# Patient Record
Sex: FEMALE | Race: BLACK OR AFRICAN AMERICAN | NOT HISPANIC OR LATINO | Employment: FULL TIME | URBAN - METROPOLITAN AREA
[De-identification: names, ages, dates, MRNs, and addresses within clinical notes are randomized per-mention and may not be internally consistent; named-entity substitution may affect disease eponyms.]

---

## 2017-09-17 PROBLEM — E55.9 UNSPECIFIED VITAMIN D DEFICIENCY: Status: ACTIVE | Noted: 2017-09-17

## 2017-09-17 PROBLEM — E88.810 DYSMETABOLIC SYNDROME X: Status: ACTIVE | Noted: 2017-09-17

## 2017-09-17 PROBLEM — E78.5 OTHER AND UNSPECIFIED HYPERLIPIDEMIA: Status: ACTIVE | Noted: 2017-09-17

## 2017-09-17 PROBLEM — E03.9 UNSPECIFIED HYPOTHYROIDISM: Status: ACTIVE | Noted: 2017-09-17

## 2017-10-10 PROBLEM — E78.49 OTHER HYPERLIPIDEMIA: Status: ACTIVE | Noted: 2017-09-17

## 2018-06-06 PROBLEM — Z00.00 ROUTINE GENERAL MEDICAL EXAMINATION AT A HEALTH CARE FACILITY: Status: ACTIVE | Noted: 2018-06-06

## 2018-06-06 PROBLEM — Z11.3 SCREENING EXAMINATION FOR VENEREAL DISEASE: Status: ACTIVE | Noted: 2018-06-06

## 2018-09-10 PROBLEM — Z00.00 ROUTINE GENERAL MEDICAL EXAMINATION AT A HEALTH CARE FACILITY: Status: RESOLVED | Noted: 2018-06-06 | Resolved: 2018-09-10

## 2018-09-29 PROBLEM — J32.9 SINUSITIS: Status: ACTIVE | Noted: 2018-09-29

## 2018-10-25 PROBLEM — J34.2 DEVIATED NASAL SEPTUM: Status: ACTIVE | Noted: 2018-10-25

## 2018-10-25 PROBLEM — J30.9 ALLERGIC RHINITIS: Status: ACTIVE | Noted: 2018-10-25

## 2019-07-09 PROBLEM — I10 ESSENTIAL HYPERTENSION, BENIGN: Status: ACTIVE | Noted: 2019-07-09

## 2019-07-15 PROBLEM — D72.810 LYMPHOCYTOPENIA: Status: ACTIVE | Noted: 2019-07-15

## 2019-07-31 PROBLEM — Z71.3 NUTRITIONAL COUNSELING: Status: ACTIVE | Noted: 2019-07-31

## 2019-10-14 PROBLEM — Z00.00 ROUTINE GENERAL MEDICAL EXAMINATION AT A HEALTH CARE FACILITY: Status: RESOLVED | Noted: 2018-06-06 | Resolved: 2019-10-14

## 2020-03-18 PROBLEM — F41.9 ANXIETY: Status: ACTIVE | Noted: 2020-03-18

## 2021-04-29 ENCOUNTER — PATIENT MESSAGE (OUTPATIENT)
Dept: RESEARCH | Facility: HOSPITAL | Age: 51
End: 2021-04-29

## 2021-09-02 PROBLEM — K21.9 GERD (GASTROESOPHAGEAL REFLUX DISEASE): Status: ACTIVE | Noted: 2020-09-22

## 2021-09-16 ENCOUNTER — OFFICE VISIT (OUTPATIENT)
Dept: GASTROENTEROLOGY | Facility: CLINIC | Age: 51
End: 2021-09-16
Payer: COMMERCIAL

## 2021-09-16 VITALS
HEART RATE: 68 BPM | HEIGHT: 62 IN | SYSTOLIC BLOOD PRESSURE: 110 MMHG | OXYGEN SATURATION: 98 % | WEIGHT: 199.06 LBS | DIASTOLIC BLOOD PRESSURE: 74 MMHG | BODY MASS INDEX: 36.63 KG/M2

## 2021-09-16 DIAGNOSIS — E88.810 DYSMETABOLIC SYNDROME X: ICD-10-CM

## 2021-09-16 DIAGNOSIS — Z12.11 COLON CANCER SCREENING: ICD-10-CM

## 2021-09-16 DIAGNOSIS — K59.00 CONSTIPATION, UNSPECIFIED CONSTIPATION TYPE: Primary | ICD-10-CM

## 2021-09-16 PROCEDURE — 3074F SYST BP LT 130 MM HG: CPT | Mod: CPTII,S$GLB,, | Performed by: PHYSICIAN ASSISTANT

## 2021-09-16 PROCEDURE — 1159F MED LIST DOCD IN RCRD: CPT | Mod: CPTII,S$GLB,, | Performed by: PHYSICIAN ASSISTANT

## 2021-09-16 PROCEDURE — 3078F DIAST BP <80 MM HG: CPT | Mod: CPTII,S$GLB,, | Performed by: PHYSICIAN ASSISTANT

## 2021-09-16 PROCEDURE — 99999 PR PBB SHADOW E&M-EST. PATIENT-LVL V: CPT | Mod: PBBFAC,,, | Performed by: PHYSICIAN ASSISTANT

## 2021-09-16 PROCEDURE — 99203 PR OFFICE/OUTPT VISIT, NEW, LEVL III, 30-44 MIN: ICD-10-PCS | Mod: S$GLB,,, | Performed by: PHYSICIAN ASSISTANT

## 2021-09-16 PROCEDURE — 3008F BODY MASS INDEX DOCD: CPT | Mod: CPTII,S$GLB,, | Performed by: PHYSICIAN ASSISTANT

## 2021-09-16 PROCEDURE — 3074F PR MOST RECENT SYSTOLIC BLOOD PRESSURE < 130 MM HG: ICD-10-PCS | Mod: CPTII,S$GLB,, | Performed by: PHYSICIAN ASSISTANT

## 2021-09-16 PROCEDURE — 1159F PR MEDICATION LIST DOCUMENTED IN MEDICAL RECORD: ICD-10-PCS | Mod: CPTII,S$GLB,, | Performed by: PHYSICIAN ASSISTANT

## 2021-09-16 PROCEDURE — 3008F PR BODY MASS INDEX (BMI) DOCUMENTED: ICD-10-PCS | Mod: CPTII,S$GLB,, | Performed by: PHYSICIAN ASSISTANT

## 2021-09-16 PROCEDURE — 99999 PR PBB SHADOW E&M-EST. PATIENT-LVL V: ICD-10-PCS | Mod: PBBFAC,,, | Performed by: PHYSICIAN ASSISTANT

## 2021-09-16 PROCEDURE — 3078F PR MOST RECENT DIASTOLIC BLOOD PRESSURE < 80 MM HG: ICD-10-PCS | Mod: CPTII,S$GLB,, | Performed by: PHYSICIAN ASSISTANT

## 2021-09-16 PROCEDURE — 99203 OFFICE O/P NEW LOW 30 MIN: CPT | Mod: S$GLB,,, | Performed by: PHYSICIAN ASSISTANT

## 2021-09-16 RX ORDER — SOD SULF/POT CHLORIDE/MAG SULF 1.479 G
12 TABLET ORAL DAILY
Qty: 24 TABLET | Refills: 0 | Status: ON HOLD | OUTPATIENT
Start: 2021-09-16 | End: 2021-10-15 | Stop reason: HOSPADM

## 2021-09-28 ENCOUNTER — TELEPHONE (OUTPATIENT)
Dept: ENDOSCOPY | Facility: HOSPITAL | Age: 51
End: 2021-09-28

## 2021-10-12 ENCOUNTER — LAB VISIT (OUTPATIENT)
Dept: PRIMARY CARE CLINIC | Facility: CLINIC | Age: 51
End: 2021-10-12
Payer: COMMERCIAL

## 2021-10-12 DIAGNOSIS — Z12.11 COLON CANCER SCREENING: ICD-10-CM

## 2021-10-12 PROCEDURE — U0005 INFEC AGEN DETEC AMPLI PROBE: HCPCS | Performed by: PHYSICIAN ASSISTANT

## 2021-10-12 PROCEDURE — U0003 INFECTIOUS AGENT DETECTION BY NUCLEIC ACID (DNA OR RNA); SEVERE ACUTE RESPIRATORY SYNDROME CORONAVIRUS 2 (SARS-COV-2) (CORONAVIRUS DISEASE [COVID-19]), AMPLIFIED PROBE TECHNIQUE, MAKING USE OF HIGH THROUGHPUT TECHNOLOGIES AS DESCRIBED BY CMS-2020-01-R: HCPCS | Performed by: PHYSICIAN ASSISTANT

## 2021-10-13 ENCOUNTER — ANESTHESIA EVENT (OUTPATIENT)
Dept: ENDOSCOPY | Facility: HOSPITAL | Age: 51
End: 2021-10-13
Payer: COMMERCIAL

## 2021-10-13 LAB
SARS-COV-2 RNA RESP QL NAA+PROBE: NOT DETECTED
SARS-COV-2- CYCLE NUMBER: NORMAL

## 2021-10-15 ENCOUNTER — HOSPITAL ENCOUNTER (OUTPATIENT)
Facility: HOSPITAL | Age: 51
Discharge: HOME | End: 2021-10-15
Attending: INTERNAL MEDICINE | Admitting: INTERNAL MEDICINE
Payer: COMMERCIAL

## 2021-10-15 ENCOUNTER — ANESTHESIA (OUTPATIENT)
Dept: ENDOSCOPY | Facility: HOSPITAL | Age: 51
End: 2021-10-15
Payer: COMMERCIAL

## 2021-10-15 VITALS
OXYGEN SATURATION: 100 % | HEIGHT: 62 IN | TEMPERATURE: 98 F | WEIGHT: 196.75 LBS | SYSTOLIC BLOOD PRESSURE: 108 MMHG | BODY MASS INDEX: 36.2 KG/M2 | RESPIRATION RATE: 19 BRPM | DIASTOLIC BLOOD PRESSURE: 58 MMHG | HEART RATE: 60 BPM

## 2021-10-15 DIAGNOSIS — Z12.11 ENCOUNTER FOR SCREENING COLONOSCOPY: Primary | ICD-10-CM

## 2021-10-15 LAB
B-HCG UR QL: NEGATIVE
B-HCG UR QL: NEGATIVE
CTP QC/QA: YES
CTP QC/QA: YES
POCT GLUCOSE: 103 MG/DL (ref 70–110)

## 2021-10-15 PROCEDURE — 45380 COLONOSCOPY AND BIOPSY: CPT | Performed by: INTERNAL MEDICINE

## 2021-10-15 PROCEDURE — 88305 TISSUE EXAM BY PATHOLOGIST: CPT | Performed by: PATHOLOGY

## 2021-10-15 PROCEDURE — 63600175 PHARM REV CODE 636 W HCPCS: Performed by: INTERNAL MEDICINE

## 2021-10-15 PROCEDURE — D9220A PRA ANESTHESIA: ICD-10-PCS | Mod: 33,,, | Performed by: NURSE ANESTHETIST, CERTIFIED REGISTERED

## 2021-10-15 PROCEDURE — 27201012 HC FORCEPS, HOT/COLD, DISP: Performed by: INTERNAL MEDICINE

## 2021-10-15 PROCEDURE — 81025 URINE PREGNANCY TEST: CPT | Performed by: INTERNAL MEDICINE

## 2021-10-15 PROCEDURE — 88305 TISSUE EXAM BY PATHOLOGIST: ICD-10-PCS | Mod: 26,,, | Performed by: PATHOLOGY

## 2021-10-15 PROCEDURE — D9220A PRA ANESTHESIA: ICD-10-PCS | Mod: 33,,, | Performed by: ANESTHESIOLOGY

## 2021-10-15 PROCEDURE — 25000003 PHARM REV CODE 250: Performed by: NURSE ANESTHETIST, CERTIFIED REGISTERED

## 2021-10-15 PROCEDURE — D9220A PRA ANESTHESIA: Mod: 33,,, | Performed by: NURSE ANESTHETIST, CERTIFIED REGISTERED

## 2021-10-15 PROCEDURE — 37000009 HC ANESTHESIA EA ADD 15 MINS: Performed by: INTERNAL MEDICINE

## 2021-10-15 PROCEDURE — 63600175 PHARM REV CODE 636 W HCPCS: Performed by: NURSE ANESTHETIST, CERTIFIED REGISTERED

## 2021-10-15 PROCEDURE — 45385 COLONOSCOPY W/LESION REMOVAL: CPT | Performed by: INTERNAL MEDICINE

## 2021-10-15 PROCEDURE — 37000008 HC ANESTHESIA 1ST 15 MINUTES: Performed by: INTERNAL MEDICINE

## 2021-10-15 PROCEDURE — 88305 TISSUE EXAM BY PATHOLOGIST: CPT | Mod: 26,,, | Performed by: PATHOLOGY

## 2021-10-15 PROCEDURE — 45380 COLONOSCOPY AND BIOPSY: CPT | Mod: 59,,, | Performed by: INTERNAL MEDICINE

## 2021-10-15 PROCEDURE — 27201089 HC SNARE, DISP (ANY): Performed by: INTERNAL MEDICINE

## 2021-10-15 PROCEDURE — 82962 GLUCOSE BLOOD TEST: CPT | Performed by: INTERNAL MEDICINE

## 2021-10-15 PROCEDURE — D9220A PRA ANESTHESIA: Mod: 33,,, | Performed by: ANESTHESIOLOGY

## 2021-10-15 PROCEDURE — 45385 COLONOSCOPY W/LESION REMOVAL: CPT | Mod: 33,,, | Performed by: INTERNAL MEDICINE

## 2021-10-15 PROCEDURE — 45380 PR COLONOSCOPY,BIOPSY: ICD-10-PCS | Mod: 59,,, | Performed by: INTERNAL MEDICINE

## 2021-10-15 PROCEDURE — 45385 PR COLONOSCOPY,REMV LESN,SNARE: ICD-10-PCS | Mod: 33,,, | Performed by: INTERNAL MEDICINE

## 2021-10-15 RX ORDER — PROPOFOL 10 MG/ML
VIAL (ML) INTRAVENOUS
Status: DISCONTINUED | OUTPATIENT
Start: 2021-10-15 | End: 2021-10-15

## 2021-10-15 RX ORDER — SODIUM CHLORIDE, SODIUM LACTATE, POTASSIUM CHLORIDE, CALCIUM CHLORIDE 600; 310; 30; 20 MG/100ML; MG/100ML; MG/100ML; MG/100ML
INJECTION, SOLUTION INTRAVENOUS CONTINUOUS
Status: DISCONTINUED | OUTPATIENT
Start: 2021-10-15 | End: 2021-10-15 | Stop reason: HOSPADM

## 2021-10-15 RX ORDER — LIDOCAINE HYDROCHLORIDE 20 MG/ML
INJECTION, SOLUTION EPIDURAL; INFILTRATION; INTRACAUDAL; PERINEURAL
Status: DISCONTINUED | OUTPATIENT
Start: 2021-10-15 | End: 2021-10-15

## 2021-10-15 RX ORDER — SODIUM CHLORIDE 0.9 % (FLUSH) 0.9 %
10 SYRINGE (ML) INJECTION
Status: DISCONTINUED | OUTPATIENT
Start: 2021-10-15 | End: 2021-10-15 | Stop reason: HOSPADM

## 2021-10-15 RX ADMIN — PROPOFOL 100 MG: 10 INJECTION, EMULSION INTRAVENOUS at 06:10

## 2021-10-15 RX ADMIN — PROPOFOL 50 MG: 10 INJECTION, EMULSION INTRAVENOUS at 07:10

## 2021-10-15 RX ADMIN — PROPOFOL 30 MG: 10 INJECTION, EMULSION INTRAVENOUS at 07:10

## 2021-10-15 RX ADMIN — LIDOCAINE HYDROCHLORIDE 100 MG: 20 INJECTION, SOLUTION EPIDURAL; INFILTRATION; INTRACAUDAL; PERINEURAL at 06:10

## 2021-10-15 RX ADMIN — PROPOFOL 20 MG: 10 INJECTION, EMULSION INTRAVENOUS at 07:10

## 2021-10-15 RX ADMIN — SODIUM CHLORIDE, SODIUM LACTATE, POTASSIUM CHLORIDE, AND CALCIUM CHLORIDE: .6; .31; .03; .02 INJECTION, SOLUTION INTRAVENOUS at 06:10

## 2021-10-22 LAB
FINAL PATHOLOGIC DIAGNOSIS: NORMAL
Lab: NORMAL

## 2022-01-18 ENCOUNTER — TELEPHONE (OUTPATIENT)
Dept: ORTHOPEDICS | Facility: CLINIC | Age: 52
End: 2022-01-18
Payer: COMMERCIAL

## 2022-01-28 DIAGNOSIS — M25.512 CHRONIC LEFT SHOULDER PAIN: Primary | ICD-10-CM

## 2022-01-28 DIAGNOSIS — G89.29 CHRONIC LEFT SHOULDER PAIN: Primary | ICD-10-CM

## 2022-01-31 ENCOUNTER — OFFICE VISIT (OUTPATIENT)
Dept: SPORTS MEDICINE | Facility: CLINIC | Age: 52
End: 2022-01-31
Payer: COMMERCIAL

## 2022-01-31 ENCOUNTER — HOSPITAL ENCOUNTER (OUTPATIENT)
Dept: RADIOLOGY | Facility: HOSPITAL | Age: 52
Discharge: HOME | End: 2022-01-31
Attending: PHYSICAL MEDICINE & REHABILITATION
Payer: COMMERCIAL

## 2022-01-31 VITALS — HEIGHT: 62 IN | WEIGHT: 197 LBS | BODY MASS INDEX: 36.25 KG/M2

## 2022-01-31 DIAGNOSIS — M25.512 CHRONIC LEFT SHOULDER PAIN: ICD-10-CM

## 2022-01-31 DIAGNOSIS — G89.29 CHRONIC LEFT SHOULDER PAIN: ICD-10-CM

## 2022-01-31 DIAGNOSIS — M75.112 NONTRAUMATIC INCOMPLETE TEAR OF LEFT ROTATOR CUFF: Primary | ICD-10-CM

## 2022-01-31 PROCEDURE — 3066F PR DOCUMENTATION OF TREATMENT FOR NEPHROPATHY: ICD-10-PCS | Mod: CPTII,S$GLB,, | Performed by: PHYSICAL MEDICINE & REHABILITATION

## 2022-01-31 PROCEDURE — 73030 XR SHOULDER COMPLETE 2 OR MORE VIEWS LEFT: ICD-10-PCS | Mod: 26,LT,, | Performed by: RADIOLOGY

## 2022-01-31 PROCEDURE — 99204 PR OFFICE/OUTPT VISIT, NEW, LEVL IV, 45-59 MIN: ICD-10-PCS | Mod: S$GLB,,, | Performed by: PHYSICAL MEDICINE & REHABILITATION

## 2022-01-31 PROCEDURE — 1159F PR MEDICATION LIST DOCUMENTED IN MEDICAL RECORD: ICD-10-PCS | Mod: CPTII,S$GLB,, | Performed by: PHYSICAL MEDICINE & REHABILITATION

## 2022-01-31 PROCEDURE — 73030 X-RAY EXAM OF SHOULDER: CPT | Mod: TC,LT

## 2022-01-31 PROCEDURE — 3066F NEPHROPATHY DOC TX: CPT | Mod: CPTII,S$GLB,, | Performed by: PHYSICAL MEDICINE & REHABILITATION

## 2022-01-31 PROCEDURE — 99999 PR PBB SHADOW E&M-EST. PATIENT-LVL V: ICD-10-PCS | Mod: PBBFAC,,, | Performed by: PHYSICAL MEDICINE & REHABILITATION

## 2022-01-31 PROCEDURE — 99999 PR PBB SHADOW E&M-EST. PATIENT-LVL V: CPT | Mod: PBBFAC,,, | Performed by: PHYSICAL MEDICINE & REHABILITATION

## 2022-01-31 PROCEDURE — 3061F NEG MICROALBUMINURIA REV: CPT | Mod: CPTII,S$GLB,, | Performed by: PHYSICAL MEDICINE & REHABILITATION

## 2022-01-31 PROCEDURE — 73030 X-RAY EXAM OF SHOULDER: CPT | Mod: 26,LT,, | Performed by: RADIOLOGY

## 2022-01-31 PROCEDURE — 1160F PR REVIEW ALL MEDS BY PRESCRIBER/CLIN PHARMACIST DOCUMENTED: ICD-10-PCS | Mod: CPTII,S$GLB,, | Performed by: PHYSICAL MEDICINE & REHABILITATION

## 2022-01-31 PROCEDURE — 99204 OFFICE O/P NEW MOD 45 MIN: CPT | Mod: S$GLB,,, | Performed by: PHYSICAL MEDICINE & REHABILITATION

## 2022-01-31 PROCEDURE — 3008F PR BODY MASS INDEX (BMI) DOCUMENTED: ICD-10-PCS | Mod: CPTII,S$GLB,, | Performed by: PHYSICAL MEDICINE & REHABILITATION

## 2022-01-31 PROCEDURE — 3061F PR NEG MICROALBUMINURIA RESULT DOCUMENTED/REVIEW: ICD-10-PCS | Mod: CPTII,S$GLB,, | Performed by: PHYSICAL MEDICINE & REHABILITATION

## 2022-01-31 PROCEDURE — 1160F RVW MEDS BY RX/DR IN RCRD: CPT | Mod: CPTII,S$GLB,, | Performed by: PHYSICAL MEDICINE & REHABILITATION

## 2022-01-31 PROCEDURE — 1159F MED LIST DOCD IN RCRD: CPT | Mod: CPTII,S$GLB,, | Performed by: PHYSICAL MEDICINE & REHABILITATION

## 2022-01-31 PROCEDURE — 3008F BODY MASS INDEX DOCD: CPT | Mod: CPTII,S$GLB,, | Performed by: PHYSICAL MEDICINE & REHABILITATION

## 2022-02-01 NOTE — PATIENT INSTRUCTIONS
Assessment:  Gerda Obrien is a 51 y.o. female   Chief Complaint   Patient presents with    Left Shoulder - Pain       Nontraumatic incomplete tear of left rotator cuff    Chronic left shoulder pain  -     Ambulatory referral/consult to Orthopedics  -     MRI Shoulder Without Contrast Left; Future; Expected date: 01/31/2022        Plan:   We personally reviewed her imaging today in clinic and agree with the radiologist's report.   At this point she has had pain for months, and has had no improvement after two months of continuous physical therapy as well as some chiropractic treatment.  She is most interested in pursuing advanced imaging to find the exact cause of this ongoing pain.   Given her inability to abduct greater than 90° I think she may have partial rotator cuff tendon tear.  MRI be the most appropriate next step to determine extent of injury and whether this is a surgical lesion or if it could be treated conservatively.   We will follow up after MRI and may send her to our shoulder surgeon versus trial of corticosteroid injection depending on results of the test.   She will take anti inflammatory in the meantime as needed and recommend she continue her home exercise program.      Follow-up:  After MRI or sooner if there are any problems between now and then.    Thank you for choosing [facility] and Dr. Abbie Smith for your orthopedic & sports medicine care. It is our goal to provide you with exceptional care that will help keep you healthy, active, and get you back in the game.    Please do not hesitate to reach out to us via email, phone, or [employer] with any questions, concerns, or feedback.    If you felt that you received exemplary care today, please consider leaving us feedback on [employer] at:  [email or web address]     If you are experiencing pain/discomfort ,or have questions after 5pm and would like to be connected to  the [facility] on-call team, please call this number and specify which Sports Medicine provider is treating you: [phone]

## 2022-02-02 ENCOUNTER — HOSPITAL ENCOUNTER (OUTPATIENT)
Dept: RADIOLOGY | Facility: HOSPITAL | Age: 52
Discharge: HOME | End: 2022-02-02
Attending: PHYSICAL MEDICINE & REHABILITATION
Payer: COMMERCIAL

## 2022-02-02 DIAGNOSIS — G89.29 CHRONIC LEFT SHOULDER PAIN: ICD-10-CM

## 2022-02-02 DIAGNOSIS — M25.512 CHRONIC LEFT SHOULDER PAIN: ICD-10-CM

## 2022-02-02 PROCEDURE — 73221 MRI JOINT UPR EXTREM W/O DYE: CPT | Mod: TC,PO,LT

## 2022-02-02 PROCEDURE — 73221 MRI JOINT UPR EXTREM W/O DYE: CPT | Mod: 26,LT,, | Performed by: RADIOLOGY

## 2022-02-02 PROCEDURE — 73221 MRI SHOULDER WITHOUT CONTRAST LEFT: ICD-10-PCS | Mod: 26,LT,, | Performed by: RADIOLOGY

## 2022-02-04 ENCOUNTER — TELEPHONE (OUTPATIENT)
Dept: SPORTS MEDICINE | Facility: CLINIC | Age: 52
End: 2022-02-04
Payer: COMMERCIAL

## 2022-02-04 DIAGNOSIS — M25.562 PAIN IN BOTH KNEES, UNSPECIFIED CHRONICITY: Primary | ICD-10-CM

## 2022-02-04 DIAGNOSIS — M25.512 CHRONIC LEFT SHOULDER PAIN: ICD-10-CM

## 2022-02-04 DIAGNOSIS — G89.29 CHRONIC LEFT SHOULDER PAIN: ICD-10-CM

## 2022-02-04 DIAGNOSIS — M25.561 PAIN IN BOTH KNEES, UNSPECIFIED CHRONICITY: Primary | ICD-10-CM

## 2022-02-04 DIAGNOSIS — M75.112 NONTRAUMATIC INCOMPLETE TEAR OF LEFT ROTATOR CUFF: ICD-10-CM

## 2022-02-04 PROBLEM — A60.00 GENITAL HERPES SIMPLEX TYPE 2: Status: ACTIVE | Noted: 2022-02-04

## 2022-02-07 ENCOUNTER — TELEPHONE (OUTPATIENT)
Dept: PHYSICAL MEDICINE AND REHAB | Facility: CLINIC | Age: 52
End: 2022-02-07
Payer: COMMERCIAL

## 2022-02-07 ENCOUNTER — OFFICE VISIT (OUTPATIENT)
Dept: SPORTS MEDICINE | Facility: CLINIC | Age: 52
End: 2022-02-07
Payer: COMMERCIAL

## 2022-02-07 ENCOUNTER — HOSPITAL ENCOUNTER (OUTPATIENT)
Dept: RADIOLOGY | Facility: HOSPITAL | Age: 52
Discharge: HOME | End: 2022-02-07
Attending: PHYSICAL MEDICINE & REHABILITATION
Payer: COMMERCIAL

## 2022-02-07 ENCOUNTER — TELEPHONE (OUTPATIENT)
Dept: SPORTS MEDICINE | Facility: CLINIC | Age: 52
End: 2022-02-07
Payer: COMMERCIAL

## 2022-02-07 VITALS — BODY MASS INDEX: 36.25 KG/M2 | WEIGHT: 197 LBS | HEIGHT: 62 IN

## 2022-02-07 DIAGNOSIS — M76.51 PATELLAR TENDINITIS OF BOTH KNEES: ICD-10-CM

## 2022-02-07 DIAGNOSIS — M54.50 ACUTE LOW BACK PAIN, UNSPECIFIED BACK PAIN LATERALITY, UNSPECIFIED WHETHER SCIATICA PRESENT: ICD-10-CM

## 2022-02-07 DIAGNOSIS — M25.562 PAIN IN BOTH KNEES, UNSPECIFIED CHRONICITY: ICD-10-CM

## 2022-02-07 DIAGNOSIS — M76.52 PATELLAR TENDINITIS OF BOTH KNEES: ICD-10-CM

## 2022-02-07 DIAGNOSIS — M22.2X2 PATELLOFEMORAL PAIN SYNDROME OF BOTH KNEES: Primary | ICD-10-CM

## 2022-02-07 DIAGNOSIS — M25.561 PAIN IN BOTH KNEES, UNSPECIFIED CHRONICITY: ICD-10-CM

## 2022-02-07 DIAGNOSIS — M22.2X1 PATELLOFEMORAL PAIN SYNDROME OF BOTH KNEES: Primary | ICD-10-CM

## 2022-02-07 PROCEDURE — 99999 PR PBB SHADOW E&M-EST. PATIENT-LVL IV: CPT | Mod: PBBFAC,,, | Performed by: PHYSICAL MEDICINE & REHABILITATION

## 2022-02-07 PROCEDURE — 99214 PR OFFICE/OUTPT VISIT, EST, LEVL IV, 30-39 MIN: ICD-10-PCS | Mod: S$GLB,,, | Performed by: PHYSICAL MEDICINE & REHABILITATION

## 2022-02-07 PROCEDURE — 3066F PR DOCUMENTATION OF TREATMENT FOR NEPHROPATHY: ICD-10-PCS | Mod: CPTII,S$GLB,, | Performed by: PHYSICAL MEDICINE & REHABILITATION

## 2022-02-07 PROCEDURE — 1160F PR REVIEW ALL MEDS BY PRESCRIBER/CLIN PHARMACIST DOCUMENTED: ICD-10-PCS | Mod: CPTII,S$GLB,, | Performed by: PHYSICAL MEDICINE & REHABILITATION

## 2022-02-07 PROCEDURE — 3061F NEG MICROALBUMINURIA REV: CPT | Mod: CPTII,S$GLB,, | Performed by: PHYSICAL MEDICINE & REHABILITATION

## 2022-02-07 PROCEDURE — 99214 OFFICE O/P EST MOD 30 MIN: CPT | Mod: S$GLB,,, | Performed by: PHYSICAL MEDICINE & REHABILITATION

## 2022-02-07 PROCEDURE — 3008F PR BODY MASS INDEX (BMI) DOCUMENTED: ICD-10-PCS | Mod: CPTII,S$GLB,, | Performed by: PHYSICAL MEDICINE & REHABILITATION

## 2022-02-07 PROCEDURE — 3066F NEPHROPATHY DOC TX: CPT | Mod: CPTII,S$GLB,, | Performed by: PHYSICAL MEDICINE & REHABILITATION

## 2022-02-07 PROCEDURE — 99999 PR PBB SHADOW E&M-EST. PATIENT-LVL IV: ICD-10-PCS | Mod: PBBFAC,,, | Performed by: PHYSICAL MEDICINE & REHABILITATION

## 2022-02-07 PROCEDURE — 1159F MED LIST DOCD IN RCRD: CPT | Mod: CPTII,S$GLB,, | Performed by: PHYSICAL MEDICINE & REHABILITATION

## 2022-02-07 PROCEDURE — 3061F PR NEG MICROALBUMINURIA RESULT DOCUMENTED/REVIEW: ICD-10-PCS | Mod: CPTII,S$GLB,, | Performed by: PHYSICAL MEDICINE & REHABILITATION

## 2022-02-07 PROCEDURE — 73564 X-RAY EXAM KNEE 4 OR MORE: CPT | Mod: 26,,, | Performed by: RADIOLOGY

## 2022-02-07 PROCEDURE — 73564 X-RAY EXAM KNEE 4 OR MORE: CPT | Mod: TC,50

## 2022-02-07 PROCEDURE — 3008F BODY MASS INDEX DOCD: CPT | Mod: CPTII,S$GLB,, | Performed by: PHYSICAL MEDICINE & REHABILITATION

## 2022-02-07 PROCEDURE — 1159F PR MEDICATION LIST DOCUMENTED IN MEDICAL RECORD: ICD-10-PCS | Mod: CPTII,S$GLB,, | Performed by: PHYSICAL MEDICINE & REHABILITATION

## 2022-02-07 PROCEDURE — 1160F RVW MEDS BY RX/DR IN RCRD: CPT | Mod: CPTII,S$GLB,, | Performed by: PHYSICAL MEDICINE & REHABILITATION

## 2022-02-07 PROCEDURE — 73564 XR KNEE ORTHO BILAT WITH FLEXION: ICD-10-PCS | Mod: 26,,, | Performed by: RADIOLOGY

## 2022-02-07 NOTE — PROGRESS NOTES
SPORTS MEDICINE / PM&R Follow Up Visit :    Referring Physician: No ref. provider found    Chief Complaint   Patient presents with    Left Knee - Pain    Right Knee - Pain       HPI: This is a 51 y.o.  female being seen in clinic today for evaluation of Pain of the Left Knee and Pain of the Right Knee    The problem began 2 years ago.  She feels sharp, pressure, constant and pain with movement pain around her bilateral knee . The symptoms are worsening. She has tried nothing without improvement. She has not tried therapy.      History obtained from patient.  Patient states that she has had bilateral knee pain for the last 2 years and denies any specific RODNEY.  She states that exercising, stair climbing, and pressure applied to knees will increase her pain as well as prolonged weight bearing.      Past family, medical, social, surgical history, and vital signs reviewed in chart.      General    Nursing note and vitals reviewed.  Constitutional: She is oriented to person, place, and time. She appears well-developed and well-nourished.   HENT:   Head: Normocephalic and atraumatic.   Eyes: Conjunctivae and EOM are normal. Pupils are equal, round, and reactive to light.   Neck: Neck supple.   Cardiovascular: Intact distal pulses.    Pulmonary/Chest: Effort normal. No respiratory distress.   Abdominal: She exhibits no distension.   Neurological: She is alert and oriented to person, place, and time. She has normal reflexes.   Psychiatric: She has a normal mood and affect.     General Musculoskeletal Exam   Gait: normal       Right Knee Exam   Right knee exam is normal.    Inspection   Erythema: absent  Swelling: absent  Effusion: absent    Tenderness   The patient is tender to palpation of the patella, patellar tendon, lateral retinaculum and medial retinaculum.    Range of Motion   The patient has normal right knee ROM.    Tests   Meniscus   Walter:  Medial - negative Lateral - negative  Ligament Examination Lachman:  normal (-1 to 2mm) PCL-Posterior Drawer: normal (0 to 2mm)     MCL - Valgus: normal (0 to 2mm)  LCL - Varus: normal  Patella   Patellar apprehension: negative  Patellar Tracking: normal    Other   Sensation: normal    Left Knee Exam   Left knee exam is normal.    Inspection   Erythema: absent  Swelling: absent  Effusion: absent    Tenderness   The patient tender to palpation of the patella, lateral retinaculum, patellar tendon and medial retinaculum.    Range of Motion   The patient has normal left knee ROM.    Tests   Meniscus   Walter:  Medial - negative Lateral - negative  Stability Lachman: normal (-1 to 2mm) PCL-Posterior Drawer: normal (0 to 2mm)  MCL - Valgus: normal (0 to 2mm)  LCL - Varus: normal (0 to 2mm)  Patella   Patellar apprehension: negative  Patellar Tracking: normal    Other   Sensation: normal    Right Hip Exam   Right hip exam is normal.     Tests   Pain w/ forced internal rotation (LISSA): absent  Left Hip Exam   Left hip exam is normal.    Tests   Pain w/ forced internal rotation (LISSA): absent          Muscle Strength   Right Lower Extremity   Hip Abduction: 5/5   Hip Adduction: 5/5   Hip Flexion: 5/5   Quadriceps:  5/5   Hamstrin/5   Left Lower Extremity   Hip Abduction: 5/5   Hip Adduction: 5/5   Hip Flexion: 5/5   Quadriceps:  5/5   Hamstrin/5     Reflexes     Left Side  Achilles:  2+  Quadriceps:  2+    Right Side   Achilles:  2+  Quadriceps:  2+    Vascular Exam     Right Pulses  Dorsalis Pedis:      2+          Left Pulses  Dorsalis Pedis:      2+              X-ray Knee Ortho Bilateral with Flexion  Narrative: EXAMINATION:  XR KNEE ORTHO BILAT WITH FLEXION    CLINICAL HISTORY:  Pain in right knee    TECHNIQUE:  AP standing of both knees, PA flexion standing views of both knees, and Merchant views of both knees were performed.  Lateral views of both knees were also performed.    COMPARISON:  None    FINDINGS:  No fracture or dislocation.  No effusion.  Mild medial  compartment narrowing with mild marginal spurring.  Soft tissues are normal  Impression: Please see above    Electronically signed by: Chavez Gerard MD  Date:    02/07/2022  Time:    10:42            Patient Instructions     Assessment:  Gerda Obrien is a 51 y.o. female   Chief Complaint   Patient presents with    Left Knee - Pain    Right Knee - Pain       Acute low back pain, unspecified back pain laterality, unspecified whether sciatica present  -     Ambulatory referral/consult to Back & Spine Clinic; Future; Expected date: 02/14/2022    Patellofemoral pain syndrome of both knees    Patellar tendinitis of both knees        Plan:   Physical therapy for bilateral knee pain   Referral to Back and Spine Clinic / Pain Management   Referral to Physical Therapy BRPT           Follow-up: 8 weeks or sooner if there are any problems between now and then.    Thank you for choosing [facility] and Dr. Abbie Smith for your orthopedic & sports medicine care. It is our goal to provide you with exceptional care that will help keep you healthy, active, and get you back in the game.    Please do not hesitate to reach out to us via email, phone, or [employer] with any questions, concerns, or feedback.    If you felt that you received exemplary care today, please consider leaving us feedback on [employer] at:  [email or web address]     If you are experiencing pain/discomfort ,or have questions after 5pm and would like to be connected to the [facility] on-call team, please call this number and specify which Sports Medicine provider is treating you: [phone]   Patient Education       Patellofemoral Pain   The Basics   Written by the doctors and editors at [facility]   What is patellofemoral pain? -- Patellofemoral pain is a condition that causes pain in the front of the knee. It involves the knee cap, which doctors call  "the "patella" (figure 1).  Many times, patellofemoral pain happens in runners or other people who put a lot of pressure on their knees. But it can also happen when a person's knee cap gets out of line with the knee joint.  What are the symptoms of patellofemoral pain? -- Patellofemoral pain causes pain in the front of the knee, or around or behind the knee cap. The pain can come on slowly or quickly. The pain is usually worse when you squat, run, or sit for a long time. You might also feel as if your knee is giving out.  Is there a test for patellofemoral pain? -- No test can tell for sure if you have patellofemoral pain. But your doctor or nurse should be able to tell if you have the condition by learning about your symptoms and doing an exam.  To make sure your symptoms aren't caused by another condition, your doctor might order an X-ray or imaging test of your knee. Imaging tests create pictures of the inside of the body.  How is patellofemoral pain treated? -- Long-term, the most important treatment is strengthening the muscles around your knees and hips. A physical therapist (exercise expert) can teach you exercises to do. This usually also involves strengthening the "core" muscles in your belly and lower back.  When you are having pain, there are some things that might help, such as:  · Resting your knee and avoiding activities or movements that make the pain worse  · Taking nonsteroidal antiinflammatory drugs, also called "NSAIDs" - NSAIDs are a large group of medicines that includes ibuprofen (sample brand names: Advil, Motrin) and naproxen (sample brand names: Aleve, Naprosyn). While they can help relieve short term pain, they should not be used to treat patellofemoral pain for longer than 2 or 3 weeks.  · Putting ice on your knee when it hurts or after activities that cause pain - You can put a cold gel pack, bag of ice, or bag of frozen vegetables on the painful area every 1 to 2 hours, for 15 minutes " each time. Put a thin towel between the ice (or other cold object) and your skin.  Your doctor might also recommend the following, along with physical therapy:  · Wear a knee brace to support your knee.  · Tape up your knee in a certain way to support your knee.  · Wear special shoe inserts made to fit your foot (to keep your foot from turning in or out too much).  Your symptoms will most likely improve with treatment, especially physical therapy. If they don't, your doctor might have you see a knee specialist to discuss treating your condition with surgery. But this is rare.  How can I prevent getting patellofemoral pain again? -- You can reduce your chances of getting this condition again by doing the exercises and stretches that your doctor or physical therapist shows you. Strengthening your muscles helps reduce the amount of stress on your knees.  All topics are updated as new evidence becomes available and our peer review process is complete.  This topic retrieved from [employer] on: Sep 21, 2021.  Topic 38631 Version 9.0  Release: 29.4.2 - C29.263  © 2021 UpToDate, Inc. and/or its affiliates. All rights reserved.  figure 1: Right knee     Graphic 54461 Version 2.0    Consumer Information Use and Disclaimer   This information is not specific medical advice and does not replace information you receive from your health care provider. This is only a brief summary of general information. It does NOT include all information about conditions, illnesses, injuries, tests, procedures, treatments, therapies, discharge instructions or life-style choices that may apply to you. You must talk with your health care provider for complete information about your health and treatment options. This information should not be used to decide whether or not to accept your health care provider's advice, instructions or recommendations. Only your health care provider has the knowledge and training to provide advice that is right for you.  The use of this information is governed by the [employer] End User License Agreement, available at https://www.[employer]/en/solutions/HapBoo/about/cee.The use of [employer] content is governed by the [employer] Terms of Use. ©2021 UpToDate, Inc. All rights reserved.  Copyright   © 2021 UpToDate, Inc. and/or its affiliates. All rights reserved.  Patient Education       Overuse Injuries   About this topic   Overuse injuries can happen to your bones, nerves, muscles, joints, tendons, or ligaments. If you overdo an exercise or activity you may notice swelling, stiffness, or soreness. These are the first signs of overuse.  What are the causes?   Overuse injuries may be caused by:  · Doing the same activity over and over  · Not giving your body a chance to rest or recover after an activity  · Doing too much exercise.  · Poor training  What can make this more likely to happen?   · Not being flexible around your joint  · Some kinds of body alignment, like flat feet or bowlegs  · Tight or weak muscles  · Old injuries  · Kind of equipment you use to train  What are the main signs?   The first sign of an overuse injury may be pain. This may go away when you warm up. If your problem continues, your pain may go away at first, but then come back after you are finished with your activity. Later, you may start to have more pain with your activity. Finally, you may have pain all the time or problems using your body part. Other signs might include swelling, redness, stiffness, muscle cramping, or spasm.  How does the doctor diagnose this health problem?   The doctor will ask you questions about your health history and do an exam. The doctor may have you move your sore body part. The doctor may order:  · X-rays  · MRI or CT scan  · Lab tests  How does the doctor treat this health problem?   Your doctor will first want you to stop doing what is causing the pain or stress to your body. You may not be able to stop it completely but  try to limit the amount of time you do it. You can also change how you do the activity that is bothering you. Take short breaks more often. Your doctor may suggest:  · Rest  · Ice  · Splints or elastic bandages for support  · Physical therapy  · Chiropractic care  · Massage  · Surgery  What drugs may be needed?   The doctor may order drugs to:  · Help with pain and swelling  · Help relax muscle spasm  The doctor may give you a shot of an anti-inflammatory drug called a corticosteroid. This will help with swelling.  Helpful tips   · Take frequent rest breaks when doing the same motion or activity. Vary tasks and exercises if possible.  · Stay active and work out to keep your muscles strong and flexible.  · Warm up slowly and stretch after you work out. Use good ways to train, such as slowly adding to how far you run. Do not work out if you are overly tired. Take extra care if working out in very cold or very hot weather.  · Vary the activities you do and how you train.  · Wear the right equipment when playing sports.  · Always maintain good posture.  · Keep a healthy weight. Being heavy puts more stress on your joints and then you are more likely to get hurt.  · Wear supportive shoes. If your feet are flat, talk with your doctor about getting inserts or orthotics for your shoes.  · Request a workplace assessment so that adjustments can be made to make your tasks more comfortable.  Where can I learn more?   American Academy of Family Physicians  https://familydoctor.org/common-sports-injuries/   NHS Choices  https://www.nhs.uk/conditions/repetitive-strain-injury-rsi/   Last Reviewed Date   2020-10-09  Consumer Information Use and Disclaimer   This information is not specific medical advice and does not replace information you receive from your health care provider. This is only a brief summary of general information. It does NOT include all information about conditions, illnesses, injuries, tests, procedures,  treatments, therapies, discharge instructions or life-style choices that may apply to you. You must talk with your health care provider for complete information about your health and treatment options. This information should not be used to decide whether or not to accept your health care providers advice, instructions or recommendations. Only your health care provider has the knowledge and training to provide advice that is right for you.  Copyright   Copyright © 2021 UpToDate, Inc. and its affiliates and/or licensors. All rights reserved.         Disclaimer: This note was prepared using a voice recognition system and is likely to have sound alike errors within the text.     Abbie Smith M.D.  Sports Medicine    I, Jeni Rodney, acted as a scribe for Abbie Smith MD for the duration of this office visit.

## 2022-02-07 NOTE — PROGRESS NOTES
SPORTS MEDICINE / PM&R New Patient Visit :    Referring Physician: No ref. provider found    Chief Complaint   Patient presents with    Left Knee - Pain    Right Knee - Pain       HPI: This is a 51 y.o.  female being seen in clinic today for evaluation of Pain of the Left Knee and Pain of the Right Knee      The problem began 2 years ago.  She feels sharp, pressure, constant and pain with movement pain around her bilateral knee . The symptoms are worsening. She has tried nothing without improvement. She has not tried therapy. ***    History obtained from patient.    Past family, medical, social, surgical history, and vital signs reviewed in chart.    Ortho/SPM Exam    X-ray Knee Ortho Bilateral with Flexion  Narrative: EXAMINATION:  XR KNEE ORTHO BILAT WITH FLEXION    CLINICAL HISTORY:  Pain in right knee    TECHNIQUE:  AP standing of both knees, PA flexion standing views of both knees, and Merchant views of both knees were performed.  Lateral views of both knees were also performed.    COMPARISON:  None    FINDINGS:  No fracture or dislocation.  No effusion.  Mild medial compartment narrowing with mild marginal spurring.  Soft tissues are normal  Impression: Please see above    Electronically signed by: Chavez Gerard MD  Date:    02/07/2022  Time:    10:42         There are no Patient Instructions on file for this visit.    Disclaimer: This note was prepared using a voice recognition system and is likely to have sound alike errors within the text.     Abbie Smith M.D.  Sports Medicine

## 2022-02-07 NOTE — TELEPHONE ENCOUNTER
Contacted patient regarding referral with Back & Spine. Scheduled appointment.  Ashley Shin RN   Protopic Counseling: Patient may experience a mild burning sensation during topical application. Protopic is not approved in children less than 2 years of age. There have been case reports of hematologic and skin malignancies in patients using topical calcineurin inhibitors although causality is questionable.

## 2022-02-07 NOTE — PATIENT INSTRUCTIONS
"Assessment:  Gerda Obrien is a 51 y.o. female   Chief Complaint   Patient presents with    Left Knee - Pain    Right Knee - Pain       Acute low back pain, unspecified back pain laterality, unspecified whether sciatica present  -     Ambulatory referral/consult to [facility]; Future; Expected date: 02/14/2022    Patellofemoral pain syndrome of both knees    Patellar tendinitis of both knees        Plan:   Physical therapy for bilateral knee pain   Referral to [facility] / Pain Management   Referral to Physical Therapy BRPT           Follow-up: 8 weeks or sooner if there are any problems between now and then.    Thank you for choosing [facility] and Dr. Abbie Smith for your orthopedic & sports medicine care. It is our goal to provide you with exceptional care that will help keep you healthy, active, and get you back in the game.    Please do not hesitate to reach out to us via email, phone, or MyChart with any questions, concerns, or feedback.    If you felt that you received exemplary care today, please consider leaving us feedback on [employer] at:  [email or web address]     If you are experiencing pain/discomfort ,or have questions after 5pm and would like to be connected to the [facility] on-call team, please call this number and specify which Sports Medicine provider is treating you: [phone]   Patient Education       Patellofemoral Pain   The Basics   Written by the doctors and editors at [facility]   What is patellofemoral pain? -- Patellofemoral pain is a condition that causes pain in the front of the knee. It involves the knee cap, which doctors call the "patella" (figure 1).  Many times, patellofemoral pain happens in runners or other people who put a lot of pressure on their knees. But it can also happen when a person's knee cap gets out of line with the knee " "joint.  What are the symptoms of patellofemoral pain? -- Patellofemoral pain causes pain in the front of the knee, or around or behind the knee cap. The pain can come on slowly or quickly. The pain is usually worse when you squat, run, or sit for a long time. You might also feel as if your knee is giving out.  Is there a test for patellofemoral pain? -- No test can tell for sure if you have patellofemoral pain. But your doctor or nurse should be able to tell if you have the condition by learning about your symptoms and doing an exam.  To make sure your symptoms aren't caused by another condition, your doctor might order an X-ray or imaging test of your knee. Imaging tests create pictures of the inside of the body.  How is patellofemoral pain treated? -- Long-term, the most important treatment is strengthening the muscles around your knees and hips. A physical therapist (exercise expert) can teach you exercises to do. This usually also involves strengthening the "core" muscles in your belly and lower back.  When you are having pain, there are some things that might help, such as:  · Resting your knee and avoiding activities or movements that make the pain worse  · Taking nonsteroidal antiinflammatory drugs, also called "NSAIDs" - NSAIDs are a large group of medicines that includes ibuprofen (sample brand names: Advil, Motrin) and naproxen (sample brand names: Aleve, Naprosyn). While they can help relieve short term pain, they should not be used to treat patellofemoral pain for longer than 2 or 3 weeks.  · Putting ice on your knee when it hurts or after activities that cause pain - You can put a cold gel pack, bag of ice, or bag of frozen vegetables on the painful area every 1 to 2 hours, for 15 minutes each time. Put a thin towel between the ice (or other cold object) and your skin.  Your doctor might also recommend the following, along with physical therapy:  · Wear a knee brace to support your knee.  · Tape up your " knee in a certain way to support your knee.  · Wear special shoe inserts made to fit your foot (to keep your foot from turning in or out too much).  Your symptoms will most likely improve with treatment, especially physical therapy. If they don't, your doctor might have you see a knee specialist to discuss treating your condition with surgery. But this is rare.  How can I prevent getting patellofemoral pain again? -- You can reduce your chances of getting this condition again by doing the exercises and stretches that your doctor or physical therapist shows you. Strengthening your muscles helps reduce the amount of stress on your knees.  All topics are updated as new evidence becomes available and our peer review process is complete.  This topic retrieved from [employer] on: Sep 21, 2021.  Topic 63101 Version 9.0  Release: 29.4.2 - C29.263  © 2021 UpToDate, Inc. and/or its affiliates. All rights reserved.  figure 1: Right knee     Graphic 20598 Version 2.0    Consumer Information Use and Disclaimer   This information is not specific medical advice and does not replace information you receive from your health care provider. This is only a brief summary of general information. It does NOT include all information about conditions, illnesses, injuries, tests, procedures, treatments, therapies, discharge instructions or life-style choices that may apply to you. You must talk with your health care provider for complete information about your health and treatment options. This information should not be used to decide whether or not to accept your health care provider's advice, instructions or recommendations. Only your health care provider has the knowledge and training to provide advice that is right for you. The use of this information is governed by the [employer] End User License Agreement, available at https://[email or web address]/en/[email or web address]/about/cee.The use of [employer] content is governed by the [employer]  Terms of Use. ©2021 UpToDate, Inc. All rights reserved.  Copyright   © 2021 Up[employer]Date, Inc. and/or its affiliates. All rights reserved.  Patient Education       Overuse Injuries   About this topic   Overuse injuries can happen to your bones, nerves, muscles, joints, tendons, or ligaments. If you overdo an exercise or activity you may notice swelling, stiffness, or soreness. These are the first signs of overuse.  What are the causes?   Overuse injuries may be caused by:  · Doing the same activity over and over  · Not giving your body a chance to rest or recover after an activity  · Doing too much exercise.  · Poor training  What can make this more likely to happen?   · Not being flexible around your joint  · Some kinds of body alignment, like flat feet or bowlegs  · Tight or weak muscles  · Old injuries  · Kind of equipment you use to train  What are the main signs?   The first sign of an overuse injury may be pain. This may go away when you warm up. If your problem continues, your pain may go away at first, but then come back after you are finished with your activity. Later, you may start to have more pain with your activity. Finally, you may have pain all the time or problems using your body part. Other signs might include swelling, redness, stiffness, muscle cramping, or spasm.  How does the doctor diagnose this health problem?   The doctor will ask you questions about your health history and do an exam. The doctor may have you move your sore body part. The doctor may order:  · X-rays  · MRI or CT scan  · Lab tests  How does the doctor treat this health problem?   Your doctor will first want you to stop doing what is causing the pain or stress to your body. You may not be able to stop it completely but try to limit the amount of time you do it. You can also change how you do the activity that is bothering you. Take short breaks more often. Your doctor may suggest:  · Rest  · Ice  · Splints or elastic bandages for  support  · Physical therapy  · Chiropractic care  · Massage  · Surgery  What drugs may be needed?   The doctor may order drugs to:  · Help with pain and swelling  · Help relax muscle spasm  The doctor may give you a shot of an anti-inflammatory drug called a corticosteroid. This will help with swelling.  Helpful tips   · Take frequent rest breaks when doing the same motion or activity. Vary tasks and exercises if possible.  · Stay active and work out to keep your muscles strong and flexible.  · Warm up slowly and stretch after you work out. Use good ways to train, such as slowly adding to how far you run. Do not work out if you are overly tired. Take extra care if working out in very cold or very hot weather.  · Vary the activities you do and how you train.  · Wear the right equipment when playing sports.  · Always maintain good posture.  · Keep a healthy weight. Being heavy puts more stress on your joints and then you are more likely to get hurt.  · Wear supportive shoes. If your feet are flat, talk with your doctor about getting inserts or orthotics for your shoes.  · Request a workplace assessment so that adjustments can be made to make your tasks more comfortable.  Where can I learn more?   [employer]  https://familydoctor.org/common-sports-injuries/   NHS Choices  https://www.nhs.uk/conditions/repetitive-strain-injury-rsi/   Last Reviewed Date   2020-10-09  Consumer Information Use and Disclaimer   This information is not specific medical advice and does not replace information you receive from your health care provider. This is only a brief summary of general information. It does NOT include all information about conditions, illnesses, injuries, tests, procedures, treatments, therapies, discharge instructions or life-style choices that may apply to you. You must talk with your health care provider for complete information about your health and treatment options. This information  should not be used to decide whether or not to accept your health care providers advice, instructions or recommendations. Only your health care provider has the knowledge and training to provide advice that is right for you.  Copyright   Copyright © 2021 UpToDate, Inc. and its affiliates and/or licensors. All rights reserved.

## 2022-02-15 ENCOUNTER — OFFICE VISIT (OUTPATIENT)
Dept: PHYSICAL MEDICINE AND REHAB | Facility: CLINIC | Age: 52
End: 2022-02-15
Payer: COMMERCIAL

## 2022-02-15 ENCOUNTER — HOSPITAL ENCOUNTER (OUTPATIENT)
Dept: RADIOLOGY | Facility: HOSPITAL | Age: 52
Discharge: HOME | End: 2022-02-15
Attending: PHYSICAL MEDICINE & REHABILITATION
Payer: COMMERCIAL

## 2022-02-15 ENCOUNTER — PATIENT MESSAGE (OUTPATIENT)
Dept: PHYSICAL MEDICINE AND REHAB | Facility: CLINIC | Age: 52
End: 2022-02-15

## 2022-02-15 VITALS
RESPIRATION RATE: 12 BRPM | SYSTOLIC BLOOD PRESSURE: 122 MMHG | DIASTOLIC BLOOD PRESSURE: 78 MMHG | WEIGHT: 197 LBS | BODY MASS INDEX: 36.25 KG/M2 | HEIGHT: 62 IN | HEART RATE: 69 BPM

## 2022-02-15 DIAGNOSIS — M47.816 LUMBAR FACET ARTHROPATHY: Primary | ICD-10-CM

## 2022-02-15 DIAGNOSIS — M46.1 BILATERAL SACROILIITIS: ICD-10-CM

## 2022-02-15 DIAGNOSIS — M47.816 LUMBAR FACET ARTHROPATHY: ICD-10-CM

## 2022-02-15 PROBLEM — J32.9 SINUSITIS: Status: RESOLVED | Noted: 2018-09-29 | Resolved: 2022-02-15

## 2022-02-15 PROCEDURE — 72114 XR LUMBAR SPINE 5 VIEW WITH FLEX AND EXT: ICD-10-PCS | Mod: 26,,, | Performed by: RADIOLOGY

## 2022-02-15 PROCEDURE — 99203 OFFICE O/P NEW LOW 30 MIN: CPT | Mod: S$GLB,,, | Performed by: PHYSICAL MEDICINE & REHABILITATION

## 2022-02-15 PROCEDURE — 72114 X-RAY EXAM L-S SPINE BENDING: CPT | Mod: TC

## 2022-02-15 PROCEDURE — 73521 X-RAY EXAM HIPS BI 2 VIEWS: CPT | Mod: 26,,, | Performed by: RADIOLOGY

## 2022-02-15 PROCEDURE — 72114 X-RAY EXAM L-S SPINE BENDING: CPT | Mod: 26,,, | Performed by: RADIOLOGY

## 2022-02-15 PROCEDURE — 1159F MED LIST DOCD IN RCRD: CPT | Mod: CPTII,S$GLB,, | Performed by: PHYSICAL MEDICINE & REHABILITATION

## 2022-02-15 PROCEDURE — 99999 PR PBB SHADOW E&M-EST. PATIENT-LVL V: CPT | Mod: PBBFAC,,, | Performed by: PHYSICAL MEDICINE & REHABILITATION

## 2022-02-15 PROCEDURE — 1160F RVW MEDS BY RX/DR IN RCRD: CPT | Mod: CPTII,S$GLB,, | Performed by: PHYSICAL MEDICINE & REHABILITATION

## 2022-02-15 PROCEDURE — 3061F PR NEG MICROALBUMINURIA RESULT DOCUMENTED/REVIEW: ICD-10-PCS | Mod: CPTII,S$GLB,, | Performed by: PHYSICAL MEDICINE & REHABILITATION

## 2022-02-15 PROCEDURE — 3008F BODY MASS INDEX DOCD: CPT | Mod: CPTII,S$GLB,, | Performed by: PHYSICAL MEDICINE & REHABILITATION

## 2022-02-15 PROCEDURE — 99999 PR PBB SHADOW E&M-EST. PATIENT-LVL V: ICD-10-PCS | Mod: PBBFAC,,, | Performed by: PHYSICAL MEDICINE & REHABILITATION

## 2022-02-15 PROCEDURE — 3008F PR BODY MASS INDEX (BMI) DOCUMENTED: ICD-10-PCS | Mod: CPTII,S$GLB,, | Performed by: PHYSICAL MEDICINE & REHABILITATION

## 2022-02-15 PROCEDURE — 99203 PR OFFICE/OUTPT VISIT, NEW, LEVL III, 30-44 MIN: ICD-10-PCS | Mod: S$GLB,,, | Performed by: PHYSICAL MEDICINE & REHABILITATION

## 2022-02-15 PROCEDURE — 1160F PR REVIEW ALL MEDS BY PRESCRIBER/CLIN PHARMACIST DOCUMENTED: ICD-10-PCS | Mod: CPTII,S$GLB,, | Performed by: PHYSICAL MEDICINE & REHABILITATION

## 2022-02-15 PROCEDURE — 3078F PR MOST RECENT DIASTOLIC BLOOD PRESSURE < 80 MM HG: ICD-10-PCS | Mod: CPTII,S$GLB,, | Performed by: PHYSICAL MEDICINE & REHABILITATION

## 2022-02-15 PROCEDURE — 73521 XR HIPS BILATERAL 2 VIEW INCL AP PELVIS: ICD-10-PCS | Mod: 26,,, | Performed by: RADIOLOGY

## 2022-02-15 PROCEDURE — 1159F PR MEDICATION LIST DOCUMENTED IN MEDICAL RECORD: ICD-10-PCS | Mod: CPTII,S$GLB,, | Performed by: PHYSICAL MEDICINE & REHABILITATION

## 2022-02-15 PROCEDURE — 3066F PR DOCUMENTATION OF TREATMENT FOR NEPHROPATHY: ICD-10-PCS | Mod: CPTII,S$GLB,, | Performed by: PHYSICAL MEDICINE & REHABILITATION

## 2022-02-15 PROCEDURE — 3074F SYST BP LT 130 MM HG: CPT | Mod: CPTII,S$GLB,, | Performed by: PHYSICAL MEDICINE & REHABILITATION

## 2022-02-15 PROCEDURE — 3078F DIAST BP <80 MM HG: CPT | Mod: CPTII,S$GLB,, | Performed by: PHYSICAL MEDICINE & REHABILITATION

## 2022-02-15 PROCEDURE — 3066F NEPHROPATHY DOC TX: CPT | Mod: CPTII,S$GLB,, | Performed by: PHYSICAL MEDICINE & REHABILITATION

## 2022-02-15 PROCEDURE — 3061F NEG MICROALBUMINURIA REV: CPT | Mod: CPTII,S$GLB,, | Performed by: PHYSICAL MEDICINE & REHABILITATION

## 2022-02-15 PROCEDURE — 73521 X-RAY EXAM HIPS BI 2 VIEWS: CPT | Mod: TC

## 2022-02-15 PROCEDURE — 3074F PR MOST RECENT SYSTOLIC BLOOD PRESSURE < 130 MM HG: ICD-10-PCS | Mod: CPTII,S$GLB,, | Performed by: PHYSICAL MEDICINE & REHABILITATION

## 2022-02-15 NOTE — PATIENT INSTRUCTIONS
Patient Education       Sacroiliac Joint Pain   About this topic   The spine ends in a set of 5 fused bones. They are called the sacrum. They join the pelvis at the sacroiliac joint. This is also called the SI joint. Strong bands of tissue called ligaments hold this joint together. Normally, there is very little movement at this joint. Its job is to absorb shock and take stress off of the spine. You can have SI joint pain if this joint is irritated.  What are the causes?   Sometimes, the exact cause of SI pain is unknown. It is not always known if the pain is in the joint or in the ligaments around the joint. The pain may be caused by wear and tear on the joint from arthritis. Pregnancy causes this joint to become looser. Sometimes, the pain may be caused by swelling from problems like gout or an injury. Infection or a fracture can also cause SI pain.   What can make this more likely to happen?   You are more likely to have this problem if you have had an injury to your spine, pelvis, or buttocks. Someone with a history of being pregnant a few times is more likely to have problems with her SI joint. Legs that are not the same length can cause pain as well. Some infections may cause problems with the SI joint.  What are the main signs?   · Pain in the lower back or buttocks. It may also be felt in the hips or pelvis. Some people feel the pain in the groin or back of the legs. This pain is often worse with activity like climbing stairs or standing for a long time.  · Numbness or tingling in the upper leg  · Feeling of weakness in the leg  · Stiffness  · Feeling unstable when moving  How does the doctor diagnose this health problem?   The doctor will do an exam and feel around your lower back. Your doctor will have you bend and twist at the waist to see what makes the pain worse. Your doctor may have you move and push and pull on your legs to test your motion and strength. Your doctor will check if the muscles in the  back or legs are tight. Your doctor may check the feeling and reflexes in your legs.  The doctor may order:  · X-ray  · CT or MRI scan  · Bone scan  · Lab tests  · Diagnostic injection ? injecting a drug into the joint to see if relief is given  How does the doctor treat this health problem?   · Rest from activities that make the problem worse  · Ice  · Heat may be used later but not right away. Heat can make swelling worse.  · Special belt worn low on the hips called an SI belt. It helps to hold the joint tightly together.  · Electrical stimulation  · Exercises  · Chiropractic manipulation  · Radiofrequency ablation ? A treatment where small nerves around the joint are burned so they are numb. This does not always work. It may only last for a few years.  · Surgery may be needed if other treatment plans do not work.  · Injections (cortisone)  Are there other health problems to treat?   If the problem is caused by an infection, inflammatory arthritis, or ankylosing spondylosis, these problems will need to be treated.  What drugs may be needed?   The doctor may order drugs to:  · Help with pain and swelling  · Fight an infection  The doctor may give you a shot to help with pain and swelling. Talk with your doctor about possible risks with this shot.   What problems could happen?   · Long-term back pain  · Weight gain, less muscle strength and flexibility, weaker bones  · Arthritis  · Need for surgery  · Infection  What can be done to prevent this health problem?   · Stay active and work out to keep your muscles strong and flexible. Warm up slowly and stretch before you exercise.  · Use good posture.  · Use proper ways to lift and bend:  ? Spread your feet apart so you have a good base of support. Then, bend with your knees when you  something from the ground.  ? When lifting and moving an object, keep your back straight. Keep the object as close to your body as possible. Do not twist. Instead, move your feet to  the direction you are going.  ? Follow your doctor's orders about weight lifting.  Last Reviewed Date   2020-10-12  Consumer Information Use and Disclaimer   This information is not specific medical advice and does not replace information you receive from your health care provider. This is only a brief summary of general information. It does NOT include all information about conditions, illnesses, injuries, tests, procedures, treatments, therapies, discharge instructions or life-style choices that may apply to you. You must talk with your health care provider for complete information about your health and treatment options. This information should not be used to decide whether or not to accept your health care providers advice, instructions or recommendations. Only your health care provider has the knowledge and training to provide advice that is right for you.  Copyright   Copyright © 2021 [employer] and its affiliates and/or licensors. All rights reserved.  Patient Education       Back Extension Exercises   About this topic   Back extension exercises focus on making your back muscles stronger and more flexible. Back extension exercises focus on the long muscles along your spine and other muscles in your back. Back extension exercises can help with problems such as some kinds of bulging discs and back pain. For some back problems, like spinal stenosis, these exercises may make some people worse.  General   Before starting with a program, ask your doctor if you are healthy enough to do these exercises. Your doctor may have you work with a , chiropractor or physical therapist to make a safe exercise program to meet your needs.  Stretching Exercises   Stretching exercises keep your muscles flexible. They also stop them from getting tight. Start by doing each of these stretches 2 to 3 times. In order for your body to make changes, you will need to hold these stretches for 20 to 30 seconds. Try to do the  stretches 2 to 3 times each day. Do all exercises slowly. Do not bounce when doing stretches.  · Back bends standing ? Stand with feet slightly apart. Put your hands on your hips. Lean back and look towards the ceiling until you feel a stretch. For a disc problem, you can do this exercise without holding it for 10 times in a row.  · Elbow props on stomach ? Lie on your stomach, resting on your lower arms. Rise up on your elbows as high as you are able. Keep your hips on the floor. Then, lower your back and shoulders down.  Strengthening Exercises   Strengthening exercises keep your muscles firm and strong. Start by repeating each exercise 2 to 3 times. Work up to doing each exercise 10 times. Try to do the exercises 2 to 3 times each day. Hold each exercise for 3 to 5 seconds. Do all exercises slowly.  · Prone press-ups ? Lie on your stomach with your arms bent and your hands near your shoulders. Raise your upper body by straightening your arms. Keep your hips on the floor. Then, lower back to the ground.  · Leg lifts on stomach ? Lie on your stomach. Keeping the knee straight, lift one leg towards the ceiling. Then, lower back to the ground. Repeat with the other leg.  · Alternate opposite arm and leg lifts on stomach ? Lie on your stomach. Extend your arms over your head so your elbows are by your ears. Keep your head aligned with your back and lift one arm and the opposite leg at the same time. Then, return to the start position. Repeat with the other arm and leg. You may also try this exercise starting on your hands and knees.  · Arm and leg lifts on stomach also called Superman exercise ? Lie on your stomach. Extend your arms over your head so your elbows are by your ears. Lift your upper body and legs up off the floor at the same time and hold 3 to 5 seconds. Lower to the ground. This is a very hard exercise. It may take some time doing the other exercises before you are strong enough to do this one.                What will the results be?   · Better strength and flexibility  · Less back pain  · Less back spasms  · Less leg pain, numbness, and tingling  · Better posture  · Easier to walk and do other activities  Helpful tips   · Stay active and work out to keep your muscles strong and flexible.  · Keep a healthy weight to avoid putting too much stress on your spine. Eat a healthy diet to keep your muscles healthy.  · Be sure you do not hold your breath when exercising. This can raise your blood pressure. If you tend to hold your breath, try counting out loud when exercising. If any exercise bothers you, stop right away.  · Try walking or cycling at an easy pace for a few minutes to warm up your muscles. Do this again after exercising.  · Exercise may be slightly uncomfortable, but you should not have sharp pains. If you do get sharp pains, stop what you are doing. If the sharp pains continue, call your doctor.  Where can I learn more?   [employer]  http://orthoinfo.aaos.org/topic.cfm?egtnt=Z72248   [facility]  https://www.nhs.uk/live-well/exercise/lower-back-pain-exercises/   Last Reviewed Date   2021-03-18  Consumer Information Use and Disclaimer   This information is not specific medical advice and does not replace information you receive from your health care provider. This is only a brief summary of general information. It does NOT include all information about conditions, illnesses, injuries, tests, procedures, treatments, therapies, discharge instructions or life-style choices that may apply to you. You must talk with your health care provider for complete information about your health and treatment options. This information should not be used to decide whether or not to accept your health care providers advice, instructions or recommendations. Only your health care provider has the knowledge and training to provide advice that is right for you.  Copyright   Copyright © 2021 [employer]  [employer] and its affiliates and/or licensors. All rights reserved.  Patient Education       Core Strengthening Exercises on Back or on Hands and Knees   About this topic   Your core muscles are in your chest, back, buttock, and stomach area. They are your abdominal, back, and pelvis muscles. These muscles help keep your body stable when using your arms or legs. They also help with balance and posture. There are many exercises you can do to keep these muscles strong.  If you have back problems like a compression fracture or a ruptured disc, doing some of these exercises could make your problem worse. Some of these exercises may cause lower back pain.  General   Before starting with a program, ask your doctor if you are healthy enough to do these exercises. Your doctor may have you work with a , chiropractor, or physical therapist to make a safe exercise program to meet your needs.  Strengthening Exercises   Strengthening exercises keep your muscles firm and strong. Start by repeating each exercise 2 to 3 times. Work up to doing each exercise 10 times. Try to do the exercises 2 to 3 times each day. Hold each exercise for 3 to 5 seconds. Do all exercises slowly.  · Hip lifts ? Lie on your back with your knees bent and feet flat on the floor. Tighten your stomach muscles and push your heels into the floor to lift your buttocks off the floor. Relax.  · Pelvic tilts ? Lie on your back with your knees bent and feet flat on the floor. Tighten your stomach muscles and press your lower back down to the floor. Relax.  · Straight leg raises lying down ? Lie on your back with one leg straight. Bend your other knee so the foot is flat on the bed. Keeping your leg straight, lift the leg up to the level of your other knee. Lower it back down. Repeat with the other leg.  · Knee flex lying down ? Lie on your back with both knees bent and your feet flat on the floor. Tighten your belly muscles. Raise one leg up and back down as if  you are marching in slow motion. Keep belly muscles tight while you move your leg. Switch legs. To make this exercise harder, raise both arms straight up in the air. Tighten your belly muscles. When you raise one leg up, reach the opposite arm over your head. Switch, moving the opposite arm and leg until you have done 10 repetitions on each side.  · Abdominal crunches ? Lie on your back with both knees bent. Keep your feet flat on the floor. Place your hands in one of these positions. Try starting with the first position since it is the easiest. As you get better, use the other positions to make it harder.  ? Crunches with arms at sides.  ? Crunches with arms across chest.  ? Crunches with arms behind head. Be careful not to interlock your fingers behind your neck or head while doing crunches. This may add tension to your neck and cause strain.  Look at the ceiling. Tighten your belly muscles and lift your shoulders and upper back off the floor. Breathe out while you are doing this. Lower your shoulders to the floor. Breathe in while you are doing this. Relax your belly muscles all the way before starting another crunch.  · Arm and leg lifts on hands and knees ? Start on your hands and knees. With all of these exercises, keep your back as level as possible. If you are having trouble with this, you may want to put a small object on your back such as a book. If it falls off, you are not keeping your back level enough during the exercise.  ? Lift one arm up to shoulder level and hold. Lower it back down. Now, lift up the other arm and hold.  ? Lift one leg up and kick it straight out until it is in line with your back and hold. Lower it back down. Now, lift up the other leg and hold.  ? Lift one arm and the OPPOSITE leg up at the same time and hold. Lower them down. Now, repeat using the other arm and leg. This is a very hard exercise. It may take time to be able to do this.               What will the results be?    · Stronger core  · Better balance  · More toned belly and back muscles  · Easier to do daily activities  · Better sports performance  · Less low back pain  Helpful tips   · Stay active and work out to keep your muscles strong and flexible.  · Keep a healthy weight to avoid putting too much stress on your spine. Eat a healthy diet to keep your muscles healthy.  · Be sure you do not hold your breath when exercising. This can raise your blood pressure. If you tend to hold your breath, try counting out loud when exercising. If any exercise bothers you, stop right away.  · Try walking or cycling at an easy pace for a few minutes to warm up your muscles. Do this again after exercising.  · Exercise may be slightly uncomfortable, but you should not have sharp pains. If you do get sharp pains, stop what you are doing. If the sharp pains continue, call your doctor.  Where can I learn more?   [employer]  http://www.orthoinfo.org/topic.cfm?ghevn=Y12487   [facility] [place]  http://www.nhs.uk/live-well/exercise/lower-back-pain-exercises/   Last Reviewed Date   2021-03-18  Consumer Information Use and Disclaimer   This information is not specific medical advice and does not replace information you receive from your health care provider. This is only a brief summary of general information. It does NOT include all information about conditions, illnesses, injuries, tests, procedures, treatments, therapies, discharge instructions or life-style choices that may apply to you. You must talk with your health care provider for complete information about your health and treatment options. This information should not be used to decide whether or not to accept your health care providers advice, instructions or recommendations. Only your health care provider has the knowledge and training to provide advice that is right for you.  Copyright   Copyright © 2021 [employer] and its affiliates and/or  licensors. All rights reserved.

## 2022-02-15 NOTE — PROGRESS NOTES
[facility] NEW PATIENT HISTORY & PHYSICAL :    Referring Physician: Dr Smith    Chief Complaint   Patient presents with    Back Pain       HPI: This is a 51 y.o.  female being seen in clinic today for evaluation of chronic low back achy pain that is worse on the left side with tightness that is worse in the morning.  With movement and change of position her symptoms somewhat improve.  She denies major weakness or numbness/tingling into her legs.    History obtained from patient    Functional History:  Walking: Not limited  Transfers: Independent  Assistive devices: No  Power mobility: No  Falls: None   Directional preference:sitting or position change      Needs help with:  Nothing - all ADLS normal    Cooking   Cleaning  Bathing   Dressing   Toileting     Past family, medical, social, and surgical history reviewed in chart    Review of Systems:     General- denies lethargy, weight change, fever, chills  Head/neck- denies swallowing difficulties  ENT- denies hearing changes  Cardiovascular-denies chest pain  Pulmonary- denies shortness of breath  GI- denies constipation or bowel incontinence  - denies bladder incontinence  Skin- denies wounds or rashes  Musculoskeletal- denies weakness, +pain  Neurologic- denies numbness and tingling  Psychiatric- denies depressive or psychotic features, +anxiety  Lymphatic-denies swelling  Endocrine- denies hypoglycemic symptoms/+DM history  All other pertinent systems negative     Physical Examination:  General: Well developed, well nourished female, NAD  HEENT:NCAT EOMI bilaterally   Pulmonary:Normal respirations    Spinal Examination: CERVICAL  Active ROM is within normal limits.  Inspection: No deformity of spinal alignment.      Spinal Examination: LUMBAR or THORACIC  Active ROM is within normal limits, some discomfort in flex  Inspection: No deformity of spinal alignment.  No palpable olisthesis.  Palpation: No vertebral tenderness to percussion.  Very ttp at bilateral si  joints-worse on left, tight and ttp at quad lumborum-worse on left, mild ttp at gluteus musculature   LISSA: positive bilaterally  Facet loading +bilaterally  SLR Test (seated and supine):negative to greater than 70 degrees bilaterally  Able to stand on heels and toes  Trendelenburg test: negative    Bilateral Upper and Lower Extremities:  Pulses are 2+ at radial,  bilaterally.  Shoulder/Elbow/Wrist/Hand ROM   Hip/Knee/Ankle ROM wnl. Some discomfort with left ER  Bilateral Extremities show normal capillary refill.  No signs of cyanosis, rubor, edema, skin changes, or dysvascular changes of appendages.  Nails appear intact.    Neurological Exam:  Cranial Nerves:  II-XII grossly intact    Manual Muscle Testing: (Motor 5=normal)  RIGHT Lower extremity: Hip flexion 5/5, Hip Abduction 4+/5, Hip Adduction 5/5, Knee extension 5/5, Knee flexion 5/5, Ankle dorsiflexion 5/5, Extensor hallucis longus 5/5, Ankle plantarflexion 5/5  LEFT Lower extremity:  Hip flexion 5/5, Hip Abduction 4+/5,Hip Adduction 5/5, Knee extension 5/5, Knee flexion 5/5, Ankle dorsiflexion 5/5, Extensor hallucis longus 5/5, Ankle plantarflexion 5/5    No focal atrophy is noted of either upper or lower extremity.    Bilateral Reflexes:1+patellar  No clonus at knee or ankle.    Sensation: tested to light touch  - intact in legs    Gait: Narrow base and good arm swing.      IMPRESSION/PLAN: This is a 51 y.o.  female with bilateral sacroiliitis-worse on left, probable lumbar facet arthropathy, overweight:Body mass index is 36.03 kg/m².    Discussed in detail for 30 minutes about diagnosis and treatment plan    1. Rx for PT-Core/hip strengthening, pelvic stabilization, myofascial release, stretch, dry needle, ROM, modalities, HEP  2. Cont aleve BID for at least 2 wks  3. Handouts on back care, exercise, stretches, etc provided  4. Xray Lumbar spine and hips today  5. If not improving, consider referral to IP[facility] for SI joint injections    Marie Zee  M.D.  Physical Medicine and Rehab

## 2022-02-18 ENCOUNTER — LAB VISIT (OUTPATIENT)
Dept: LAB | Facility: HOSPITAL | Age: 52
End: 2022-02-18
Attending: STUDENT IN AN ORGANIZED HEALTH CARE EDUCATION/TRAINING PROGRAM
Payer: COMMERCIAL

## 2022-02-18 ENCOUNTER — OFFICE VISIT (OUTPATIENT)
Dept: ORTHOPEDICS | Facility: CLINIC | Age: 52
End: 2022-02-18
Payer: COMMERCIAL

## 2022-02-18 VITALS — BODY MASS INDEX: 36.03 KG/M2 | WEIGHT: 197 LBS

## 2022-02-18 DIAGNOSIS — M75.112 NONTRAUMATIC INCOMPLETE TEAR OF LEFT ROTATOR CUFF: ICD-10-CM

## 2022-02-18 DIAGNOSIS — M25.512 CHRONIC LEFT SHOULDER PAIN: ICD-10-CM

## 2022-02-18 DIAGNOSIS — M75.122 NONTRAUMATIC COMPLETE TEAR OF LEFT ROTATOR CUFF: Primary | ICD-10-CM

## 2022-02-18 DIAGNOSIS — G89.29 CHRONIC LEFT SHOULDER PAIN: ICD-10-CM

## 2022-02-18 DIAGNOSIS — Z01.818 PREOP TESTING: ICD-10-CM

## 2022-02-18 DIAGNOSIS — Z01.818 PREOP TESTING: Primary | ICD-10-CM

## 2022-02-18 LAB
ALBUMIN SERPL BCP-MCNC: 3.9 G/DL (ref 3.5–5.2)
ALP SERPL-CCNC: 88 U/L (ref 55–135)
ALT SERPL W/O P-5'-P-CCNC: 15 U/L (ref 10–44)
ANION GAP SERPL CALC-SCNC: 12 MMOL/L (ref 8–16)
AST SERPL-CCNC: 21 U/L (ref 10–40)
BASOPHILS # BLD AUTO: 0.02 K/UL (ref 0–0.2)
BASOPHILS NFR BLD: 0.4 % (ref 0–1.9)
BILIRUB SERPL-MCNC: 0.3 MG/DL (ref 0.1–1)
BUN SERPL-MCNC: 15 MG/DL (ref 6–20)
CALCIUM SERPL-MCNC: 9.9 MG/DL (ref 8.7–10.5)
CHLORIDE SERPL-SCNC: 101 MMOL/L (ref 95–110)
CO2 SERPL-SCNC: 27 MMOL/L (ref 23–29)
CREAT SERPL-MCNC: 0.8 MG/DL (ref 0.5–1.4)
DIFFERENTIAL METHOD: ABNORMAL
EOSINOPHIL # BLD AUTO: 0.1 K/UL (ref 0–0.5)
EOSINOPHIL NFR BLD: 1.7 % (ref 0–8)
ERYTHROCYTE [DISTWIDTH] IN BLOOD BY AUTOMATED COUNT: 14.5 % (ref 11.5–14.5)
EST. GFR  (AFRICAN AMERICAN): >60 ML/MIN/1.73 M^2
EST. GFR  (NON AFRICAN AMERICAN): >60 ML/MIN/1.73 M^2
ESTIMATED AVG GLUCOSE: 108 MG/DL (ref 68–131)
GLUCOSE SERPL-MCNC: 96 MG/DL (ref 70–110)
HBA1C MFR BLD: 5.4 % (ref 4–5.6)
HCT VFR BLD AUTO: 42.8 % (ref 37–48.5)
HGB BLD-MCNC: 13.2 G/DL (ref 12–16)
IMM GRANULOCYTES # BLD AUTO: 0.02 K/UL (ref 0–0.04)
IMM GRANULOCYTES NFR BLD AUTO: 0.4 % (ref 0–0.5)
LYMPHOCYTES # BLD AUTO: 1.9 K/UL (ref 1–4.8)
LYMPHOCYTES NFR BLD: 39.7 % (ref 18–48)
MCH RBC QN AUTO: 29.1 PG (ref 27–31)
MCHC RBC AUTO-ENTMCNC: 30.8 G/DL (ref 32–36)
MCV RBC AUTO: 95 FL (ref 82–98)
MONOCYTES # BLD AUTO: 0.4 K/UL (ref 0.3–1)
MONOCYTES NFR BLD: 8.8 % (ref 4–15)
NEUTROPHILS # BLD AUTO: 2.4 K/UL (ref 1.8–7.7)
NEUTROPHILS NFR BLD: 49 % (ref 38–73)
NRBC BLD-RTO: 0 /100 WBC
PLATELET # BLD AUTO: 288 K/UL (ref 150–450)
PMV BLD AUTO: 11.4 FL (ref 9.2–12.9)
POTASSIUM SERPL-SCNC: 4.3 MMOL/L (ref 3.5–5.1)
PROT SERPL-MCNC: 7.3 G/DL (ref 6–8.4)
RBC # BLD AUTO: 4.53 M/UL (ref 4–5.4)
SODIUM SERPL-SCNC: 140 MMOL/L (ref 136–145)
WBC # BLD AUTO: 4.79 K/UL (ref 3.9–12.7)

## 2022-02-18 PROCEDURE — 3008F PR BODY MASS INDEX (BMI) DOCUMENTED: ICD-10-PCS | Mod: CPTII,S$GLB,, | Performed by: STUDENT IN AN ORGANIZED HEALTH CARE EDUCATION/TRAINING PROGRAM

## 2022-02-18 PROCEDURE — 1160F RVW MEDS BY RX/DR IN RCRD: CPT | Mod: CPTII,S$GLB,, | Performed by: STUDENT IN AN ORGANIZED HEALTH CARE EDUCATION/TRAINING PROGRAM

## 2022-02-18 PROCEDURE — 3044F HG A1C LEVEL LT 7.0%: CPT | Mod: CPTII,S$GLB,, | Performed by: STUDENT IN AN ORGANIZED HEALTH CARE EDUCATION/TRAINING PROGRAM

## 2022-02-18 PROCEDURE — 36415 COLL VENOUS BLD VENIPUNCTURE: CPT | Performed by: STUDENT IN AN ORGANIZED HEALTH CARE EDUCATION/TRAINING PROGRAM

## 2022-02-18 PROCEDURE — 99999 PR PBB SHADOW E&M-EST. PATIENT-LVL III: ICD-10-PCS | Mod: PBBFAC,,, | Performed by: STUDENT IN AN ORGANIZED HEALTH CARE EDUCATION/TRAINING PROGRAM

## 2022-02-18 PROCEDURE — 3066F PR DOCUMENTATION OF TREATMENT FOR NEPHROPATHY: ICD-10-PCS | Mod: CPTII,S$GLB,, | Performed by: STUDENT IN AN ORGANIZED HEALTH CARE EDUCATION/TRAINING PROGRAM

## 2022-02-18 PROCEDURE — 85025 COMPLETE CBC W/AUTO DIFF WBC: CPT | Performed by: STUDENT IN AN ORGANIZED HEALTH CARE EDUCATION/TRAINING PROGRAM

## 2022-02-18 PROCEDURE — 1159F PR MEDICATION LIST DOCUMENTED IN MEDICAL RECORD: ICD-10-PCS | Mod: CPTII,S$GLB,, | Performed by: STUDENT IN AN ORGANIZED HEALTH CARE EDUCATION/TRAINING PROGRAM

## 2022-02-18 PROCEDURE — 99204 PR OFFICE/OUTPT VISIT, NEW, LEVL IV, 45-59 MIN: ICD-10-PCS | Mod: S$GLB,,, | Performed by: STUDENT IN AN ORGANIZED HEALTH CARE EDUCATION/TRAINING PROGRAM

## 2022-02-18 PROCEDURE — 80053 COMPREHEN METABOLIC PANEL: CPT | Performed by: STUDENT IN AN ORGANIZED HEALTH CARE EDUCATION/TRAINING PROGRAM

## 2022-02-18 PROCEDURE — 83036 HEMOGLOBIN GLYCOSYLATED A1C: CPT | Performed by: STUDENT IN AN ORGANIZED HEALTH CARE EDUCATION/TRAINING PROGRAM

## 2022-02-18 PROCEDURE — 3008F BODY MASS INDEX DOCD: CPT | Mod: CPTII,S$GLB,, | Performed by: STUDENT IN AN ORGANIZED HEALTH CARE EDUCATION/TRAINING PROGRAM

## 2022-02-18 PROCEDURE — 1160F PR REVIEW ALL MEDS BY PRESCRIBER/CLIN PHARMACIST DOCUMENTED: ICD-10-PCS | Mod: CPTII,S$GLB,, | Performed by: STUDENT IN AN ORGANIZED HEALTH CARE EDUCATION/TRAINING PROGRAM

## 2022-02-18 PROCEDURE — 3061F PR NEG MICROALBUMINURIA RESULT DOCUMENTED/REVIEW: ICD-10-PCS | Mod: CPTII,S$GLB,, | Performed by: STUDENT IN AN ORGANIZED HEALTH CARE EDUCATION/TRAINING PROGRAM

## 2022-02-18 PROCEDURE — 3061F NEG MICROALBUMINURIA REV: CPT | Mod: CPTII,S$GLB,, | Performed by: STUDENT IN AN ORGANIZED HEALTH CARE EDUCATION/TRAINING PROGRAM

## 2022-02-18 PROCEDURE — 99999 PR PBB SHADOW E&M-EST. PATIENT-LVL III: CPT | Mod: PBBFAC,,, | Performed by: STUDENT IN AN ORGANIZED HEALTH CARE EDUCATION/TRAINING PROGRAM

## 2022-02-18 PROCEDURE — 3066F NEPHROPATHY DOC TX: CPT | Mod: CPTII,S$GLB,, | Performed by: STUDENT IN AN ORGANIZED HEALTH CARE EDUCATION/TRAINING PROGRAM

## 2022-02-18 PROCEDURE — 1159F MED LIST DOCD IN RCRD: CPT | Mod: CPTII,S$GLB,, | Performed by: STUDENT IN AN ORGANIZED HEALTH CARE EDUCATION/TRAINING PROGRAM

## 2022-02-18 PROCEDURE — 3044F PR MOST RECENT HEMOGLOBIN A1C LEVEL <7.0%: ICD-10-PCS | Mod: CPTII,S$GLB,, | Performed by: STUDENT IN AN ORGANIZED HEALTH CARE EDUCATION/TRAINING PROGRAM

## 2022-02-18 PROCEDURE — 99204 OFFICE O/P NEW MOD 45 MIN: CPT | Mod: S$GLB,,, | Performed by: STUDENT IN AN ORGANIZED HEALTH CARE EDUCATION/TRAINING PROGRAM

## 2022-02-18 NOTE — PROGRESS NOTES
Orthopaedics Sports Medicine     Shoulder Initial Visit         2/18/2022    Referring MD: Abbie Smith MD    Chief Complaint   Patient presents with    Left Shoulder - Pain         History of Present Illness:   Gerda Obrien is a 51 y.o. right-hand dominant female who presents with LEFT shoulder pain and dysfunction.    Onset of the symptoms was about 1 year ago     Inciting event: no specific injury, gradual onset of symptoms     Current symptoms include pain in the left shoulder, localized lateral and anterior     Pain is aggravated by activity, lifting, reaching, also endorses night pain      Evaluation to date: X-Ray     Treatment to date: Rest, activity modification, NSAIDs, formal PT at [facility]    Past Medical History:   Past Medical History:   Diagnosis Date    Allergy     Biotin deficiency disease     Chlamydia     Diabetes mellitus     Fibroids     Herpes simplex     Hypertension     Knee pain, bilateral     Metabolic syndrome     Sleep apnea     Thyroid disease     Vitamin D deficiency        Past Surgical History:   Past Surgical History:   Procedure Laterality Date    BREAST SURGERY      BUNIONECTOMY      CERVICAL BIOPSY  W/ LOOP ELECTRODE EXCISION      COLONOSCOPY N/A 10/15/2021    Procedure: COLONOSCOPY;  Surgeon: Alania Ceja MD;  [facility];  Service: Endoscopy;  Laterality: N/A;    FOOT SURGERY      hammer toe Bilateral     HYSTERECTOMY      Laparoscopic assisted vaginal hysterectomy with BSO  11/22/2021    Path--Leiomyomata/Adenomyosis    LASER ABLATION OF THE CERVIX      PLANTAR FASCIA SURGERY Left     TUBAL LIGATION         Medications:  Patient's Medications   New Prescriptions    No medications on file   Previous Medications    ALBUTEROL (PROVENTIL/VENTOLIN HFA) 90 MCG/ACTUATION INHALER    Inhale 2 puffs into the lungs every 6 (six) hours as needed for Wheezing.    AMITRIPTYLINE (ELAVIL) 25 MG TABLET    amitriptyline 25 mg tablet   TAKE  1/2 TABLET BY MOUTH AT BEDTIME X 5 DAYS, THEN 1 TAB AT BEDTIME FOR HEADACHE/NECK PAIN CONTROL    ASPIRIN (ECOTRIN) 81 MG EC TABLET    Take 81 mg by mouth once daily.    ATORVASTATIN (LIPITOR) 10 MG TABLET    Take 1 tablet (10 mg total) by mouth once daily.    BIOTIN 10,000 MCG CAP    Take 1 capsule by mouth once daily.    BLOOD SUGAR DIAGNOSTIC STRP    Precision Xtra Test strips    BUSPIRONE (BUSPAR) 7.5 MG TABLET    TAKE 1 TABLET BY MOUTH TWICE A DAY AS NEEDED    BUTALBITAL-ACETAMINOPHEN-CAFFEINE -40 MG (FIORICET, ESGIC) -40 MG PER TABLET    PLEASE SEE ATTACHED FOR DETAILED DIRECTIONS    CHOLECALCIFEROL, VITAMIN D3, (VITAMIN D3) 250 MCG (10,000 UNIT) CAP    Take 1 tablet by mouth. 1 tablet 3 times a week    CLONAZEPAM (KLONOPIN) 0.5 MG TABLET    TAKE 1 TO 2 TABLETS BY MOUTH ONCE DAILY AT BEDTIME.    CYCLOBENZAPRINE (FLEXERIL) 10 MG TABLET    One tab po qhs prn spasm    DAPAGLIFLOZIN (FARXIGA) 10 MG TABLET    Take 1 tablet (10 mg total) by mouth once daily.    DESLORATADINE (CLARINEX) 5 MG TABLET    desloratadine 5 mg tablet    DICLOFENAC SODIUM (VOLTAREN) 1 % GEL    Apply 2 g topically 4 (four) times daily.    DYMISTA 137-50 MCG/SPRAY SPRY NASSAL SPRAY    1 spray by Each Nare route 2 (two) times daily as needed.    ESTRADIOL (VIVELLE-DOT) 0.075 MG/24 HR    Place 1 patch onto the skin twice a week.    FERROUS SULFATE (FEOSOL) 325 MG (65 MG IRON) TAB TABLET    Take 1 tablet (325 mg total) by mouth daily with breakfast.    FREESTYLE LANCETS 28 GAUGE LANCETS    USE TWICE DAILY BEFORE MEALS.    HYDROCORTISONE 2.5 % CREAM    Apply topically 2 (two) times daily.    KETOCONAZOLE (NIZORAL) 2 % CREAM    APPLY TO FEET ONCE DAILY    MAGNESIUM CHLORIDE (SLOW-MAG) 71.5 MG TBEC    Take 1 tablet by mouth once. One tablet 3 times a week    MULTIVITAMIN CAPSULE    Take by mouth.    NAPROXEN (NAPROSYN) 500 MG TABLET    One tab po bid prn pain    OMEPRAZOLE (PRILOSEC) 40 MG CAPSULE    Take 1 capsule (40 mg total) by mouth  "every morning.    SITAGLIPTIN (JANUVIA) 100 MG TAB    Take 1 tablet (100 mg total) by mouth once daily.    SUMATRIPTAN (IMITREX) 100 MG TABLET    sumatriptan 100 mg tablet   PLEASE SEE ATTACHED FOR DETAILED DIRECTIONS    TIZANIDINE (ZANAFLEX) 4 MG TABLET    tizanidine 4 mg tablet   ONE TABLET BY MOUTH NIGHTLY AT BEDTIME.    VALACYCLOVIR (VALTREX) 500 MG TABLET    Take 500 mg by mouth 2 (two) times daily.   Modified Medications    No medications on file   Discontinued Medications    No medications on file       Allergies:   Review of patient's allergies indicates:   Allergen Reactions    Doxycycline     Exenatide     Lexapro [escitalopram oxalate]      spasms    Sulfa (sulfonamide antibiotics)     Trulicity [dulaglutide]     Bydureon [exenatide microspheres] Rash     Skin reaction at site of injection.       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  Tobacco use: She reports that she has never smoked. She has never used smokeless tobacco.    Review of systems:  History of recent illness, fevers, shakes, or chills: no  History of cardiac problems or chest pain: no  History of pulmonary problems or asthma: no  History of diabetes: no  History of prior dvt or clotting problems: no  History of sleep apnea: no      Physical Examination:  Estimated body mass index is 36.03 kg/m² as calculated from the following:    Height as of 2/15/22: 5' 2" (1.575 m).    Weight as of this encounter: 89.4 kg (197 lb).    General  Healthy appearing female in no acute distress  Alert and oriented, normal mood, appropriate affect    Shoulder Examination:  Patient is alert and oriented, no distress. Skin is intact. Neuro is normal with no focal motor or sensory findings.    Cervical exam is unremarkable. Intact cervical ROM. Negative Spurling's test    Physical Exam:  RIGHT    LEFT    Scap " Dyskinesis/Winging (-)    (-)    Tenderness:          Greater Tuberosity             (-)    +  Bicipital Groove  (-)    +  AC joint   (-)    (-)  Other:     ROM:  Forward Elevation 180    160  Abduction  120    100  ER (at side)  80    60  IR   T8    T12    Strength:   Supraspinatus  5/5    4/5  Infraspinatus  5/5    4/5  Subscap / IR  5/5    5/5     Special Tests:   Neer:    (-)    (-)   Greenberg:   (-)    +   SS Stress:   (-)    +   Bear Hug:   (-)    (-)   [place]'s:   (-)    +   Resisted Thrower's:   (-)    +   Cross Arm Abduction:  (-)    (-)    Neurovascular examination  - Motor grossly intact bilaterally to shoulder abduction, elbow flexion and extension, wrist flexion and extension, and intrinsic hand musculature  - Sensation intact to light touch bilaterally in axillary, median, radial, and ulnar distributions  - Symmetrical radial pulses      Imaging:  MRI SHOULDER WITHOUT CONTRAST LEFT     CLINICAL HISTORY:  Shoulder pain, rotator cuff disorder suspected, xray done;  Pain in left shoulder     TECHNIQUE:  Multisequence, multiplanar MR imaging of the shoulder performed without contrast.     COMPARISON:  Left shoulder radiographs from 01/31/2022     FINDINGS:  Rotator cuff:     Supraspinatus: There is a small full-thickness tear of the far anterior distal fibers of the supraspinatus tendon with fluid-filled gap near the insertion site.  Please see series 5 image 5 for example.  Or alternatively see image 13 of series 4.  Defect measures on the order of 7 mm.     Infraspinatus: Intact     Subscapularis: Intact     Teres minor: Intact     Muscle bulk is maintained.     Labrum: Limited evaluation on this non-arthrographic study.  Degenerative changes noted.  No gross labral deformity or perilabral cyst.     Biceps: Long head biceps tendon is intact.     Bone: No fracture present.  Bone marrow signal is unremarkable.     Acromioclavicular joint:Mild degenerative changes of the AC joint with small osteophytes  noted.     Cartilage: Articular cartilage of the glenohumeral joint is preserved     Miscellaneous: No significant joint effusion.  Small volume subacromial/subdeltoid bursal fluid present.     Impression:     Acute full-thickness supraspinatus tear, degenerative changes, mild subdeltoid bursitis, and other incidental findings as above.        Electronically signed by: Chavez Gerard MD  Date:                                            02/02/2022     Physician Read: I agree with the above impression.      Impression:  51 y.o. female with left shoulder rotator cuff tear, subacromial bursitis, biceps tendinosis      Plan:  1. Discussed diagnosis and treatment options with patient today.  She has a left shoulder rotator cuff tear along with associated subacromial bursitis and biceps tendinosis.  2. She has tried multiple nonoperative treatments including rest, activity modification, oral anti-inflammatories, and formal physical therapy.  She has had symptoms for 1 year now.  The symptoms are interfering with her activities of daily living and diminishing her quality of life.  3. I recommend left shoulder arthroscopy with rotator cuff repair, subacromial decompression, and possible biceps tenodesis.  4. We discussed all the risks, benefits, limitations, and alternatives of surgery today.  We discussed the postoperative recovery and rehab protocol in detail.  Despite the risks, she elected proceed forward with the surgery and the consent was freely signed.  5. Follow up with me 10-14 days after surgery           Denilson Jackson MD

## 2022-02-18 NOTE — H&P (VIEW-ONLY)
Orthopaedics Sports Medicine     Shoulder Initial Visit         2/18/2022    Referring MD: Abbie Smith MD    Chief Complaint   Patient presents with    Left Shoulder - Pain         History of Present Illness:   Gerda Obrien is a 51 y.o. right-hand dominant female who presents with LEFT shoulder pain and dysfunction.    Onset of the symptoms was about 1 year ago     Inciting event: no specific injury, gradual onset of symptoms     Current symptoms include pain in the left shoulder, localized lateral and anterior     Pain is aggravated by activity, lifting, reaching, also endorses night pain      Evaluation to date: X-Ray     Treatment to date: Rest, activity modification, NSAIDs, formal PT at [facility]    Past Medical History:   Past Medical History:   Diagnosis Date    Allergy     Biotin deficiency disease     Chlamydia     Diabetes mellitus     Fibroids     Herpes simplex     Hypertension     Knee pain, bilateral     Metabolic syndrome     Sleep apnea     Thyroid disease     Vitamin D deficiency        Past Surgical History:   Past Surgical History:   Procedure Laterality Date    BREAST SURGERY      BUNIONECTOMY      CERVICAL BIOPSY  W/ LOOP ELECTRODE EXCISION      COLONOSCOPY N/A 10/15/2021    Procedure: COLONOSCOPY;  Surgeon: Alaina Ceja MD;  Location: [facility];  Service: Endoscopy;  Laterality: N/A;    FOOT SURGERY      hammer toe Bilateral     HYSTERECTOMY      Laparoscopic assisted vaginal hysterectomy with BSO  11/22/2021    Path--Leiomyomata/Adenomyosis    LASER ABLATION OF THE CERVIX      PLANTAR FASCIA SURGERY Left     TUBAL LIGATION         Medications:  Patient's Medications   New Prescriptions    No medications on file   Previous Medications    ALBUTEROL (PROVENTIL/VENTOLIN HFA) 90 MCG/ACTUATION INHALER    Inhale 2 puffs into the lungs every 6 (six) hours as needed for Wheezing.    AMITRIPTYLINE (ELAVIL) 25 MG TABLET    amitriptyline 25 mg tablet   TAKE  1/2 TABLET BY MOUTH AT BEDTIME X 5 DAYS, THEN 1 TAB AT BEDTIME FOR HEADACHE/NECK PAIN CONTROL    ASPIRIN (ECOTRIN) 81 MG EC TABLET    Take 81 mg by mouth once daily.    ATORVASTATIN (LIPITOR) 10 MG TABLET    Take 1 tablet (10 mg total) by mouth once daily.    BIOTIN 10,000 MCG CAP    Take 1 capsule by mouth once daily.    BLOOD SUGAR DIAGNOSTIC STRP    Precision Xtra Test strips    BUSPIRONE (BUSPAR) 7.5 MG TABLET    TAKE 1 TABLET BY MOUTH TWICE A DAY AS NEEDED    BUTALBITAL-ACETAMINOPHEN-CAFFEINE -40 MG (FIORICET, ESGIC) -40 MG PER TABLET    PLEASE SEE ATTACHED FOR DETAILED DIRECTIONS    CHOLECALCIFEROL, VITAMIN D3, (VITAMIN D3) 250 MCG (10,000 UNIT) CAP    Take 1 tablet by mouth. 1 tablet 3 times a week    CLONAZEPAM (KLONOPIN) 0.5 MG TABLET    TAKE 1 TO 2 TABLETS BY MOUTH ONCE DAILY AT BEDTIME.    CYCLOBENZAPRINE (FLEXERIL) 10 MG TABLET    One tab po qhs prn spasm    DAPAGLIFLOZIN (FARXIGA) 10 MG TABLET    Take 1 tablet (10 mg total) by mouth once daily.    DESLORATADINE (CLARINEX) 5 MG TABLET    desloratadine 5 mg tablet    DICLOFENAC SODIUM (VOLTAREN) 1 % GEL    Apply 2 g topically 4 (four) times daily.    DYMISTA 137-50 MCG/SPRAY SPRY NASSAL SPRAY    1 spray by Each Nare route 2 (two) times daily as needed.    ESTRADIOL (VIVELLE-DOT) 0.075 MG/24 HR    Place 1 patch onto the skin twice a week.    FERROUS SULFATE (FEOSOL) 325 MG (65 MG IRON) TAB TABLET    Take 1 tablet (325 mg total) by mouth daily with breakfast.    FREESTYLE LANCETS 28 GAUGE LANCETS    USE TWICE DAILY BEFORE MEALS.    HYDROCORTISONE 2.5 % CREAM    Apply topically 2 (two) times daily.    KETOCONAZOLE (NIZORAL) 2 % CREAM    APPLY TO FEET ONCE DAILY    MAGNESIUM CHLORIDE (SLOW-MAG) 71.5 MG TBEC    Take 1 tablet by mouth once. One tablet 3 times a week    MULTIVITAMIN CAPSULE    Take by mouth.    NAPROXEN (NAPROSYN) 500 MG TABLET    One tab po bid prn pain    OMEPRAZOLE (PRILOSEC) 40 MG CAPSULE    Take 1 capsule (40 mg total) by mouth  "every morning.    SITAGLIPTIN (JANUVIA) 100 MG TAB    Take 1 tablet (100 mg total) by mouth once daily.    SUMATRIPTAN (IMITREX) 100 MG TABLET    sumatriptan 100 mg tablet   PLEASE SEE ATTACHED FOR DETAILED DIRECTIONS    TIZANIDINE (ZANAFLEX) 4 MG TABLET    tizanidine 4 mg tablet   ONE TABLET BY MOUTH NIGHTLY AT BEDTIME.    VALACYCLOVIR (VALTREX) 500 MG TABLET    Take 500 mg by mouth 2 (two) times daily.   Modified Medications    No medications on file   Discontinued Medications    No medications on file       Allergies:   Review of patient's allergies indicates:   Allergen Reactions    Doxycycline     Exenatide     Lexapro [escitalopram oxalate]      spasms    Sulfa (sulfonamide antibiotics)     Trulicity [dulaglutide]     Bydureon [exenatide microspheres] Rash     Skin reaction at site of injection.       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  Tobacco use: She reports that she has never smoked. She has never used smokeless tobacco.    Review of systems:  History of recent illness, fevers, shakes, or chills: no  History of cardiac problems or chest pain: no  History of pulmonary problems or asthma: no  History of diabetes: no  History of prior dvt or clotting problems: no  History of sleep apnea: no      Physical Examination:  Estimated body mass index is 36.03 kg/m² as calculated from the following:    Height as of 2/15/22: 5' 2" (1.575 m).    Weight as of this encounter: 89.4 kg (197 lb).    General  Healthy appearing female in no acute distress  Alert and oriented, normal mood, appropriate affect    Shoulder Examination:  Patient is alert and oriented, no distress. Skin is intact. Neuro is normal with no focal motor or sensory findings.    Cervical exam is unremarkable. Intact cervical ROM. Negative Spurling's test    Physical Exam:  RIGHT    LEFT    Scap " Dyskinesis/Winging (-)    (-)    Tenderness:          Greater Tuberosity             (-)    +  Bicipital Groove  (-)    +  AC joint   (-)    (-)  Other:     ROM:  Forward Elevation 180    160  Abduction  120    100  ER (at side)  80    60  IR   T8    T12    Strength:   Supraspinatus  5/5    4/5  Infraspinatus  5/5    4/5  Subscap / IR  5/5    5/5     Special Tests:   Neer:    (-)    (-)   Greenberg:   (-)    +   SS Stress:   (-)    +   Bear Hug:   (-)    (-)   [place]'s:   (-)    +   Resisted Thrower's:   (-)    +   Cross Arm Abduction:  (-)    (-)    Neurovascular examination  - Motor grossly intact bilaterally to shoulder abduction, elbow flexion and extension, wrist flexion and extension, and intrinsic hand musculature  - Sensation intact to light touch bilaterally in axillary, median, radial, and ulnar distributions  - Symmetrical radial pulses      Imaging:  MRI SHOULDER WITHOUT CONTRAST LEFT     CLINICAL HISTORY:  Shoulder pain, rotator cuff disorder suspected, xray done;  Pain in left shoulder     TECHNIQUE:  Multisequence, multiplanar MR imaging of the shoulder performed without contrast.     COMPARISON:  Left shoulder radiographs from 01/31/2022     FINDINGS:  Rotator cuff:     Supraspinatus: There is a small full-thickness tear of the far anterior distal fibers of the supraspinatus tendon with fluid-filled gap near the insertion site.  Please see series 5 image 5 for example.  Or alternatively see image 13 of series 4.  Defect measures on the order of 7 mm.     Infraspinatus: Intact     Subscapularis: Intact     Teres minor: Intact     Muscle bulk is maintained.     Labrum: Limited evaluation on this non-arthrographic study.  Degenerative changes noted.  No gross labral deformity or perilabral cyst.     Biceps: Long head biceps tendon is intact.     Bone: No fracture present.  Bone marrow signal is unremarkable.     Acromioclavicular joint:Mild degenerative changes of the AC joint with small osteophytes  noted.     Cartilage: Articular cartilage of the glenohumeral joint is preserved     Miscellaneous: No significant joint effusion.  Small volume subacromial/subdeltoid bursal fluid present.     Impression:     Acute full-thickness supraspinatus tear, degenerative changes, mild subdeltoid bursitis, and other incidental findings as above.        Electronically signed by: Chavez Gerard MD  Date:                                            02/02/2022     Physician Read: I agree with the above impression.      Impression:  51 y.o. female with left shoulder rotator cuff tear, subacromial bursitis, biceps tendinosis      Plan:  1. Discussed diagnosis and treatment options with patient today.  She has a left shoulder rotator cuff tear along with associated subacromial bursitis and biceps tendinosis.  2. She has tried multiple nonoperative treatments including rest, activity modification, oral anti-inflammatories, and formal physical therapy.  She has had symptoms for 1 year now.  The symptoms are interfering with her activities of daily living and diminishing her quality of life.  3. I recommend left shoulder arthroscopy with rotator cuff repair, subacromial decompression, and possible biceps tenodesis.  4. We discussed all the risks, benefits, limitations, and alternatives of surgery today.  We discussed the postoperative recovery and rehab protocol in detail.  Despite the risks, she elected proceed forward with the surgery and the consent was freely signed.  5. Follow up with me 10-14 days after surgery           Denilson Jackson MD

## 2022-02-21 ENCOUNTER — TELEPHONE (OUTPATIENT)
Dept: SPORTS MEDICINE | Facility: CLINIC | Age: 52
End: 2022-02-21
Payer: COMMERCIAL

## 2022-02-21 NOTE — TELEPHONE ENCOUNTER
Confirmed patient sx date and time. Patient verbalized understanding and was grateful for the call_DD      ----- Message from Sonia Chinchilla sent at 2/21/2022 10:24 AM CST -----  Pt is would like to know when her surgery date is. Call back number is [phone]. Thanks, El

## 2022-02-23 ENCOUNTER — OFFICE VISIT (OUTPATIENT)
Dept: DERMATOLOGY | Facility: CLINIC | Age: 52
End: 2022-02-23
Payer: COMMERCIAL

## 2022-02-23 DIAGNOSIS — L81.8 PERIORBITAL HYPERPIGMENTATION: Primary | ICD-10-CM

## 2022-02-23 DIAGNOSIS — L70.0 ACNE VULGARIS: ICD-10-CM

## 2022-02-23 PROCEDURE — 3044F HG A1C LEVEL LT 7.0%: CPT | Mod: CPTII,S$GLB,, | Performed by: DERMATOLOGY

## 2022-02-23 PROCEDURE — 3066F NEPHROPATHY DOC TX: CPT | Mod: CPTII,S$GLB,, | Performed by: DERMATOLOGY

## 2022-02-23 PROCEDURE — 1160F PR REVIEW ALL MEDS BY PRESCRIBER/CLIN PHARMACIST DOCUMENTED: ICD-10-PCS | Mod: CPTII,S$GLB,, | Performed by: DERMATOLOGY

## 2022-02-23 PROCEDURE — 3061F PR NEG MICROALBUMINURIA RESULT DOCUMENTED/REVIEW: ICD-10-PCS | Mod: CPTII,S$GLB,, | Performed by: DERMATOLOGY

## 2022-02-23 PROCEDURE — 1159F MED LIST DOCD IN RCRD: CPT | Mod: CPTII,S$GLB,, | Performed by: DERMATOLOGY

## 2022-02-23 PROCEDURE — 99203 OFFICE O/P NEW LOW 30 MIN: CPT | Mod: S$GLB,,, | Performed by: DERMATOLOGY

## 2022-02-23 PROCEDURE — 3061F NEG MICROALBUMINURIA REV: CPT | Mod: CPTII,S$GLB,, | Performed by: DERMATOLOGY

## 2022-02-23 PROCEDURE — 3066F PR DOCUMENTATION OF TREATMENT FOR NEPHROPATHY: ICD-10-PCS | Mod: CPTII,S$GLB,, | Performed by: DERMATOLOGY

## 2022-02-23 PROCEDURE — 99203 PR OFFICE/OUTPT VISIT, NEW, LEVL III, 30-44 MIN: ICD-10-PCS | Mod: S$GLB,,, | Performed by: DERMATOLOGY

## 2022-02-23 PROCEDURE — 3044F PR MOST RECENT HEMOGLOBIN A1C LEVEL <7.0%: ICD-10-PCS | Mod: CPTII,S$GLB,, | Performed by: DERMATOLOGY

## 2022-02-23 PROCEDURE — 99999 PR PBB SHADOW E&M-EST. PATIENT-LVL III: ICD-10-PCS | Mod: PBBFAC,,, | Performed by: DERMATOLOGY

## 2022-02-23 PROCEDURE — 1160F RVW MEDS BY RX/DR IN RCRD: CPT | Mod: CPTII,S$GLB,, | Performed by: DERMATOLOGY

## 2022-02-23 PROCEDURE — 1159F PR MEDICATION LIST DOCUMENTED IN MEDICAL RECORD: ICD-10-PCS | Mod: CPTII,S$GLB,, | Performed by: DERMATOLOGY

## 2022-02-23 PROCEDURE — 99999 PR PBB SHADOW E&M-EST. PATIENT-LVL III: CPT | Mod: PBBFAC,,, | Performed by: DERMATOLOGY

## 2022-02-23 RX ORDER — TRETINOIN 0.25 MG/G
CREAM TOPICAL
Qty: 20 G | Refills: 6 | Status: SHIPPED | OUTPATIENT
Start: 2022-02-23 | End: 2023-03-23

## 2022-02-23 RX ORDER — HYDROQUINONE 40 MG/G
CREAM TOPICAL
Qty: 28 G | Refills: 1 | Status: SHIPPED | OUTPATIENT
Start: 2022-02-23 | End: 2023-09-18

## 2022-02-23 NOTE — PROGRESS NOTES
Subjective:       Patient ID:  Gerda Obrien is a 51 y.o. female who presents for   Chief Complaint   Patient presents with    Skin Discoloration     Eyes and nose      History of Present Illness: The patient presents with chief complaint of discoloration. Irritated from eyelgasses.   Location: periocular areas  Duration: several months  Signs/Symptoms: discoloration    Prior treatments: vitamin C, vitamin E        Review of Systems   Constitutional: Negative for fever and chills.   Gastrointestinal: Negative for nausea and vomiting.   Skin: Positive for activity-related sunscreen use. Negative for daily sunscreen use and recent sunburn.   Hematologic/Lymphatic: Does not bruise/bleed easily.        Objective:    Physical Exam   Constitutional: She appears well-developed and well-nourished. No distress.   Neurological: She is alert and oriented to person, place, and time. She is not disoriented.   Psychiatric: She has a normal mood and affect.   Skin:   Areas Examined (abnormalities noted in diagram):   Head / Face Inspection Performed  Neck Inspection Performed  RUE Inspected  LUE Inspection Performed  Nails and Digits Inspection Performed                Assessment / Plan:        Periorbital hyperpigmentation  -     hydroquinone 4 % Crea; Apply to dark spots once daily. Use with sunscreen if outdoors  Dispense: 28 g; Refill: 1  Acne vulgaris  -     tretinoin (RETIN-A) 0.025 % cream; Apply to areas 3 times/week  Dispense: 20 g; Refill: 6  -     Discussed dx, AVS given. Will start above med with sunscreen. Recommend OTC caffeine products.            Follow up in about 3 months (around 5/23/2022).

## 2022-02-23 NOTE — PATIENT INSTRUCTIONS
Ordinary Caffeine Solution  Teamine Undereye cream    Start tretinoin 0.025% cream 3 nights/week. Apply pea-sized amount to affected areas at bedtime.      Start hydroquinone bleaching cream daily to dark spots. Use sunscreen each morning.       [facility] [phone]      SHEER SUNSCREENS    Black Girl Sunscreen SPF 30  Cherie Brightening Moisturizer SPF 30  Umbra Sheer Physical Daily Defense SPF 30  Shiseido Ultimate Sun Protection Lotion WetForce SPF 50+  Supergoop! Unseen Sunscreen Broad Spectrum SPF 40  Neutrogena HydroBoost Water Gel Lotion SPF 50  Neutrogena Ultrasheer Face & Body Stick SPF 70

## 2022-02-25 ENCOUNTER — PATIENT MESSAGE (OUTPATIENT)
Dept: SURGERY | Facility: HOSPITAL | Age: 52
End: 2022-02-25
Payer: COMMERCIAL

## 2022-03-02 ENCOUNTER — TELEPHONE (OUTPATIENT)
Dept: PREADMISSION TESTING | Facility: HOSPITAL | Age: 52
End: 2022-03-02
Payer: COMMERCIAL

## 2022-03-02 ENCOUNTER — PATIENT MESSAGE (OUTPATIENT)
Dept: ORTHOPEDICS | Facility: CLINIC | Age: 52
End: 2022-03-02
Payer: COMMERCIAL

## 2022-03-02 NOTE — TELEPHONE ENCOUNTER
Pre op instructions reviewed with patient per phone.    Spoke about pre op process and surgery instructions, verbalized understanding.    To confirm, Your surgeon has in structed you:  Surgery is scheduled on 3/7/22.      Please report to [facility], 1st floor.       The Pre Admissions will call you the day prior to surgery with your arrival time.        INSTRUCTIONS IMPORTANT!!!  Do Not Eat, Drink, or Smoke after 12 midnight! NO WATER after midnight! OK to brush teeth, no gum, candy or mints!        *Take only these medicines with a small swallow of water-morning of surgery.    Prilosec        ____  Do Not wear makeup, mascara nail polish or artificial nails  ____  NO powder, lotions, deodorants or creams to surgical area.  ____  Please remove all jewelry, including piercings and leave at home.  SURGERY WILL BE CANCELLED IF PIERCINGS ARE PRESENT!!!  ____  Dentures, Hearing Aids and Contact Lens will need to be removed prior to the start of surgery.  ____  Please bring photo ID and insurance information to hospital (Leave Valuables at Home)  ____  If going home the same day, arrange for a ride home. You will not be able to            drive if Anesthesia was used.    ____  Wear clean, loose fitting clothing. Allow for dressings, bandages.  ____  Stop Aspirin, Ibuprofen, Motrin and Aleve at least 5-7 days before surgery, unless otherwise instructed by your doctor, or the nurse. You MAY use Tylenol/acetaminophen until day of               surgery.  ____  If you take diabetic medication, do NOT take morning of surgery unless instructed by            Doctor. Metformin to be stopped 24 hrs prior to surgery time.   ____ Stop taking any Fish Oil supplements or Vitamins at least 5 days prior to surgery, unless instructed otherwise by your Doctor.         Bathing Instructions: The night before surgery and the morning prior to coming to the hospital:              -Do not shave your  face.  -Do not shave pubic hair 7 days prior to surgery (gyn pt's).  -Do not shave legs/underarms 3 days prior to surgery.              -Shower & Rinse your body as usual with anti-bacterial Soap (Dial, Lever 2000, or Hibiclens)              -Do not use hibiclens on your head, face, or genitals.              -Do not wash with anti-bacterial soap after you use the hibiclens.              -Rinse your body thoroughly.       Pediatric patients do not need to use anti-bacterial soap or Hibiclens.            [facility] Visitor/Ride Policy:  Only 1 adult allowed (over the age of 18) to accompany you into Pre-op/Recovery Surgery Dept and must stay through the entire length of admission.    Must have a ride home from a responsible adult that you know and trust.   Pediatric Patients are allowed 2 adult visitors.    Medical Transport, Uber or Lyft can only be used if patient has a responsible adult to accompany them during ride home.     Post-Op Instructions: You will receive surgery post-op instructions by your Discharge Nurse prior to going home.     Surgical Site Infection:     Prevention of surgical site infections:                 -Keep incisions clean and dry.              -Do not soak/submerge incisions in water until completely healed.              -Do not apply lotions, powders, creams, or deodorants to site.              -Always make sure hands are cleaned with antibacterial soap/ alcohol-based   prior to touching the surgical site.       Signs and symptoms:              -Redness and pain around the area where you had surgery              -Drainage of cloudy fluid from your surgical wound              -Fever over 100.4 or chills  >>>Call Surgeon office/on-call Surgeon if you experience any of these signs & symptoms post-surgery.        *Please Call [facility] Department with surgery instruction questions at [phone].     *Insurance Questions, please call [phone].    Pre admit office to call  afternoon prior to surgery with final arrival time.

## 2022-03-03 ENCOUNTER — ANESTHESIA EVENT (OUTPATIENT)
Dept: SURGERY | Facility: HOSPITAL | Age: 52
End: 2022-03-03
Payer: COMMERCIAL

## 2022-03-03 NOTE — ANESTHESIA PREPROCEDURE EVALUATION
03/03/2022  Gerda Obrien is a 51 y.o., female.  Past Surgical History:   Procedure Laterality Date    BREAST SURGERY      BUNIONECTOMY      CERVICAL BIOPSY  W/ LOOP ELECTRODE EXCISION      COLONOSCOPY N/A 10/15/2021    Procedure: COLONOSCOPY;  Surgeon: Alaina Ceja MD;  Location: [facility];  Service: Endoscopy;  Laterality: N/A;    FOOT SURGERY      hammer toe Bilateral     HYSTERECTOMY      Laparoscopic assisted vaginal hysterectomy with BSO  11/22/2021    Path--Leiomyomata/Adenomyosis    LASER ABLATION OF THE CERVIX      PLANTAR FASCIA SURGERY Left     TUBAL LIGATION       Past Medical History:   Diagnosis Date    Allergy     Biotin deficiency disease     Chlamydia     Diabetes mellitus     Fibroids     Herpes simplex     Knee pain, bilateral     Metabolic syndrome     Sleep apnea     Vitamin D deficiency      08/06/2020 EKG   NSR,normal ekg    Pre-op Assessment    I have reviewed the Patient Summary Reports.    I have reviewed the Nursing Notes. I have reviewed the NPO Status.   I have reviewed the Medications.     Review of Systems  Anesthesia Hx:  No problems with previous Anesthesia  Denies Family Hx of Anesthesia complications.   Denies Personal Hx of Anesthesia complications.   Social:  Non-Smoker, Social Alcohol Use    Hematology/Oncology:  Hematology Normal   Oncology Normal     EENT/Dental:EENT/Dental Normal   Cardiovascular:   Exercise tolerance: good Hypertension hyperlipidemia    Pulmonary:   Sleep Apnea, CPAP    Education provided regarding risk of obstructive sleep apnea     Renal/:  Renal/ Normal     Hepatic/GI:   Bowel Prep. GERD    Musculoskeletal:  Musculoskeletal Normal    Neurological:  Neurology Normal    Endocrine:   Diabetes, well controlled, type 2 Hypothyroidism  Metabolic Disorders, Obesity / BMI > 30  Dermatological:  Skin Normal     Psych:   anxiety          Physical Exam  General:  Well nourished and Obesity      Airway/Jaw/Neck:  Airway Findings: Mouth Opening: Normal   Tongue: Normal   General Airway Assessment: Adult Mallampati: II  TM Distance: Normal, at least 6 cm   Jaw/Neck Findings:     Neck ROM: Normal ROM      Eyes/Ears/Nose:  EYES/EARS/NOSE FINDINGS: Normal   Dental:  Dental Findings: In tact     Chest/Lungs:  Chest/Lungs Findings: Clear to auscultation, Normal Respiratory Rate      Heart/Vascular:  Heart Findings: Rate: Normal  Rhythm: Regular Rhythm  Heart murmur: negative       Mental Status:  Mental Status Findings:  Cooperative, Alert and Oriented         Anesthesia Plan  Type of Anesthesia, risks & benefits discussed:  Anesthesia Type:  general, Gen ETT    Patient's Preference:   Plan Factors:          Intra-op Monitoring Plan: standard ASA monitors  Intra-op Monitoring Plan Comments:   Post Op Pain Control Plan: per primary service following discharge from PACU, multimodal analgesia and IV/PO Opioids PRN  Post Op Pain Control Plan Comments:     Induction:   IV  Beta Blocker:  Patient is not currently on a Beta-Blocker (No further documentation required).       Informed Consent: Informed consent signed with the Patient and all parties understand the risks and agree with anesthesia plan.  All questions answered.  Anesthesia consent signed with patient.  ASA Score: 2     Day of Surgery Review of History & Physical:    H&P Update referred to the surgeon/provider.          Ready For Surgery From Anesthesia Perspective.           Physical Exam  General: Well nourished and Obesity    Airway:  Mallampati: II   Mouth Opening: Normal  TM Distance: Normal, at least 6 cm  Tongue: Normal  Neck ROM: Normal ROM    Dental:  In tact    Chest/Lungs:  Clear to auscultation, Normal Respiratory Rate    Heart:  Rate: Normal  Rhythm: Regular Rhythm          Anesthesia Plan  Type of Anesthesia, risks & benefits discussed:    Anesthesia Type:  general, Gen ETT  Intra-op Monitoring Plan: standard ASA monitors  Post Op Pain Control Plan: per primary service following discharge from PACU, multimodal analgesia and IV/PO Opioids PRN  Induction:  IV  Informed Consent: Informed consent signed with the Patient and all parties understand the risks and agree with anesthesia plan.  All questions answered.   ASA Score: 2  Day of Surgery Review of History & Physical: H&P Update referred to the surgeon/provider.    Ready For Surgery From Anesthesia Perspective.       .

## 2022-03-04 ENCOUNTER — TELEPHONE (OUTPATIENT)
Dept: PREADMISSION TESTING | Facility: HOSPITAL | Age: 52
End: 2022-03-04
Payer: COMMERCIAL

## 2022-03-04 NOTE — TELEPHONE ENCOUNTER
Called and spoke with the patient about the following:    Your Surgery arrival time is at 5:30 AM on 3/7/2022 at [facility] location.    The address is [address] [place]  [address].     Only one adult (over 18) is to accompany you to surgery, unless it is a Pediatric patient, then 2 adults are encouraged to accompany them to the surgery center.    Your ride MUST STAY the entire time until you are discharged.      Please come in the main lobby (located on the 1st floor).     Be prepared to show your photo ID and insurance card.     Masks should be worn by ALL persons entering the building.     Nothing to eat or drink after after midnight, unless you were instructed to take specific medications discussed with the Pre-admit Nurse.     Please call [phone] with any questions or concerns.     Thanks.

## 2022-03-07 ENCOUNTER — HOSPITAL ENCOUNTER (OUTPATIENT)
Facility: HOSPITAL | Age: 52
Discharge: HOME | End: 2022-03-07
Attending: STUDENT IN AN ORGANIZED HEALTH CARE EDUCATION/TRAINING PROGRAM | Admitting: STUDENT IN AN ORGANIZED HEALTH CARE EDUCATION/TRAINING PROGRAM
Payer: COMMERCIAL

## 2022-03-07 ENCOUNTER — ANESTHESIA (OUTPATIENT)
Dept: SURGERY | Facility: HOSPITAL | Age: 52
End: 2022-03-07
Payer: COMMERCIAL

## 2022-03-07 VITALS
SYSTOLIC BLOOD PRESSURE: 114 MMHG | DIASTOLIC BLOOD PRESSURE: 60 MMHG | TEMPERATURE: 98 F | HEIGHT: 62 IN | RESPIRATION RATE: 16 BRPM | HEART RATE: 55 BPM | WEIGHT: 203.06 LBS | BODY MASS INDEX: 37.37 KG/M2 | OXYGEN SATURATION: 100 %

## 2022-03-07 DIAGNOSIS — M75.102 ROTATOR CUFF TEAR, LEFT: ICD-10-CM

## 2022-03-07 DIAGNOSIS — M75.122 NONTRAUMATIC COMPLETE TEAR OF LEFT ROTATOR CUFF: Primary | ICD-10-CM

## 2022-03-07 LAB
POCT GLUCOSE: 100 MG/DL (ref 70–110)
SARS-COV-2 RNA NPH QL NAA+NON-PROBE: NOT DETECTED

## 2022-03-07 PROCEDURE — D9220A PRA ANESTHESIA: ICD-10-PCS | Mod: ,,, | Performed by: NURSE ANESTHETIST, CERTIFIED REGISTERED

## 2022-03-07 PROCEDURE — 76942 PERIPHERAL BLOCK: ICD-10-PCS | Mod: 26,,, | Performed by: ANESTHESIOLOGY

## 2022-03-07 PROCEDURE — 76942 ECHO GUIDE FOR BIOPSY: CPT | Mod: 26,,, | Performed by: ANESTHESIOLOGY

## 2022-03-07 PROCEDURE — 63600175 PHARM REV CODE 636 W HCPCS: Performed by: NURSE ANESTHETIST, CERTIFIED REGISTERED

## 2022-03-07 PROCEDURE — 71000015 HC POSTOP RECOV 1ST HR: Performed by: STUDENT IN AN ORGANIZED HEALTH CARE EDUCATION/TRAINING PROGRAM

## 2022-03-07 PROCEDURE — 36000711: Performed by: STUDENT IN AN ORGANIZED HEALTH CARE EDUCATION/TRAINING PROGRAM

## 2022-03-07 PROCEDURE — 71000033 HC RECOVERY, INTIAL HOUR: Performed by: STUDENT IN AN ORGANIZED HEALTH CARE EDUCATION/TRAINING PROGRAM

## 2022-03-07 PROCEDURE — 63600175 PHARM REV CODE 636 W HCPCS: Performed by: STUDENT IN AN ORGANIZED HEALTH CARE EDUCATION/TRAINING PROGRAM

## 2022-03-07 PROCEDURE — D9220A PRA ANESTHESIA: ICD-10-PCS | Mod: ,,, | Performed by: ANESTHESIOLOGY

## 2022-03-07 PROCEDURE — D9220A PRA ANESTHESIA: Mod: ,,, | Performed by: NURSE ANESTHETIST, CERTIFIED REGISTERED

## 2022-03-07 PROCEDURE — C1713 ANCHOR/SCREW BN/BN,TIS/BN: HCPCS | Performed by: STUDENT IN AN ORGANIZED HEALTH CARE EDUCATION/TRAINING PROGRAM

## 2022-03-07 PROCEDURE — 36000710: Performed by: STUDENT IN AN ORGANIZED HEALTH CARE EDUCATION/TRAINING PROGRAM

## 2022-03-07 PROCEDURE — 29827 SHO ARTHRS SRG RT8TR CUF RPR: CPT | Mod: LT,,, | Performed by: STUDENT IN AN ORGANIZED HEALTH CARE EDUCATION/TRAINING PROGRAM

## 2022-03-07 PROCEDURE — 82962 GLUCOSE BLOOD TEST: CPT | Performed by: STUDENT IN AN ORGANIZED HEALTH CARE EDUCATION/TRAINING PROGRAM

## 2022-03-07 PROCEDURE — 64450 NJX AA&/STRD OTHER PN/BRANCH: CPT | Performed by: STUDENT IN AN ORGANIZED HEALTH CARE EDUCATION/TRAINING PROGRAM

## 2022-03-07 PROCEDURE — 64415 NJX AA&/STRD BRCH PLXS IMG: CPT | Performed by: ANESTHESIOLOGY

## 2022-03-07 PROCEDURE — C9290 INJ, BUPIVACAINE LIPOSOME: HCPCS | Performed by: ANESTHESIOLOGY

## 2022-03-07 PROCEDURE — 37000009 HC ANESTHESIA EA ADD 15 MINS: Performed by: STUDENT IN AN ORGANIZED HEALTH CARE EDUCATION/TRAINING PROGRAM

## 2022-03-07 PROCEDURE — 29823 PR SHLDR ARTHROSCOP,EXTEN DEBRIDE: ICD-10-PCS | Mod: 51,LT,, | Performed by: STUDENT IN AN ORGANIZED HEALTH CARE EDUCATION/TRAINING PROGRAM

## 2022-03-07 PROCEDURE — D9220A PRA ANESTHESIA: Mod: ,,, | Performed by: ANESTHESIOLOGY

## 2022-03-07 PROCEDURE — 64415 PERIPHERAL BLOCK: ICD-10-PCS | Mod: 59,LT,, | Performed by: ANESTHESIOLOGY

## 2022-03-07 PROCEDURE — 29828 SHO ARTHRS SRG BICP TENODSIS: CPT | Mod: 51,LT,, | Performed by: STUDENT IN AN ORGANIZED HEALTH CARE EDUCATION/TRAINING PROGRAM

## 2022-03-07 PROCEDURE — 29827 PR SHLDR ARTHROSCOP,SURG,W/ROTAT CUFF REPR: ICD-10-PCS | Mod: LT,,, | Performed by: STUDENT IN AN ORGANIZED HEALTH CARE EDUCATION/TRAINING PROGRAM

## 2022-03-07 PROCEDURE — 27201423 OPTIME MED/SURG SUP & DEVICES STERILE SUPPLY: Performed by: STUDENT IN AN ORGANIZED HEALTH CARE EDUCATION/TRAINING PROGRAM

## 2022-03-07 PROCEDURE — 63600175 PHARM REV CODE 636 W HCPCS: Performed by: ANESTHESIOLOGY

## 2022-03-07 PROCEDURE — 25000003 PHARM REV CODE 250: Performed by: NURSE ANESTHETIST, CERTIFIED REGISTERED

## 2022-03-07 PROCEDURE — 25000003 PHARM REV CODE 250: Performed by: ANESTHESIOLOGY

## 2022-03-07 PROCEDURE — 37000008 HC ANESTHESIA 1ST 15 MINUTES: Performed by: STUDENT IN AN ORGANIZED HEALTH CARE EDUCATION/TRAINING PROGRAM

## 2022-03-07 PROCEDURE — 29823 SHO ARTHRS SRG XTNSV DBRDMT: CPT | Mod: 51,LT,, | Performed by: STUDENT IN AN ORGANIZED HEALTH CARE EDUCATION/TRAINING PROGRAM

## 2022-03-07 PROCEDURE — 29828 PR ARTHROSCOPY SHOULDER SURGICAL BICEPS TENODESIS: ICD-10-PCS | Mod: 51,LT,, | Performed by: STUDENT IN AN ORGANIZED HEALTH CARE EDUCATION/TRAINING PROGRAM

## 2022-03-07 DEVICE — ANCHOR SWIVELOCK KNTLS BLUE #2: Type: IMPLANTABLE DEVICE | Site: SHOULDER | Status: FUNCTIONAL

## 2022-03-07 DEVICE — ANCHOR FIBERTAK 2.6 SOFT DL: Type: IMPLANTABLE DEVICE | Site: SHOULDER | Status: FUNCTIONAL

## 2022-03-07 RX ORDER — ONDANSETRON 2 MG/ML
INJECTION INTRAMUSCULAR; INTRAVENOUS
Status: DISCONTINUED | OUTPATIENT
Start: 2022-03-07 | End: 2022-03-07

## 2022-03-07 RX ORDER — SODIUM CHLORIDE, SODIUM LACTATE, POTASSIUM CHLORIDE, CALCIUM CHLORIDE 600; 310; 30; 20 MG/100ML; MG/100ML; MG/100ML; MG/100ML
INJECTION, SOLUTION INTRAVENOUS CONTINUOUS
Status: DISCONTINUED | OUTPATIENT
Start: 2022-03-07 | End: 2022-03-07 | Stop reason: HOSPADM

## 2022-03-07 RX ORDER — EPINEPHRINE 1 MG/ML
INJECTION, SOLUTION INTRACARDIAC; INTRAMUSCULAR; INTRAVENOUS; SUBCUTANEOUS
Status: DISCONTINUED | OUTPATIENT
Start: 2022-03-07 | End: 2022-03-07 | Stop reason: HOSPADM

## 2022-03-07 RX ORDER — ROCURONIUM BROMIDE 10 MG/ML
INJECTION, SOLUTION INTRAVENOUS
Status: DISCONTINUED | OUTPATIENT
Start: 2022-03-07 | End: 2022-03-07

## 2022-03-07 RX ORDER — CHLORHEXIDINE GLUCONATE ORAL RINSE 1.2 MG/ML
10 SOLUTION DENTAL
Status: DISCONTINUED | OUTPATIENT
Start: 2022-03-07 | End: 2022-03-07 | Stop reason: HOSPADM

## 2022-03-07 RX ORDER — SODIUM CHLORIDE 9 MG/ML
INJECTION, SOLUTION INTRAVENOUS CONTINUOUS
Status: DISCONTINUED | OUTPATIENT
Start: 2022-03-07 | End: 2022-03-07 | Stop reason: HOSPADM

## 2022-03-07 RX ORDER — DEXAMETHASONE SODIUM PHOSPHATE 4 MG/ML
INJECTION, SOLUTION INTRA-ARTICULAR; INTRALESIONAL; INTRAMUSCULAR; INTRAVENOUS; SOFT TISSUE
Status: DISCONTINUED | OUTPATIENT
Start: 2022-03-07 | End: 2022-03-07

## 2022-03-07 RX ORDER — SUCCINYLCHOLINE CHLORIDE 20 MG/ML
INJECTION INTRAMUSCULAR; INTRAVENOUS
Status: DISCONTINUED | OUTPATIENT
Start: 2022-03-07 | End: 2022-03-07

## 2022-03-07 RX ORDER — EPINEPHRINE 1 MG/ML
INJECTION, SOLUTION INTRACARDIAC; INTRAMUSCULAR; INTRAVENOUS; SUBCUTANEOUS
Status: DISCONTINUED
Start: 2022-03-07 | End: 2022-03-07 | Stop reason: HOSPADM

## 2022-03-07 RX ORDER — ASPIRIN 81 MG/1
81 TABLET ORAL 2 TIMES DAILY
Qty: 28 TABLET | Refills: 0 | Status: SHIPPED | OUTPATIENT
Start: 2022-03-07 | End: 2022-03-21

## 2022-03-07 RX ORDER — BUPIVACAINE HYDROCHLORIDE 5 MG/ML
INJECTION, SOLUTION EPIDURAL; INTRACAUDAL
Status: COMPLETED | OUTPATIENT
Start: 2022-03-07 | End: 2022-03-07

## 2022-03-07 RX ORDER — ONDANSETRON 2 MG/ML
4 INJECTION INTRAMUSCULAR; INTRAVENOUS ONCE AS NEEDED
Status: DISCONTINUED | OUTPATIENT
Start: 2022-03-07 | End: 2022-03-07 | Stop reason: HOSPADM

## 2022-03-07 RX ORDER — FENTANYL CITRATE 50 UG/ML
25 INJECTION, SOLUTION INTRAMUSCULAR; INTRAVENOUS EVERY 5 MIN PRN
Status: DISCONTINUED | OUTPATIENT
Start: 2022-03-07 | End: 2022-03-07 | Stop reason: HOSPADM

## 2022-03-07 RX ORDER — CEFAZOLIN SODIUM 2 G/50ML
2 SOLUTION INTRAVENOUS
Status: COMPLETED | OUTPATIENT
Start: 2022-03-07 | End: 2022-03-07

## 2022-03-07 RX ORDER — LIDOCAINE HYDROCHLORIDE 20 MG/ML
INJECTION, SOLUTION EPIDURAL; INFILTRATION; INTRACAUDAL; PERINEURAL
Status: DISCONTINUED | OUTPATIENT
Start: 2022-03-07 | End: 2022-03-07

## 2022-03-07 RX ORDER — ACETAMINOPHEN 500 MG
1000 TABLET ORAL EVERY 8 HOURS PRN
Qty: 60 TABLET | Refills: 0 | Status: SHIPPED | OUTPATIENT
Start: 2022-03-07 | End: 2022-09-12

## 2022-03-07 RX ORDER — HYDROCODONE BITARTRATE AND ACETAMINOPHEN 5; 325 MG/1; MG/1
1 TABLET ORAL
Status: DISCONTINUED | OUTPATIENT
Start: 2022-03-07 | End: 2022-03-07 | Stop reason: HOSPADM

## 2022-03-07 RX ORDER — ACETAMINOPHEN 10 MG/ML
INJECTION, SOLUTION INTRAVENOUS
Status: DISCONTINUED | OUTPATIENT
Start: 2022-03-07 | End: 2022-03-07

## 2022-03-07 RX ORDER — FENTANYL CITRATE 50 UG/ML
INJECTION, SOLUTION INTRAMUSCULAR; INTRAVENOUS
Status: DISCONTINUED | OUTPATIENT
Start: 2022-03-07 | End: 2022-03-07

## 2022-03-07 RX ORDER — KETOROLAC TROMETHAMINE 30 MG/ML
INJECTION, SOLUTION INTRAMUSCULAR; INTRAVENOUS
Status: DISCONTINUED | OUTPATIENT
Start: 2022-03-07 | End: 2022-03-07

## 2022-03-07 RX ORDER — OXYCODONE HYDROCHLORIDE 5 MG/1
5 TABLET ORAL EVERY 4 HOURS PRN
Qty: 24 TABLET | Refills: 0 | Status: SHIPPED | OUTPATIENT
Start: 2022-03-07 | End: 2022-03-18

## 2022-03-07 RX ORDER — PROPOFOL 10 MG/ML
VIAL (ML) INTRAVENOUS
Status: DISCONTINUED | OUTPATIENT
Start: 2022-03-07 | End: 2022-03-07

## 2022-03-07 RX ORDER — TRAMADOL HYDROCHLORIDE 50 MG/1
50 TABLET ORAL
Qty: 36 TABLET | Refills: 0 | Status: SHIPPED | OUTPATIENT
Start: 2022-03-07 | End: 2022-03-18

## 2022-03-07 RX ORDER — ALBUTEROL SULFATE 0.83 MG/ML
2.5 SOLUTION RESPIRATORY (INHALATION) EVERY 4 HOURS PRN
Status: DISCONTINUED | OUTPATIENT
Start: 2022-03-07 | End: 2022-03-07 | Stop reason: HOSPADM

## 2022-03-07 RX ORDER — PHENYLEPHRINE HYDROCHLORIDE 10 MG/ML
INJECTION INTRAVENOUS
Status: DISCONTINUED | OUTPATIENT
Start: 2022-03-07 | End: 2022-03-07

## 2022-03-07 RX ORDER — MEPERIDINE HYDROCHLORIDE 25 MG/ML
12.5 INJECTION INTRAMUSCULAR; INTRAVENOUS; SUBCUTANEOUS ONCE
Status: DISCONTINUED | OUTPATIENT
Start: 2022-03-07 | End: 2022-03-07 | Stop reason: HOSPADM

## 2022-03-07 RX ORDER — MIDAZOLAM HYDROCHLORIDE 1 MG/ML
INJECTION INTRAMUSCULAR; INTRAVENOUS
Status: DISCONTINUED | OUTPATIENT
Start: 2022-03-07 | End: 2022-03-07

## 2022-03-07 RX ORDER — DIPHENHYDRAMINE HYDROCHLORIDE 50 MG/ML
25 INJECTION INTRAMUSCULAR; INTRAVENOUS EVERY 6 HOURS PRN
Status: DISCONTINUED | OUTPATIENT
Start: 2022-03-07 | End: 2022-03-07 | Stop reason: HOSPADM

## 2022-03-07 RX ORDER — CELECOXIB 200 MG/1
200 CAPSULE ORAL 2 TIMES DAILY
Qty: 28 CAPSULE | Refills: 0 | Status: SHIPPED | OUTPATIENT
Start: 2022-03-07 | End: 2022-10-04

## 2022-03-07 RX ADMIN — BUPIVACAINE 133 MG: 13.3 INJECTION, SUSPENSION, LIPOSOMAL INFILTRATION at 06:03

## 2022-03-07 RX ADMIN — ACETAMINOPHEN 1000 MG: 10 INJECTION, SOLUTION INTRAVENOUS at 08:03

## 2022-03-07 RX ADMIN — DEXAMETHASONE SODIUM PHOSPHATE 4 MG: 4 INJECTION, SOLUTION INTRA-ARTICULAR; INTRALESIONAL; INTRAMUSCULAR; INTRAVENOUS; SOFT TISSUE at 07:03

## 2022-03-07 RX ADMIN — SODIUM CHLORIDE, SODIUM LACTATE, POTASSIUM CHLORIDE, AND CALCIUM CHLORIDE: 600; 310; 30; 20 INJECTION, SOLUTION INTRAVENOUS at 06:03

## 2022-03-07 RX ADMIN — FENTANYL CITRATE 100 MCG: 50 INJECTION, SOLUTION INTRAMUSCULAR; INTRAVENOUS at 06:03

## 2022-03-07 RX ADMIN — BUPIVACAINE HYDROCHLORIDE 10 ML: 5 INJECTION, SOLUTION EPIDURAL; INTRACAUDAL; PERINEURAL at 06:03

## 2022-03-07 RX ADMIN — ROCURONIUM BROMIDE 10 MG: 10 INJECTION, SOLUTION INTRAVENOUS at 07:03

## 2022-03-07 RX ADMIN — CEFAZOLIN SODIUM 2 G: 2 SOLUTION INTRAVENOUS at 07:03

## 2022-03-07 RX ADMIN — PHENYLEPHRINE HYDROCHLORIDE 50 MCG: 10 INJECTION INTRAVENOUS at 08:03

## 2022-03-07 RX ADMIN — SUGAMMADEX 200 MG: 100 INJECTION, SOLUTION INTRAVENOUS at 08:03

## 2022-03-07 RX ADMIN — ONDANSETRON 4 MG: 2 INJECTION, SOLUTION INTRAMUSCULAR; INTRAVENOUS at 08:03

## 2022-03-07 RX ADMIN — FENTANYL CITRATE 50 MCG: 50 INJECTION, SOLUTION INTRAMUSCULAR; INTRAVENOUS at 07:03

## 2022-03-07 RX ADMIN — MIDAZOLAM HYDROCHLORIDE 2 MG: 1 INJECTION INTRAMUSCULAR; INTRAVENOUS at 06:03

## 2022-03-07 RX ADMIN — ROCURONIUM BROMIDE 20 MG: 10 INJECTION, SOLUTION INTRAVENOUS at 07:03

## 2022-03-07 RX ADMIN — PROPOFOL 200 MG: 10 INJECTION, EMULSION INTRAVENOUS at 07:03

## 2022-03-07 RX ADMIN — PHENYLEPHRINE HYDROCHLORIDE 100 MCG: 10 INJECTION INTRAVENOUS at 07:03

## 2022-03-07 RX ADMIN — LIDOCAINE HYDROCHLORIDE 100 MG: 20 INJECTION, SOLUTION EPIDURAL; INFILTRATION; INTRACAUDAL; PERINEURAL at 07:03

## 2022-03-07 RX ADMIN — SODIUM CHLORIDE, SODIUM LACTATE, POTASSIUM CHLORIDE, AND CALCIUM CHLORIDE: 600; 310; 30; 20 INJECTION, SOLUTION INTRAVENOUS at 08:03

## 2022-03-07 RX ADMIN — SUCCINYLCHOLINE CHLORIDE 100 MG: 20 INJECTION, SOLUTION INTRAMUSCULAR; INTRAVENOUS at 07:03

## 2022-03-07 RX ADMIN — KETOROLAC TROMETHAMINE 30 MG: 30 INJECTION, SOLUTION INTRAMUSCULAR at 08:03

## 2022-03-07 NOTE — PLAN OF CARE
Peripheral nerve block performed to left shoulder per Dr. Brown. Patient tolerated well. VSS. Cardiac monitor in place.Pt. To OR.

## 2022-03-07 NOTE — TRANSFER OF CARE
"Anesthesia Transfer of Care Note    Patient: Gerda Obrien    Procedure(s) Performed: Procedure(s) (LRB):  REPAIR, ROTATOR CUFF, ARTHROSCOPIC (Left)  ARTHROSCOPY, SHOULDER, WITH SUBACROMIAL SPACE DECOMPRESSION (Left)  FIXATION, TENDON (Left)    Patient location: PACU    Anesthesia Type: general and regional    Transport from OR: Transported from OR on room air with adequate spontaneous ventilation    Post pain: adequate analgesia    Post assessment: no apparent anesthetic complications and tolerated procedure well    Post vital signs: stable    Level of consciousness: awake and alert    Nausea/Vomiting: no nausea/vomiting    Complications: none    Transfer of care protocol was followed      Last vitals:   Visit Vitals  /71 (BP Location: Right arm, Patient Position: Lying)   Pulse (!) 55   Temp 36.8 °C (98.3 °F) (Temporal)   Resp 15   Ht 5' 2" (1.575 m)   Wt 92.1 kg (203 lb 0.7 oz)   LMP 06/01/2018 (Exact Date)   SpO2 97%   Breastfeeding No   BMI 37.14 kg/m²     "

## 2022-03-07 NOTE — ANESTHESIA POSTPROCEDURE EVALUATION
Anesthesia Post Evaluation    Patient: Gerda Obrien    Procedure(s) Performed: Procedure(s) (LRB):  REPAIR, ROTATOR CUFF, ARTHROSCOPIC (Left)  ARTHROSCOPY, SHOULDER, WITH SUBACROMIAL SPACE DECOMPRESSION (Left)  FIXATION, TENDON (Left)    Final Anesthesia Type: general      Patient location during evaluation: PACU  Patient participation: Yes- Able to Participate  Level of consciousness: awake and alert and oriented  Post-procedure vital signs: reviewed and stable  Pain management: adequate  Airway patency: patent    PONV status at discharge: No PONV  Anesthetic complications: no      Cardiovascular status: blood pressure returned to baseline, stable and hemodynamically stable  Respiratory status: unassisted  Hydration status: euvolemic  Follow-up not needed.          Vitals Value Taken Time   /60 03/07/22 0934   Temp 36.8 °C (98.3 °F) 03/07/22 0844   Pulse 57 03/07/22 0936   Resp 20 03/07/22 0936   SpO2 100 % 03/07/22 0936   Vitals shown include unvalidated device data.      Event Time   Out of Recovery 09:30:00         Pain/Ramandeep Score: Ramandeep Score: 9 (3/7/2022  9:30 AM)

## 2022-03-07 NOTE — PATIENT INSTRUCTIONS
DISCHARGE INSTRUCTIONS FOR ROTATOR CUFF REPAIR     Contact the Sports Medicine Clinic at [phone] if you have questions about your instructions or follow-up appointment.     DIET:   Start with clear liquids and light foods to minimize nausea. Once these are tolerated, advance to a regular diet.     DRESSING AND WOUND CARE:   Keep the dressing clean and dry. It is normal for there to be some drainage after surgery since the shoulder was irrigated with large amounts of fluid. Reinforce with additional gauze as necessary.   Remove the dressing the 2nd day after surgery and begin changing daily with clean gauze or Band-Aids®. Keep your incisions covered until you follow up in clinic.   If you have Steri-Strips in place of stitches, allow them to stay in place as long as possible. Steri-Strips are made of a fabric material that can get wet in the shower and pat dry with a towel. They usually fall off on their own within 7 to 10 days. You may trim the edges as they begin to curl.     BATHING:   You may bathe or shower on the 2nd day after surgery, but do not scrub or soak the incisions. Dry the area by gently blotting it with a gauze or towel. After it is completely dry, cover the wound with clean gauze or Band-Aids®. Do NOT submerge the incisions (bath/swim) until after the sutures are removed and the wound has completely healed.     ACTIVITY:    Ice should be applied to the shoulder for 20-30 minutes, 5-6 times a day, to help control pain and swelling. Apply additional times as needed, especially after exercise, for the first 3-4 weeks. Do not apply ice directly to the skin; use a thin barrier in between. Also, do not use heat.    Elevate the shoulder by sleeping as upright as possible using extra pillows or a recliner. Do this for the first few days to help decrease pain and swelling.    Wear the sling at all times for 6 weeks including while sleeping. The only time you may remove the sling is for bathing and  exercises. Do not lean or put your body weight on your arm.    When your block wears off, start the following exercises:  Remove the sling for 5-10 minutes, 3 times a day, to do the following exercises:   Fully bend & straighten your fingers, your wrist, and your elbow several times.   Lean forward, bracing yourself on a table/counter with your normal arm. Let your surgical arm relax and hang straight down. Shift your weight so that your arm moves side to side, front to back, and in gentle circles like a pendulum or elephant's trunk. Use your body to generate the movement for this, NOT your surgical shoulder's muscles. (see drawing below)      Physical therapy will be started after your 1st follow-up visit. At that time, you will be given a prescription & rehabilitation protocol to take to the PT clinic of your choice. Plan to visit with a therapist within 3 days after your follow-up visit.     PAIN CONTROL:   It is important to stay ahead of pain as it becomes challenging to get under control if you fall behind. Ice and elevation can help and should be used as much as possible in the first few days.    Narcotic pain medications, such as tramadol and oxycodone, should be taken as prescribed. The Tramadol is intended to be taken first as the primary medicine and then oxycodone taken for breakthrough pain. Wean off as soon as possible. Take these with food to decrease the chances of nausea and vomiting. Do not drink alcohol, drive a vehicle, or use heavy machinery while taking narcotic pain medications.    NSAID medications are used for pain control and to decrease inflammation. You may be prescribed an NSAID such as celebrex. Take as instructed. Other NSAID medications such as ibuprofen, Motrin, Advil, naproxen, or Aleve can be used once you have finished the celebrex, or if a prescription for celebrex was not provided.    Acetaminophen (Tylenol) is an effective over-the-counter pain medication that can be used with  NSAID medications and non-acetaminophen containing narcotics such as plain oxycodone.     ASPIRIN FOR PREVENTION OF BLOOD CLOTS:   You should take one 81 mg baby aspirin twice daily for two weeks starting the evening of the day you have surgery unless instructed otherwise or taking a different blood thinner such as enoxaparin or warfarin. If you are aware that you are at high risk for a blood clot, notify your physician as soon as possible.   Take aspirin at least 30 minutes before taking ibuprofen or Toradol.    CONSTIPATION PREVENTION:   Anesthesia and pain medications, changes in eating and drinking, and less activity can all lead to constipation after surgery. To prevent or reduce constipation, take an over-the-counter stool softener (brands include Colace and Miralax). Follow the directions on the bottle. Drink plenty of water and eat high fiber foods including whole grains, fresh fruits, vegetables, beans, prunes or prune juice.     PROBLEMS TO REPORT:   Persistent bloody drainage that soaks through reinforced dressings.   Fever greater than 101F or 38C.   Incision that is very red, swollen, draining pus, shows red streaks, or feels hot.   Inability to urinate within 8 hours of surgery (a rare effect of the anesthesia).   If you develop a rash, generalized itching or swelling from the medications, STOP the medication and call the clinic or the orthopedic surgery resident on call.   Daytime calls should be directed to the Sports Medicine Clinic at [phone].   Night-time and weekend calls should be directed to the after hours nurse line at [phone]    FREQUENTLY ASKED QUESTIONS     WHAT DAILY ACTIVITIES CAN I DO?   After shoulder surgery, you may do what you feel comfortable doing in the sling. Do not lift anything with your operative arm or put yourself at risk of falling.     CAN I DRIVE OR RIDE BY CAR/ TRAIN/ PLANE?   You should not drive while using a sling. There are no forced restrictions  regarding operating a motor vehicle, however you must always be the  of whether you are able to operate it safely. You should not drive while taking narcotic pain medications. You may ride in a car after surgery as needed. You may take a train or even fly the day after your surgery as long as you feel secure and comfortable.     WHAT ABOUT WORK?   You may return to an office-type job or to school whenever comfortable. For most patients this occurs 1-2 weeks after surgery. For more active jobs that require some lifting, you can wait until after your follow-up appointment. Any other unusual types of jobs should be discussed to determine a date for return to work.     WHAT ABOUT SWELLING?   Expect swelling as a normal process after surgery. Ice, elevation, and other treatments provided at physical therapy will allow this to improve in time. Some swelling may remain for up to 8 weeks, and this is normal.     WHAT IF IT REALLY HURTS TOO MUCH?   Surgery hurts and you cannot expect to be pain free, but our goal is for it to be tolerable. Try to use all available pain therapies such as narcotics, NSAIDS, and acetaminophen. Always try more ice and elevation. If the pain is not tolerable, call the clinic or the after hours nurse line.

## 2022-03-07 NOTE — ANESTHESIA PROCEDURE NOTES
Peripheral Block    Patient location during procedure: pre-op   Block not for primary anesthetic.  Reason for block: at surgeon's request and post-op pain management   Post-op Pain Location: Left shoulder   Start time: 3/7/2022 6:51 AM  Timeout: 3/7/2022 6:50 AM   End time: 3/7/2022 6:55 AM    Staffing  Authorizing Provider: Jasmin Brown MD  Performing Provider: Jasmin Brown MD    Staffing  Other anesthesia staff: Telma Blank CRNA  Preanesthetic Checklist  Completed: patient identified, IV checked, site marked, risks and benefits discussed, surgical consent, monitors and equipment checked, pre-op evaluation and timeout performed  Peripheral Block  Patient position: supine  Prep: ChloraPrep  Patient monitoring: heart rate, cardiac monitor, continuous pulse ox, continuous capnometry and frequent blood pressure checks  Block type: supraclavicular  Laterality: left  Injection technique: single shot  Needle  Needle type: Stimuplex   Needle gauge: 22 G  Needle length: 4 in  Needle localization: anatomical landmarks, ultrasound guidance and nerve stimulator   -ultrasound image captured on disc.  Assessment  Injection assessment: negative aspiration, negative parasthesia and local visualized surrounding nerve  Paresthesia pain: none  Heart rate change: no  Slow fractionated injection: yes  Pain Tolerance: comfortable throughout block and no complaints  Medications:    Medications: bupivacaine (pf) (MARCAINE) injection 0.5% - Perineural   10 mL - 3/7/2022 6:55:00 AM    Additional Notes  VSS.  [facility] RN monitoring vitals throughout procedure.  Patient tolerated procedure well.

## 2022-03-07 NOTE — DISCHARGE SUMMARY
[facility]  Discharge Note  Short Stay    Procedure(s) (LRB):  REPAIR, ROTATOR CUFF, ARTHROSCOPIC (Left)  ARTHROSCOPY, SHOULDER, WITH SUBACROMIAL SPACE DECOMPRESSION (Left)  FIXATION, TENDON (Left)    OUTCOME: Patient tolerated treatment/procedure well without complication and is now ready for discharge.    DISPOSITION: Home or Self Care    FINAL DIAGNOSIS:  Left rotator cuff tear    FOLLOWUP: In clinic    DISCHARGE INSTRUCTIONS:  No discharge procedures on file.     TIME SPENT ON DISCHARGE: 10 minutes

## 2022-03-08 ENCOUNTER — PATIENT MESSAGE (OUTPATIENT)
Dept: ORTHOPEDICS | Facility: CLINIC | Age: 52
End: 2022-03-08
Payer: COMMERCIAL

## 2022-03-08 DIAGNOSIS — M75.112 NONTRAUMATIC INCOMPLETE TEAR OF LEFT ROTATOR CUFF: Primary | ICD-10-CM

## 2022-03-09 ENCOUNTER — CLINICAL SUPPORT (OUTPATIENT)
Dept: REHABILITATION | Facility: HOSPITAL | Age: 52
End: 2022-03-09
Attending: STUDENT IN AN ORGANIZED HEALTH CARE EDUCATION/TRAINING PROGRAM
Payer: COMMERCIAL

## 2022-03-09 DIAGNOSIS — Z74.09 DECREASED FUNCTIONAL MOBILITY AND ENDURANCE: ICD-10-CM

## 2022-03-09 DIAGNOSIS — M62.81 PROXIMAL MUSCLE WEAKNESS: ICD-10-CM

## 2022-03-09 DIAGNOSIS — M25.612 DECREASED RANGE OF MOTION OF LEFT SHOULDER: ICD-10-CM

## 2022-03-09 DIAGNOSIS — M75.112 NONTRAUMATIC INCOMPLETE TEAR OF LEFT ROTATOR CUFF: ICD-10-CM

## 2022-03-09 PROCEDURE — 97110 THERAPEUTIC EXERCISES: CPT

## 2022-03-09 PROCEDURE — 97162 PT EVAL MOD COMPLEX 30 MIN: CPT

## 2022-03-09 NOTE — PLAN OF CARE
[facility]   Physical Therapy Initial Evaluation   Date: 3/9/2022   Name: Gerda Obrien  Clinic Number: [id number]    Therapy Diagnosis:    Encounter Diagnoses   Name Primary?    Nontraumatic incomplete tear of left rotator cuff     Decreased functional mobility and endurance     Decreased range of motion of left shoulder     Proximal muscle weakness       Physician: Denilson Jackson     Physician Orders: PT Eval and Treat  Medical Diagnosis from Referral: Nontraumatic incomplete tear of left rotator cuff  Evaluation Date: 3/9/2022  Authorization Period Expiration: 12/1/2022  Plan of Care Expiration: 6/9/2022  Progress Note Due: 4/9/2022  Visit # / Visits authorized: 1/ 1   FOTO: 1/ 3     Precautions: Standard      Surgical Procedure: L shoulder rotator cuff repair, biceps tenodesis, subacromial decompression Date of Surgery: 3/7/2022         Time In: 1015  Time Out: 1100  Total Billable Time (timed & untimed codes): 40 minutes    SUBJECTIVE   Date of onset: 3/7/2022    History of current condition - Gerda reports L shoulder pain for ~1 year prior to surgery. States she has been feeling good since surgery and is still taking pain medication. Continues to have numbness and weakness in the hand as a result of her nerve block but states that this is improving slowly. States she is sleeping propped up in bed with one pillow behind her back and that this has been making her back and hip a little stiff.     Imaging: x-ray shoulder performed on 1/28/2022    Pain:  Current 0/10, worst: NA due to pain medication, best 0/10    Location: L shoulder   Description: sore  Aggravating Factors: sleeping position   Easing Factors: pain medicine,     Prior Therapy: Yes   Social History: Pt lives with their family  Occupation: Pt works for the [employer] (currently off of work for 6+ weeks)   Prior Level of Function: Independent with all ADL, IADL, community mobility and functional  activities with reports of increased pain and need for increased time and frequent breaks. Pain at night time. Unable to raise into abduction.   Current Level of Function: wearing a sling with abduction pillow at all times.     Dominant Extremity: right    Pts goals: Pt reported goals are to decrease overall pain levels in order to return to prior functional level. She would like to get back to activities such as being a  and crafting (makes tumblers).       Medical History:   Past Medical History:   Diagnosis Date    Allergy     Biotin deficiency disease     Chlamydia     Diabetes mellitus     Fibroids     Herpes simplex     Knee pain, bilateral     Metabolic syndrome     Sleep apnea     Vitamin D deficiency        Surgical History:   Gerda Obrien  has a past surgical history that includes Tubal ligation; Bunionectomy; Breast surgery; Cervical biopsy w/ loop electrode excision; Plantar fascia surgery (Left); Foot surgery; hammer toe (Bilateral); Laser ablation of the cervix; Colonoscopy (N/A, 10/15/2021); Laparoscopic assisted vaginal hysterectomy with BSO (11/22/2021); Hysterectomy; Arthroscopic repair of rotator cuff of shoulder (Left, 3/7/2022); Arthroscopy of shoulder with decompression of subacromial space (Left, 3/7/2022); and Fixation of tendon (Left, 3/7/2022).    Medications:   Gerda has a current medication list which includes the following prescription(s): acetaminophen, albuterol, amitriptyline, aspirin, aspirin, atorvastatin, biotin, blood sugar diagnostic, buspirone, butalbital-acetaminophen-caffeine -40 mg, celecoxib, cholecalciferol (vitamin d3), clonazepam, cyclobenzaprine, farxiga, desloratadine, diclofenac sodium, dymista, estradiol, ferrous sulfate, freestyle lancets, hydrocortisone, hydroquinone, ketoconazole, magnesium chloride, multivitamin, naproxen, omeprazole, oxycodone, sitagliptin, sumatriptan, tizanidine, tramadol, tretinoin, and  valacyclovir.    Allergies:   Review of patient's allergies indicates:   Allergen Reactions    Doxycycline     Exenatide     Lexapro [escitalopram oxalate]      spasms    Sulfa (sulfonamide antibiotics)     Trulicity [dulaglutide]     Bydureon [exenatide microspheres] Rash     Skin reaction at site of injection.        OBJECTIVE     RANGE OF MOTION:    Cervical Right   (spine) Left    Pain/Dysfunction with Movement Goal   Cervical Flexion  100% ---     Cervical Extension  100% ---     Cervical Side Bending  75% 100%  100%   Cervical Rotation  100% 100%       Shoulder PROM Right Left Pain/Dysfunction with Movement Goal   Shoulder Flexion (180) 175 NT  170   Shoulder Extension (60) 60 NT  60   Shoulder Abduction (180) 175 NT  170   Shoulder ER (90) 90 NT  80   Shoulder IR (70) 70 NT  60     Elbow AROM/PROM Right Left Pain/Dysfunction with Movement Goal   Elbow Flexion (150) 150 130  150   Elbow Extension (0) 0 10  0       STRENGTH:    U/E MMT Right Left Pain/Dysfunction with Movement Goal   Shoulder Flexion 4/5 NT  4+/5 B   Shoulder Extension 4/5 NT  4+/5 B   Shoulder Abduction 4/5 NT  4+/5 B   Shoulder IR 4+/5 NT  4+/5 B   Shoulder ER   4/5 NT  4+/5 B   Elbow Flexion  5/5 NT  5/5 B   Elbow Extension 5/5 NT  5/5 B   Wrist Flexion 5/5 NT  5/5 B   Wrist Extension 5/5 NT  5/5 B       Initial Observation: pt wearing a sling with abduction pillow on L.     Sensation:  Sensation is intact to light touch in B upper extremities    Palpation: Increased tone and tenderness noted with palpation of L shoulder and periscapular region grossly.     Posture:  Pt presents with postural abnormalities which include: forward head and rounded shoulders     FUNCTION:     CMS Impairment/Limitation/Restriction for FOTO Shoulder Survey    Therapist reviewed FOTO scores for Gerda on 3/9/2022.   FOTO documents entered into EPIC - see Media section.    Limitation Score: 96%         TREATMENT   Gerda received the treatments listed  below:        THERAPEUTIC EXERCISES to develop strength, endurance, ROM, flexibility, posture and core stabilization for (15) minutes including:    Intervention Performed Today    Ball squeezes  x 1 minute    Wrist circles  x 1 minute    Scapular retractions  x 20x    Elbow PROM  x 10x by PT and 10x by family member for HEP education    Shoulder internal rotation and external rotation PROM  x 10x by PT and 10x by family member for HEP education    Pendulums  x 15x forward, 15x lateral                Plan for Next Visit: shoulder PROM, scapular isometrics        PATIENT EDUCATION AND HOME EXERCISES     Education provided: (time included with therex)    Patient educated on the impairments noted above and the effects of physical therapy intervention to improve overall condition and QOL.    Patient was educated on all the above exercise prior/during/after for proper posture, positioning, and execution for safe performance with home exercise program.     Written Home Exercises Provided: {Exercises were reviewed and Gerda was able to demonstrate them prior to the end of the session.  Gerda demonstrated good  understanding of the education provided. See EMR under Patient Instructions for exercises provided during therapy sessions.    ASSESSMENT   Gerda is a 51 y.o. female referred to outpatient Physical Therapy with a medical diagnosis of Nontraumatic incomplete tear of left rotator cuff. Pt presents with impairments including: decreased ROM, decreased strength, postural abnormalities, decreased overall function and scar tissue.    Pt prognosis is Excellent.   Pt will benefit from skilled outpatient Physical Therapy to address the deficits stated above and in the chart below, provide pt/family education, and to maximize pt's level of independence.     Plan of care discussed with patient: Yes  Pt's spiritual, cultural and educational needs considered and patient is agreeable to the plan of care and goals as  "stated below:     Anticipated Barriers for therapy: co-morbidities and adherence to treatment plan    Medical Necessity is demonstrated by the following  History  Co-morbidities and personal factors that may impact the plan of care Co-morbidities:   high BMI and HTN    Personal Factors:   lifestyle     moderate   Examination  Body Structures and Functions, activity limitations and participation restrictions that may impact the plan of care Body Regions:   upper extremities    Body Systems:    ROM  strength  motor control    Participation Restrictions:   See above in "Current Level of Function"     Activity limitations:   Learning and applying knowledge  no deficits    General Tasks and Commands  no deficits    Communication  no deficits    Mobility  lifting and carrying objects  reaching overhead, forward and behind the back    Self care  washing oneself (bathing, drying, washing hands)  caring for body parts (brushing teeth, shaving, grooming)  toileting  dressing  eating  drinking  looking after one's health    Domestic Life  shopping  cooking  doing house work (cleaning house, washing dishes, laundry)  assisting others    Interactions/Relationships  no deficits    Life Areas  no deficits    Community and Social Life  community life  recreation and leisure         moderate   Clinical Presentation evolving clinical presentation with changing clinical characteristics moderate   Decision Making/ Complexity Score: moderate       GOALS:    Short Term Goals:  6 weeks Progress   3/9/2022   1. Pain: Pt will demonstrate improved pain by reports of less than or equal to 6/10 worst pain on the verbal rating scale in order to progress toward maximal functional ability and improve QOL. PC   2. Function: Patient will demonstrate improved function as indicated by a functional limitation score of less than or equal to 80 out of 100 on FOTO. PC   3. Mobility: Patient will improve AROM to 50% of stated goals, listed in objective " measures above, in order to progress towards independence with functional activities.  PC   4. Strength: Patient will improve strength to 50% of stated goals, listed in objective measures above, in order to progress towards independence with functional activities.  PC   5. HEP: Patient will demonstrate independence with HEP in order to progress toward functional independence. PC     Long Term Goals:  12 weeks Progress  3/9/2022   1. Pain: Pt will demonstrate improved pain by reports of less than or equal to 3/10 worst pain on the verbal rating scale in order to progress toward maximal functional ability and improve QOL.   PC   2. Function: Patient will demonstrate improved function as indicated by a functional limitation score of less than or equal to 66 out of 100 on FOTO. PC   3. Mobility: Patient will improve AROM to stated goals, listed in objective measures above, in order to return to maximal functional potential and improve quality of life. PC   4. Strength: Patient will improve strength to stated goals, listed in objective measures above, in order to improve functional independence and quality of life. PC   5. Patient will return to normal ADL's, IADL's, community involvement, recreational activities, and work-related activities with less than or equal to 3/10 pain and maximal function.  PC     Goals Key:  PC= progressing/continue; PM= partially met;        DC= discontinue    PLAN   Plan of care Certification: 3/9/2022 to 6/9/2022.    Outpatient Physical Therapy 2 times weekly for 12 weeks to include any combination of the following interventions: virtual visits, dry needling, modalities, electrical stimulation (IFC, Pre-Mod, Attended with Functional Dry Needling), Cervical/Lumbar Traction, Manual Therapy, Neuromuscular Re-ed, Patient Education, Self Care, Therapeutic Exercise and Therapeutic Activites     Analilia Son, PT, DPT      I CERTIFY THE NEED FOR THESE SERVICES FURNISHED UNDER THIS PLAN OF  TREATMENT AND WHILE UNDER MY CARE   Physician's comments:     Physician's Signature: ___________________________________________________

## 2022-03-10 ENCOUNTER — PATIENT MESSAGE (OUTPATIENT)
Dept: ORTHOPEDICS | Facility: CLINIC | Age: 52
End: 2022-03-10
Payer: COMMERCIAL

## 2022-03-10 PROBLEM — M62.81 PROXIMAL MUSCLE WEAKNESS: Status: ACTIVE | Noted: 2022-03-10

## 2022-03-10 PROBLEM — Z74.09 DECREASED FUNCTIONAL MOBILITY AND ENDURANCE: Status: ACTIVE | Noted: 2022-03-10

## 2022-03-10 PROBLEM — M25.612 DECREASED RANGE OF MOTION OF LEFT SHOULDER: Status: ACTIVE | Noted: 2022-03-10

## 2022-03-18 ENCOUNTER — OFFICE VISIT (OUTPATIENT)
Dept: ORTHOPEDICS | Facility: CLINIC | Age: 52
End: 2022-03-18
Payer: COMMERCIAL

## 2022-03-18 VITALS — WEIGHT: 203 LBS | HEIGHT: 62 IN | BODY MASS INDEX: 37.36 KG/M2

## 2022-03-18 DIAGNOSIS — M25.512 CHRONIC LEFT SHOULDER PAIN: ICD-10-CM

## 2022-03-18 DIAGNOSIS — G89.29 CHRONIC LEFT SHOULDER PAIN: ICD-10-CM

## 2022-03-18 DIAGNOSIS — Z98.890 POST-OPERATIVE STATE: Primary | ICD-10-CM

## 2022-03-18 DIAGNOSIS — M75.112 NONTRAUMATIC INCOMPLETE TEAR OF LEFT ROTATOR CUFF: ICD-10-CM

## 2022-03-18 DIAGNOSIS — M75.122 NONTRAUMATIC COMPLETE TEAR OF LEFT ROTATOR CUFF: ICD-10-CM

## 2022-03-18 PROCEDURE — 3044F PR MOST RECENT HEMOGLOBIN A1C LEVEL <7.0%: ICD-10-PCS | Mod: CPTII,S$GLB,, | Performed by: PHYSICIAN ASSISTANT

## 2022-03-18 PROCEDURE — 99024 PR POST-OP FOLLOW-UP VISIT: ICD-10-PCS | Mod: S$GLB,,, | Performed by: PHYSICIAN ASSISTANT

## 2022-03-18 PROCEDURE — 99999 PR PBB SHADOW E&M-EST. PATIENT-LVL V: ICD-10-PCS | Mod: PBBFAC,,, | Performed by: PHYSICIAN ASSISTANT

## 2022-03-18 PROCEDURE — 3008F BODY MASS INDEX DOCD: CPT | Mod: CPTII,S$GLB,, | Performed by: PHYSICIAN ASSISTANT

## 2022-03-18 PROCEDURE — 3044F HG A1C LEVEL LT 7.0%: CPT | Mod: CPTII,S$GLB,, | Performed by: PHYSICIAN ASSISTANT

## 2022-03-18 PROCEDURE — 1160F PR REVIEW ALL MEDS BY PRESCRIBER/CLIN PHARMACIST DOCUMENTED: ICD-10-PCS | Mod: CPTII,S$GLB,, | Performed by: PHYSICIAN ASSISTANT

## 2022-03-18 PROCEDURE — 3066F PR DOCUMENTATION OF TREATMENT FOR NEPHROPATHY: ICD-10-PCS | Mod: CPTII,S$GLB,, | Performed by: PHYSICIAN ASSISTANT

## 2022-03-18 PROCEDURE — 3061F NEG MICROALBUMINURIA REV: CPT | Mod: CPTII,S$GLB,, | Performed by: PHYSICIAN ASSISTANT

## 2022-03-18 PROCEDURE — 99999 PR PBB SHADOW E&M-EST. PATIENT-LVL V: CPT | Mod: PBBFAC,,, | Performed by: PHYSICIAN ASSISTANT

## 2022-03-18 PROCEDURE — 3061F PR NEG MICROALBUMINURIA RESULT DOCUMENTED/REVIEW: ICD-10-PCS | Mod: CPTII,S$GLB,, | Performed by: PHYSICIAN ASSISTANT

## 2022-03-18 PROCEDURE — 3008F PR BODY MASS INDEX (BMI) DOCUMENTED: ICD-10-PCS | Mod: CPTII,S$GLB,, | Performed by: PHYSICIAN ASSISTANT

## 2022-03-18 PROCEDURE — 99024 POSTOP FOLLOW-UP VISIT: CPT | Mod: S$GLB,,, | Performed by: PHYSICIAN ASSISTANT

## 2022-03-18 PROCEDURE — 1159F MED LIST DOCD IN RCRD: CPT | Mod: CPTII,S$GLB,, | Performed by: PHYSICIAN ASSISTANT

## 2022-03-18 PROCEDURE — 3066F NEPHROPATHY DOC TX: CPT | Mod: CPTII,S$GLB,, | Performed by: PHYSICIAN ASSISTANT

## 2022-03-18 PROCEDURE — 1160F RVW MEDS BY RX/DR IN RCRD: CPT | Mod: CPTII,S$GLB,, | Performed by: PHYSICIAN ASSISTANT

## 2022-03-18 PROCEDURE — 1159F PR MEDICATION LIST DOCUMENTED IN MEDICAL RECORD: ICD-10-PCS | Mod: CPTII,S$GLB,, | Performed by: PHYSICIAN ASSISTANT

## 2022-03-18 RX ORDER — TRAMADOL HYDROCHLORIDE 50 MG/1
50 TABLET ORAL EVERY 8 HOURS PRN
Qty: 30 TABLET | Refills: 0 | Status: SHIPPED | OUTPATIENT
Start: 2022-03-18 | End: 2022-04-27

## 2022-03-18 NOTE — PROGRESS NOTES
Patient ID: Gerda Obrien  YOB: 1970  MRN: [id number]    Chief Complaint: Post-op Evaluation of the Left Shoulder    Referred By: Dr. Denilson Jackson    History of Present Illness: Gerda Obrein is a RHD 51 y.o. female     with a chief complaint of Post-op Evaluation of the Left Shoulder    Patient presents to the clinic today c/o 7/10 Left Shoulder Pain 11 days s/p Left Shoulder Surgery with Dr. Denilson Jackson on 3/7/2022. Procedures performed:  1. Left shoulder arthroscopic rotator cuff repair  2. Left shoulder arthroscopic biceps tenodesis  3. Left shoulder subacromial decompression  She states that her Pain is worse at night. She discontinued taking Tramadol 3 days ago and started taking Oxycodone. She has not monitored dosage timing at night, and feels that she may be taking too much, but she needs something for pain relief. She has been to 1 Physical Therapy visit since her surgery at [facility] with Sunshine SANCHEZ    Past Medical History:   Past Medical History:   Diagnosis Date    Allergy     Biotin deficiency disease     Chlamydia     Diabetes mellitus     Fibroids     Herpes simplex     Knee pain, bilateral     Metabolic syndrome     Sleep apnea     Vitamin D deficiency      Past Surgical History:   Procedure Laterality Date    ARTHROSCOPIC REPAIR OF ROTATOR CUFF OF SHOULDER Left 3/7/2022    Procedure: REPAIR, ROTATOR CUFF, ARTHROSCOPIC;  Surgeon: Denilson Jackson MD;  Location: [facility] OR;  Service: Orthopedics;  Laterality: Left;    ARTHROSCOPY OF SHOULDER WITH DECOMPRESSION OF SUBACROMIAL SPACE Left 3/7/2022    Procedure: ARTHROSCOPY, SHOULDER, WITH SUBACROMIAL SPACE DECOMPRESSION;  Surgeon: Denilson Jackson MD;  Location: [facility] OR;  Service: Orthopedics;  Laterality: Left;    BREAST SURGERY      BUNIONECTOMY      CERVICAL BIOPSY  W/ LOOP ELECTRODE EXCISION      COLONOSCOPY N/A  10/15/2021    Procedure: COLONOSCOPY;  Surgeon: Alaina Ceja MD;  Location: [facility] ENDO;  Service: Endoscopy;  Laterality: N/A;    FIXATION OF TENDON Left 3/7/2022    Procedure: FIXATION, TENDON;  Surgeon: Denilson Jackson MD;  Location: [facility] OR;  Service: Orthopedics;  Laterality: Left;    FOOT SURGERY      hammer toe Bilateral     HYSTERECTOMY      Laparoscopic assisted vaginal hysterectomy with BSO  11/22/2021    Path--Leiomyomata/Adenomyosis    LASER ABLATION OF THE CERVIX      PLANTAR FASCIA SURGERY Left     TUBAL LIGATION       Family History   Problem Relation Age of Onset    Diabetes Paternal Grandfather     Epilepsy Maternal Grandmother     Cancer Neg Hx     Heart disease Neg Hx     Hypertension Neg Hx      Social History     Socioeconomic History    Marital status:    Tobacco Use    Smoking status: Never Smoker    Smokeless tobacco: Never Used   Substance and Sexual Activity    Alcohol use: Yes     Alcohol/week: 1.0 standard drink     Types: 1 Glasses of wine per week     Comment: ocassional     Drug use: No    Sexual activity: Yes     Birth control/protection: Surgical     Medication List with Changes/Refills   Current Medications    ACETAMINOPHEN (TYLENOL) 500 MG TABLET    Take 2 tablets (1,000 mg total) by mouth every 8 (eight) hours as needed for Pain.    ALBUTEROL (PROVENTIL/VENTOLIN HFA) 90 MCG/ACTUATION INHALER    Inhale 2 puffs into the lungs every 6 (six) hours as needed for Wheezing.    AMITRIPTYLINE (ELAVIL) 25 MG TABLET    amitriptyline 25 mg tablet   TAKE 1/2 TABLET BY MOUTH AT BEDTIME X 5 DAYS, THEN 1 TAB AT BEDTIME FOR HEADACHE/NECK PAIN CONTROL    ASPIRIN (ECOTRIN) 81 MG EC TABLET    Take 81 mg by mouth once daily.    ASPIRIN (ECOTRIN) 81 MG EC TABLET    Take 1 tablet (81 mg total) by mouth 2 (two) times a day. for 14 days    ATORVASTATIN (LIPITOR) 10 MG TABLET    Take 1 tablet (10 mg total) by mouth once daily.    BIOTIN 10,000 MCG CAP    Take 1  capsule by mouth once daily.    BLOOD SUGAR DIAGNOSTIC STRP    Precision Xtra Test strips    BUSPIRONE (BUSPAR) 7.5 MG TABLET    TAKE 1 TABLET BY MOUTH TWICE A DAY AS NEEDED    BUTALBITAL-ACETAMINOPHEN-CAFFEINE -40 MG (FIORICET, ESGIC) -40 MG PER TABLET    PLEASE SEE ATTACHED FOR DETAILED DIRECTIONS    CELECOXIB (CELEBREX) 200 MG CAPSULE    Take 1 capsule (200 mg total) by mouth 2 (two) times daily.    CHOLECALCIFEROL, VITAMIN D3, (VITAMIN D3) 250 MCG (10,000 UNIT) CAP    Take 1 tablet by mouth. 1 tablet 3 times a week    CLONAZEPAM (KLONOPIN) 0.5 MG TABLET    TAKE 1 TO 2 TABLETS BY MOUTH ONCE DAILY AT BEDTIME.    CYCLOBENZAPRINE (FLEXERIL) 10 MG TABLET    One tab po qhs prn spasm    DAPAGLIFLOZIN (FARXIGA) 10 MG TABLET    Take 1 tablet (10 mg total) by mouth once daily.    DESLORATADINE (CLARINEX) 5 MG TABLET    desloratadine 5 mg tablet    DICLOFENAC SODIUM (VOLTAREN) 1 % GEL    Apply 2 g topically 4 (four) times daily.    DYMISTA 137-50 MCG/SPRAY SPRY NASSAL SPRAY    1 spray by Each Nare route 2 (two) times daily as needed.    ESTRADIOL (VIVELLE-DOT) 0.075 MG/24 HR    Place 1 patch onto the skin twice a week.    FERROUS SULFATE (FEOSOL) 325 MG (65 MG IRON) TAB TABLET    Take 1 tablet (325 mg total) by mouth daily with breakfast.    FREESTYLE LANCETS 28 GAUGE LANCETS    USE TWICE DAILY BEFORE MEALS.    HYDROCORTISONE 2.5 % CREAM    Apply topically 2 (two) times daily.    HYDROQUINONE 4 % CREA    Apply to dark spots once daily. Use with sunscreen if outdoors    KETOCONAZOLE (NIZORAL) 2 % CREAM    APPLY TO FEET ONCE DAILY    MAGNESIUM CHLORIDE (SLOW-MAG) 71.5 MG TBEC    Take 1 tablet by mouth once. One tablet 3 times a week    MULTIVITAMIN CAPSULE    Take by mouth.    NAPROXEN (NAPROSYN) 500 MG TABLET    One po bid prn pain    OMEPRAZOLE (PRILOSEC) 40 MG CAPSULE    Take 1 capsule (40 mg total) by mouth every morning.    SITAGLIPTIN (JANUVIA) 100 MG TAB    Take 1 tablet (100 mg total) by mouth once daily.     SUMATRIPTAN (IMITREX) 100 MG TABLET    sumatriptan 100 mg tablet   PLEASE SEE ATTACHED FOR DETAILED DIRECTIONS    TIZANIDINE (ZANAFLEX) 4 MG TABLET    tizanidine 4 mg tablet   ONE TABLET BY MOUTH NIGHTLY AT BEDTIME.    TRETINOIN (RETIN-A) 0.025 % CREAM    Apply to areas 3 times/week    VALACYCLOVIR (VALTREX) 500 MG TABLET    Take 500 mg by mouth 2 (two) times daily.   Changed and/or Refilled Medications    Modified Medication Previous Medication    TRAMADOL (ULTRAM) 50 MG TABLET traMADoL (ULTRAM) 50 mg tablet       Take 1 tablet (50 mg total) by mouth every 8 (eight) hours as needed for Pain.    Take 1 tablet (50 mg total) by mouth every 4 to 6 hours as needed for Pain.   Discontinued Medications    OXYCODONE (ROXICODONE) 5 MG IMMEDIATE RELEASE TABLET    Take 1 tablet (5 mg total) by mouth every 4 (four) hours as needed for Pain (post-op pain).     Review of patient's allergies indicates:   Allergen Reactions    Doxycycline     Exenatide     Lexapro [escitalopram oxalate]      spasms    Sulfa (sulfonamide antibiotics)     Trulicity [dulaglutide]     Bydureon [exenatide microspheres] Rash     Skin reaction at site of injection.     Review of Systems   Constitutional: Negative for chills and fever.   HENT: Negative for sore throat.    Eyes: Negative for pain.   Cardiovascular: Negative for chest pain and leg swelling.   Respiratory: Negative for cough and shortness of breath.    Skin: Negative for itching and rash.   Musculoskeletal: Positive for joint swelling and muscle weakness.   Gastrointestinal: Negative for abdominal pain, nausea and vomiting.   Genitourinary: Negative for dysuria.   Neurological: Negative for dizziness, numbness and paresthesias.       Physical Exam:   Body mass index is 37.13 kg/m².  There were no vitals filed for this visit.   GENERAL: Well appearing, appropriate for stated age, no acute distress.  CARDIOVASCULAR: Pulses regular by peripheral palpation.  PULMONARY: Respirations are  even and non-labored.  NEURO: Awake, alert, and oriented x 3.  PSYCH: Mood & affect are appropriate.  HEENT: Head is normocephalic and atraumatic.  General    Nursing note and vitals reviewed.          Left shoulder  Sutures removed, replaced with steri-strips  No s/s of infection  No active drainage   No active bleeding  Deferred on ROM  All compartments soft and compressible. Intact extensor pollicis longus, flexor pollicis longus, finger flexion, finger extension, finger abduction and adduction. Sensation intact to radial, median, ulnar, and axillary nerve distributions. Hand warm and well perfused with capillary refill of less than 2 seconds, and palpable distal radial pulses.       Imaging:    X-Ray Hips Bilateral 2 View Incl AP Pelvis  Narrative: EXAMINATION:  XR HIPS BILATERAL 2 VIEW INCL AP PELVIS    CLINICAL HISTORY:  Sacroiliitis, not elsewhere classified    TECHNIQUE:  AP view of the pelvis and frogleg lateral views of both hips were performed.    COMPARISON:  None.    FINDINGS:  No acute fracture.  Hip joint spaces maintained with minimal acetabular degenerative ridging.  These a fight changes noted at the left greater trochanter and left greater than right iliac spine/crest.  Soft tissues unremarkable..  Impression: As above    Electronically signed by: Kian Romero MD  Date:    02/15/2022  Time:    09:20  X-Ray Lumbar Complete Including Flex And Ext  Narrative: EXAMINATION:  XR LUMBAR SPINE 5 VIEW WITH FLEX AND EXT    CLINICAL HISTORY:  Spondylosis without myelopathy or radiculopathy, lumbar region    TECHNIQUE:  AP, lateral and spot images were performed of the lumbar spine.    COMPARISON:  None    FINDINGS:  AP, lateral (flexion and extension), bilateral oblique and coned down radiographs of the lumbar spine demonstrate no evidence for acute fracture or dislocation.  Posterior facet hypertrophic changes are identified at L5/S1, to a lesser extent at L4/5.  Anterior osteophytes are identified at  several lumbar levels.  Mild intervertebral disc space narrowing is also identified at L4/5 and L5/S1.  Remaining intervertebral disk spaces and vertebral body heights are well-preserved.  Visualized SI joints are intact.  Impression: Mild degenerative disease as described above    Electronically signed by: Jan Rowell MD  Date:    02/15/2022  Time:    09:17    Relevant imaging results reviewed and interpreted by me, discussed with the patient and / or family today.     Other Tests:     Patient Instructions     Assessment:  Gerda Obrien is a RHD 51 y.o. female     with a chief complaint of Post-op Evaluation of the Left Shoulder  11 days s/p Left Shoulder Surgery with Dr. Denilson Jackson on 3/7/2022. Procedures performed:  1. Left shoulder arthroscopic rotator cuff repair  2. Left shoulder arthroscopic biceps tenodesis  3. Left shoulder subacromial decompression   Post-op with Dr. Jackson on 3/7/22    Encounter Diagnoses   Name Primary?    Post-operative state Yes    Nontraumatic incomplete tear of left rotator cuff     Chronic left shoulder pain     Nontraumatic complete tear of left rotator cuff       Plan:   Continue physical therapy at [facility]   Tramadol for pain at not   Complete aspirin 2 weeks for DVT ppx   Discussed at home exercises   Sling x 6 weeks     Follow up with Dr. Jackson 4 weeks     Follow-up: 4 weeks or sooner if there are any problems between now and then.    Thank you for choosing [facility] and Dr. Fredy Mei for your orthopedic & sports medicine care. It is our goal to provide you with exceptional care that will help keep you healthy, active, and get you back in the game.    If you felt that you received exemplary care today, please consider leaving us feedback on [employer] at [email or web address].     Please do not hesitate to reach out to us via email,  phone, or MyChart with any questions, concerns, or feedback.    If you are experiencing pain/discomfort ,or have questions after 5pm and would like to be connected to the [facility] on-call team, please call this number and specify which Sports Medicine provider is treating you: [phone]        Provider Note/Medical Decision Making:        I discussed worrisome and red flag signs and symptoms with the patient. The patient expressed understanding and agreed to alert me immediately or to go to the emergency room if they experience any of these.    Treatment plan was developed with input from the patient/family, and they expressed understanding and agreement with the plan. All questions were answered today.    Disclaimer: This note was prepared using a voice recognition system and is likely to have sound alike errors within the text.

## 2022-03-18 NOTE — PATIENT INSTRUCTIONS
Assessment:  Gerda Obrien is a RHD 51 y.o. female     with a chief complaint of Post-op Evaluation of the Left Shoulder  11 days s/p Left Shoulder Surgery with Dr. Denilson Jackson on 3/7/2022. Procedures performed:  1. Left shoulder arthroscopic rotator cuff repair  2. Left shoulder arthroscopic biceps tenodesis  3. Left shoulder subacromial decompression  Post-op with Dr. Jackson on 3/7/22    Encounter Diagnoses   Name Primary?    Post-operative state Yes    Nontraumatic incomplete tear of left rotator cuff     Chronic left shoulder pain     Nontraumatic complete tear of left rotator cuff       Plan:  Continue physical therapy at [facility]  Tramadol for pain at not  Complete aspirin 2 weeks for DVT ppx  Discussed at home exercises  Sling x 6 weeks    Follow up with Dr. Jackson 4 weeks     Follow-up: 4 weeks or sooner if there are any problems between now and then.    Thank you for choosing [facility] and Dr. Fredy Mei for your orthopedic & sports medicine care. It is our goal to provide you with exceptional care that will help keep you healthy, active, and get you back in the game.    If you felt that you received exemplary care today, please consider leaving us feedback on [employer] at [email or web address].     Please do not hesitate to reach out to us via email, phone, or MyChart with any questions, concerns, or feedback.    If you are experiencing pain/discomfort ,or have questions after 5pm and would like to be connected to the [facility] on-call team, please call this number and specify which Sports Medicine provider is treating you: [phone]

## 2022-03-23 ENCOUNTER — CLINICAL SUPPORT (OUTPATIENT)
Dept: REHABILITATION | Facility: HOSPITAL | Age: 52
End: 2022-03-23
Attending: STUDENT IN AN ORGANIZED HEALTH CARE EDUCATION/TRAINING PROGRAM
Payer: COMMERCIAL

## 2022-03-23 DIAGNOSIS — M62.81 PROXIMAL MUSCLE WEAKNESS: ICD-10-CM

## 2022-03-23 DIAGNOSIS — M25.612 DECREASED RANGE OF MOTION OF LEFT SHOULDER: ICD-10-CM

## 2022-03-23 DIAGNOSIS — Z74.09 DECREASED FUNCTIONAL MOBILITY AND ENDURANCE: Primary | ICD-10-CM

## 2022-03-23 PROCEDURE — 97110 THERAPEUTIC EXERCISES: CPT

## 2022-03-23 PROCEDURE — 97140 MANUAL THERAPY 1/> REGIONS: CPT

## 2022-03-24 ENCOUNTER — CLINICAL SUPPORT (OUTPATIENT)
Dept: REHABILITATION | Facility: HOSPITAL | Age: 52
End: 2022-03-24
Payer: COMMERCIAL

## 2022-03-24 DIAGNOSIS — M25.612 DECREASED RANGE OF MOTION OF LEFT SHOULDER: ICD-10-CM

## 2022-03-24 DIAGNOSIS — Z74.09 DECREASED FUNCTIONAL MOBILITY AND ENDURANCE: Primary | ICD-10-CM

## 2022-03-24 DIAGNOSIS — M62.81 PROXIMAL MUSCLE WEAKNESS: ICD-10-CM

## 2022-03-24 PROCEDURE — 97110 THERAPEUTIC EXERCISES: CPT

## 2022-03-24 PROCEDURE — 97140 MANUAL THERAPY 1/> REGIONS: CPT

## 2022-03-24 NOTE — PROGRESS NOTES
[facility]   Physical Therapy Treatment Note     [facility]  [facility] Number: [id number]    Therapy Diagnosis:   Encounter Diagnoses   Name Primary?    Decreased functional mobility and endurance Yes    Decreased range of motion of left shoulder     Proximal muscle weakness      Physician: Denilson Jackson    Visit Date: 3/24/2022    Physician Orders: PT Eval and Treat  Medical Diagnosis from Referral: Nontraumatic incomplete tear of left rotator cuff  Evaluation Date: 3/9/2022  Authorization Period Expiration: 12/1/2022  Plan of Care Expiration: 6/9/2022  Progress Note Due: 4/9/2022  Visit # / Visits authorized: 2/20 (+1 for evaluation)    FOTO: 1/ 3      Precautions: Standard        Surgical Procedure: L shoulder rotator cuff repair, biceps tenodesis, subacromial decompression Date of Surgery: 3/7/2022     Time In: 1300  Time Out: 1345  Total Billable Time: 43 minutes     SUBJECTIVE     Patient reports: she has been having more shoulder pain now that she is no longer taking her pain medication but states that pain is tolerable. She has been sleeping in her recliner.    He/She was compliant with home exercise program.  Response to previous treatment: felt fine with no pain   Functional change: PROM goals accomplished     Pain: 4/10     Location: L shoulder     OBJECTIVE     Objective Measures updated at progress report unless specified.       TREATMENT     Gerda received the treatments listed below:       MANUAL THERAPY TECHNIQUES were applied for (10) minutes, including:    Manual Intervention Performed Today    Soft Tissue Mobilization x Superficial soft tissue mobilization to left upper trapezius, levator scapulae  and subscapularis and scapular tilting, latissimus dorsi muscle.   Joint Mobilizations     Mobilization with movement          Functional Dry Needling        Plan for Next Visit: PRN         THERAPEUTIC EXERCISES to develop strength,  endurance, ROM, flexibility, posture and core stabilization for (30) minutes including:    Intervention Performed Today    Shoulder and elbow PROM  x 15 minutes   Shoulder internal rotation and external rotation to 30*  Shoulder flexion to 90*  Shoulder abduction to 90*  Full range elbow flexion and extension    submax shoulder isometrics  x 30x each by PT (No abduction) adduction and extension    Shoulder protraction and retraction  x 20x    Shrugs and depressions  x 20x    pendulums  1 minute forward, 1 minute lateral    Wrist AROM flexion/extension;  supination/pronation x  x 20x/ each   gripper x Red 2 min          Plan for Next Visit: wrist AROM            PATIENT EDUCATION AND HOME EXERCISES     Home Exercises Provided and Patient Education Provided     Education provided: (time included with therex) minutes   Patient educated on biomechanical justification for therapeutic exercise and importance of compliance with HEP in order to improve overall impairments and QOL    Patient was educated on all the above exercise prior/during/after for proper posture, positioning, and execution for safe performance with home exercise program.    Patient educated on the importance of improved core and upper extremity strength in order to improve alignment of the spine and upper extremities with static positions and dynamic movement.    Patient educated on the importance of strong core and lower extremity musculature in order to improve both static and dynamic balance, improve gait mechanics, reduce fall risk and improve household and community mobility.       Written Home Exercises Provided: yes.  Exercises were reviewed and Gerda was able to demonstrate them prior to the end of the session.  Gerda demonstrated good  understanding of the education provided. See EMR under Patient Instructions for exercises provided during therapy sessions.      ASSESSMENT     Patient tolerated all treatment well today and was able to  recline more on mat for manual and PROM, patient was educated on support of posterior arm on towel roll or pillow to avoid arm slipping back while laying more reclined. Patient was encouraged to participate with home exercise program and continue with sling use.    Gerda is progressing well towards her goals.   Pt prognosis is Excellent.     Pt will continue to benefit from skilled outpatient physical therapy to address the deficits listed in the problem list box on initial evaluation, provide pt/family education and to maximize pt's level of independence in the home and community environment.     Pt's spiritual, cultural and educational needs considered and pt agreeable to plan of care and goals.     Anticipated Barriers for therapy: co-morbidities and adherence to treatment plan    GOALS:     Short Term Goals:  6 weeks Progress   3/9/2022   1. Pain: Pt will demonstrate improved pain by reports of less than or equal to 6/10 worst pain on the verbal rating scale in order to progress toward maximal functional ability and improve QOL. PC   2. Function: Patient will demonstrate improved function as indicated by a functional limitation score of less than or equal to 80 out of 100 on FOTO. PC   3. Mobility: Patient will improve AROM to 50% of stated goals, listed in objective measures above, in order to progress towards independence with functional activities.  PC   4. Strength: Patient will improve strength to 50% of stated goals, listed in objective measures above, in order to progress towards independence with functional activities.  PC   5. HEP: Patient will demonstrate independence with HEP in order to progress toward functional independence. PC      Long Term Goals:  12 weeks Progress  3/9/2022   1. Pain: Pt will demonstrate improved pain by reports of less than or equal to 3/10 worst pain on the verbal rating scale in order to progress toward maximal functional ability and improve QOL.   PC   2. Function:  Patient will demonstrate improved function as indicated by a functional limitation score of less than or equal to 66 out of 100 on FOTO. PC   3. Mobility: Patient will improve AROM to stated goals, listed in objective measures above, in order to return to maximal functional potential and improve quality of life. PC   4. Strength: Patient will improve strength to stated goals, listed in objective measures above, in order to improve functional independence and quality of life. PC   5. Patient will return to normal ADL's, IADL's, community involvement, recreational activities, and work-related activities with less than or equal to 3/10 pain and maximal function.         Goals Key:  PC= progressing/continue; PM= partially met;        DC= discontinue    PLAN     Continue Plan of Care (POC) and progress per patient tolerance. See treatment section for details on planned progressions next session.      Guera Gaspar, PT

## 2022-03-29 ENCOUNTER — PATIENT MESSAGE (OUTPATIENT)
Dept: ORTHOPEDICS | Facility: CLINIC | Age: 52
End: 2022-03-29
Payer: COMMERCIAL

## 2022-03-29 ENCOUNTER — CLINICAL SUPPORT (OUTPATIENT)
Dept: REHABILITATION | Facility: HOSPITAL | Age: 52
End: 2022-03-29
Attending: STUDENT IN AN ORGANIZED HEALTH CARE EDUCATION/TRAINING PROGRAM
Payer: COMMERCIAL

## 2022-03-29 DIAGNOSIS — M25.612 DECREASED RANGE OF MOTION OF LEFT SHOULDER: ICD-10-CM

## 2022-03-29 DIAGNOSIS — Z74.09 DECREASED FUNCTIONAL MOBILITY AND ENDURANCE: Primary | ICD-10-CM

## 2022-03-29 DIAGNOSIS — M62.81 PROXIMAL MUSCLE WEAKNESS: ICD-10-CM

## 2022-03-29 PROCEDURE — 97110 THERAPEUTIC EXERCISES: CPT

## 2022-03-29 PROCEDURE — 97140 MANUAL THERAPY 1/> REGIONS: CPT

## 2022-03-29 NOTE — PROGRESS NOTES
[facility]   Physical Therapy Treatment Note     Name: Gerda [facility]  Clinic Number: [id number]    Therapy Diagnosis:   Encounter Diagnoses   Name Primary?    Decreased functional mobility and endurance Yes    Decreased range of motion of left shoulder     Proximal muscle weakness      Physician: Denilson Jackson    Visit Date: 3/29/2022    Physician Orders: PT Eval and Treat  Medical Diagnosis from Referral: Nontraumatic incomplete tear of left rotator cuff  Evaluation Date: 3/9/2022  Authorization Period Expiration: 12/1/2022  Plan of Care Expiration: 6/9/2022  Progress Note Due: 4/9/2022  Visit # / Visits authorized: 3/20 (+1 for evaluation)    FOTO: 1/ 3      Precautions: Standard        Surgical Procedure: L shoulder rotator cuff repair, biceps tenodesis, subacromial decompression Date of Surgery: 3/7/2022     Time In: 1000  Time Out: 1048  Total Billable Time: 45 minutes     SUBJECTIVE     Patient reports: she has been having more shoulder pain now that she is no longer taking her pain medication but states that pain is tolerable. She has been sleeping in her recliner.    He/She was compliant with home exercise program.  Response to previous treatment: felt fine with no pain   Functional change: PROM goals accomplished     Pain: 4/10     Location: L shoulder     OBJECTIVE     Objective Measures updated at progress report unless specified.       TREATMENT     Gerda received the treatments listed below:       MANUAL THERAPY TECHNIQUES were applied for (15) minutes, including:    Manual Intervention Performed Today    Soft Tissue Mobilization x Superficial soft tissue mobilization to left upper trapezius, levator scapulae , periscapular musculature and deltoid and mid-scapular release   Joint Mobilizations x Grade II L glenohumeral distraction, inferior, anterior and posterior glide    Mobilization with movement          Functional Dry Needling        Plan for  Next Visit: PRN         THERAPEUTIC EXERCISES to develop strength, endurance, ROM, flexibility, posture and core stabilization for (30) minutes including:    Intervention Performed Today    Shoulder and elbow PROM  x 15 minutes   Shoulder internal rotation and external rotation to 30*  Shoulder flexion to 90*  Shoulder abduction to 90*  Full range elbow flexion and extension    submax shoulder isometrics  x 30x each by PT (No abduction) adduction and extension    Shoulder protraction and retraction  x 20x    Shrugs and depressions  x 20x    pendulums  1 minute forward, 1 minute lateral    Wrist AROM flexion/extension;  supination/pronation  20x/ each   gripper  Red 2 min          Plan for Next Visit: wrist AROM            PATIENT EDUCATION AND HOME EXERCISES     Home Exercises Provided and Patient Education Provided     Education provided: (time included with therex) minutes   Patient educated on biomechanical justification for therapeutic exercise and importance of compliance with HEP in order to improve overall impairments and QOL    Patient was educated on all the above exercise prior/during/after for proper posture, positioning, and execution for safe performance with home exercise program.    Patient educated on the importance of improved core and upper extremity strength in order to improve alignment of the spine and upper extremities with static positions and dynamic movement.    Patient educated on the importance of strong core and lower extremity musculature in order to improve both static and dynamic balance, improve gait mechanics, reduce fall risk and improve household and community mobility.       Written Home Exercises Provided: yes.  Exercises were reviewed and Gerda was able to demonstrate them prior to the end of the session.  Gerda demonstrated good  understanding of the education provided. See EMR under Patient Instructions for exercises provided during therapy sessions.      ASSESSMENT      Patient tolerated session well today. Incorporated joint mobilizations with good tolerance reported by pt. Pt continues to maintain ROM goals expected for this stage of rehab    Gerda is progressing well towards her goals.   Pt prognosis is Excellent.     Pt will continue to benefit from skilled outpatient physical therapy to address the deficits listed in the problem list box on initial evaluation, provide pt/family education and to maximize pt's level of independence in the home and community environment.     Pt's spiritual, cultural and educational needs considered and pt agreeable to plan of care and goals.     Anticipated Barriers for therapy: co-morbidities and adherence to treatment plan    GOALS:     Short Term Goals:  6 weeks Progress   3/9/2022   1. Pain: Pt will demonstrate improved pain by reports of less than or equal to 6/10 worst pain on the verbal rating scale in order to progress toward maximal functional ability and improve QOL. PC   2. Function: Patient will demonstrate improved function as indicated by a functional limitation score of less than or equal to 80 out of 100 on FOTO. PC   3. Mobility: Patient will improve AROM to 50% of stated goals, listed in objective measures above, in order to progress towards independence with functional activities.  PC   4. Strength: Patient will improve strength to 50% of stated goals, listed in objective measures above, in order to progress towards independence with functional activities.  PC   5. HEP: Patient will demonstrate independence with HEP in order to progress toward functional independence. PC      Long Term Goals:  12 weeks Progress  3/9/2022   1. Pain: Pt will demonstrate improved pain by reports of less than or equal to 3/10 worst pain on the verbal rating scale in order to progress toward maximal functional ability and improve QOL.   PC   2. Function: Patient will demonstrate improved function as indicated by a functional limitation score  of less than or equal to 66 out of 100 on FOTO. PC   3. Mobility: Patient will improve AROM to stated goals, listed in objective measures above, in order to return to maximal functional potential and improve quality of life. PC   4. Strength: Patient will improve strength to stated goals, listed in objective measures above, in order to improve functional independence and quality of life. PC   5. Patient will return to normal ADL's, IADL's, community involvement, recreational activities, and work-related activities with less than or equal to 3/10 pain and maximal function.         Goals Key:  PC= progressing/continue; PM= partially met;        DC= discontinue    PLAN     Continue Plan of Care (POC) and progress per patient tolerance. See treatment section for details on planned progressions next session.      Analilia Son, PT

## 2022-04-01 ENCOUNTER — CLINICAL SUPPORT (OUTPATIENT)
Dept: REHABILITATION | Facility: HOSPITAL | Age: 52
End: 2022-04-01
Attending: STUDENT IN AN ORGANIZED HEALTH CARE EDUCATION/TRAINING PROGRAM
Payer: COMMERCIAL

## 2022-04-01 DIAGNOSIS — Z74.09 DECREASED FUNCTIONAL MOBILITY AND ENDURANCE: Primary | ICD-10-CM

## 2022-04-01 DIAGNOSIS — M62.81 PROXIMAL MUSCLE WEAKNESS: ICD-10-CM

## 2022-04-01 DIAGNOSIS — M25.612 DECREASED RANGE OF MOTION OF LEFT SHOULDER: ICD-10-CM

## 2022-04-01 PROCEDURE — 97110 THERAPEUTIC EXERCISES: CPT

## 2022-04-01 PROCEDURE — 97140 MANUAL THERAPY 1/> REGIONS: CPT

## 2022-04-01 NOTE — PROGRESS NOTES
[facility]   Physical Therapy Treatment Note     [facility]  [facility] Number: [id number]    Therapy Diagnosis:   Encounter Diagnoses   Name Primary?    Decreased functional mobility and endurance Yes    Decreased range of motion of left shoulder     Proximal muscle weakness      Physician: Denilson Jackson    Visit Date: 4/1/2022    Physician Orders: PT Eval and Treat  Medical Diagnosis from Referral: Nontraumatic incomplete tear of left rotator cuff  Evaluation Date: 3/9/2022  Authorization Period Expiration: 12/1/2022  Plan of Care Expiration: 6/9/2022  Progress Note Due: 4/9/2022  Visit # / Visits authorized: 3/20 (+1 for evaluation)    FOTO: 1/ 3      Precautions: Standard        Surgical Procedure: L shoulder rotator cuff repair, biceps tenodesis, subacromial decompression Date of Surgery: 3/7/2022     Time In: 0920  Time Out: 1005  Total Billable Time: 43 minutes     SUBJECTIVE     Patient reports: she is feeling pretty good today with decreased pain and tension compared to previous session. Continues to report night time pain     He/She was compliant with home exercise program.  Response to previous treatment: felt fine with no pain   Functional change: PROM goals accomplished     Pain: 2/10     Location: L shoulder     OBJECTIVE     Objective Measures updated at progress report unless specified.       TREATMENT     Gerda received the treatments listed below:       MANUAL THERAPY TECHNIQUES were applied for (13) minutes, including:    Manual Intervention Performed Today    Soft Tissue Mobilization x Superficial soft tissue mobilization to left upper trapezius, levator scapulae , periscapular musculature and deltoid and mid-scapular release. Scar mobilizations    Joint Mobilizations x Grade II L glenohumeral distraction, inferior, anterior and posterior glide    Mobilization with movement          Functional Dry Needling        Plan for Next Visit:  PRN         THERAPEUTIC EXERCISES to develop strength, endurance, ROM, flexibility, posture and core stabilization for (30) minutes including:    Intervention Performed Today    Shoulder PROM  x 15 minutes   Shoulder internal rotation and external rotation to 30*  Shoulder flexion to 90*  Shoulder abduction to 90*   Elbow AROM flexion and extension  x 30x    submax shoulder isometrics  x 30x each by PT in all directions    Shoulder protraction and retraction   20x    Shrugs and depressions   20x    pendulums  1 minute forward, 1 minute lateral    Wrist AROM flexion/extension;  supination/pronation  20x/ each   gripper  Red 2 min   Shoulder rolls  x 30x each, posterior and anterior      Plan for Next Visit: advance shoulder PROM per protocol            PATIENT EDUCATION AND HOME EXERCISES     Home Exercises Provided and Patient Education Provided     Education provided: (time included with therex) minutes   Patient educated on biomechanical justification for therapeutic exercise and importance of compliance with HEP in order to improve overall impairments and QOL    Patient was educated on all the above exercise prior/during/after for proper posture, positioning, and execution for safe performance with home exercise program.    Patient educated on the importance of improved core and upper extremity strength in order to improve alignment of the spine and upper extremities with static positions and dynamic movement.    Patient educated on the importance of strong core and lower extremity musculature in order to improve both static and dynamic balance, improve gait mechanics, reduce fall risk and improve household and community mobility.       Written Home Exercises Provided: yes.  Exercises were reviewed and Gerda was able to demonstrate them prior to the end of the session.  Gerda demonstrated good  understanding of the education provided. See EMR under Patient Instructions for exercises provided during  therapy sessions.      ASSESSMENT     Patient tolerated session well today. incorporated elbow AROM and shoulder isometrics in all directions with no adverse effects     Gerda is progressing well towards her goals.   Pt prognosis is Excellent.     Pt will continue to benefit from skilled outpatient physical therapy to address the deficits listed in the problem list box on initial evaluation, provide pt/family education and to maximize pt's level of independence in the home and community environment.     Pt's spiritual, cultural and educational needs considered and pt agreeable to plan of care and goals.     Anticipated Barriers for therapy: co-morbidities and adherence to treatment plan    GOALS:     Short Term Goals:  6 weeks Progress   3/9/2022   1. Pain: Pt will demonstrate improved pain by reports of less than or equal to 6/10 worst pain on the verbal rating scale in order to progress toward maximal functional ability and improve QOL. PC   2. Function: Patient will demonstrate improved function as indicated by a functional limitation score of less than or equal to 80 out of 100 on FOTO. PC   3. Mobility: Patient will improve AROM to 50% of stated goals, listed in objective measures above, in order to progress towards independence with functional activities.  PC   4. Strength: Patient will improve strength to 50% of stated goals, listed in objective measures above, in order to progress towards independence with functional activities.  PC   5. HEP: Patient will demonstrate independence with HEP in order to progress toward functional independence. PC      Long Term Goals:  12 weeks Progress  3/9/2022   1. Pain: Pt will demonstrate improved pain by reports of less than or equal to 3/10 worst pain on the verbal rating scale in order to progress toward maximal functional ability and improve QOL.   PC   2. Function: Patient will demonstrate improved function as indicated by a functional limitation score of less than  or equal to 66 out of 100 on FOTO. PC   3. Mobility: Patient will improve AROM to stated goals, listed in objective measures above, in order to return to maximal functional potential and improve quality of life. PC   4. Strength: Patient will improve strength to stated goals, listed in objective measures above, in order to improve functional independence and quality of life. PC   5. Patient will return to normal ADL's, IADL's, community involvement, recreational activities, and work-related activities with less than or equal to 3/10 pain and maximal function.         Goals Key:  PC= progressing/continue; PM= partially met;        DC= discontinue    PLAN     Continue Plan of Care (POC) and progress per patient tolerance. See treatment section for details on planned progressions next session.      Analilia Son, PT

## 2022-04-04 ENCOUNTER — CLINICAL SUPPORT (OUTPATIENT)
Dept: REHABILITATION | Facility: HOSPITAL | Age: 52
End: 2022-04-04
Payer: COMMERCIAL

## 2022-04-04 ENCOUNTER — PATIENT MESSAGE (OUTPATIENT)
Dept: ORTHOPEDICS | Facility: CLINIC | Age: 52
End: 2022-04-04
Payer: COMMERCIAL

## 2022-04-04 DIAGNOSIS — M25.612 DECREASED RANGE OF MOTION OF LEFT SHOULDER: ICD-10-CM

## 2022-04-04 DIAGNOSIS — Z74.09 DECREASED FUNCTIONAL MOBILITY AND ENDURANCE: Primary | ICD-10-CM

## 2022-04-04 DIAGNOSIS — M62.81 PROXIMAL MUSCLE WEAKNESS: ICD-10-CM

## 2022-04-04 PROCEDURE — 97140 MANUAL THERAPY 1/> REGIONS: CPT | Performed by: PHYSICAL THERAPIST

## 2022-04-04 PROCEDURE — 97110 THERAPEUTIC EXERCISES: CPT | Performed by: PHYSICAL THERAPIST

## 2022-04-04 NOTE — PROGRESS NOTES
[facility]   Physical Therapy Treatment Note     [facility]  [facility] [id number]    Therapy Diagnosis:   Encounter Diagnoses   Name Primary?    Decreased functional mobility and endurance Yes    Decreased range of motion of left shoulder     Proximal muscle weakness      Physician: Denilson Jackson    Visit Date: 4/4/2022    Physician Orders: PT Eval and Treat  Medical Diagnosis from Referral: Nontraumatic incomplete tear of left rotator cuff  Evaluation Date: 3/9/2022  Authorization Period Expiration: 12/1/2022  Plan of Care Expiration: 6/9/2022  Progress Note Due: 4/9/2022  Visit # / Visits authorized: 5/20 (+1 for evaluation)    FOTO: 1/ 3      Precautions: Standard        Surgical Procedure: L shoulder rotator cuff repair, biceps tenodesis, subacromial decompression Date of Surgery: 3/7/2022     Time In: 1350  Time Out: 1435  Total Billable Time: 42 minutes     SUBJECTIVE     Patient reports: she is hurting a little more today. She states that she is hurting throughout her arm and into her fingers. Patient reports that she did go to [place] and sat in stands for a while.     He/She was compliant with home exercise program.  Response to previous treatment: felt fine with no pain   Functional change: PROM goals accomplished     Pain: 4/10     Location: L shoulder     OBJECTIVE     Objective Measures updated at progress report unless specified.       TREATMENT     Gerda received the treatments listed below:       MANUAL THERAPY TECHNIQUES were applied for (12) minutes, including:    Manual Intervention Performed Today    Soft Tissue Mobilization x Superficial soft tissue mobilization to left upper trapezius, levator scapulae , periscapular musculature and deltoid and mid-scapular release. Scar mobilizations    Joint Mobilizations x Grade II L glenohumeral distraction, inferior, anterior and posterior glide    Mobilization with movement         "  Functional Dry Needling        Plan for Next Visit: PRN         THERAPEUTIC EXERCISES to develop strength, endurance, ROM, flexibility, posture and core stabilization for (30) minutes including:    Intervention Performed Today    Shoulder PROM  x 15 minutes   Shoulder internal rotation to 45* and external rotation to 30*  Shoulder flexion to 120*  Shoulder abduction to 120*   Elbow AROM flexion and extension  x 30x    shoulder isometrics at wall x 10x, 5" holds each direction   Shoulder protraction and retraction   20x    Shrugs and depressions   20x    pendulums  1 minute forward, 1 minute lateral    Wrist AROM flexion/extension;  supination/pronation  20x/ each   gripper  Red 2 min   Shoulder rolls   30x each, posterior and anterior    AAROM IR/ER within protocol (45/30) x 2 x 10 with wand     Plan for Next Visit: advance shoulder PROM per protocol            PATIENT EDUCATION AND HOME EXERCISES     Home Exercises Provided and Patient Education Provided     Education provided: (time included with therex) minutes   Patient educated on biomechanical justification for therapeutic exercise and importance of compliance with HEP in order to improve overall impairments and QOL    Patient was educated on all the above exercise prior/during/after for proper posture, positioning, and execution for safe performance with home exercise program.    Patient educated on the importance of improved core and upper extremity strength in order to improve alignment of the spine and upper extremities with static positions and dynamic movement.    Patient educated on the importance of strong core and lower extremity musculature in order to improve both static and dynamic balance, improve gait mechanics, reduce fall risk and improve household and community mobility.       Written Home Exercises Provided: yes.  Exercises were reviewed and Gerda was able to demonstrate them prior to the end of the session.  Gerda demonstrated " good  understanding of the education provided. See EMR under Patient Instructions for exercises provided during therapy sessions.      ASSESSMENT     Patient did well with treatment today, despite initial complaints of pain. Able to increase PROM per protocol this visit, and patient tolerated PROM very well. No increase in muscle guarding noted, and patient reports only minimal discomfort with flexion out of all motions performed within protocol today. She did well with progression to isometrics standing at wall.      Gerda is progressing well towards her goals.   Pt prognosis is Excellent.     Pt will continue to benefit from skilled outpatient physical therapy to address the deficits listed in the problem list box on initial evaluation, provide pt/family education and to maximize pt's level of independence in the home and community environment.     Pt's spiritual, cultural and educational needs considered and pt agreeable to plan of care and goals.     Anticipated Barriers for therapy: co-morbidities and adherence to treatment plan    GOALS:     Short Term Goals:  6 weeks Progress   3/9/2022   1. Pain: Pt will demonstrate improved pain by reports of less than or equal to 6/10 worst pain on the verbal rating scale in order to progress toward maximal functional ability and improve QOL. PC   2. Function: Patient will demonstrate improved function as indicated by a functional limitation score of less than or equal to 80 out of 100 on FOTO. PC   3. Mobility: Patient will improve AROM to 50% of stated goals, listed in objective measures above, in order to progress towards independence with functional activities.  PC   4. Strength: Patient will improve strength to 50% of stated goals, listed in objective measures above, in order to progress towards independence with functional activities.  PC   5. HEP: Patient will demonstrate independence with HEP in order to progress toward functional independence. PC      Long Term  Goals:  12 weeks Progress  3/9/2022   1. Pain: Pt will demonstrate improved pain by reports of less than or equal to 3/10 worst pain on the verbal rating scale in order to progress toward maximal functional ability and improve QOL.   PC   2. Function: Patient will demonstrate improved function as indicated by a functional limitation score of less than or equal to 66 out of 100 on FOTO. PC   3. Mobility: Patient will improve AROM to stated goals, listed in objective measures above, in order to return to maximal functional potential and improve quality of life. PC   4. Strength: Patient will improve strength to stated goals, listed in objective measures above, in order to improve functional independence and quality of life. PC   5. Patient will return to normal ADL's, IADL's, community involvement, recreational activities, and work-related activities with less than or equal to 3/10 pain and maximal function.         Goals Key:  PC= progressing/continue; PM= partially met;        DC= discontinue    PLAN     Continue Plan of Care (POC) and progress per patient tolerance. See treatment section for details on planned progressions next session.      Melani Allen, PT

## 2022-04-06 ENCOUNTER — CLINICAL SUPPORT (OUTPATIENT)
Dept: REHABILITATION | Facility: HOSPITAL | Age: 52
End: 2022-04-06
Attending: STUDENT IN AN ORGANIZED HEALTH CARE EDUCATION/TRAINING PROGRAM
Payer: COMMERCIAL

## 2022-04-06 DIAGNOSIS — M25.612 DECREASED RANGE OF MOTION OF LEFT SHOULDER: ICD-10-CM

## 2022-04-06 DIAGNOSIS — Z74.09 DECREASED FUNCTIONAL MOBILITY AND ENDURANCE: Primary | ICD-10-CM

## 2022-04-06 DIAGNOSIS — M62.81 PROXIMAL MUSCLE WEAKNESS: ICD-10-CM

## 2022-04-06 PROCEDURE — 97140 MANUAL THERAPY 1/> REGIONS: CPT | Performed by: PHYSICAL THERAPIST

## 2022-04-06 PROCEDURE — 97110 THERAPEUTIC EXERCISES: CPT | Performed by: PHYSICAL THERAPIST

## 2022-04-06 NOTE — PROGRESS NOTES
[facility]   Physical Therapy Treatment Note     Name: Gerda Wilson [facility]  Clinic Number: [id number]    Therapy Diagnosis:   Encounter Diagnoses   Name Primary?    Decreased functional mobility and endurance Yes    Decreased range of motion of left shoulder     Proximal muscle weakness      Physician: Denilson Jackson    Visit Date: 4/6/2022    Physician Orders: PT Eval and Treat  Medical Diagnosis from Referral: Nontraumatic incomplete tear of left rotator cuff  Evaluation Date: 3/9/2022  Authorization Period Expiration: 12/1/2022  Plan of Care Expiration: 6/9/2022  Progress Note Due: 4/9/2022  Visit # / Visits authorized: 6/20 (+1 for evaluation)    FOTO: 1/ 3      Precautions: Standard        Surgical Procedure: L shoulder rotator cuff repair, biceps tenodesis, subacromial decompression Date of Surgery: 3/7/2022     Time In: 1144  Time Out: 1220  Total Billable Time: 35 minutes     SUBJECTIVE     Patient reports: she did not really experience any increase in pain or soreness following previous visit. Her pain levels remain about the same as last visit, maybe just a little less.     He/She was compliant with home exercise program.  Response to previous treatment: felt fine with no pain   Functional change: PROM goals accomplished     Pain: 3/10     Location: L shoulder     OBJECTIVE     Objective Measures updated at progress report unless specified.       TREATMENT     Gerda received the treatments listed below:       MANUAL THERAPY TECHNIQUES were applied for (10) minutes, including:    Manual Intervention Performed Today    Soft Tissue Mobilization x Superficial soft tissue mobilization to left upper trapezius, levator scapulae , periscapular musculature and deltoid and mid-scapular release. Scar mobilizations    Joint Mobilizations x Grade II L glenohumeral distraction, inferior, anterior and posterior glide    Mobilization with movement          Functional Dry  "Needling        Plan for Next Visit: PRN         THERAPEUTIC EXERCISES to develop strength, endurance, ROM, flexibility, posture and core stabilization for (25) minutes including:    Intervention Performed Today    Shoulder PROM  x 10 minutes   Shoulder internal rotation to 45* and external rotation to 30*  Shoulder flexion to 120*  Shoulder abduction to 120*   Elbow AROM flexion and extension  x 30x    shoulder isometrics at wall x 10x, 5" holds each direction   Shoulder protraction and retraction   20x    Shrugs and depressions   20x    pendulums  1 minute forward, 1 minute lateral    Wrist AROM flexion/extension;  supination/pronation  20x/ each   gripper  Red 2 min   Shoulder rolls   30x each, posterior and anterior    AAROM IR/ER within protocol (45/30) x 2 x 10 with wand     Plan for Next Visit: advance shoulder PROM per protocol            PATIENT EDUCATION AND HOME EXERCISES     Home Exercises Provided and Patient Education Provided     Education provided: (time included with therex) minutes   Patient educated on biomechanical justification for therapeutic exercise and importance of compliance with HEP in order to improve overall impairments and QOL    Patient was educated on all the above exercise prior/during/after for proper posture, positioning, and execution for safe performance with home exercise program.    Patient educated on the importance of improved core and upper extremity strength in order to improve alignment of the spine and upper extremities with static positions and dynamic movement.    Patient educated on the importance of strong core and lower extremity musculature in order to improve both static and dynamic balance, improve gait mechanics, reduce fall risk and improve household and community mobility.       Written Home Exercises Provided: yes.  Exercises were reviewed and Gerda was able to demonstrate them prior to the end of the session.  Gerda demonstrated good  understanding " of the education provided. See EMR under Patient Instructions for exercises provided during therapy sessions.      ASSESSMENT     Patient tolerated treatment well again this visit. Improved PROM noted today. Patient reports minimal discomfort with PROM flexion only during PROM today. When she was able to relax and decrease guarding, the discomfort decreased. Patient progressing well overall within protocol.     Gerda is progressing well towards her goals.   Pt prognosis is Excellent.     Pt will continue to benefit from skilled outpatient physical therapy to address the deficits listed in the problem list box on initial evaluation, provide pt/family education and to maximize pt's level of independence in the home and community environment.     Pt's spiritual, cultural and educational needs considered and pt agreeable to plan of care and goals.     Anticipated Barriers for therapy: co-morbidities and adherence to treatment plan    GOALS:     Short Term Goals:  6 weeks Progress   3/9/2022   1. Pain: Pt will demonstrate improved pain by reports of less than or equal to 6/10 worst pain on the verbal rating scale in order to progress toward maximal functional ability and improve QOL. PC   2. Function: Patient will demonstrate improved function as indicated by a functional limitation score of less than or equal to 80 out of 100 on FOTO. PC   3. Mobility: Patient will improve AROM to 50% of stated goals, listed in objective measures above, in order to progress towards independence with functional activities.  PC   4. Strength: Patient will improve strength to 50% of stated goals, listed in objective measures above, in order to progress towards independence with functional activities.  PC   5. HEP: Patient will demonstrate independence with HEP in order to progress toward functional independence. PC      Long Term Goals:  12 weeks Progress  3/9/2022   1. Pain: Pt will demonstrate improved pain by reports of less than or  equal to 3/10 worst pain on the verbal rating scale in order to progress toward maximal functional ability and improve QOL.   PC   2. Function: Patient will demonstrate improved function as indicated by a functional limitation score of less than or equal to 66 out of 100 on FOTO. PC   3. Mobility: Patient will improve AROM to stated goals, listed in objective measures above, in order to return to maximal functional potential and improve quality of life. PC   4. Strength: Patient will improve strength to stated goals, listed in objective measures above, in order to improve functional independence and quality of life. PC   5. Patient will return to normal ADL's, IADL's, community involvement, recreational activities, and work-related activities with less than or equal to 3/10 pain and maximal function.         Goals Key:  PC= progressing/continue; PM= partially met;        DC= discontinue    PLAN     Continue Plan of Care (POC) and progress per patient tolerance. See treatment section for details on planned progressions next session.      Melani Allen, PT

## 2022-04-09 ENCOUNTER — PATIENT MESSAGE (OUTPATIENT)
Dept: DERMATOLOGY | Facility: CLINIC | Age: 52
End: 2022-04-09
Payer: COMMERCIAL

## 2022-04-11 ENCOUNTER — CLINICAL SUPPORT (OUTPATIENT)
Dept: REHABILITATION | Facility: HOSPITAL | Age: 52
End: 2022-04-11
Payer: COMMERCIAL

## 2022-04-11 DIAGNOSIS — Z74.09 DECREASED FUNCTIONAL MOBILITY AND ENDURANCE: Primary | ICD-10-CM

## 2022-04-11 DIAGNOSIS — M62.81 PROXIMAL MUSCLE WEAKNESS: ICD-10-CM

## 2022-04-11 DIAGNOSIS — M25.612 DECREASED RANGE OF MOTION OF LEFT SHOULDER: ICD-10-CM

## 2022-04-11 PROCEDURE — 97140 MANUAL THERAPY 1/> REGIONS: CPT | Performed by: PHYSICAL THERAPIST

## 2022-04-11 PROCEDURE — 97110 THERAPEUTIC EXERCISES: CPT | Performed by: PHYSICAL THERAPIST

## 2022-04-11 NOTE — PROGRESS NOTES
[facility]   Physical Therapy Treatment Note     Name: Gerda Wilson [facility] [place]  Clinic Number: [id number]    Therapy Diagnosis:   Encounter Diagnoses   Name Primary?    Decreased functional mobility and endurance Yes    Decreased range of motion of left shoulder     Proximal muscle weakness      Physician: Denilson Jackson    Visit Date: 4/11/2022    Physician Orders: PT Eval and Treat  Medical Diagnosis from Referral: Nontraumatic incomplete tear of left rotator cuff  Evaluation Date: 3/9/2022  Authorization Period Expiration: 12/1/2022  Plan of Care Expiration: 6/9/2022  Progress Note Due: 4/9/2022  Visit # / Visits authorized: 7/20 (+1 for evaluation)    FOTO: 1/ 3      Precautions: Standard        Surgical Procedure: L shoulder rotator cuff repair, biceps tenodesis, subacromial decompression Date of Surgery: 3/7/2022     Time In: 1350  Time Out: 1435  Total Billable Time: 40 minutes     SUBJECTIVE     Patient reports: she has kind of been hurting a little the past couple of days at about a 3-4/10, mostly in the very early morning. It wakes her up in her sleep.     He/She was compliant with home exercise program.  Response to previous treatment: felt fine with no pain   Functional change: PROM goals accomplished     Pain: 3/10     Location: L shoulder     OBJECTIVE     Objective Measures updated at progress report unless specified.       TREATMENT     Gerda received the treatments listed below:       MANUAL THERAPY TECHNIQUES were applied for (17) minutes, including:    Manual Intervention Performed Today    Soft Tissue Mobilization x Superficial soft tissue mobilization to left upper trapezius, levator scapulae , periscapular musculature and deltoid and mid-scapular release. Scar mobilizations    Joint Mobilizations x Grade II L glenohumeral distraction, inferior, anterior and posterior glide    Mobilization with movement          Functional Dry Needling        Plan for  "Next Visit: PRN         THERAPEUTIC EXERCISES to develop strength, endurance, ROM, flexibility, posture and core stabilization for (23) minutes including:    Intervention Performed Today    Shoulder PROM  x 10 minutes   Shoulder internal rotation to 45* and external rotation to 30*  Shoulder flexion to 120*  Shoulder abduction to 120*   Elbow AROM flexion and extension  x 30x    shoulder isometrics at wall x 10x, 5" holds each direction   Shoulder protraction and retraction   20x    Shrugs and depressions   20x    pendulums  1 minute forward, 1 minute lateral    Wrist AROM flexion/extension;  supination/pronation  20x/ each   gripper  Red 2 min   Shoulder rolls   30x each, posterior and anterior    AAROM IR/ER within protocol (45/30) x 2 x 10 with wand   Sidelying ER, gentle assist from PT x 1 x 10   Pulley's - flexion/scap  (add next visit)     Plan for Next Visit: advance shoulder PROM per protocol            PATIENT EDUCATION AND HOME EXERCISES     Home Exercises Provided and Patient Education Provided     Education provided: (time included with therex) minutes   Patient educated on biomechanical justification for therapeutic exercise and importance of compliance with HEP in order to improve overall impairments and QOL    Patient was educated on all the above exercise prior/during/after for proper posture, positioning, and execution for safe performance with home exercise program.    Patient educated on the importance of improved core and upper extremity strength in order to improve alignment of the spine and upper extremities with static positions and dynamic movement.    Patient educated on the importance of strong core and lower extremity musculature in order to improve both static and dynamic balance, improve gait mechanics, reduce fall risk and improve household and community mobility.       Written Home Exercises Provided: yes.  Exercises were reviewed and Gerda was able to demonstrate them prior to " the end of the session.  Gerda demonstrated good  understanding of the education provided. See EMR under Patient Instructions for exercises provided during therapy sessions.      ASSESSMENT     Patient did very well with treatment today. She was able to tolerate PROM in supine position this visit and less guarding was noted with PROM shoulder flexion. Improved PROM noted in all directions. Patient was able to be progressed within protocol again this visit, and she is appropriate to progress per tolerance again next visit as well. Patient progressing well towards goals.     Gerda is progressing well towards her goals.   Pt prognosis is Excellent.     Pt will continue to benefit from skilled outpatient physical therapy to address the deficits listed in the problem list box on initial evaluation, provide pt/family education and to maximize pt's level of independence in the home and community environment.     Pt's spiritual, cultural and educational needs considered and pt agreeable to plan of care and goals.     Anticipated Barriers for therapy: co-morbidities and adherence to treatment plan    GOALS:     Short Term Goals:  6 weeks Progress   3/9/2022   1. Pain: Pt will demonstrate improved pain by reports of less than or equal to 6/10 worst pain on the verbal rating scale in order to progress toward maximal functional ability and improve QOL. PC   2. Function: Patient will demonstrate improved function as indicated by a functional limitation score of less than or equal to 80 out of 100 on FOTO. PC   3. Mobility: Patient will improve AROM to 50% of stated goals, listed in objective measures above, in order to progress towards independence with functional activities.  PC   4. Strength: Patient will improve strength to 50% of stated goals, listed in objective measures above, in order to progress towards independence with functional activities.  PC   5. HEP: Patient will demonstrate independence with HEP in order to  progress toward functional independence. PC      Long Term Goals:  12 weeks Progress  3/9/2022   1. Pain: Pt will demonstrate improved pain by reports of less than or equal to 3/10 worst pain on the verbal rating scale in order to progress toward maximal functional ability and improve QOL.   PC   2. Function: Patient will demonstrate improved function as indicated by a functional limitation score of less than or equal to 66 out of 100 on FOTO. PC   3. Mobility: Patient will improve AROM to stated goals, listed in objective measures above, in order to return to maximal functional potential and improve quality of life. PC   4. Strength: Patient will improve strength to stated goals, listed in objective measures above, in order to improve functional independence and quality of life. PC   5. Patient will return to normal ADL's, IADL's, community involvement, recreational activities, and work-related activities with less than or equal to 3/10 pain and maximal function.         Goals Key:  PC= progressing/continue; PM= partially met;        DC= discontinue    PLAN     Continue Plan of Care (POC) and progress per patient tolerance. See treatment section for details on planned progressions next session.      Melani Allen, PT

## 2022-04-13 ENCOUNTER — CLINICAL SUPPORT (OUTPATIENT)
Dept: REHABILITATION | Facility: HOSPITAL | Age: 52
End: 2022-04-13
Attending: STUDENT IN AN ORGANIZED HEALTH CARE EDUCATION/TRAINING PROGRAM
Payer: COMMERCIAL

## 2022-04-13 DIAGNOSIS — Z74.09 DECREASED FUNCTIONAL MOBILITY AND ENDURANCE: Primary | ICD-10-CM

## 2022-04-13 DIAGNOSIS — M25.612 DECREASED RANGE OF MOTION OF LEFT SHOULDER: ICD-10-CM

## 2022-04-13 DIAGNOSIS — M62.81 PROXIMAL MUSCLE WEAKNESS: ICD-10-CM

## 2022-04-13 PROCEDURE — 97110 THERAPEUTIC EXERCISES: CPT

## 2022-04-13 PROCEDURE — 97140 MANUAL THERAPY 1/> REGIONS: CPT

## 2022-04-13 NOTE — PROGRESS NOTES
[facility]   Physical Therapy Treatment Note     [facility]  [id number]    Therapy Diagnosis:   Encounter Diagnoses   Name Primary?    Decreased functional mobility and endurance Yes    Decreased range of motion of left shoulder     Proximal muscle weakness      Physician: Denilson Jackson    Visit Date: 4/13/2022    Physician Orders: PT Eval and Treat  Medical Diagnosis from Referral: Nontraumatic incomplete tear of left rotator cuff  Evaluation Date: 3/9/2022  Authorization Period Expiration: 12/1/2022  Plan of Care Expiration: 6/9/2022  Progress Note Due: 4/9/2022  Visit # / Visits authorized: 8/20 (+1 for evaluation)    FOTO: 1/ 3      Precautions: Standard        Surgical Procedure: L shoulder rotator cuff repair, biceps tenodesis, subacromial decompression Date of Surgery: 3/7/2022     Time In: 1150 (PN + FOTO Next Session)   Time Out: 1230  Total Billable Time: 38 minutes     SUBJECTIVE     Patient reports: her shoulder is a little sore today but otherwise she is feeling fine     He/She was compliant with home exercise program.  Response to previous treatment: felt fine with no pain   Functional change: PROM goals accomplished     Pain: 3/10     Location: L shoulder     OBJECTIVE     Objective Measures updated at progress report unless specified.       TREATMENT     Gerda received the treatments listed below:       MANUAL THERAPY TECHNIQUES were applied for (8) minutes, including:    Manual Intervention Performed Today    Soft Tissue Mobilization x Superficial soft tissue mobilization to left upper trapezius, levator scapulae , periscapular musculature and deltoid and mid-scapular release. Scar mobilizations    Joint Mobilizations x Grade II L glenohumeral distraction, inferior, anterior and posterior glide    Mobilization with movement          Functional Dry Needling        Plan for Next Visit: PRN         THERAPEUTIC EXERCISES to  "develop strength, endurance, ROM, flexibility, posture and core stabilization for (30) minutes including:    Intervention Performed Today    Shoulder PROM  x 8 minutes   Shoulder internal rotation to 60* and external rotation to ~50*  Shoulder flexion to ~145*  Shoulder abduction to 120*   Elbow AROM flexion and extension   30x    shoulder isometrics at wall x 1 minute with 5" holds each direction   Shoulder protraction and retraction   20x    Shrugs and depressions   20x    pendulums  1 minute forward, 1 minute lateral    Wrist AROM flexion/extension;  supination/pronation  20x/ each   Shoulder rolls   30x each, posterior and anterior    Manual resisted IR/ER- supine  x 20x   Sidelying ER, gentle assist from PT x 1 x 10   Pulley's - flexion/scap x 3 minutes each on L    Table slides flexion and scaption  x 15x each on L                Plan for Next Visit: advance shoulder PROM per protocol            PATIENT EDUCATION AND HOME EXERCISES     Home Exercises Provided and Patient Education Provided     Education provided: (time included with therex) minutes   Patient educated on biomechanical justification for therapeutic exercise and importance of compliance with HEP in order to improve overall impairments and QOL    Patient was educated on all the above exercise prior/during/after for proper posture, positioning, and execution for safe performance with home exercise program.    Patient educated on the importance of improved core and upper extremity strength in order to improve alignment of the spine and upper extremities with static positions and dynamic movement.    Patient educated on the importance of strong core and lower extremity musculature in order to improve both static and dynamic balance, improve gait mechanics, reduce fall risk and improve household and community mobility.       Written Home Exercises Provided: yes.  Exercises were reviewed and Gerda was able to demonstrate them prior to the end of " the session.  Gerda demonstrated good  understanding of the education provided. See EMR under Patient Instructions for exercises provided during therapy sessions.      ASSESSMENT     Patient tolerated session well today. Incorporated rope and pulley as well as table slides for AAROM. Significant improvement in shoulder PROM accomplished today.     Gerda is progressing well towards her goals.   Pt prognosis is Excellent.     Pt will continue to benefit from skilled outpatient physical therapy to address the deficits listed in the problem list box on initial evaluation, provide pt/family education and to maximize pt's level of independence in the home and community environment.     Pt's spiritual, cultural and educational needs considered and pt agreeable to plan of care and goals.     Anticipated Barriers for therapy: co-morbidities and adherence to treatment plan    GOALS:     Short Term Goals:  6 weeks Progress   3/9/2022   1. Pain: Pt will demonstrate improved pain by reports of less than or equal to 6/10 worst pain on the verbal rating scale in order to progress toward maximal functional ability and improve QOL. PC   2. Function: Patient will demonstrate improved function as indicated by a functional limitation score of less than or equal to 80 out of 100 on FOTO. PC   3. Mobility: Patient will improve AROM to 50% of stated goals, listed in objective measures above, in order to progress towards independence with functional activities.  PC   4. Strength: Patient will improve strength to 50% of stated goals, listed in objective measures above, in order to progress towards independence with functional activities.  PC   5. HEP: Patient will demonstrate independence with HEP in order to progress toward functional independence. PC      Long Term Goals:  12 weeks Progress  3/9/2022   1. Pain: Pt will demonstrate improved pain by reports of less than or equal to 3/10 worst pain on the verbal rating scale in order  to progress toward maximal functional ability and improve QOL.   PC   2. Function: Patient will demonstrate improved function as indicated by a functional limitation score of less than or equal to 66 out of 100 on FOTO. PC   3. Mobility: Patient will improve AROM to stated goals, listed in objective measures above, in order to return to maximal functional potential and improve quality of life. PC   4. Strength: Patient will improve strength to stated goals, listed in objective measures above, in order to improve functional independence and quality of life. PC   5. Patient will return to normal ADL's, IADL's, community involvement, recreational activities, and work-related activities with less than or equal to 3/10 pain and maximal function.         Goals Key:  PC= progressing/continue; PM= partially met;        DC= discontinue    PLAN     Continue Plan of Care (POC) and progress per patient tolerance. See treatment section for details on planned progressions next session.      Analilia Son, PT

## 2022-04-19 ENCOUNTER — OFFICE VISIT (OUTPATIENT)
Dept: ORTHOPEDICS | Facility: CLINIC | Age: 52
End: 2022-04-19
Payer: COMMERCIAL

## 2022-04-19 ENCOUNTER — PATIENT MESSAGE (OUTPATIENT)
Dept: ORTHOPEDICS | Facility: CLINIC | Age: 52
End: 2022-04-19

## 2022-04-19 ENCOUNTER — CLINICAL SUPPORT (OUTPATIENT)
Dept: REHABILITATION | Facility: HOSPITAL | Age: 52
End: 2022-04-19
Attending: STUDENT IN AN ORGANIZED HEALTH CARE EDUCATION/TRAINING PROGRAM
Payer: COMMERCIAL

## 2022-04-19 VITALS — WEIGHT: 203 LBS | BODY MASS INDEX: 37.36 KG/M2 | HEIGHT: 62 IN

## 2022-04-19 DIAGNOSIS — M62.81 PROXIMAL MUSCLE WEAKNESS: ICD-10-CM

## 2022-04-19 DIAGNOSIS — M25.612 DECREASED RANGE OF MOTION OF LEFT SHOULDER: ICD-10-CM

## 2022-04-19 DIAGNOSIS — M75.112 NONTRAUMATIC INCOMPLETE TEAR OF LEFT ROTATOR CUFF: Primary | ICD-10-CM

## 2022-04-19 DIAGNOSIS — Z74.09 DECREASED FUNCTIONAL MOBILITY AND ENDURANCE: Primary | ICD-10-CM

## 2022-04-19 PROCEDURE — 97110 THERAPEUTIC EXERCISES: CPT

## 2022-04-19 PROCEDURE — 3066F PR DOCUMENTATION OF TREATMENT FOR NEPHROPATHY: ICD-10-PCS | Mod: CPTII,S$GLB,, | Performed by: STUDENT IN AN ORGANIZED HEALTH CARE EDUCATION/TRAINING PROGRAM

## 2022-04-19 PROCEDURE — 3061F PR NEG MICROALBUMINURIA RESULT DOCUMENTED/REVIEW: ICD-10-PCS | Mod: CPTII,S$GLB,, | Performed by: STUDENT IN AN ORGANIZED HEALTH CARE EDUCATION/TRAINING PROGRAM

## 2022-04-19 PROCEDURE — 3008F PR BODY MASS INDEX (BMI) DOCUMENTED: ICD-10-PCS | Mod: CPTII,S$GLB,, | Performed by: STUDENT IN AN ORGANIZED HEALTH CARE EDUCATION/TRAINING PROGRAM

## 2022-04-19 PROCEDURE — 3066F NEPHROPATHY DOC TX: CPT | Mod: CPTII,S$GLB,, | Performed by: STUDENT IN AN ORGANIZED HEALTH CARE EDUCATION/TRAINING PROGRAM

## 2022-04-19 PROCEDURE — 99024 POSTOP FOLLOW-UP VISIT: CPT | Mod: S$GLB,,, | Performed by: STUDENT IN AN ORGANIZED HEALTH CARE EDUCATION/TRAINING PROGRAM

## 2022-04-19 PROCEDURE — 97140 MANUAL THERAPY 1/> REGIONS: CPT

## 2022-04-19 PROCEDURE — 3044F PR MOST RECENT HEMOGLOBIN A1C LEVEL <7.0%: ICD-10-PCS | Mod: CPTII,S$GLB,, | Performed by: STUDENT IN AN ORGANIZED HEALTH CARE EDUCATION/TRAINING PROGRAM

## 2022-04-19 PROCEDURE — 1160F RVW MEDS BY RX/DR IN RCRD: CPT | Mod: CPTII,S$GLB,, | Performed by: STUDENT IN AN ORGANIZED HEALTH CARE EDUCATION/TRAINING PROGRAM

## 2022-04-19 PROCEDURE — 1159F MED LIST DOCD IN RCRD: CPT | Mod: CPTII,S$GLB,, | Performed by: STUDENT IN AN ORGANIZED HEALTH CARE EDUCATION/TRAINING PROGRAM

## 2022-04-19 PROCEDURE — 1160F PR REVIEW ALL MEDS BY PRESCRIBER/CLIN PHARMACIST DOCUMENTED: ICD-10-PCS | Mod: CPTII,S$GLB,, | Performed by: STUDENT IN AN ORGANIZED HEALTH CARE EDUCATION/TRAINING PROGRAM

## 2022-04-19 PROCEDURE — 3008F BODY MASS INDEX DOCD: CPT | Mod: CPTII,S$GLB,, | Performed by: STUDENT IN AN ORGANIZED HEALTH CARE EDUCATION/TRAINING PROGRAM

## 2022-04-19 PROCEDURE — 3044F HG A1C LEVEL LT 7.0%: CPT | Mod: CPTII,S$GLB,, | Performed by: STUDENT IN AN ORGANIZED HEALTH CARE EDUCATION/TRAINING PROGRAM

## 2022-04-19 PROCEDURE — 99999 PR PBB SHADOW E&M-EST. PATIENT-LVL V: ICD-10-PCS | Mod: PBBFAC,,, | Performed by: STUDENT IN AN ORGANIZED HEALTH CARE EDUCATION/TRAINING PROGRAM

## 2022-04-19 PROCEDURE — 3061F NEG MICROALBUMINURIA REV: CPT | Mod: CPTII,S$GLB,, | Performed by: STUDENT IN AN ORGANIZED HEALTH CARE EDUCATION/TRAINING PROGRAM

## 2022-04-19 PROCEDURE — 99024 PR POST-OP FOLLOW-UP VISIT: ICD-10-PCS | Mod: S$GLB,,, | Performed by: STUDENT IN AN ORGANIZED HEALTH CARE EDUCATION/TRAINING PROGRAM

## 2022-04-19 PROCEDURE — 1159F PR MEDICATION LIST DOCUMENTED IN MEDICAL RECORD: ICD-10-PCS | Mod: CPTII,S$GLB,, | Performed by: STUDENT IN AN ORGANIZED HEALTH CARE EDUCATION/TRAINING PROGRAM

## 2022-04-19 PROCEDURE — 99999 PR PBB SHADOW E&M-EST. PATIENT-LVL V: CPT | Mod: PBBFAC,,, | Performed by: STUDENT IN AN ORGANIZED HEALTH CARE EDUCATION/TRAINING PROGRAM

## 2022-04-19 NOTE — PROGRESS NOTES
Orthopaedics  Post-operative follow-up    Procedure Performed:  1. Left shoulder arthroscopic rotator cuff repair  2. Left shoulder arthroscopic biceps tenodesis  3. Left shoulder subacromial space debridement    Date of Surgery: 03/07/2022    Subjective: Gerda Obrien is now approximately 6 weeks out from her shoulder surgery.  She has been compliant with post-operative restrictions and has been progressing with PT.  Her pain and function continue to improve.    Exam:  Incision sites healed, C/D/I  No swelling or bruising noted  Fluid ROM with no crepitus  Passive ROM: , , ER 60  Axillary nerve sensation and motor intact  Motor and sensory intact distally  Strong radial pulse, fingers warm and well perfused    Imaging:  No new imaging studies    Impression:  Left shoulder arthroscopic rotator cuff repair, biceps tenodesis, subacromial space debridement, second post-operative visit - doing well    Plan:  Overall she is doing very well at this point in her recovery.  She is making excellent progress in terms of passive range of motion.  Advance PT per protocol - I would like her now to start working on active assisted and active range of motion  Symptomatic treatment for pain / swelling  May advance activities as reviewed today:  Discontinue sling, focus on active range of motion  Instructed patient to call clinic if questions or concerns    Follow-up in 6 weeks at 3 months post-op    Work status:  For weeks 0-6 from now:  1) Discontinue sling  2) Continue physical therapy  3) No lifting/pushing/pulling greater than 2 pounds  4) No overhead work  5) No repetitive motions        Denilson Jackson MD

## 2022-04-19 NOTE — PROGRESS NOTES
[facility]   Physical Therapy Treatment Note / Progress Note    Name: Gerda Obrien  Clinic Number: [id number]    Therapy Diagnosis:   Encounter Diagnoses   Name Primary?    Decreased functional mobility and endurance Yes    Decreased range of motion of left shoulder     Proximal muscle weakness      Physician: Denilson Jackson    Visit Date: 4/19/2022    Physician Orders: PT Eval and Treat  Medical Diagnosis from Referral: Nontraumatic incomplete tear of left rotator cuff  Evaluation Date: 3/9/2022  Authorization Period Expiration: 12/1/2022  Plan of Care Expiration: 6/9/2022  Visit # / Visits authorized: 8/20 (+1 for evaluation)    FOTO: 1/ 3      Precautions: Standard        Surgical Procedure: L shoulder rotator cuff repair, biceps tenodesis, subacromial decompression Date of Surgery: 3/7/2022       Progress Note Due: 4/9/2022    Time In: 0755 (FOTO Next Session)   Time Out: 0833  Total Billable Time: 35 minutes     SUBJECTIVE     Patient reports: mild soreness in the shoulder. States she has been getting some muscle spasms throughout the hamstring region     He/She was compliant with home exercise program.  Response to previous treatment: felt fine with no pain   Functional change: PROM goals accomplished     Pain: 3/10     Location: L shoulder     OBJECTIVE     Objective Measures updated at progress report unless specified.     RANGE OF MOTION:     Cervical Right   (spine) Left     Pain/Dysfunction with Movement Goal   Cervical Flexion  100% ---       Cervical Extension  100% ---       Cervical Side Bending  100% 100%   100%   Cervical Rotation  100% 100%          Shoulder PROM Right Left Pain/Dysfunction with Movement Goal   Shoulder Flexion (180) 175 148   170   Shoulder Extension (60) 60 NT   60   Shoulder Abduction (180) 175 105   170   Shoulder ER (90) 90 50 at 45* abduction    80   Shoulder IR (70) 70 60 at 45* abduction   60      Elbow AROM/PROM Right  "Left Pain/Dysfunction with Movement Goal   Elbow Flexion (150) 150 150   150   Elbow Extension (0) 0 0   0         STRENGTH:     U/E MMT Right Left Pain/Dysfunction with Movement Goal   Shoulder Flexion 4/5 2+/5   4+/5 B   Shoulder Extension 4/5 2+/5   4+/5 B   Shoulder Abduction 4/5 2+/5   4+/5 B   Shoulder IR 4+/5 3-/5   4+/5 B   Shoulder ER    4/5 3-/5   4+/5 B   Elbow Flexion  5/5 3/5   5/5 B   Elbow Extension 5/5 3/5   5/5 B   Wrist Flexion 5/5 3+/5   5/5 B   Wrist Extension 5/5 3+/5   5/5 B           TREATMENT     Gerda received the treatments listed below:       MANUAL THERAPY TECHNIQUES were applied for (10) minutes, including:    Manual Intervention Performed Today    Soft Tissue Mobilization x Superficial soft tissue mobilization to left upper trapezius, levator scapulae , periscapular musculature and deltoid and mid-scapular release. Scar mobilizations    Joint Mobilizations x Grade II L glenohumeral distraction, inferior, anterior and posterior glide    Mobilization with movement          Functional Dry Needling        Plan for Next Visit: PRN         THERAPEUTIC EXERCISES to develop strength, endurance, ROM, flexibility, posture and core stabilization for (25) minutes including:    Intervention Performed Today    Shoulder PROM  x 8 minutes   Shoulder internal rotation to 60* and external rotation to ~50*  Shoulder flexion to ~145*  Shoulder abduction to 120*   AAROM shoulder flexion and abduction to 90* x 20x each with PT assist, *progress to [facility] next session     Elbow AROM flexion and extension  x 30x    6 way shoulder isometrics at wall x 2 minutes with 5" holds each direction   Shoulder protraction and retraction   20x    Shrugs and depressions   20x    pendulums  1 minute forward, 1 minute lateral    Wrist AROM flexion/extension;  supination/pronation  20x/ each   Shoulder rolls   30x each, posterior and anterior    Manual resisted IR/ER- supine   20x   Sidelying ER, gentle assist from PT  1 x " 10   Pulley's - flexion/scap x 3 minutes each on L to 90*   Table slides flexion and scaption  x 20x each on L                Plan for Next Visit: progress to AAROM flexion and abduction with dowel if tolerated            PATIENT EDUCATION AND HOME EXERCISES     Home Exercises Provided and Patient Education Provided     Education provided: (time included with therex) minutes   Patient educated on biomechanical justification for therapeutic exercise and importance of compliance with HEP in order to improve overall impairments and QOL    Patient was educated on all the above exercise prior/during/after for proper posture, positioning, and execution for safe performance with home exercise program.    Patient educated on the importance of improved core and upper extremity strength in order to improve alignment of the spine and upper extremities with static positions and dynamic movement.    Patient educated on the importance of strong core and lower extremity musculature in order to improve both static and dynamic balance, improve gait mechanics, reduce fall risk and improve household and community mobility.       Written Home Exercises Provided: yes.  Exercises were reviewed and Gerda was able to demonstrate them prior to the end of the session.  Gerda demonstrated good  understanding of the education provided. See EMR under Patient Instructions for exercises provided during therapy sessions.      ASSESSMENT     Patient tolerated session well today. Progressed time with shoulder isometrics for increased muscle activation and strength. A progress note was completed today with significant improvements noted in shoulder ROM and gross us strength compared to prior assessment; please see exam for details. Gerda continues to have difficulty with shoulder ROM and strength due to post-operative restrictions. The above impairments and functional deficits will continue to be addressed over the course of this plan of  care. Gerda was educated on continued progression of PT, expectations for the duration of care, updates on goals set at initial evaluation, and home exercise program.  Gerda will continue to benefit from physical therapy in order to further address goals, maximize function and quality of life.     Gerda is progressing well towards her goals.   Pt prognosis is Excellent.     Pt will continue to benefit from skilled outpatient physical therapy to address the deficits listed in the problem list box on initial evaluation, provide pt/family education and to maximize pt's level of independence in the home and community environment.     Pt's spiritual, cultural and educational needs considered and pt agreeable to plan of care and goals.     Anticipated Barriers for therapy: co-morbidities and adherence to treatment plan    GOALS:     Short Term Goals:  6 weeks Progress   3/9/2022   1. Pain: Pt will demonstrate improved pain by reports of less than or equal to 6/10 worst pain on the verbal rating scale in order to progress toward maximal functional ability and improve QOL. Met   4/19/2022   2. Function: Patient will demonstrate improved function as indicated by a functional limitation score of less than or equal to 80 out of 100 on FOTO. PC   3. Mobility: Patient will improve AROM to 50% of stated goals, listed in objective measures above, in order to progress towards independence with functional activities.  Met  4/19/2022   4. Strength: Patient will improve strength to 50% of stated goals, listed in objective measures above, in order to progress towards independence with functional activities.  PM  4/19/2022   5. HEP: Patient will demonstrate independence with HEP in order to progress toward functional independence. Met  4/19/2022      Long Term Goals:  12 weeks Progress  3/9/2022   1. Pain: Pt will demonstrate improved pain by reports of less than or equal to 3/10 worst pain on the verbal rating scale in order  to progress toward maximal functional ability and improve QOL.   PC   2. Function: Patient will demonstrate improved function as indicated by a functional limitation score of less than or equal to 66 out of 100 on FOTO. PC   3. Mobility: Patient will improve AROM to stated goals, listed in objective measures above, in order to return to maximal functional potential and improve quality of life. PC   4. Strength: Patient will improve strength to stated goals, listed in objective measures above, in order to improve functional independence and quality of life. PC   5. Patient will return to normal ADL's, IADL's, community involvement, recreational activities, and work-related activities with less than or equal to 3/10 pain and maximal function.  PC       Goals Key:  PC= progressing/continue; PM= partially met;        DC= discontinue    PLAN     Continue Plan of Care (POC) and progress per patient tolerance. See treatment section for details on planned progressions next session.      Analilia Son, PT

## 2022-04-19 NOTE — Clinical Note
Hey, she has been doing really well in therapy and has been meeting all of her goals. I know you follow up with her today so let me know if you have any concerns.   Thanks,  Analilia

## 2022-04-22 ENCOUNTER — CLINICAL SUPPORT (OUTPATIENT)
Dept: REHABILITATION | Facility: HOSPITAL | Age: 52
End: 2022-04-22
Attending: STUDENT IN AN ORGANIZED HEALTH CARE EDUCATION/TRAINING PROGRAM
Payer: COMMERCIAL

## 2022-04-22 ENCOUNTER — PATIENT MESSAGE (OUTPATIENT)
Dept: ORTHOPEDICS | Facility: CLINIC | Age: 52
End: 2022-04-22
Payer: COMMERCIAL

## 2022-04-22 DIAGNOSIS — M62.81 PROXIMAL MUSCLE WEAKNESS: ICD-10-CM

## 2022-04-22 DIAGNOSIS — Z74.09 DECREASED FUNCTIONAL MOBILITY AND ENDURANCE: Primary | ICD-10-CM

## 2022-04-22 DIAGNOSIS — M25.612 DECREASED RANGE OF MOTION OF LEFT SHOULDER: ICD-10-CM

## 2022-04-22 PROCEDURE — 97110 THERAPEUTIC EXERCISES: CPT

## 2022-04-22 PROCEDURE — 97140 MANUAL THERAPY 1/> REGIONS: CPT

## 2022-04-22 NOTE — PROGRESS NOTES
[facility]   Physical Therapy Treatment Note / Progress Note    Name: Gerda Wilson [facility]  Clinic Number: [id number]    Therapy Diagnosis:   Encounter Diagnoses   Name Primary?    Decreased functional mobility and endurance Yes    Decreased range of motion of left shoulder     Proximal muscle weakness      Physician: Denilson Jackson    Visit Date: 4/22/2022    Physician Orders: PT Eval and Treat  Medical Diagnosis from Referral: Nontraumatic incomplete tear of left rotator cuff  Evaluation Date: 3/9/2022  Authorization Period Expiration: 12/1/2022  Plan of Care Expiration: 6/9/2022  Visit # / Visits authorized: 10/20 (+1 for evaluation)    FOTO: 1/ 3      Precautions: Standard        Surgical Procedure: L shoulder rotator cuff repair, biceps tenodesis, subacromial decompression Date of Surgery: 3/7/2022       Progress Note Due: 5/19/2022    Time In: 0836 (FOTO Next Session)   Time Out: 0908  Total Billable Time: 45 minutes     SUBJECTIVE     Patient reports: increased soreness in the L anterior shoulder in the last few days. Has been walking more without the sling; 5 mile walk this week.     He/She was compliant with home exercise program.  Response to previous treatment: felt fine with no pain   Functional change: PROM goals accomplished     Pain: 3/10     Location: L shoulder     OBJECTIVE     Objective Measures updated at progress report unless specified.       TREATMENT     Gerda received the treatments listed below:       MANUAL THERAPY TECHNIQUES were applied for (10) minutes, including:    Manual Intervention Performed Today    Soft Tissue Mobilization x Superficial soft tissue mobilization to left periscapular musculature, pectorals, supraspinatus , infraspinatus , subscapularis  and deltoid. Scar mobilizations    Joint Mobilizations  Grade II L glenohumeral distraction, inferior, anterior and posterior glide    Mobilization with movement          Functional  "Dry Needling        Plan for Next Visit: PRN         THERAPEUTIC EXERCISES to develop strength, endurance, ROM, flexibility, posture and core stabilization for (35) minutes including:    Intervention Performed Today    Shoulder PROM  x 6 minutes in all directions    Shoulder AAROM- standing with dowel  x 20x each flexion, abduction and extension    Elbow AROM flexion and extension   30x    6 way shoulder isometrics at wall  2 minutes with 5" holds each direction   Shoulder protraction and retraction   20x    Shrugs and depressions   20x    pendulums  1 minute forward, 1 minute lateral    Wrist AROM flexion/extension;  supination/pronation  20x/ each   Shoulder rolls   30x each, posterior and anterior    Manual resisted IR/ER- supine   20x   Sidelying ER, gentle assist from PT  1 x 10   Pulley's - flexion/scap x 3 minutes with 3s holds    Table slides flexion and scaption   20x each on L    Side lying shoulder external rotation  x 2 x 10    Wall slides flexion (assisted)  x 10x      Plan for Next Visit: progress to AAROM flexion and abduction with dowel if tolerated            PATIENT EDUCATION AND HOME EXERCISES     Home Exercises Provided and Patient Education Provided     Education provided: (time included with therex) minutes   Patient educated on biomechanical justification for therapeutic exercise and importance of compliance with HEP in order to improve overall impairments and QOL    Patient was educated on all the above exercise prior/during/after for proper posture, positioning, and execution for safe performance with home exercise program.    Patient educated on the importance of improved core and upper extremity strength in order to improve alignment of the spine and upper extremities with static positions and dynamic movement.    Patient educated on the importance of strong core and lower extremity musculature in order to improve both static and dynamic balance, improve gait mechanics, reduce fall " risk and improve household and community mobility.       Written Home Exercises Provided: yes.  Exercises were reviewed and Gerda was able to demonstrate them prior to the end of the session.  Gerda demonstrated good  understanding of the education provided. See EMR under Patient Instructions for exercises provided during therapy sessions.      ASSESSMENT     Patient tolerated session well today. Incorporated standing AAROM shoulder exercises for increased strength. Significant improvement noted in ROM with PROM and AAROM today.     Gerda is progressing well towards her goals.   Pt prognosis is Excellent.     Pt will continue to benefit from skilled outpatient physical therapy to address the deficits listed in the problem list box on initial evaluation, provide pt/family education and to maximize pt's level of independence in the home and community environment.     Pt's spiritual, cultural and educational needs considered and pt agreeable to plan of care and goals.     Anticipated Barriers for therapy: co-morbidities and adherence to treatment plan    GOALS:     Short Term Goals:  6 weeks Progress   3/9/2022   1. Pain: Pt will demonstrate improved pain by reports of less than or equal to 6/10 worst pain on the verbal rating scale in order to progress toward maximal functional ability and improve QOL. Met   4/19/2022   2. Function: Patient will demonstrate improved function as indicated by a functional limitation score of less than or equal to 80 out of 100 on FOTO. PC   3. Mobility: Patient will improve AROM to 50% of stated goals, listed in objective measures above, in order to progress towards independence with functional activities.  Met  4/19/2022   4. Strength: Patient will improve strength to 50% of stated goals, listed in objective measures above, in order to progress towards independence with functional activities.  PM  4/19/2022   5. HEP: Patient will demonstrate independence with HEP in order to  progress toward functional independence. Met  4/19/2022      Long Term Goals:  12 weeks Progress  3/9/2022   1. Pain: Pt will demonstrate improved pain by reports of less than or equal to 3/10 worst pain on the verbal rating scale in order to progress toward maximal functional ability and improve QOL.   PC   2. Function: Patient will demonstrate improved function as indicated by a functional limitation score of less than or equal to 66 out of 100 on FOTO. PC   3. Mobility: Patient will improve AROM to stated goals, listed in objective measures above, in order to return to maximal functional potential and improve quality of life. PC   4. Strength: Patient will improve strength to stated goals, listed in objective measures above, in order to improve functional independence and quality of life. PC   5. Patient will return to normal ADL's, IADL's, community involvement, recreational activities, and work-related activities with less than or equal to 3/10 pain and maximal function.  PC       Goals Key:  PC= progressing/continue; PM= partially met;        DC= discontinue    PLAN     Continue Plan of Care (POC) and progress per patient tolerance. See treatment section for details on planned progressions next session.      Analilia Son, PT

## 2022-04-29 ENCOUNTER — CLINICAL SUPPORT (OUTPATIENT)
Dept: REHABILITATION | Facility: HOSPITAL | Age: 52
End: 2022-04-29
Attending: STUDENT IN AN ORGANIZED HEALTH CARE EDUCATION/TRAINING PROGRAM
Payer: COMMERCIAL

## 2022-04-29 DIAGNOSIS — M25.612 DECREASED RANGE OF MOTION OF LEFT SHOULDER: ICD-10-CM

## 2022-04-29 DIAGNOSIS — M62.81 PROXIMAL MUSCLE WEAKNESS: ICD-10-CM

## 2022-04-29 DIAGNOSIS — Z74.09 DECREASED FUNCTIONAL MOBILITY AND ENDURANCE: Primary | ICD-10-CM

## 2022-04-29 PROCEDURE — 97140 MANUAL THERAPY 1/> REGIONS: CPT | Performed by: PHYSICAL THERAPIST

## 2022-04-29 PROCEDURE — 97110 THERAPEUTIC EXERCISES: CPT | Performed by: PHYSICAL THERAPIST

## 2022-04-29 NOTE — PROGRESS NOTES
[facility]   Physical Therapy Treatment Note     Name: Gerda Wilson [facility]  Clinic Number: [id number]    Therapy Diagnosis:   Encounter Diagnoses   Name Primary?    Decreased functional mobility and endurance Yes    Decreased range of motion of left shoulder     Proximal muscle weakness      Physician: Denilson Jackson    Visit Date: 4/29/2022    Physician Orders: PT Eval and Treat  Medical Diagnosis from Referral: Nontraumatic incomplete tear of left rotator cuff  Evaluation Date: 3/9/2022  Authorization Period Expiration: 12/1/2022  Plan of Care Expiration: 6/9/2022  Visit # / Visits authorized: 11/20 (+1 for evaluation)    FOTO: 1/ 3      Precautions: Standard        Surgical Procedure: L shoulder rotator cuff repair, biceps tenodesis, subacromial decompression Date of Surgery: 3/7/2022       Progress Note Due: 5/19/2022    Time In: 0751   Time Out: 0838  Total Billable Time: 44 minutes     SUBJECTIVE     Patient reports: feeling okay overall. She has her good days and her bad days.     He/She was compliant with home exercise program.  Response to previous treatment: felt fine with no pain   Functional change: PROM goals accomplished     Pain: 2/10     Location: L shoulder     OBJECTIVE     Objective Measures updated at progress report unless specified.         TREATMENT     Gerda received the treatments listed below:       MANUAL THERAPY TECHNIQUES were applied for (9) minutes, including:    Manual Intervention Performed Today    Soft Tissue Mobilization x Superficial soft tissue mobilization to left periscapular musculature, pectorals, supraspinatus , infraspinatus , subscapularis  and deltoid. Scar mobilizations    Joint Mobilizations  Grade II L glenohumeral distraction, inferior, anterior and posterior glide    Mobilization with movement          Functional Dry Needling        Plan for Next Visit: PRN         THERAPEUTIC EXERCISES to develop strength, endurance,  "ROM, flexibility, posture and core stabilization for (35) minutes including:    Intervention Performed Today    Shoulder PROM  x 6 minutes in all directions    Shoulder AAROM- standing with dowel  x 20x each flexion, abduction and extension    Elbow AROM flexion and extension   30x    6 way shoulder isometrics at wall x 2 minutes with 5" holds each direction   Shoulder protraction and retraction   20x    Shrugs and depressions   20x    pendulums  1 minute forward, 1 minute lateral    Wrist AROM flexion/extension;  supination/pronation  20x/ each   Shoulder rolls   30x each, posterior and anterior    Manual resisted IR/ER- supine   20x   Sidelying ER, gentle assist from PT  1 x 10   Pulley's - flexion/scap x 3 minutes with 3s holds    Table slides flexion and scaption   20x each on L    Side lying shoulder external rotation  x 2 x 10    Wall slides flexion & scaption (assisted)  x 10x each     Plan for Next Visit: progress to AAROM flexion and abduction with dowel if tolerated            PATIENT EDUCATION AND HOME EXERCISES     Home Exercises Provided and Patient Education Provided     Education provided: (time included with therex) minutes   Patient educated on biomechanical justification for therapeutic exercise and importance of compliance with HEP in order to improve overall impairments and QOL    Patient was educated on all the above exercise prior/during/after for proper posture, positioning, and execution for safe performance with home exercise program.    Patient educated on the importance of improved core and upper extremity strength in order to improve alignment of the spine and upper extremities with static positions and dynamic movement.    Patient educated on the importance of strong core and lower extremity musculature in order to improve both static and dynamic balance, improve gait mechanics, reduce fall risk and improve household and community mobility.       Written Home Exercises Provided: " yes.  Exercises were reviewed and Gerda was able to demonstrate them prior to the end of the session.  Gerda demonstrated good  understanding of the education provided. See EMR under Patient Instructions for exercises provided during therapy sessions.      ASSESSMENT     Patient did well with treatment today. She demonstrated good understanding and form with exercises and again demonstrated improvements in PROM and AAROM. Patient is able to self correct with any shoulder shrug compensation during standing AAROM. She is progressing very well towards goals. Patient is appropriate to progress again next visit per tolerance.     Gerda is progressing well towards her goals.   Pt prognosis is Excellent.     Pt will continue to benefit from skilled outpatient physical therapy to address the deficits listed in the problem list box on initial evaluation, provide pt/family education and to maximize pt's level of independence in the home and community environment.     Pt's spiritual, cultural and educational needs considered and pt agreeable to plan of care and goals.     Anticipated Barriers for therapy: co-morbidities and adherence to treatment plan    GOALS:     Short Term Goals:  6 weeks Progress   3/9/2022   1. Pain: Pt will demonstrate improved pain by reports of less than or equal to 6/10 worst pain on the verbal rating scale in order to progress toward maximal functional ability and improve QOL. Met   4/19/2022   2. Function: Patient will demonstrate improved function as indicated by a functional limitation score of less than or equal to 80 out of 100 on FOTO. Met  4/29/2022   3. Mobility: Patient will improve AROM to 50% of stated goals, listed in objective measures above, in order to progress towards independence with functional activities.  Met  4/19/2022   4. Strength: Patient will improve strength to 50% of stated goals, listed in objective measures above, in order to progress towards independence with  functional activities.  PM  4/19/2022   5. HEP: Patient will demonstrate independence with HEP in order to progress toward functional independence. Met  4/19/2022      Long Term Goals:  12 weeks Progress  3/9/2022   1. Pain: Pt will demonstrate improved pain by reports of less than or equal to 3/10 worst pain on the verbal rating scale in order to progress toward maximal functional ability and improve QOL.   PC   2. Function: Patient will demonstrate improved function as indicated by a functional limitation score of less than or equal to 66 out of 100 on FOTO. Met  4/29/2022   3. Mobility: Patient will improve AROM to stated goals, listed in objective measures above, in order to return to maximal functional potential and improve quality of life. PC   4. Strength: Patient will improve strength to stated goals, listed in objective measures above, in order to improve functional independence and quality of life. PC   5. Patient will return to normal ADL's, IADL's, community involvement, recreational activities, and work-related activities with less than or equal to 3/10 pain and maximal function.  PC       Goals Key:  PC= progressing/continue; PM= partially met;        DC= discontinue    PLAN     Continue Plan of Care (POC) and progress per patient tolerance. See treatment section for details on planned progressions next session.      Melani Allen, PT

## 2022-05-02 ENCOUNTER — CLINICAL SUPPORT (OUTPATIENT)
Dept: REHABILITATION | Facility: HOSPITAL | Age: 52
End: 2022-05-02
Payer: COMMERCIAL

## 2022-05-02 DIAGNOSIS — Z74.09 DECREASED FUNCTIONAL MOBILITY AND ENDURANCE: Primary | ICD-10-CM

## 2022-05-02 DIAGNOSIS — M25.612 DECREASED RANGE OF MOTION OF LEFT SHOULDER: ICD-10-CM

## 2022-05-02 DIAGNOSIS — M62.81 PROXIMAL MUSCLE WEAKNESS: ICD-10-CM

## 2022-05-02 PROCEDURE — 97110 THERAPEUTIC EXERCISES: CPT

## 2022-05-02 PROCEDURE — 97140 MANUAL THERAPY 1/> REGIONS: CPT

## 2022-05-02 NOTE — PROGRESS NOTES
[facility]   Physical Therapy Treatment Note     Name: Gerda Wilson [facility]  Clinic Number: [id number]    Therapy Diagnosis:   Encounter Diagnoses   Name Primary?    Decreased functional mobility and endurance Yes    Decreased range of motion of left shoulder     Proximal muscle weakness      Physician: Denilson Jackson    Visit Date: 5/2/2022    Physician Orders: PT Eval and Treat  Medical Diagnosis from Referral: Nontraumatic incomplete tear of left rotator cuff  Evaluation Date: 3/9/2022  Authorization Period Expiration: 12/1/2022  Plan of Care Expiration: 6/9/2022  Visit # / Visits authorized: 12/20 (+1 for evaluation)    FOTO: 1/ 3      Precautions: Standard        Surgical Procedure: L shoulder rotator cuff repair, biceps tenodesis, subacromial decompression Date of Surgery: 3/7/2022       Progress Note Due: 5/19/2022    Time In: 1145  Time Out: 1235  Total Billable Time: 48 minutes     SUBJECTIVE     Patient reports: she is feeling good. States she returned to work last week and that the arm gets tired throughout the day but that overall she has been feeling good. Continues to wake up in the middle of the night with aching pain     He/She was compliant with home exercise program.  Response to previous treatment: felt fine with no pain   Functional change: PROM goals accomplished     Pain: 1/10     Location: L shoulder     OBJECTIVE     Objective Measures updated at progress report unless specified.         TREATMENT     Gerda received the treatments listed below:       MANUAL THERAPY TECHNIQUES were applied for (8) minutes, including:    Manual Intervention Performed Today    Soft Tissue Mobilization x Superficial soft tissue mobilization to left periscapular musculature, pectorals, supraspinatus , infraspinatus , subscapularis  and deltoid   Joint Mobilizations  Grade II L glenohumeral distraction, inferior, anterior and posterior glide    Mobilization with  movement          Functional Dry Needling        Plan for Next Visit: PRN         THERAPEUTIC EXERCISES to develop strength, endurance, ROM, flexibility, posture and core stabilization for (40) minutes including:    Intervention Performed Today    Shoulder PROM  x 6 minutes in all directions    Shoulder AAROM- standing with dowel  x 20x extension, abduction    Elbow AROM flexion and extension   30x    Shoulder protraction and retraction   20x    Shrugs and depressions   20x    Wrist AROM flexion/extension;  supination/pronation  20x/ each   Manual resisted IR/ER- supine   20x   Sidelying ER, gentle assist from PT  1 x 10   Table slides flexion and scaption   20x each on L    Side lying shoulder external rotation  x 2 x 10 on L    Wall slides flexion & scaption  x 2 x 10 each   Side lying shoulder abduction to 90* x 10x with minimal assist from PT    Chest press with wand  x 10x    Supine AAROM shoulder flexion  x 10x    Manual resisted shoulder internal rotation and external rotation  x 20x    Rhythmic stabilization  x 3 minutes      Plan for Next Visit: progress to AAROM flexion and abduction with dowel if tolerated            PATIENT EDUCATION AND HOME EXERCISES     Home Exercises Provided and Patient Education Provided     Education provided: (time included with therex) minutes   Patient educated on biomechanical justification for therapeutic exercise and importance of compliance with HEP in order to improve overall impairments and QOL    Patient was educated on all the above exercise prior/during/after for proper posture, positioning, and execution for safe performance with home exercise program.    Patient educated on the importance of improved core and upper extremity strength in order to improve alignment of the spine and upper extremities with static positions and dynamic movement.    Patient educated on the importance of strong core and lower extremity musculature in order to improve both static and  dynamic balance, improve gait mechanics, reduce fall risk and improve household and community mobility.       Written Home Exercises Provided: yes.  Exercises were reviewed and Gerda was able to demonstrate them prior to the end of the session.  Gerda demonstrated good  understanding of the education provided. See EMR under Patient Instructions for exercises provided during therapy sessions.      ASSESSMENT     Patient tolerated session well today. Continue to progress AROM and AAROM for shoulder strength and mobility. Pt reports significant fatigue following session but states that she feels good and notes that she is getting stronger     Gerda is progressing well towards her goals.   Pt prognosis is Excellent.     Pt will continue to benefit from skilled outpatient physical therapy to address the deficits listed in the problem list box on initial evaluation, provide pt/family education and to maximize pt's level of independence in the home and community environment.     Pt's spiritual, cultural and educational needs considered and pt agreeable to plan of care and goals.     Anticipated Barriers for therapy: co-morbidities and adherence to treatment plan    GOALS:     Short Term Goals:  6 weeks Progress   3/9/2022   1. Pain: Pt will demonstrate improved pain by reports of less than or equal to 6/10 worst pain on the verbal rating scale in order to progress toward maximal functional ability and improve QOL. Met   4/19/2022   2. Function: Patient will demonstrate improved function as indicated by a functional limitation score of less than or equal to 80 out of 100 on FOTO. Met  4/29/2022   3. Mobility: Patient will improve AROM to 50% of stated goals, listed in objective measures above, in order to progress towards independence with functional activities.  Met  4/19/2022   4. Strength: Patient will improve strength to 50% of stated goals, listed in objective measures above, in order to progress towards  independence with functional activities.  PM  4/19/2022   5. HEP: Patient will demonstrate independence with HEP in order to progress toward functional independence. Met  4/19/2022      Long Term Goals:  12 weeks Progress  3/9/2022   1. Pain: Pt will demonstrate improved pain by reports of less than or equal to 3/10 worst pain on the verbal rating scale in order to progress toward maximal functional ability and improve QOL.   PC   2. Function: Patient will demonstrate improved function as indicated by a functional limitation score of less than or equal to 66 out of 100 on FOTO. Met  4/29/2022   3. Mobility: Patient will improve AROM to stated goals, listed in objective measures above, in order to return to maximal functional potential and improve quality of life. PC   4. Strength: Patient will improve strength to stated goals, listed in objective measures above, in order to improve functional independence and quality of life. PC   5. Patient will return to normal ADL's, IADL's, community involvement, recreational activities, and work-related activities with less than or equal to 3/10 pain and maximal function.  PC       Goals Key:  PC= progressing/continue; PM= partially met;        DC= discontinue    PLAN     Continue Plan of Care (POC) and progress per patient tolerance. See treatment section for details on planned progressions next session.      Analilia Son, PT

## 2022-05-04 ENCOUNTER — CLINICAL SUPPORT (OUTPATIENT)
Dept: REHABILITATION | Facility: HOSPITAL | Age: 52
End: 2022-05-04
Payer: COMMERCIAL

## 2022-05-04 DIAGNOSIS — M25.612 DECREASED RANGE OF MOTION OF LEFT SHOULDER: ICD-10-CM

## 2022-05-04 DIAGNOSIS — Z74.09 DECREASED FUNCTIONAL MOBILITY AND ENDURANCE: Primary | ICD-10-CM

## 2022-05-04 DIAGNOSIS — M62.81 PROXIMAL MUSCLE WEAKNESS: ICD-10-CM

## 2022-05-04 PROCEDURE — 97110 THERAPEUTIC EXERCISES: CPT

## 2022-05-04 PROCEDURE — 97140 MANUAL THERAPY 1/> REGIONS: CPT

## 2022-05-04 NOTE — PROGRESS NOTES
[facility]   Physical Therapy Treatment Note     [facility]  [facility] Number: [id number]    Therapy Diagnosis:   Encounter Diagnoses   Name Primary?    Decreased functional mobility and endurance Yes    Decreased range of motion of left shoulder     Proximal muscle weakness      Physician: Denilson Jackson    Visit Date: 5/4/2022    Physician Orders: PT Eval and Treat  Medical Diagnosis from Referral: Nontraumatic incomplete tear of left rotator cuff  Evaluation Date: 3/9/2022  Authorization Period Expiration: 12/1/2022  Plan of Care Expiration: 6/9/2022  Visit # / Visits authorized: 13/20 (+1 for evaluation)    FOTO: 1/ 3      Precautions: Standard        Surgical Procedure: L shoulder rotator cuff repair, biceps tenodesis, subacromial decompression Date of Surgery: 3/7/2022       Progress Note Due: 5/19/2022  MD Follow-up: 5/24/2022     Time In: 1110 (late arrival)   Time Out: 1154  Total Billable Time: 40 minutes     SUBJECTIVE     Patient reports: increased pain today. States she transitioned from sleeping in her recliner to sleeping in her bed and is therefore having increased pain at night     He/She was compliant with home exercise program.  Response to previous treatment: felt fine with no pain   Functional change: PROM goals accomplished     Pain: 4/10     Location: L shoulder     OBJECTIVE     Objective Measures updated at progress report unless specified.       TREATMENT     Gerda received the treatments listed below:       MANUAL THERAPY TECHNIQUES were applied for (12) minutes, including:    Manual Intervention Performed Today    Soft Tissue Mobilization x Superficial soft tissue mobilization to left periscapular musculature, pectorals, supraspinatus , infraspinatus , subscapularis  and deltoid   Joint Mobilizations x Grade III L glenohumeral distraction, inferior, anterior and posterior glide    Mobilization with movement x Subscapularis  release with shoulder flexion         Functional Dry Needling        Plan for Next Visit: PRN         THERAPEUTIC EXERCISES to develop strength, endurance, ROM, flexibility, posture and core stabilization for (28) minutes including:    Intervention Performed Today    Rope and pulley  x 3 minutes flexion, 3 minutes scaption    Shoulder PROM  x 10 minutes in all directions    Side lying shoulder external rotation  x 3 x 10 on L    Wall slides flexion & scaption   2 x 10 each   Side lying shoulder abduction to 90* x 10x then transitioned to standing with wand    Standing AAROM shoulder abduction  x 15x   Chest press with wand  x 2#, 2 x 10     Supine AAROM shoulder flexion  x 2 x 10                        Rhythmic stabilization   3 minutes      Plan for Next Visit: progress to AAROM flexion and abduction with dowel if tolerated            PATIENT EDUCATION AND HOME EXERCISES     Home Exercises Provided and Patient Education Provided     Education provided: (time included with therex) minutes   Patient educated on biomechanical justification for therapeutic exercise and importance of compliance with HEP in order to improve overall impairments and QOL    Patient was educated on all the above exercise prior/during/after for proper posture, positioning, and execution for safe performance with home exercise program.    Patient educated on the importance of improved core and upper extremity strength in order to improve alignment of the spine and upper extremities with static positions and dynamic movement.    Patient educated on the importance of strong core and lower extremity musculature in order to improve both static and dynamic balance, improve gait mechanics, reduce fall risk and improve household and community mobility.       Written Home Exercises Provided: yes.  Exercises were reviewed and Gerda was able to demonstrate them prior to the end of the session.  Gerda demonstrated good  understanding of the  education provided. See EMR under Patient Instructions for exercises provided during therapy sessions.      ASSESSMENT     Patient tolerated session well today. We continue to progress AROM and AAROM for shoulder strength and mobility with reports of significant soreness following intervention    Gerda is progressing well towards her goals.   Pt prognosis is Excellent.     Pt will continue to benefit from skilled outpatient physical therapy to address the deficits listed in the problem list box on initial evaluation, provide pt/family education and to maximize pt's level of independence in the home and community environment.     Pt's spiritual, cultural and educational needs considered and pt agreeable to plan of care and goals.     Anticipated Barriers for therapy: co-morbidities and adherence to treatment plan    GOALS:     Short Term Goals:  6 weeks Progress   3/9/2022   1. Pain: Pt will demonstrate improved pain by reports of less than or equal to 6/10 worst pain on the verbal rating scale in order to progress toward maximal functional ability and improve QOL. Met   4/19/2022   2. Function: Patient will demonstrate improved function as indicated by a functional limitation score of less than or equal to 80 out of 100 on FOTO. Met  4/29/2022   3. Mobility: Patient will improve AROM to 50% of stated goals, listed in objective measures above, in order to progress towards independence with functional activities.  Met  4/19/2022   4. Strength: Patient will improve strength to 50% of stated goals, listed in objective measures above, in order to progress towards independence with functional activities.  PM  4/19/2022   5. HEP: Patient will demonstrate independence with HEP in order to progress toward functional independence. Met  4/19/2022      Long Term Goals:  12 weeks Progress  3/9/2022   1. Pain: Pt will demonstrate improved pain by reports of less than or equal to 3/10 worst pain on the verbal rating scale in  order to progress toward maximal functional ability and improve QOL.   PC   2. Function: Patient will demonstrate improved function as indicated by a functional limitation score of less than or equal to 66 out of 100 on FOTO. Met  4/29/2022   3. Mobility: Patient will improve AROM to stated goals, listed in objective measures above, in order to return to maximal functional potential and improve quality of life. PC   4. Strength: Patient will improve strength to stated goals, listed in objective measures above, in order to improve functional independence and quality of life. PC   5. Patient will return to normal ADL's, IADL's, community involvement, recreational activities, and work-related activities with less than or equal to 3/10 pain and maximal function.  PC       Goals Key:  PC= progressing/continue; PM= partially met;        DC= discontinue    PLAN     Continue Plan of Care (POC) and progress per patient tolerance. See treatment section for details on planned progressions next session.      Analilia Son, PT

## 2022-05-09 ENCOUNTER — CLINICAL SUPPORT (OUTPATIENT)
Dept: REHABILITATION | Facility: HOSPITAL | Age: 52
End: 2022-05-09
Payer: COMMERCIAL

## 2022-05-09 DIAGNOSIS — Z74.09 DECREASED FUNCTIONAL MOBILITY AND ENDURANCE: Primary | ICD-10-CM

## 2022-05-09 DIAGNOSIS — M25.612 DECREASED RANGE OF MOTION OF LEFT SHOULDER: ICD-10-CM

## 2022-05-09 DIAGNOSIS — M62.81 PROXIMAL MUSCLE WEAKNESS: ICD-10-CM

## 2022-05-09 PROCEDURE — 97112 NEUROMUSCULAR REEDUCATION: CPT

## 2022-05-09 PROCEDURE — 97110 THERAPEUTIC EXERCISES: CPT

## 2022-05-09 PROCEDURE — 97140 MANUAL THERAPY 1/> REGIONS: CPT

## 2022-05-09 NOTE — PROGRESS NOTES
[facility]   Physical Therapy Treatment Note     [facility]  [id number]    Therapy Diagnosis:   Encounter Diagnoses   Name Primary?    Decreased functional mobility and endurance Yes    Decreased range of motion of left shoulder     Proximal muscle weakness      Physician: Denilson Jackson    Visit Date: 5/9/2022    Physician Orders: PT Eval and Treat  Medical Diagnosis from Referral: Nontraumatic incomplete tear of left rotator cuff  Evaluation Date: 3/9/2022  Authorization Period Expiration: 12/1/2022  Plan of Care Expiration: 6/9/2022  Visit # / Visits authorized: 14/20 (+1 for evaluation)    FOTO: 1/ 3      Precautions: Standard        Surgical Procedure: L shoulder rotator cuff repair, biceps tenodesis, subacromial decompression Date of Surgery: 3/7/2022       Progress Note Due: 5/19/2022  MD Follow-up: 5/24/2022     Time In: 1100   Time Out: 1152  Total Billable Time: 50 minutes     SUBJECTIVE     Patient reports: continued soreness in the shoulder     He/She was compliant with home exercise program.  Response to previous treatment: felt fine with no pain   Functional change: PROM goals accomplished     Pain: 4/10     Location: L shoulder     OBJECTIVE     Objective Measures updated at progress report unless specified.       TREATMENT     Gerda received the treatments listed below:       MANUAL THERAPY TECHNIQUES were applied for (10) minutes, including:    Manual Intervention Performed Today    Soft Tissue Mobilization x Superficial soft tissue mobilization to left periscapular musculature, pectorals, supraspinatus , infraspinatus , subscapularis  and deltoid   Joint Mobilizations x Grade III L glenohumeral distraction, inferior, anterior and posterior glide    Mobilization with movement x Subscapularis release with shoulder flexion         Functional Dry Needling        Plan for Next Visit: PRN         THERAPEUTIC EXERCISES to  develop strength, endurance, ROM, flexibility, posture and core stabilization for (22) minutes including:    Intervention Performed Today    Rope and pulley  x 3 minutes flexion, 3 minutes scaption    Shoulder PROM  x 10 minutes in all directions    Side lying shoulder external rotation  x 3 x 10 on L    Wall slides flexion & scaption   2 x 10 each   Chest press with wand   2#, 2 x 10     AAROM shoulder flexion  x  x Supine with hands clasped: 10x   Standing with wand: 2 x 10    AAROM shoulder abduction  X  x Standing with wand: 2x10  Side lying to 90*: 15x with PT assist                   Rhythmic stabilization   3 minutes      Plan for Next Visit: progress to AAROM flexion and abduction with dowel if tolerated        NEUROMUSCULAR RE-EDUCATION ACTIVITIES to improve Balance, Coordination, Kinesthetic, Sense, Proprioception and Posture for (18) minutes.  The following were included:    Intervention Performed Today    Shoulder internal rotation- standing  x Yellow band, 3 x 10; *progress to red next session    Shoulder external rotation  X  x Standing: Yellow band, 3 x 10   Side lyin# 10x, 1# 10x    Shoulder extensions  x Yellow band, 3 x 10    Supine shoulder circles at 90* flexion  x 3x each, 10 clockwise and 10 counterclockwise                          Plan for Next Visit:          PATIENT EDUCATION AND HOME EXERCISES     Home Exercises Provided and Patient Education Provided     Education provided: (time included with therex) minutes   Patient educated on biomechanical justification for therapeutic exercise and importance of compliance with HEP in order to improve overall impairments and QOL    Patient was educated on all the above exercise prior/during/after for proper posture, positioning, and execution for safe performance with home exercise program.    Patient educated on the importance of improved core and upper extremity strength in order to improve alignment of the spine and upper extremities with  static positions and dynamic movement.    Patient educated on the importance of strong core and lower extremity musculature in order to improve both static and dynamic balance, improve gait mechanics, reduce fall risk and improve household and community mobility.       Written Home Exercises Provided: yes.  Exercises were reviewed and Gerda was able to demonstrate them prior to the end of the session.  Gerda demonstrated good  understanding of the education provided. See EMR under Patient Instructions for exercises provided during therapy sessions.      ASSESSMENT     Patient tolerated session well today. Increased repetitions of all overhead AAROM exercises today. Added theraband exercises for increased shoulder strength.     Gerda is progressing well towards her goals.   Pt prognosis is Excellent.     Pt will continue to benefit from skilled outpatient physical therapy to address the deficits listed in the problem list box on initial evaluation, provide pt/family education and to maximize pt's level of independence in the home and community environment.     Pt's spiritual, cultural and educational needs considered and pt agreeable to plan of care and goals.     Anticipated Barriers for therapy: co-morbidities and adherence to treatment plan    GOALS:     Short Term Goals:  6 weeks Progress   3/9/2022   1. Pain: Pt will demonstrate improved pain by reports of less than or equal to 6/10 worst pain on the verbal rating scale in order to progress toward maximal functional ability and improve QOL. Met   4/19/2022   2. Function: Patient will demonstrate improved function as indicated by a functional limitation score of less than or equal to 80 out of 100 on FOTO. Met  4/29/2022   3. Mobility: Patient will improve AROM to 50% of stated goals, listed in objective measures above, in order to progress towards independence with functional activities.  Met  4/19/2022   4. Strength: Patient will improve strength to  50% of stated goals, listed in objective measures above, in order to progress towards independence with functional activities.  PM  4/19/2022   5. HEP: Patient will demonstrate independence with HEP in order to progress toward functional independence. Met  4/19/2022      Long Term Goals:  12 weeks Progress  3/9/2022   1. Pain: Pt will demonstrate improved pain by reports of less than or equal to 3/10 worst pain on the verbal rating scale in order to progress toward maximal functional ability and improve QOL.   PC   2. Function: Patient will demonstrate improved function as indicated by a functional limitation score of less than or equal to 66 out of 100 on FOTO. Met  4/29/2022   3. Mobility: Patient will improve AROM to stated goals, listed in objective measures above, in order to return to maximal functional potential and improve quality of life. PC   4. Strength: Patient will improve strength to stated goals, listed in objective measures above, in order to improve functional independence and quality of life. PC   5. Patient will return to normal ADL's, IADL's, community involvement, recreational activities, and work-related activities with less than or equal to 3/10 pain and maximal function.  PC       Goals Key:  PC= progressing/continue; PM= partially met;        DC= discontinue    PLAN     Continue Plan of Care (POC) and progress per patient tolerance. See treatment section for details on planned progressions next session.      Analilia Son, PT

## 2022-05-11 ENCOUNTER — CLINICAL SUPPORT (OUTPATIENT)
Dept: REHABILITATION | Facility: HOSPITAL | Age: 52
End: 2022-05-11
Payer: COMMERCIAL

## 2022-05-11 DIAGNOSIS — M25.612 DECREASED RANGE OF MOTION OF LEFT SHOULDER: ICD-10-CM

## 2022-05-11 DIAGNOSIS — M62.81 PROXIMAL MUSCLE WEAKNESS: ICD-10-CM

## 2022-05-11 DIAGNOSIS — Z74.09 DECREASED FUNCTIONAL MOBILITY AND ENDURANCE: Primary | ICD-10-CM

## 2022-05-11 PROCEDURE — 97110 THERAPEUTIC EXERCISES: CPT

## 2022-05-11 PROCEDURE — 97112 NEUROMUSCULAR REEDUCATION: CPT

## 2022-05-11 PROCEDURE — 97140 MANUAL THERAPY 1/> REGIONS: CPT

## 2022-05-11 NOTE — PROGRESS NOTES
[facility]   Physical Therapy Treatment Note     [facility]  [facility] Number: [id number]    Therapy Diagnosis:   Encounter Diagnoses   Name Primary?    Decreased functional mobility and endurance Yes    Decreased range of motion of left shoulder     Proximal muscle weakness      Physician: Denilson Jackson    Visit Date: 5/11/2022    Physician Orders: PT Eval and Treat  Medical Diagnosis from Referral: Nontraumatic incomplete tear of left rotator cuff  Evaluation Date: 3/9/2022  Authorization Period Expiration: 12/1/2022  Plan of Care Expiration: 6/9/2022  Visit # / Visits authorized: 15/20 (+1 for evaluation)    FOTO: 1/ 3      Precautions: Standard        Surgical Procedure: L shoulder rotator cuff repair, biceps tenodesis, subacromial decompression Date of Surgery: 3/7/2022       Progress Note Due: 5/19/2022  MD Follow-up: 5/24/2022     Time In: 1100   Time Out: 1150  Total Billable Time: 48 minutes     SUBJECTIVE     Patient reports: she is feeling okay today     He/She was compliant with home exercise program.  Response to previous treatment: felt fine with no pain   Functional change: PROM goals accomplished     Pain: 4/10     Location: L shoulder     OBJECTIVE     Objective Measures updated at progress report unless specified.       TREATMENT     Gerda received the treatments listed below:       MANUAL THERAPY TECHNIQUES were applied for (10) minutes, including:    Manual Intervention Performed Today    Soft Tissue Mobilization x Superficial soft tissue mobilization to left periscapular musculature, pectorals, supraspinatus , infraspinatus , subscapularis  and deltoid   Joint Mobilizations x Grade III L glenohumeral distraction, inferior, anterior and posterior glide    Mobilization with movement x Subscapularis release with shoulder flexion         Functional Dry Needling        Plan for Next Visit: PRN         THERAPEUTIC EXERCISES to develop  strength, endurance, ROM, flexibility, posture and core stabilization for (28) minutes including:    Intervention Performed Today    Rope and pulley  x 3 minutes flexion, 3 minutes scaption    Shoulder PROM  x 10 minutes in all directions    Stretches  x Prayer: 2 minutes with 5s holds    Side lying shoulder external rotation  x 3 x 10 on L    Wall slides flexion & scaption   2 x 10 each   Chest press with wand   2#, 2 x 10     AAROM shoulder flexion    x Supine with hands clasped: 10x   Standing with wand: 2 x 10, ROM limited to prevent shoulder hike   AROM shoulder flexion  x Supine: 2x10    AAROM shoulder abduction  X  x Standing with wand: 2x10, ROM limited to prevent shoulder hike  Side lying to 90*: 15x with PT assist    AAROM shoulder extensions x 10 x 5s holds    AAROM shoulder functional internal rotation  x 2x10         Rhythmic stabilization   3 minutes      Plan for Next Visit: progress to AAROM flexion and abduction with dowel if tolerated        NEUROMUSCULAR RE-EDUCATION ACTIVITIES to improve Balance, Coordination, Kinesthetic, Sense, Proprioception and Posture for (12) minutes.  The following were included:    Intervention Performed Today    Shoulder internal rotation- standing  x Yellow band, 3 x 10; *progress to red next session    Shoulder external rotation  X  x Standing: Yellow band, 3 x 10   Side lyin# 10x, 1# 10x    Shoulder extensions  x Yellow band, 3 x 10    Ball circles on wall  x 3x each, 10 clockwise and 10 counterclockwise                          Plan for Next Visit:          PATIENT EDUCATION AND HOME EXERCISES     Home Exercises Provided and Patient Education Provided     Education provided: (time included with therex) minutes   Patient educated on biomechanical justification for therapeutic exercise and importance of compliance with HEP in order to improve overall impairments and QOL    Patient was educated on all the above exercise prior/during/after for proper posture,  positioning, and execution for safe performance with home exercise program.    Patient educated on the importance of improved core and upper extremity strength in order to improve alignment of the spine and upper extremities with static positions and dynamic movement.    Patient educated on the importance of strong core and lower extremity musculature in order to improve both static and dynamic balance, improve gait mechanics, reduce fall risk and improve household and community mobility.       Written Home Exercises Provided: yes.  Exercises were reviewed and Gerda was able to demonstrate them prior to the end of the session.  Gerda demonstrated good  understanding of the education provided. See EMR under Patient Instructions for exercises provided during therapy sessions.      ASSESSMENT     Pt tolerated session well. Continued improvement noted in shoulder AROM with decreased assistance required for movement. Ball circles on wall incorporated for shoulder flexion strength and endurance. Pt states she feels good following session.     Gerda is progressing well towards her goals.   Pt prognosis is Excellent.     Pt will continue to benefit from skilled outpatient physical therapy to address the deficits listed in the problem list box on initial evaluation, provide pt/family education and to maximize pt's level of independence in the home and community environment.     Pt's spiritual, cultural and educational needs considered and pt agreeable to plan of care and goals.     Anticipated Barriers for therapy: co-morbidities and adherence to treatment plan    GOALS:     Short Term Goals:  6 weeks Progress   3/9/2022   1. Pain: Pt will demonstrate improved pain by reports of less than or equal to 6/10 worst pain on the verbal rating scale in order to progress toward maximal functional ability and improve QOL. Met   4/19/2022   2. Function: Patient will demonstrate improved function as indicated by a functional  limitation score of less than or equal to 80 out of 100 on FOTO. Met  4/29/2022   3. Mobility: Patient will improve AROM to 50% of stated goals, listed in objective measures above, in order to progress towards independence with functional activities.  Met  4/19/2022   4. Strength: Patient will improve strength to 50% of stated goals, listed in objective measures above, in order to progress towards independence with functional activities.  PM  4/19/2022   5. HEP: Patient will demonstrate independence with HEP in order to progress toward functional independence. Met  4/19/2022      Long Term Goals:  12 weeks Progress  3/9/2022   1. Pain: Pt will demonstrate improved pain by reports of less than or equal to 3/10 worst pain on the verbal rating scale in order to progress toward maximal functional ability and improve QOL.   PC   2. Function: Patient will demonstrate improved function as indicated by a functional limitation score of less than or equal to 66 out of 100 on FOTO. Met  4/29/2022   3. Mobility: Patient will improve AROM to stated goals, listed in objective measures above, in order to return to maximal functional potential and improve quality of life. PC   4. Strength: Patient will improve strength to stated goals, listed in objective measures above, in order to improve functional independence and quality of life. PC   5. Patient will return to normal ADL's, IADL's, community involvement, recreational activities, and work-related activities with less than or equal to 3/10 pain and maximal function.  PC       Goals Key:  PC= progressing/continue; PM= partially met;        DC= discontinue    PLAN     Continue Plan of Care (POC) and progress per patient tolerance. See treatment section for details on planned progressions next session.      Analilia Son, PT

## 2022-05-16 ENCOUNTER — CLINICAL SUPPORT (OUTPATIENT)
Dept: REHABILITATION | Facility: HOSPITAL | Age: 52
End: 2022-05-16
Payer: COMMERCIAL

## 2022-05-16 DIAGNOSIS — M62.81 PROXIMAL MUSCLE WEAKNESS: ICD-10-CM

## 2022-05-16 DIAGNOSIS — M25.612 DECREASED RANGE OF MOTION OF LEFT SHOULDER: ICD-10-CM

## 2022-05-16 DIAGNOSIS — Z74.09 DECREASED FUNCTIONAL MOBILITY AND ENDURANCE: Primary | ICD-10-CM

## 2022-05-16 PROCEDURE — 97110 THERAPEUTIC EXERCISES: CPT

## 2022-05-16 PROCEDURE — 97112 NEUROMUSCULAR REEDUCATION: CPT

## 2022-05-16 PROCEDURE — 97140 MANUAL THERAPY 1/> REGIONS: CPT

## 2022-05-16 NOTE — PROGRESS NOTES
[facility]   Physical Therapy Treatment Note     [facility]  [facility] Number: [id number]    Therapy Diagnosis:   Encounter Diagnoses   Name Primary?    Decreased functional mobility and endurance Yes    Decreased range of motion of left shoulder     Proximal muscle weakness      Physician: Denilson Jackson    Visit Date: 5/16/2022    Physician Orders: PT Eval and Treat  Medical Diagnosis from Referral: Nontraumatic incomplete tear of left rotator cuff  Evaluation Date: 3/9/2022  Authorization Period Expiration: 12/1/2022  Plan of Care Expiration: 6/9/2022  Visit # / Visits authorized: 16/20 (+1 for evaluation)    FOTO: 1/ 3      Precautions: Standard        Surgical Procedure: L shoulder rotator cuff repair, biceps tenodesis, subacromial decompression Date of Surgery: 3/7/2022       Progress Note Due: 5/19/2022  MD Follow-up: 5/24/2022     Time In: 1104  Time Out: 1200  Total Billable Time: 53 minutes     SUBJECTIVE     Patient reports: she is feeling okay today     He/She was compliant with home exercise program.  Response to previous treatment: felt fine with no pain   Functional change: PROM goals accomplished     Pain: 4/10     Location: L shoulder     OBJECTIVE     Objective Measures updated at progress report unless specified.       TREATMENT     Gedra received the treatments listed below:       MANUAL THERAPY TECHNIQUES were applied for (10) minutes, including:    Manual Intervention Performed Today    Soft Tissue Mobilization x Superficial soft tissue mobilization to left periscapular musculature, pectorals, supraspinatus , infraspinatus , subscapularis  and deltoid   Joint Mobilizations x Grade III L glenohumeral distraction, inferior, anterior and posterior glide    Mobilization with movement x Subscapularis release with shoulder flexion         Functional Dry Needling        Plan for Next Visit: PRN         THERAPEUTIC EXERCISES to develop  strength, endurance, ROM, flexibility, posture and core stabilization for (31) minutes including:    Intervention Performed Today    Rope and pulley   3 minutes flexion, 3 minutes scaption    Shoulder PROM  x 8 minutes in all directions    Stretches  X  x Prayer: 2 minutes with 5s holds   Table external rotation: 2 minutes with 5s holds    Side lying shoulder external rotation   3 x 10 on L    Wall slides flexion & scaption  x 15x each on L    Chest press with wand   2#, 2 x 10     AAROM shoulder flexion    x Supine with hands clasped: 10x   Standing with wand: 3 x 10, ROM limited to prevent shoulder hike   AROM shoulder flexion  x Supine: 2x10    AAROM shoulder abduction  X   Standing with wand: 2x10, ROM limited to prevent shoulder hike  Side lying to 90*: 15x with PT assist    AAROM shoulder extensions  10 x 5s holds    AAROM shoulder functional internal rotation   2x10    Bicep curls  x 2# 3x10, *progress next session   Rhythmic stabilization   3 minutes      Plan for Next Visit: progress to AAROM flexion and abduction with dowel if tolerated        NEUROMUSCULAR RE-EDUCATION ACTIVITIES to improve Balance, Coordination, Kinesthetic, Sense, Proprioception and Posture for (12) minutes.  The following were included:    Intervention Performed Today    Shoulder internal rotation- standing  x Green band, 3 x 10   Shoulder external rotation  X   Standing: red band, 3 x 10   Side lyin# 10x, 1# 10x    Shoulder extensions  x Yellow band, 3 x 10    Scapular retractions  x 20#, 3x10   Ball circles on wall   3x each, 10 clockwise and 10 counterclockwise                          Plan for Next Visit:          PATIENT EDUCATION AND HOME EXERCISES     Home Exercises Provided and Patient Education Provided     Education provided: (time included with therex) minutes   Patient educated on biomechanical justification for therapeutic exercise and importance of compliance with HEP in order to improve overall impairments and QOL     Patient was educated on all the above exercise prior/during/after for proper posture, positioning, and execution for safe performance with home exercise program.    Patient educated on the importance of improved core and upper extremity strength in order to improve alignment of the spine and upper extremities with static positions and dynamic movement.    Patient educated on the importance of strong core and lower extremity musculature in order to improve both static and dynamic balance, improve gait mechanics, reduce fall risk and improve household and community mobility.       Written Home Exercises Provided: yes.  Exercises were reviewed and Gerda was able to demonstrate them prior to the end of the session.  Gerda demonstrated good  understanding of the education provided. See EMR under Patient Instructions for exercises provided during therapy sessions.      ASSESSMENT     Pt tolerated session well. We are now at week 10 post-op and were able to incorporate bicep curls and rows with good tolerance by pt. Continued limitation in standing AROM flexion and abduction due to significant shoulder hiking     Gerda is progressing well towards her goals.   Pt prognosis is Excellent.     Pt will continue to benefit from skilled outpatient physical therapy to address the deficits listed in the problem list box on initial evaluation, provide pt/family education and to maximize pt's level of independence in the home and community environment.     Pt's spiritual, cultural and educational needs considered and pt agreeable to plan of care and goals.     Anticipated Barriers for therapy: co-morbidities and adherence to treatment plan    GOALS:     Short Term Goals:  6 weeks Progress   3/9/2022   1. Pain: Pt will demonstrate improved pain by reports of less than or equal to 6/10 worst pain on the verbal rating scale in order to progress toward maximal functional ability and improve QOL. Met   4/19/2022    2. Function: Patient will demonstrate improved function as indicated by a functional limitation score of less than or equal to 80 out of 100 on FOTO. Met  4/29/2022   3. Mobility: Patient will improve AROM to 50% of stated goals, listed in objective measures above, in order to progress towards independence with functional activities.  Met  4/19/2022   4. Strength: Patient will improve strength to 50% of stated goals, listed in objective measures above, in order to progress towards independence with functional activities.  PM  4/19/2022   5. HEP: Patient will demonstrate independence with HEP in order to progress toward functional independence. Met  4/19/2022      Long Term Goals:  12 weeks Progress  3/9/2022   1. Pain: Pt will demonstrate improved pain by reports of less than or equal to 3/10 worst pain on the verbal rating scale in order to progress toward maximal functional ability and improve QOL.   PC   2. Function: Patient will demonstrate improved function as indicated by a functional limitation score of less than or equal to 66 out of 100 on FOTO. Met  4/29/2022   3. Mobility: Patient will improve AROM to stated goals, listed in objective measures above, in order to return to maximal functional potential and improve quality of life. PC   4. Strength: Patient will improve strength to stated goals, listed in objective measures above, in order to improve functional independence and quality of life. PC   5. Patient will return to normal ADL's, IADL's, community involvement, recreational activities, and work-related activities with less than or equal to 3/10 pain and maximal function.  PC       Goals Key:  PC= progressing/continue; PM= partially met;        DC= discontinue    PLAN     Continue Plan of Care (POC) and progress per patient tolerance. See treatment section for details on planned progressions next session.      Analilia oSn, PT

## 2022-05-19 ENCOUNTER — CLINICAL SUPPORT (OUTPATIENT)
Dept: REHABILITATION | Facility: HOSPITAL | Age: 52
End: 2022-05-19
Payer: COMMERCIAL

## 2022-05-19 DIAGNOSIS — M25.612 DECREASED RANGE OF MOTION OF LEFT SHOULDER: ICD-10-CM

## 2022-05-19 DIAGNOSIS — M62.81 PROXIMAL MUSCLE WEAKNESS: ICD-10-CM

## 2022-05-19 DIAGNOSIS — Z74.09 DECREASED FUNCTIONAL MOBILITY AND ENDURANCE: Primary | ICD-10-CM

## 2022-05-19 PROCEDURE — 97110 THERAPEUTIC EXERCISES: CPT

## 2022-05-19 PROCEDURE — 97112 NEUROMUSCULAR REEDUCATION: CPT

## 2022-05-19 PROCEDURE — 97140 MANUAL THERAPY 1/> REGIONS: CPT

## 2022-05-19 NOTE — PROGRESS NOTES
[facility]   Physical Therapy Treatment Note     [facility]  [facility] [id number]    Therapy Diagnosis:   Encounter Diagnoses   Name Primary?    Decreased functional mobility and endurance Yes    Decreased range of motion of left shoulder     Proximal muscle weakness      Physician: Denilson Jackson    Visit Date: 5/19/2022    Physician Orders: PT Eval and Treat  Medical Diagnosis from Referral: Nontraumatic incomplete tear of left rotator cuff  Evaluation Date: 3/9/2022  Authorization Period Expiration: 12/1/2022  Plan of Care Expiration: 6/9/2022  Visit # / Visits authorized: 17/20 (+1 for evaluation)    FOTO: 1/ 3      Precautions: Standard        Surgical Procedure: L shoulder rotator cuff repair, biceps tenodesis, subacromial decompression Date of Surgery: 3/7/2022       Progress Note Due: 5/19/2022  MD Follow-up: 5/24/2022     Time In: 1104 (PN + FOTO Next Session)   Time Out: 1200  Total Billable Time: 53 minutes     SUBJECTIVE     Patient reports: mild soreness this morning    He/She was compliant with home exercise program.  Response to previous treatment: felt fine with no pain   Functional change: PROM goals accomplished     Pain: 4/10     Location: L shoulder     OBJECTIVE     Objective Measures updated at progress report unless specified.       TREATMENT     Gerda received the treatments listed below:       MANUAL THERAPY TECHNIQUES were applied for (10) minutes, including:    Manual Intervention Performed Today    Soft Tissue Mobilization x Superficial soft tissue mobilization to left periscapular musculature, pectorals, supraspinatus , infraspinatus , subscapularis  and deltoid   Joint Mobilizations x Grade III L glenohumeral distraction, inferior, anterior and posterior glide    Mobilization with movement x Subscapularis release with shoulder flexion         Functional Dry Needling        Plan for Next Visit: PRN         THERAPEUTIC  EXERCISES to develop strength, endurance, ROM, flexibility, posture and core stabilization for (20) minutes including:    Intervention Performed Today    Rope and pulley  x 3 minutes flexion, 3 minutes scaption    Shoulder PROM  x 8 minutes in all directions    Stretches   Prayer: 2 minutes with 5s holds   Table external rotation: 2 minutes with 5s holds    Side lying shoulder external rotation   3 x 10 on L    Wall slides flexion & scaption   15x each on L    Chest press with wand   2#, 2 x 10     AAROM shoulder flexion    x Supine with hands clasped: 10x   Standing with wand: 3 x 10, ROM limited to prevent shoulder hike   AROM shoulder flexion  x standing: 2x10    AROM shoulder abduction  X Standing: 2x10, ROM limited to prevent shoulder hike   AAROM shoulder extensions  10 x 5s holds    AAROM shoulder functional internal rotation   2x10    Bicep curls  x 4# 3x10   Shoulder flexion punch  x Red band, 3x10   Assisted overhead press  x 2# ball, 2x10 flexion and abduction with wall for assist      Plan for Next Visit: progress to AAROM flexion and abduction with dowel if tolerated        NEUROMUSCULAR RE-EDUCATION ACTIVITIES to improve Balance, Coordination, Kinesthetic, Sense, Proprioception and Posture for (18) minutes.  The following were included:    Intervention Performed Today    Shoulder internal rotation- standing  x Green band, 3 x 10   Shoulder external rotation  X   Standing: red band, 3 x 10   Side lyin# 10x, 1# 10x    Shoulder extensions   Yellow band, 3 x 10    Scapular retractions   20#, 3x10   Ball circles on wall   3x each, 10 clockwise and 10 counterclockwise    Prone row  x 4#, 2x10   Prone T and I  X  x I: 2#, 2x10   T: 2x10               Plan for Next Visit:          PATIENT EDUCATION AND HOME EXERCISES     Home Exercises Provided and Patient Education Provided     Education provided: (time included with therex) minutes   Patient educated on biomechanical justification for therapeutic  exercise and importance of compliance with HEP in order to improve overall impairments and QOL    Patient was educated on all the above exercise prior/during/after for proper posture, positioning, and execution for safe performance with home exercise program.    Patient educated on the importance of improved core and upper extremity strength in order to improve alignment of the spine and upper extremities with static positions and dynamic movement.    Patient educated on the importance of strong core and lower extremity musculature in order to improve both static and dynamic balance, improve gait mechanics, reduce fall risk and improve household and community mobility.       Written Home Exercises Provided: yes.  Exercises were reviewed and Gerda was able to demonstrate them prior to the end of the session.  Gerda demonstrated good  understanding of the education provided. See EMR under Patient Instructions for exercises provided during therapy sessions.      ASSESSMENT     Pt tolerated session well today. Added assisted overhead press to increase strength with end range elevation. Added prone T and I for scapular strength and stability     Gerda is progressing well towards her goals.   Pt prognosis is Excellent.     Pt will continue to benefit from skilled outpatient physical therapy to address the deficits listed in the problem list box on initial evaluation, provide pt/family education and to maximize pt's level of independence in the home and community environment.     Pt's spiritual, cultural and educational needs considered and pt agreeable to plan of care and goals.     Anticipated Barriers for therapy: co-morbidities and adherence to treatment plan    GOALS:     Short Term Goals:  6 weeks Progress   3/9/2022   1. Pain: Pt will demonstrate improved pain by reports of less than or equal to 6/10 worst pain on the verbal rating scale in order to progress toward maximal functional ability and improve  QOL. Met   4/19/2022   2. Function: Patient will demonstrate improved function as indicated by a functional limitation score of less than or equal to 80 out of 100 on FOTO. Met  4/29/2022   3. Mobility: Patient will improve AROM to 50% of stated goals, listed in objective measures above, in order to progress towards independence with functional activities.  Met  4/19/2022   4. Strength: Patient will improve strength to 50% of stated goals, listed in objective measures above, in order to progress towards independence with functional activities.  PM  4/19/2022   5. HEP: Patient will demonstrate independence with HEP in order to progress toward functional independence. Met  4/19/2022      Long Term Goals:  12 weeks Progress  3/9/2022   1. Pain: Pt will demonstrate improved pain by reports of less than or equal to 3/10 worst pain on the verbal rating scale in order to progress toward maximal functional ability and improve QOL.   PC   2. Function: Patient will demonstrate improved function as indicated by a functional limitation score of less than or equal to 66 out of 100 on FOTO. Met  4/29/2022   3. Mobility: Patient will improve AROM to stated goals, listed in objective measures above, in order to return to maximal functional potential and improve quality of life. PC   4. Strength: Patient will improve strength to stated goals, listed in objective measures above, in order to improve functional independence and quality of life. PC   5. Patient will return to normal ADL's, IADL's, community involvement, recreational activities, and work-related activities with less than or equal to 3/10 pain and maximal function.  PC       Goals Key:  PC= progressing/continue; PM= partially met;        DC= discontinue    PLAN     Continue Plan of Care (POC) and progress per patient tolerance. See treatment section for details on planned progressions next session.      Analilia Son, PT

## 2022-05-23 ENCOUNTER — CLINICAL SUPPORT (OUTPATIENT)
Dept: REHABILITATION | Facility: HOSPITAL | Age: 52
End: 2022-05-23
Payer: COMMERCIAL

## 2022-05-23 DIAGNOSIS — Z74.09 DECREASED FUNCTIONAL MOBILITY AND ENDURANCE: Primary | ICD-10-CM

## 2022-05-23 DIAGNOSIS — M25.612 DECREASED RANGE OF MOTION OF LEFT SHOULDER: ICD-10-CM

## 2022-05-23 DIAGNOSIS — M62.81 PROXIMAL MUSCLE WEAKNESS: ICD-10-CM

## 2022-05-23 PROCEDURE — 97112 NEUROMUSCULAR REEDUCATION: CPT

## 2022-05-23 PROCEDURE — 97140 MANUAL THERAPY 1/> REGIONS: CPT

## 2022-05-23 PROCEDURE — 97110 THERAPEUTIC EXERCISES: CPT

## 2022-05-23 NOTE — Clinical Note
Hey! You follow up with her tomorrow. She has been doing pretty good and pain has decreased significantly. She is still stiff with end range flexion and abduction but we usually get pretty close after PROM and stretching. She still has a good bit of popping and discomfort with elevation but it's becoming less frequent with time. Strength is progressing but shoulder still has the most difficulty with overhead movements and tends to shrug a good bit. Let me know if you have any concerns after you follow up with her!   Analilia

## 2022-05-23 NOTE — PROGRESS NOTES
[facility]   Physical Therapy Treatment Note / Progress Note     Name: Gerda Obrien  Clinic Number: [id number]    Therapy Diagnosis:   Encounter Diagnoses   Name Primary?    Decreased functional mobility and endurance Yes    Decreased range of motion of left shoulder     Proximal muscle weakness      Physician: Denilson Jackson    Visit Date: 5/23/2022    Physician Orders: PT Eval and Treat  Medical Diagnosis from Referral: Nontraumatic incomplete tear of left rotator cuff  Evaluation Date: 3/9/2022  Authorization Period Expiration: 12/1/2022  Plan of Care Expiration: 6/9/2022  Visit # / Visits authorized: 18/20 (+1 for evaluation)    FOTO: 1/ 3      Precautions: Standard        Surgical Procedure: L shoulder rotator cuff repair, biceps tenodesis, subacromial decompression Date of Surgery: 3/7/2022       Progress Note Due: 5/19/2022  MD Follow-up: 5/24/2022     Time In: 1100  Time Out: 1147  Total Billable Time: 46 minutes     SUBJECTIVE     Patient reports: she is feeling pretty good today. Continues to get occasional popping and clicking in the shoulder with elevation     He/She was compliant with home exercise program.  Response to previous treatment: felt fine with no pain   Functional change: PROM goals accomplished     Pain: 2/10     Location: L shoulder     OBJECTIVE     Objective Measures updated at progress report unless specified.     RANGE OF MOTION:     Cervical Right   (spine) Left     Pain/Dysfunction with Movement Goal   Cervical Flexion  100% ---       Cervical Extension  100% ---       Cervical Side Bending  100% 100%   100%   Cervical Rotation  100% 100%          Shoulder PROM Right Left Pain/Dysfunction with Movement Goal   Shoulder Flexion (180) 175 168   170   Shoulder Extension (60) 60 NT   60   Shoulder Abduction (180) 175 160   170   Shoulder ER (90) 90 90 at 90* abduction    80   Shoulder IR (70) 70 70 at 90* abduction   60         STRENGTH:     U/E MMT Right Left Pain/Dysfunction with Movement Goal   Shoulder Flexion 4/5 4-/5   4+/5 B   Shoulder Extension 4/5 4/5   4+/5 B   Shoulder Abduction 4/5 4-/5   4+/5 B   Shoulder IR 4+/5 4/5   4+/5 B   Shoulder ER    4/5 4-/5   4+/5 B   Elbow Flexion  5/5 4+/5   5/5 B   Elbow Extension 5/5 4+/5   5/5 B   Wrist Flexion 5/5 5/5   5/5 B   Wrist Extension 5/5 5/5   5/5 B       TREATMENT     Post-Op Rehab Timeline     11 weeks: 5/23/2022  15 weeks: 6/20/2022  20 weeks: 7/25/2022  24 weeks: 8/22/2022    Gerda received the treatments listed below:       MANUAL THERAPY TECHNIQUES were applied for (8) minutes, including:    Manual Intervention Performed Today    Soft Tissue Mobilization x Superficial soft tissue mobilization to left periscapular musculature, pectorals, supraspinatus , infraspinatus , subscapularis  and deltoid   Joint Mobilizations x Grade III L glenohumeral distraction, inferior, anterior and posterior glide    Mobilization with movement x Subscapularis release with shoulder flexion         Functional Dry Needling        Plan for Next Visit: PRN         THERAPEUTIC EXERCISES to develop strength, endurance, ROM, flexibility, posture and core stabilization for (20) minutes including:    Intervention Performed Today    Rope and pulley  x 3 minutes flexion, 3 minutes scaption    UBE NEXT    Shoulder PROM  x 8 minutes in all directions    Stretches  X  X  x Prayer: 90s each with shoulders in ER and IR  Pectoralis + ER stretch: 5x10s holds   Shoulder internal rotation (strap): 5x10s holds    Side lying shoulder external rotation   3 x 10 on L    Wall slides flexion & scaption   15x each on L    Chest press with wand   2#, 2 x 10     AAROM shoulder flexion   Supine with hands clasped: 10x   Standing with wand: 3 x 10, ROM limited to prevent shoulder hike   AROM shoulder flexion   standing: 2x10    AROM shoulder abduction   Standing: 2x10, ROM limited to prevent shoulder hike   AAROM  shoulder extensions  10 x 5s holds    AAROM shoulder functional internal rotation   2x10    Bicep curls  x 4# 3x10   Shoulder flexion punch   Red band, 3x10   Assisted overhead press  x 2# ball, 2x10 flexion and abduction with wall for assist    Wall push up  NEXT      Plan for Next Visit: progress to AAROM flexion and abduction with dowel if tolerated        NEUROMUSCULAR RE-EDUCATION ACTIVITIES to improve Balance, Coordination, Kinesthetic, Sense, Proprioception and Posture for (18) minutes.  The following were included:    Intervention Performed Today    Shoulder internal rotation- standing  x Blue band, 3 x 12   Shoulder external rotation  X Standing: red band, 3 x 12   Shoulder extensions  x 30# 3x10    Scapular retractions  x 30# 3x10   Ball circles on wall   3x each, 10 clockwise and 10 counterclockwise    Prone row   4#, 2x10   Prone T and I   I: 2#, 2x10   T: 2x10   Wall wash  x 2x30s each clockwise and counterclockwise           Plan for Next Visit:          PATIENT EDUCATION AND HOME EXERCISES     Home Exercises Provided and Patient Education Provided     Education provided: (time included with therex) minutes   Patient educated on biomechanical justification for therapeutic exercise and importance of compliance with HEP in order to improve overall impairments and QOL    Patient was educated on all the above exercise prior/during/after for proper posture, positioning, and execution for safe performance with home exercise program.    Patient educated on the importance of improved core and upper extremity strength in order to improve alignment of the spine and upper extremities with static positions and dynamic movement.    Patient educated on the importance of strong core and lower extremity musculature in order to improve both static and dynamic balance, improve gait mechanics, reduce fall risk and improve household and community mobility.       Written Home Exercises Provided: yes.  Exercises were  reviewed and Gerda was able to demonstrate them prior to the end of the session.  Gerda demonstrated good  understanding of the education provided. See EMR under Patient Instructions for exercises provided during therapy sessions.      ASSESSMENT     Pt tolerated session well today. Increased repetitions for shoulder external rotation with reports of increased fatigue. Added wall washes for endurance in shoulder flexion position. A progress note was completed today with significant improvements noted in shoulder ROM and gross upper extremity strength compared to prior assessment; please see exam for details. Gerda continues to have difficulty with end range shoulder flexion and abduction ROM and overhead strength due to continues limitations with overhead strength. The above impairments and functional deficits will continue to be addressed over the course of this plan of care. Gerda was educated on continued progression of PT, expectations for the duration of care, updates on goals set at initial evaluation, and home exercise program.  eGrda will continue to benefit from physical therapy in order to further address goals, maximize function and quality of life.     Gerda is progressing well towards her goals.   Pt prognosis is Excellent.     Pt will continue to benefit from skilled outpatient physical therapy to address the deficits listed in the problem list box on initial evaluation, provide pt/family education and to maximize pt's level of independence in the home and community environment.     Pt's spiritual, cultural and educational needs considered and pt agreeable to plan of care and goals.     Anticipated Barriers for therapy: co-morbidities and adherence to treatment plan    GOALS:     Short Term Goals:  6 weeks Progress   3/9/2022   1. Pain: Pt will demonstrate improved pain by reports of less than or equal to 6/10 worst pain on the verbal rating scale in order to progress toward maximal  functional ability and improve QOL. Met   4/19/2022   2. Function: Patient will demonstrate improved function as indicated by a functional limitation score of less than or equal to 80 out of 100 on FOTO. Met  4/29/2022   3. Mobility: Patient will improve AROM to 50% of stated goals, listed in objective measures above, in order to progress towards independence with functional activities.  Met  4/19/2022   4. Strength: Patient will improve strength to 50% of stated goals, listed in objective measures above, in order to progress towards independence with functional activities.  Met  5/23/2022   5. HEP: Patient will demonstrate independence with HEP in order to progress toward functional independence. Met  4/19/2022      Long Term Goals:  12 weeks Progress  3/9/2022   1. Pain: Pt will demonstrate improved pain by reports of less than or equal to 3/10 worst pain on the verbal rating scale in order to progress toward maximal functional ability and improve QOL.   PM  5/23/2022   2. Function: Patient will demonstrate improved function as indicated by a functional limitation score of less than or equal to 66 out of 100 on FOTO. Met  4/29/2022   3. Mobility: Patient will improve AROM to stated goals, listed in objective measures above, in order to return to maximal functional potential and improve quality of life. PM   5/23/2022   4. Strength: Patient will improve strength to stated goals, listed in objective measures above, in order to improve functional independence and quality of life. PM  5/23/2022   5. Patient will return to normal ADL's, IADL's, community involvement, recreational activities, and work-related activities with less than or equal to 3/10 pain and maximal function.  PM  5/23/2022       Goals Key:  PC= progressing/continue; PM= partially met;        DC= discontinue    PLAN     Continue Plan of Care (POC) and progress per patient tolerance. See treatment section for details on planned progressions next  session.      Analilia Son, PT

## 2022-05-24 ENCOUNTER — OFFICE VISIT (OUTPATIENT)
Dept: ORTHOPEDICS | Facility: CLINIC | Age: 52
End: 2022-05-24
Payer: COMMERCIAL

## 2022-05-24 ENCOUNTER — TELEPHONE (OUTPATIENT)
Dept: ORTHOPEDICS | Facility: CLINIC | Age: 52
End: 2022-05-24
Payer: COMMERCIAL

## 2022-05-24 VITALS — WEIGHT: 206 LBS | BODY MASS INDEX: 37.91 KG/M2 | HEIGHT: 62 IN

## 2022-05-24 DIAGNOSIS — M75.112 NONTRAUMATIC INCOMPLETE TEAR OF LEFT ROTATOR CUFF: Primary | ICD-10-CM

## 2022-05-24 PROCEDURE — 1159F MED LIST DOCD IN RCRD: CPT | Mod: CPTII,S$GLB,, | Performed by: STUDENT IN AN ORGANIZED HEALTH CARE EDUCATION/TRAINING PROGRAM

## 2022-05-24 PROCEDURE — 1159F PR MEDICATION LIST DOCUMENTED IN MEDICAL RECORD: ICD-10-PCS | Mod: CPTII,S$GLB,, | Performed by: STUDENT IN AN ORGANIZED HEALTH CARE EDUCATION/TRAINING PROGRAM

## 2022-05-24 PROCEDURE — 99999 PR PBB SHADOW E&M-EST. PATIENT-LVL IV: CPT | Mod: PBBFAC,,, | Performed by: STUDENT IN AN ORGANIZED HEALTH CARE EDUCATION/TRAINING PROGRAM

## 2022-05-24 PROCEDURE — 99024 PR POST-OP FOLLOW-UP VISIT: ICD-10-PCS | Mod: S$GLB,,, | Performed by: STUDENT IN AN ORGANIZED HEALTH CARE EDUCATION/TRAINING PROGRAM

## 2022-05-24 PROCEDURE — 99024 POSTOP FOLLOW-UP VISIT: CPT | Mod: S$GLB,,, | Performed by: STUDENT IN AN ORGANIZED HEALTH CARE EDUCATION/TRAINING PROGRAM

## 2022-05-24 PROCEDURE — 1160F RVW MEDS BY RX/DR IN RCRD: CPT | Mod: CPTII,S$GLB,, | Performed by: STUDENT IN AN ORGANIZED HEALTH CARE EDUCATION/TRAINING PROGRAM

## 2022-05-24 PROCEDURE — 3008F PR BODY MASS INDEX (BMI) DOCUMENTED: ICD-10-PCS | Mod: CPTII,S$GLB,, | Performed by: STUDENT IN AN ORGANIZED HEALTH CARE EDUCATION/TRAINING PROGRAM

## 2022-05-24 PROCEDURE — 99999 PR PBB SHADOW E&M-EST. PATIENT-LVL IV: ICD-10-PCS | Mod: PBBFAC,,, | Performed by: STUDENT IN AN ORGANIZED HEALTH CARE EDUCATION/TRAINING PROGRAM

## 2022-05-24 PROCEDURE — 3066F PR DOCUMENTATION OF TREATMENT FOR NEPHROPATHY: ICD-10-PCS | Mod: CPTII,S$GLB,, | Performed by: STUDENT IN AN ORGANIZED HEALTH CARE EDUCATION/TRAINING PROGRAM

## 2022-05-24 PROCEDURE — 1160F PR REVIEW ALL MEDS BY PRESCRIBER/CLIN PHARMACIST DOCUMENTED: ICD-10-PCS | Mod: CPTII,S$GLB,, | Performed by: STUDENT IN AN ORGANIZED HEALTH CARE EDUCATION/TRAINING PROGRAM

## 2022-05-24 PROCEDURE — 3044F PR MOST RECENT HEMOGLOBIN A1C LEVEL <7.0%: ICD-10-PCS | Mod: CPTII,S$GLB,, | Performed by: STUDENT IN AN ORGANIZED HEALTH CARE EDUCATION/TRAINING PROGRAM

## 2022-05-24 PROCEDURE — 3066F NEPHROPATHY DOC TX: CPT | Mod: CPTII,S$GLB,, | Performed by: STUDENT IN AN ORGANIZED HEALTH CARE EDUCATION/TRAINING PROGRAM

## 2022-05-24 PROCEDURE — 3044F HG A1C LEVEL LT 7.0%: CPT | Mod: CPTII,S$GLB,, | Performed by: STUDENT IN AN ORGANIZED HEALTH CARE EDUCATION/TRAINING PROGRAM

## 2022-05-24 PROCEDURE — 3061F NEG MICROALBUMINURIA REV: CPT | Mod: CPTII,S$GLB,, | Performed by: STUDENT IN AN ORGANIZED HEALTH CARE EDUCATION/TRAINING PROGRAM

## 2022-05-24 PROCEDURE — 3008F BODY MASS INDEX DOCD: CPT | Mod: CPTII,S$GLB,, | Performed by: STUDENT IN AN ORGANIZED HEALTH CARE EDUCATION/TRAINING PROGRAM

## 2022-05-24 PROCEDURE — 3061F PR NEG MICROALBUMINURIA RESULT DOCUMENTED/REVIEW: ICD-10-PCS | Mod: CPTII,S$GLB,, | Performed by: STUDENT IN AN ORGANIZED HEALTH CARE EDUCATION/TRAINING PROGRAM

## 2022-05-24 NOTE — PROGRESS NOTES
Orthopaedics  Post-operative follow-up    Procedure Performed:  1. Left shoulder arthroscopic rotator cuff repair  2. Left shoulder arthroscopic biceps tenodesis  3. Left shoulder subacromial space debridement     Date of Surgery: 03/07/2022    Subjective: Gerda Obrien is now almost 12 weeks out from her shoulder surgery.  She has been compliant with post-operative restrictions and has been progressing with PT.  Her pain and function continue to improve. She does note some clicking in the shoulder and still has a little difficulty sleeping.    Exam:  incision sites healed, C/D/I  No swelling or bruising noted  Fluid ROM with no crepitus  Active upright ROM: , , ER 50  Cuff strength intact on limited testing  Axillary nerve sensation and motor intact  Motor and sensory intact distally  Strong radial pulse, fingers warm and well perfused    Imaging:  No new imaging.    Impression:  2.5 months s/p left shoulder arthroscopic rotator cuff repair, biceps tenodesis, subacromial space debridement, third post-operative visit - doing well    Plan:  Overall she is doing well and making steady progress.  Attribute the clicking in her shoulder to scar formation in her subacromial space.  This should improve over time.  She may now begin working on strengthening of the shoulder.  As her strength and endurance improves, I anticipate her symptoms will improve as well.  Advance PT per protocol  Symptomatic treatment for pain / swelling  Instructed patient to call clinic if questions or concerns    Follow-up with me in 2 months.  If she is still symptomatic, then we will consider MRI versus injection.          Denilson Jackson MD    I, Sherwin Reddy, acted as a scribe for Denilson Jackson MD for the duration of this office visit.

## 2022-05-26 ENCOUNTER — CLINICAL SUPPORT (OUTPATIENT)
Dept: REHABILITATION | Facility: HOSPITAL | Age: 52
End: 2022-05-26
Payer: COMMERCIAL

## 2022-05-26 DIAGNOSIS — M25.612 DECREASED RANGE OF MOTION OF LEFT SHOULDER: ICD-10-CM

## 2022-05-26 DIAGNOSIS — M62.81 PROXIMAL MUSCLE WEAKNESS: ICD-10-CM

## 2022-05-26 DIAGNOSIS — Z74.09 DECREASED FUNCTIONAL MOBILITY AND ENDURANCE: Primary | ICD-10-CM

## 2022-05-26 PROCEDURE — 97140 MANUAL THERAPY 1/> REGIONS: CPT

## 2022-05-26 PROCEDURE — 97112 NEUROMUSCULAR REEDUCATION: CPT

## 2022-05-26 PROCEDURE — 97110 THERAPEUTIC EXERCISES: CPT

## 2022-05-26 NOTE — PROGRESS NOTES
[facility]   Physical Therapy Treatment Note     Name: Gerda Obrien  Clinic Number: [id number]    Therapy Diagnosis:   Encounter Diagnoses   Name Primary?    Decreased functional mobility and endurance Yes    Decreased range of motion of left shoulder     Proximal muscle weakness      Physician: Denilson Jackson    Visit Date: 5/26/2022    Physician Orders: PT Eval and Treat  Medical Diagnosis from Referral: Nontraumatic incomplete tear of left rotator cuff  Evaluation Date: 3/9/2022  Authorization Period Expiration: 12/1/2022  Plan of Care Expiration: 6/9/2022  Visit # / Visits authorized: 19/20 (+1 for evaluation)    FOTO: 1/ 3      Precautions: Standard        Surgical Procedure: L shoulder rotator cuff repair, biceps tenodesis, subacromial decompression Date of Surgery: 3/7/2022       Progress Note Due: 5/19/2022  MD Follow-up: 5/24/2022     Time In: 1430  Time Out: 1520  Total Billable Time: 48 minutes     SUBJECTIVE     Patient reports: she is feeling pretty good today with no significant complaints     He/She was compliant with home exercise program.  Response to previous treatment: felt fine with no pain   Functional change: PROM goals accomplished     Pain: 1/10     Location: L shoulder     OBJECTIVE     Objective Measures updated at progress report unless specified.     TREATMENT     Post-Op Rehab Timeline     11 weeks: 5/23/2022  15 weeks: 6/20/2022  20 weeks: 7/25/2022  24 weeks: 8/22/2022    Gerda received the treatments listed below:       MANUAL THERAPY TECHNIQUES were applied for (8) minutes, including:    Manual Intervention Performed Today    Soft Tissue Mobilization x Superficial soft tissue mobilization to left periscapular musculature, pectorals, supraspinatus , infraspinatus , subscapularis  and deltoid   Joint Mobilizations x Grade IV L glenohumeral distraction and inferior glides   Mobilization with movement x Subscapularis release with  shoulder flexion         Functional Dry Needling        Plan for Next Visit: PRN         THERAPEUTIC EXERCISES to develop strength, endurance, ROM, flexibility, posture and core stabilization for (25) minutes including:    Intervention Performed Today    Rope and pulley  x 3 minutes flexion, 3 minutes scaption    UBE x Level 1, 3' forward, 3' backward    Shoulder PROM  x 5 minutes in all directions    Stretches  X  X  x Prayer: 90s each with shoulders in ER and IR  Pectoralis + ER stretch: 5x10s holds   Shoulder internal rotation (strap): 5x10s holds    Side lying shoulder external rotation   3 x 10 on L    Wall slides flexion & scaption   15x each on L    Chest press with wand   2#, 2 x 10     AAROM shoulder flexion   Supine with hands clasped: 10x   Standing with wand: 3 x 10, ROM limited to prevent shoulder hike   AROM shoulder flexion   standing: 2x10    AROM shoulder abduction   Standing: 2x10, ROM limited to prevent shoulder hike   AAROM shoulder extensions  10 x 5s holds    AAROM shoulder functional internal rotation   2x10    Bicep curls  x 5# 3x10   Shoulder flexion punch   Red band, 3x10   Assisted overhead press  x 3# ball, 2x10 flexion and abduction with wall for assist    Wall push up  x 3x10     Plan for Next Visit: progress to AAROM flexion and abduction with dowel if tolerated        NEUROMUSCULAR RE-EDUCATION ACTIVITIES to improve Balance, Coordination, Kinesthetic, Sense, Proprioception and Posture for (15) minutes.  The following were included:    Intervention Performed Today    Shoulder internal rotation- standing   Blue band, 3 x 12   B Shoulder external rotation  X Standing: red band, 3 x 8   Shoulder extensions   30# 3x10    Scapular retractions  x 30# 3x10   Ball circles on wall   3x each, 10 clockwise and 10 counterclockwise    Prone row   4#, 2x10   Prone TYI  X  x I: 2#, 2x10   T and Y: 2x10   Wall wash  x 2x30s each clockwise and counterclockwise           Plan for Next Visit:           PATIENT EDUCATION AND HOME EXERCISES     Home Exercises Provided and Patient Education Provided     Education provided: (time included with therex) minutes   Patient educated on biomechanical justification for therapeutic exercise and importance of compliance with HEP in order to improve overall impairments and QOL    Patient was educated on all the above exercise prior/during/after for proper posture, positioning, and execution for safe performance with home exercise program.    Patient educated on the importance of improved core and upper extremity strength in order to improve alignment of the spine and upper extremities with static positions and dynamic movement.    Patient educated on the importance of strong core and lower extremity musculature in order to improve both static and dynamic balance, improve gait mechanics, reduce fall risk and improve household and community mobility.       Written Home Exercises Provided: yes.  Exercises were reviewed and Gerda was able to demonstrate them prior to the end of the session.  Gerda demonstrated good  understanding of the education provided. See EMR under Patient Instructions for exercises provided during therapy sessions.      ASSESSMENT     Pt tolerated session well today. Increased weight with overhead press. Added wall push ups for anterior shoulder strength.     Gerda is progressing well towards her goals.   Pt prognosis is Excellent.     Pt will continue to benefit from skilled outpatient physical therapy to address the deficits listed in the problem list box on initial evaluation, provide pt/family education and to maximize pt's level of independence in the home and community environment.     Pt's spiritual, cultural and educational needs considered and pt agreeable to plan of care and goals.     Anticipated Barriers for therapy: co-morbidities and adherence to treatment plan    GOALS:     Short Term Goals:  6 weeks Progress   3/9/2022    1. Pain: Pt will demonstrate improved pain by reports of less than or equal to 6/10 worst pain on the verbal rating scale in order to progress toward maximal functional ability and improve QOL. Met   4/19/2022   2. Function: Patient will demonstrate improved function as indicated by a functional limitation score of less than or equal to 80 out of 100 on FOTO. Met  4/29/2022   3. Mobility: Patient will improve AROM to 50% of stated goals, listed in objective measures above, in order to progress towards independence with functional activities.  Met  4/19/2022   4. Strength: Patient will improve strength to 50% of stated goals, listed in objective measures above, in order to progress towards independence with functional activities.  Met  5/23/2022   5. HEP: Patient will demonstrate independence with HEP in order to progress toward functional independence. Met  4/19/2022      Long Term Goals:  12 weeks Progress  3/9/2022   1. Pain: Pt will demonstrate improved pain by reports of less than or equal to 3/10 worst pain on the verbal rating scale in order to progress toward maximal functional ability and improve QOL.   PM  5/23/2022   2. Function: Patient will demonstrate improved function as indicated by a functional limitation score of less than or equal to 66 out of 100 on FOTO. Met  4/29/2022   3. Mobility: Patient will improve AROM to stated goals, listed in objective measures above, in order to return to maximal functional potential and improve quality of life. PM   5/23/2022   4. Strength: Patient will improve strength to stated goals, listed in objective measures above, in order to improve functional independence and quality of life. PM  5/23/2022   5. Patient will return to normal ADL's, IADL's, community involvement, recreational activities, and work-related activities with less than or equal to 3/10 pain and maximal function.  PM  5/23/2022       Goals Key:  PC= progressing/continue; PM= partially met;         DC= discontinue    PLAN     Continue Plan of Care (POC) and progress per patient tolerance. See treatment section for details on planned progressions next session.      Analilia Son PT

## 2022-05-27 ENCOUNTER — PATIENT MESSAGE (OUTPATIENT)
Dept: ORTHOPEDICS | Facility: CLINIC | Age: 52
End: 2022-05-27
Payer: COMMERCIAL

## 2022-05-30 ENCOUNTER — CLINICAL SUPPORT (OUTPATIENT)
Dept: REHABILITATION | Facility: HOSPITAL | Age: 52
End: 2022-05-30
Payer: COMMERCIAL

## 2022-05-30 DIAGNOSIS — Z74.09 DECREASED FUNCTIONAL MOBILITY AND ENDURANCE: Primary | ICD-10-CM

## 2022-05-30 DIAGNOSIS — M62.81 PROXIMAL MUSCLE WEAKNESS: ICD-10-CM

## 2022-05-30 DIAGNOSIS — M25.612 DECREASED RANGE OF MOTION OF LEFT SHOULDER: ICD-10-CM

## 2022-05-30 PROCEDURE — 97110 THERAPEUTIC EXERCISES: CPT

## 2022-05-30 PROCEDURE — 97112 NEUROMUSCULAR REEDUCATION: CPT

## 2022-05-30 PROCEDURE — 97140 MANUAL THERAPY 1/> REGIONS: CPT

## 2022-05-30 NOTE — PROGRESS NOTES
[facility]   Physical Therapy Treatment Note     Name: Gerda Obrien  Clinic Number: [id number]    Therapy Diagnosis:   Encounter Diagnoses   Name Primary?    Decreased functional mobility and endurance Yes    Decreased range of motion of left shoulder     Proximal muscle weakness      Physician: Denilson Jackson    Visit Date: 5/30/2022    Physician Orders: PT Eval and Treat  Medical Diagnosis from Referral: Nontraumatic incomplete tear of left rotator cuff  Evaluation Date: 3/9/2022  Authorization Period Expiration: 12/1/2022  Plan of Care Expiration: 6/9/2022  Visit # / Visits authorized: 19/20 (+1 for evaluation)    FOTO: 1/ 3      Precautions: Standard        Surgical Procedure: L shoulder rotator cuff repair, biceps tenodesis, subacromial decompression Date of Surgery: 3/7/2022       Progress Note Due: 5/19/2022  MD Follow-up: 5/24/2022     Time In: 1330  Time Out: 1432  Total Billable Time: 60 minutes     SUBJECTIVE     Patient reports: she is feeling pretty good today with no significant complaints currently. Continues to have some night time pain.    He/She was compliant with home exercise program.  Response to previous treatment: felt fine with no pain   Functional change: PROM goals accomplished     Pain: 0/10     Location: L shoulder     OBJECTIVE     Objective Measures updated at progress report unless specified.     TREATMENT     Post-Op Rehab Timeline     11 weeks: 5/23/2022  15 weeks: 6/20/2022  20 weeks: 7/25/2022  24 weeks: 8/22/2022    Gerda received the treatments listed below:       MANUAL THERAPY TECHNIQUES were applied for (12) minutes, including:    Manual Intervention Performed Today    Soft Tissue Mobilization x Superficial soft tissue mobilization to left periscapular musculature, pectorals, supraspinatus , infraspinatus , subscapularis  and deltoid   Joint Mobilizations x Grade IV L glenohumeral distraction and inferior glides    Mobilization with movement x Subscapularis release with shoulder flexion         Functional Dry Needling        Plan for Next Visit: PRN         THERAPEUTIC EXERCISES to develop strength, endurance, ROM, flexibility, posture and core stabilization for (23) minutes including:    Intervention Performed Today    Rope and pulley  x 3 minutes flexion, 3 minutes scaption    UBE x Level 1, 3' forward, 3' backward    Shoulder PROM  x 5 minutes in all directions    Stretches  X  X  x Prayer: 90s each with shoulders in ER and IR  Pectoralis + ER stretch: 5x10s holds   Shoulder internal rotation (strap): 5x10s holds    Side lying shoulder external rotation   3 x 10 on L    Wall slides flexion & scaption   15x each on L    Chest press with wand   2#, 2 x 10     AAROM shoulder flexion   Supine with hands clasped: 10x   Standing with wand: 3 x 10, ROM limited to prevent shoulder hike   AROM shoulder flexion   standing: 2x10    AROM shoulder abduction   Standing: 2x10, ROM limited to prevent shoulder hike   AAROM shoulder extensions  10 x 5s holds    AAROM shoulder functional internal rotation   2x10    Bicep curls   5# 3x10   Shoulder flexion punch   Red band, 3x10   Assisted overhead press  x 3# ball, 2x10 flexion and abduction with wall for assist    Wall push up  x 3x10     Plan for Next Visit: progress to AAROM flexion and abduction with dowel if tolerated          NEUROMUSCULAR RE-EDUCATION ACTIVITIES to improve Balance, Coordination, Kinesthetic, Sense, Proprioception and Posture for (25) minutes.  The following were included:    Intervention Performed Today    Shoulder internal rotation- standing  x Blue band, 3 x 12   B Shoulder external rotation  X Standing: red band, 3 x 10  Side lying    Shoulder extensions  x 30# 3x10    Scapular retractions  x 30# 3x10   Prone row  x 7.5#, 2x10   Prone TYI  X  x I: 2#, 2x10   T and Y: 2x10   Wall wash  x 30s each, 20s each  clockwise and counterclockwise    serratus punches  x 5#,  3x10      Plan for Next Visit:          PATIENT EDUCATION AND HOME EXERCISES     Home Exercises Provided and Patient Education Provided     Education provided: (time included with therex) minutes   Patient educated on biomechanical justification for therapeutic exercise and importance of compliance with HEP in order to improve overall impairments and QOL    Patient was educated on all the above exercise prior/during/after for proper posture, positioning, and execution for safe performance with home exercise program.    Patient educated on the importance of improved core and upper extremity strength in order to improve alignment of the spine and upper extremities with static positions and dynamic movement.    Patient educated on the importance of strong core and lower extremity musculature in order to improve both static and dynamic balance, improve gait mechanics, reduce fall risk and improve household and community mobility.       Written Home Exercises Provided: yes.  Exercises were reviewed and Gerda was able to demonstrate them prior to the end of the session.  Gerda demonstrated good  understanding of the education provided. See EMR under Patient Instructions for exercises provided during therapy sessions.      ASSESSMENT     Pt tolerated session well today. Incorporated serratus punches for increased scapular stability. Improved range of movement noted with prone T and Y.     Gerda is progressing well towards her goals.   Pt prognosis is Excellent.     Pt will continue to benefit from skilled outpatient physical therapy to address the deficits listed in the problem list box on initial evaluation, provide pt/family education and to maximize pt's level of independence in the home and community environment.     Pt's spiritual, cultural and educational needs considered and pt agreeable to plan of care and goals.     Anticipated Barriers for therapy: co-morbidities and adherence to treatment  plan    GOALS:     Short Term Goals:  6 weeks Progress   3/9/2022   1. Pain: Pt will demonstrate improved pain by reports of less than or equal to 6/10 worst pain on the verbal rating scale in order to progress toward maximal functional ability and improve QOL. Met   4/19/2022   2. Function: Patient will demonstrate improved function as indicated by a functional limitation score of less than or equal to 80 out of 100 on FOTO. Met  4/29/2022   3. Mobility: Patient will improve AROM to 50% of stated goals, listed in objective measures above, in order to progress towards independence with functional activities.  Met  4/19/2022   4. Strength: Patient will improve strength to 50% of stated goals, listed in objective measures above, in order to progress towards independence with functional activities.  Met  5/23/2022   5. HEP: Patient will demonstrate independence with HEP in order to progress toward functional independence. Met  4/19/2022      Long Term Goals:  12 weeks Progress  3/9/2022   1. Pain: Pt will demonstrate improved pain by reports of less than or equal to 3/10 worst pain on the verbal rating scale in order to progress toward maximal functional ability and improve QOL.   PM  5/23/2022   2. Function: Patient will demonstrate improved function as indicated by a functional limitation score of less than or equal to 66 out of 100 on FOTO. Met  4/29/2022   3. Mobility: Patient will improve AROM to stated goals, listed in objective measures above, in order to return to maximal functional potential and improve quality of life. PM   5/23/2022   4. Strength: Patient will improve strength to stated goals, listed in objective measures above, in order to improve functional independence and quality of life. PM  5/23/2022   5. Patient will return to normal ADL's, IADL's, community involvement, recreational activities, and work-related activities with less than or equal to 3/10 pain and maximal function.  PM  5/23/2022        Goals Key:  PC= progressing/continue; PM= partially met;        DC= discontinue    PLAN     Continue Plan of Care (POC) and progress per patient tolerance. See treatment section for details on planned progressions next session.      Analilia Son PT

## 2022-06-01 ENCOUNTER — CLINICAL SUPPORT (OUTPATIENT)
Dept: REHABILITATION | Facility: HOSPITAL | Age: 52
End: 2022-06-01
Payer: COMMERCIAL

## 2022-06-01 DIAGNOSIS — M25.612 DECREASED RANGE OF MOTION OF LEFT SHOULDER: ICD-10-CM

## 2022-06-01 DIAGNOSIS — M62.81 PROXIMAL MUSCLE WEAKNESS: ICD-10-CM

## 2022-06-01 DIAGNOSIS — Z74.09 DECREASED FUNCTIONAL MOBILITY AND ENDURANCE: Primary | ICD-10-CM

## 2022-06-01 PROCEDURE — 97110 THERAPEUTIC EXERCISES: CPT

## 2022-06-01 PROCEDURE — 97112 NEUROMUSCULAR REEDUCATION: CPT

## 2022-06-01 PROCEDURE — 97140 MANUAL THERAPY 1/> REGIONS: CPT

## 2022-06-01 NOTE — PROGRESS NOTES
[facility]   Physical Therapy Treatment Note     Name: Gerda Obrien  Clinic Number: [id number]    Therapy Diagnosis:   Encounter Diagnoses   Name Primary?    Decreased functional mobility and endurance Yes    Decreased range of motion of left shoulder     Proximal muscle weakness      Physician: Denilson Jackson    Visit Date: 6/1/2022    Physician Orders: PT Eval and Treat  Medical Diagnosis from Referral: Nontraumatic incomplete tear of left rotator cuff  Evaluation Date: 3/9/2022  Authorization Period Expiration: 12/1/2022  Plan of Care Expiration: 6/9/2022  Visit # / Visits authorized: 19/20 (+1 for evaluation)    FOTO: 1/ 3      Precautions: Standard        Surgical Procedure: L shoulder rotator cuff repair, biceps tenodesis, subacromial decompression Date of Surgery: 3/7/2022       Progress Note Due: 5/19/2022  MD Follow-up: 5/24/2022     Time In: 1432  Time Out: 1532  Total Billable Time: 55 minutes     SUBJECTIVE     Patient reports: she is feeling pretty good today with no significant complaints currently. Continued night time pain.    He/She was compliant with home exercise program.  Response to previous treatment: felt fine with no pain   Functional change: PROM goals accomplished     Pain: 0/10     Location: L shoulder     OBJECTIVE     Objective Measures updated at progress report unless specified.     TREATMENT     Post-Op Rehab Timeline     11 weeks: 5/23/2022  15 weeks: 6/20/2022  20 weeks: 7/25/2022  24 weeks: 8/22/2022    Gerda received the treatments listed below:       MANUAL THERAPY TECHNIQUES were applied for (15) minutes, including:    Manual Intervention Performed Today    Soft Tissue Mobilization x Superficial soft tissue mobilization to left periscapular musculature, pectorals, supraspinatus , infraspinatus , subscapularis  and deltoid   Joint Mobilizations x Grade IV L glenohumeral distraction and inferior glides   Mobilization with  movement x Subscapularis release with shoulder flexion         Functional Dry Needling        Plan for Next Visit: PRN         THERAPEUTIC EXERCISES to develop strength, endurance, ROM, flexibility, posture and core stabilization for (18) minutes including:    Intervention Performed Today    UBE x Level 2, 3' forward, 3' backward    Shoulder PROM  x 5 minutes in all directions    Stretches  X    X  x Prayer: 2 min each with shoulders in ER and IR  Pectoralis + ER stretch: 5x10s holds   Shoulder internal rotation (strap): 10x10s holds   Pectoralis corner: 10x10s holds    Side lying shoulder external rotation   3 x 10 on L    Wall slides flexion & scaption   15x each on L    Chest press with wand   2#, 2 x 10     AAROM shoulder flexion   Supine with hands clasped: 10x   Standing with wand: 3 x 10, ROM limited to prevent shoulder hike   AROM shoulder flexion   standing: 2x10    AROM shoulder abduction   Standing: 2x10, ROM limited to prevent shoulder hike   AAROM shoulder extensions  10 x 5s holds    AAROM shoulder functional internal rotation   2x10    Bicep curls   7# 3x10   Shoulder flexion punch   Red band, 3x10   Assisted overhead press   3# ball, 2x10 flexion and abduction with wall for assist    Wall push up  x 3x10     Plan for Next Visit: progress to AAROM flexion and abduction with dowel if tolerated          NEUROMUSCULAR RE-EDUCATION ACTIVITIES to improve Balance, Coordination, Kinesthetic, Sense, Proprioception and Posture for (32) minutes.  The following were included:    Intervention Performed Today    Shoulder internal rotation- standing   Blue band, 3 x 12   B Shoulder external rotation  X Standing: red band, 3 x 10  Side lying    Shoulder extensions   30# 3x10    Scapular retractions   30# 3x10   Prone row  x 7.5#, 2x10   Prone TYI  X  X  x I: 2#, 3x8  T,Y 3x8   W: 2x8   Wall wash   30s each, 20s each  clockwise and counterclockwise    serratus punches   5#, 3x10    Wall svetlana  x 3x8    scaption  x 2#  2x8    Plank (modified with hands on table)  x 4x30s                Plan for Next Visit:          PATIENT EDUCATION AND HOME EXERCISES     Home Exercises Provided and Patient Education Provided     Education provided: (time included with therex) minutes   Patient educated on biomechanical justification for therapeutic exercise and importance of compliance with HEP in order to improve overall impairments and QOL    Patient was educated on all the above exercise prior/during/after for proper posture, positioning, and execution for safe performance with home exercise program.    Patient educated on the importance of improved core and upper extremity strength in order to improve alignment of the spine and upper extremities with static positions and dynamic movement.    Patient educated on the importance of strong core and lower extremity musculature in order to improve both static and dynamic balance, improve gait mechanics, reduce fall risk and improve household and community mobility.       Written Home Exercises Provided: yes.  Exercises were reviewed and Gerda was able to demonstrate them prior to the end of the session.  Gerda demonstrated good  understanding of the education provided. See EMR under Patient Instructions for exercises provided during therapy sessions.      ASSESSMENT     Pt tolerated session well today. Incorporated planks for stability with weight bearing. Increased repetitions for prone TYI and added prone W for increased scapular strength and stability. Improved mobility noted with prayer stretch compared to previous session    Gerda is progressing well towards her goals.   Pt prognosis is Excellent.     Pt will continue to benefit from skilled outpatient physical therapy to address the deficits listed in the problem list box on initial evaluation, provide pt/family education and to maximize pt's level of independence in the home and community environment.     Pt's spiritual,  cultural and educational needs considered and pt agreeable to plan of care and goals.     Anticipated Barriers for therapy: co-morbidities and adherence to treatment plan    GOALS:     Short Term Goals:  6 weeks Progress   3/9/2022   1. Pain: Pt will demonstrate improved pain by reports of less than or equal to 6/10 worst pain on the verbal rating scale in order to progress toward maximal functional ability and improve QOL. Met   4/19/2022   2. Function: Patient will demonstrate improved function as indicated by a functional limitation score of less than or equal to 80 out of 100 on FOTO. Met  4/29/2022   3. Mobility: Patient will improve AROM to 50% of stated goals, listed in objective measures above, in order to progress towards independence with functional activities.  Met  4/19/2022   4. Strength: Patient will improve strength to 50% of stated goals, listed in objective measures above, in order to progress towards independence with functional activities.  Met  5/23/2022   5. HEP: Patient will demonstrate independence with HEP in order to progress toward functional independence. Met  4/19/2022      Long Term Goals:  12 weeks Progress  3/9/2022   1. Pain: Pt will demonstrate improved pain by reports of less than or equal to 3/10 worst pain on the verbal rating scale in order to progress toward maximal functional ability and improve QOL.   PM  5/23/2022   2. Function: Patient will demonstrate improved function as indicated by a functional limitation score of less than or equal to 66 out of 100 on FOTO. Met  4/29/2022   3. Mobility: Patient will improve AROM to stated goals, listed in objective measures above, in order to return to maximal functional potential and improve quality of life. PM   5/23/2022   4. Strength: Patient will improve strength to stated goals, listed in objective measures above, in order to improve functional independence and quality of life. PM  5/23/2022   5. Patient will return to normal  ADL's, IADL's, community involvement, recreational activities, and work-related activities with less than or equal to 3/10 pain and maximal function.  PM  5/23/2022       Goals Key:  PC= progressing/continue; PM= partially met;        DC= discontinue    PLAN     Continue Plan of Care (POC) and progress per patient tolerance. See treatment section for details on planned progressions next session.      Analilia Son, PT

## 2022-06-06 ENCOUNTER — CLINICAL SUPPORT (OUTPATIENT)
Dept: REHABILITATION | Facility: HOSPITAL | Age: 52
End: 2022-06-06
Payer: COMMERCIAL

## 2022-06-06 DIAGNOSIS — M25.612 DECREASED RANGE OF MOTION OF LEFT SHOULDER: ICD-10-CM

## 2022-06-06 DIAGNOSIS — Z74.09 DECREASED FUNCTIONAL MOBILITY AND ENDURANCE: Primary | ICD-10-CM

## 2022-06-06 DIAGNOSIS — M62.81 PROXIMAL MUSCLE WEAKNESS: ICD-10-CM

## 2022-06-06 PROCEDURE — 97110 THERAPEUTIC EXERCISES: CPT

## 2022-06-06 PROCEDURE — 97140 MANUAL THERAPY 1/> REGIONS: CPT

## 2022-06-06 PROCEDURE — 97112 NEUROMUSCULAR REEDUCATION: CPT

## 2022-06-07 NOTE — PROGRESS NOTES
[facility]   Physical Therapy Treatment Note     Name: Gerda Obrien  Clinic Number: [id number]    Therapy Diagnosis:   Encounter Diagnoses   Name Primary?    Decreased functional mobility and endurance Yes    Decreased range of motion of left shoulder     Proximal muscle weakness      Physician: Denilson Jackson    Visit Date: 6/6/2022    Physician Orders: PT Eval and Treat  Medical Diagnosis from Referral: Nontraumatic incomplete tear of left rotator cuff  Evaluation Date: 3/9/2022  Authorization Period Expiration: 12/1/2022  Plan of Care Expiration: 6/9/2022  Visit # / Visits authorized: 22/40 (+1 for evaluation)    FOTO: 1/ 3      Precautions: Standard        Surgical Procedure: L shoulder rotator cuff repair, biceps tenodesis, subacromial decompression Date of Surgery: 3/7/2022       Progress Note Due: 5/19/2022  MD Follow-up: 5/24/2022     Time In: 1533 (PN + FOTO Next Session)   Time Out: 1630  Total Billable Time: 53 minutes     SUBJECTIVE     Patient reports: she is feeling pretty good today with no significant complaints currently. Continued night time pain but feels this is improving with time.     He/She was compliant with home exercise program.  Response to previous treatment: felt fine with no pain   Functional change: PROM goals accomplished     Pain: 0/10     Location: L shoulder     OBJECTIVE     Objective Measures updated at progress report unless specified.     TREATMENT     Post-Op Rehab Timeline     11 weeks: 5/23/2022  15 weeks: 6/20/2022  20 weeks: 7/25/2022  24 weeks: 8/22/2022    Gerda received the treatments listed below:       MANUAL THERAPY TECHNIQUES were applied for (10) minutes, including:    Manual Intervention Performed Today    Soft Tissue Mobilization x Superficial soft tissue mobilization to left periscapular musculature, pectorals, supraspinatus , infraspinatus , subscapularis  and deltoid   Joint Mobilizations x Grade IV L  glenohumeral distraction and inferior glides   Mobilization with movement x Subscapularis release with shoulder flexion         Functional Dry Needling        Plan for Next Visit: PRN         THERAPEUTIC EXERCISES to develop strength, endurance, ROM, flexibility, posture and core stabilization for (15) minutes including:    Intervention Performed Today    UBE x Level 2, 3' forward, 3' backward    Shoulder PROM  x 5 minutes in all directions    Stretches  X    X  x Prayer: 2 min each with shoulders in ER and IR  Pectoralis + ER stretch: 5x10s holds   Shoulder internal rotation (strap): 10x10s holds   Pectoralis corner: 10x10s holds    Side lying shoulder external rotation   3 x 10 on L    Wall slides flexion & scaption   15x each on L    Chest press with wand   2#, 2 x 10     AAROM shoulder flexion   Supine with hands clasped: 10x   Standing with wand: 3 x 10, ROM limited to prevent shoulder hike   AROM shoulder flexion   standing: 2x10    AROM shoulder abduction   Standing: 2x10, ROM limited to prevent shoulder hike   AAROM shoulder extensions  10 x 5s holds    AAROM shoulder functional internal rotation   2x10    Bicep curls   7# 3x10   Shoulder flexion punch   Red band, 3x10   Assisted overhead press   3# ball, 2x10 flexion and abduction with wall for assist    Wall push up  x 3x10     Plan for Next Visit: progress to AAROM flexion and abduction with dowel if tolerated          NEUROMUSCULAR RE-EDUCATION ACTIVITIES to improve Balance, Coordination, Kinesthetic, Sense, Proprioception and Posture for (30) minutes.  The following were included:    Intervention Performed Today    Shoulder internal rotation- standing   Blue band, 3 x 12   B Shoulder external rotation  X Standing: red band, 3 x 10  Side lying    Shoulder extensions   30# 3x10    Scapular retractions  x 40# 3x10   Prone row  x 7#, 3x10   Prone TYI  X  X ITY: 2#, 3x10 on L   W: 3x10 on L    Wall wash   30s each, 20s each  clockwise and counterclockwise     serratus punches   5#, 3x10    Wall svetlana  x 3x10   scaption  x 2# 3x8   Plank (modified with hands on table)  x 4x30s                Plan for Next Visit:          PATIENT EDUCATION AND HOME EXERCISES     Home Exercises Provided and Patient Education Provided     Education provided: (time included with therex) minutes   Patient educated on biomechanical justification for therapeutic exercise and importance of compliance with HEP in order to improve overall impairments and QOL    Patient was educated on all the above exercise prior/during/after for proper posture, positioning, and execution for safe performance with home exercise program.    Patient educated on the importance of improved core and upper extremity strength in order to improve alignment of the spine and upper extremities with static positions and dynamic movement.    Patient educated on the importance of strong core and lower extremity musculature in order to improve both static and dynamic balance, improve gait mechanics, reduce fall risk and improve household and community mobility.       Written Home Exercises Provided: yes.  Exercises were reviewed and Gerda was able to demonstrate them prior to the end of the session.  Gerda demonstrated good  understanding of the education provided. See EMR under Patient Instructions for exercises provided during therapy sessions.      ASSESSMENT     Pt tolerated session well today. Increased weight with prone scapular exercises for increased scapular strength and stability. Improved ROM noted with wall angels today     Gerda is progressing well towards her goals.   Pt prognosis is Excellent.     Pt will continue to benefit from skilled outpatient physical therapy to address the deficits listed in the problem list box on initial evaluation, provide pt/family education and to maximize pt's level of independence in the home and community environment.     Pt's spiritual, cultural and educational needs  considered and pt agreeable to plan of care and goals.     Anticipated Barriers for therapy: co-morbidities and adherence to treatment plan    GOALS:     Short Term Goals:  6 weeks Progress   3/9/2022   1. Pain: Pt will demonstrate improved pain by reports of less than or equal to 6/10 worst pain on the verbal rating scale in order to progress toward maximal functional ability and improve QOL. Met   4/19/2022   2. Function: Patient will demonstrate improved function as indicated by a functional limitation score of less than or equal to 80 out of 100 on FOTO. Met  4/29/2022   3. Mobility: Patient will improve AROM to 50% of stated goals, listed in objective measures above, in order to progress towards independence with functional activities.  Met  4/19/2022   4. Strength: Patient will improve strength to 50% of stated goals, listed in objective measures above, in order to progress towards independence with functional activities.  Met  5/23/2022   5. HEP: Patient will demonstrate independence with HEP in order to progress toward functional independence. Met  4/19/2022      Long Term Goals:  12 weeks Progress  3/9/2022   1. Pain: Pt will demonstrate improved pain by reports of less than or equal to 3/10 worst pain on the verbal rating scale in order to progress toward maximal functional ability and improve QOL.   PM  5/23/2022   2. Function: Patient will demonstrate improved function as indicated by a functional limitation score of less than or equal to 66 out of 100 on FOTO. Met  4/29/2022   3. Mobility: Patient will improve AROM to stated goals, listed in objective measures above, in order to return to maximal functional potential and improve quality of life. PM   5/23/2022   4. Strength: Patient will improve strength to stated goals, listed in objective measures above, in order to improve functional independence and quality of life. PM  5/23/2022   5. Patient will return to normal ADL's, IADL's, community  involvement, recreational activities, and work-related activities with less than or equal to 3/10 pain and maximal function.  PM  5/23/2022       Goals Key:  PC= progressing/continue; PM= partially met;        DC= discontinue    PLAN     Continue Plan of Care (POC) and progress per patient tolerance. See treatment section for details on planned progressions next session.      Analilia Son, PT

## 2022-06-08 ENCOUNTER — CLINICAL SUPPORT (OUTPATIENT)
Dept: REHABILITATION | Facility: HOSPITAL | Age: 52
End: 2022-06-08
Payer: COMMERCIAL

## 2022-06-08 DIAGNOSIS — M25.612 DECREASED RANGE OF MOTION OF LEFT SHOULDER: ICD-10-CM

## 2022-06-08 DIAGNOSIS — M62.81 PROXIMAL MUSCLE WEAKNESS: ICD-10-CM

## 2022-06-08 DIAGNOSIS — Z74.09 DECREASED FUNCTIONAL MOBILITY AND ENDURANCE: Primary | ICD-10-CM

## 2022-06-08 PROCEDURE — 97110 THERAPEUTIC EXERCISES: CPT

## 2022-06-08 PROCEDURE — 97112 NEUROMUSCULAR REEDUCATION: CPT

## 2022-06-08 PROCEDURE — 97140 MANUAL THERAPY 1/> REGIONS: CPT

## 2022-06-08 NOTE — PROGRESS NOTES
[facility]   Physical Therapy Treatment Note    Name: Gerda Obrien  Clinic Number: [id number]    Therapy Diagnosis:   Encounter Diagnoses   Name Primary?    Decreased functional mobility and endurance Yes    Decreased range of motion of left shoulder     Proximal muscle weakness      Physician: Denilson Jackson    Visit Date: 6/8/2022    Physician Orders: PT Eval and Treat  Medical Diagnosis from Referral: Nontraumatic incomplete tear of left rotator cuff  Evaluation Date: 3/9/2022  Authorization Period Expiration: 12/1/2022  Plan of Care Expiration: 9/8/2022  Visit # / Visits authorized: 23/40 (+1 for evaluation)    FOTO: 1/ 3      Precautions: Standard        Surgical Procedure: L shoulder rotator cuff repair, biceps tenodesis, subacromial decompression Date of Surgery: 3/7/2022       Progress Note Due: 5/19/2022  MD Follow-up: 5/24/2022     Time In: 1533 (FOTO Next Session)  Time Out: 1630  Total Billable Time: 55 minutes     SUBJECTIVE     Patient reports: she is feeling pretty good today with no significant complaints currently. Continued night time pain but feels this is improving with time.     He/She was compliant with home exercise program.  Response to previous treatment: felt fine with no pain   Functional change: PROM goals accomplished     Pain: 0/10     Location: L shoulder     OBJECTIVE     Objective Measures updated at progress report unless specified.     TREATMENT     Post-Op Rehab Timeline     11 weeks: 5/23/2022  15 weeks: 6/20/2022  20 weeks: 7/25/2022  24 weeks: 8/22/2022    Gerda received the treatments listed below:       MANUAL THERAPY TECHNIQUES were applied for (10) minutes, including:    Manual Intervention Performed Today    Soft Tissue Mobilization x Superficial soft tissue mobilization to left periscapular musculature, pectorals, supraspinatus , infraspinatus , subscapularis  and deltoid   Joint Mobilizations x Grade IV L  glenohumeral distraction and inferior glides   Mobilization with movement x Subscapularis release with shoulder flexion         Functional Dry Needling        Plan for Next Visit: PRN         THERAPEUTIC EXERCISES to develop strength, endurance, ROM, flexibility, posture and core stabilization for (20) minutes including:    Intervention Performed Today    UBE x Level 2, 3' forward, 3' backward    Shoulder PROM  x 5 minutes in all directions    Stretches  X    X  x Prayer: 2 min each with shoulders in ER and IR  Pectoralis + ER stretch: 5x10s holds   Shoulder internal rotation (strap): 10x10s holds   Pectoralis corner: 10x10s holds    Bicep curls   7# 3x10   Shoulder flexion punch   Red band, 3x10   Assisted overhead press   3# ball, 2x10 flexion and abduction with wall for assist    Wall push up  x 3x10     Plan for Next Visit: progress to AAROM flexion and abduction with dowel if tolerated          NEUROMUSCULAR RE-EDUCATION ACTIVITIES to improve Balance, Coordination, Kinesthetic, Sense, Proprioception and Posture for (25) minutes.  The following were included:    Intervention Performed Today    Shoulder internal rotation- standing   Blue band, 3 x 12   B Shoulder external rotation  X Standing: green band, 3 x 10  Side lying    Shoulder extensions   30# 3x10    Scapular retractions  x 40# 3x10   Prone row  x 7#, 3x10   Prone TYI  X  X ITY: 2#, 3x10 on L   W: 3x10 on L    Wall wash  x 2x30s each clockwise and counterclockwise    serratus punches   5#, 3x10    Wall svetlana  x 3x10   Forward and lateral raises x 3# 3x10 each    Plank (modified with hands on table)  x 4x30s                Plan for Next Visit:          PATIENT EDUCATION AND HOME EXERCISES     Home Exercises Provided and Patient Education Provided     Education provided: (time included with therex) minutes   Patient educated on biomechanical justification for therapeutic exercise and importance of compliance with HEP in order to improve overall  impairments and QOL    Patient was educated on all the above exercise prior/during/after for proper posture, positioning, and execution for safe performance with home exercise program.    Patient educated on the importance of improved core and upper extremity strength in order to improve alignment of the spine and upper extremities with static positions and dynamic movement.    Patient educated on the importance of strong core and lower extremity musculature in order to improve both static and dynamic balance, improve gait mechanics, reduce fall risk and improve household and community mobility.       Written Home Exercises Provided: yes.  Exercises were reviewed and Gerda was able to demonstrate them prior to the end of the session.  Gerda demonstrated good  understanding of the education provided. See EMR under Patient Instructions for exercises provided during therapy sessions.      ASSESSMENT     Pt tolerated session well today. Increased resistance tolerated well with rows, B shoulder external rotation, forward and lateral raises with good tolerance by pt. Improved range noted with prone T and Y. A progress note was completed today with significant improvements noted in L shoulder ROM and gross upper extremity strength compared to prior assessment; please see exam for details. Gerda continues to have difficulty with reaching overhead due to continued shoulder weakness. The above impairments and functional deficits will continue to be addressed over the course of this plan of care. Gerda was educated on continued progression of PT, expectations for the duration of care, updates on goals set at initial evaluation, and home exercise program.  Gerda will continue to benefit from physical therapy in order to further address goals, maximize function and quality of life.     Gerda is progressing well towards her goals.   Pt prognosis is Excellent.     Pt will continue to benefit from skilled  outpatient physical therapy to address the deficits listed in the problem list box on initial evaluation, provide pt/family education and to maximize pt's level of independence in the home and community environment.     Pt's spiritual, cultural and educational needs considered and pt agreeable to plan of care and goals.     Anticipated Barriers for therapy: co-morbidities and adherence to treatment plan    GOALS:     Short Term Goals:  6 weeks Progress   3/9/2022   1. Pain: Pt will demonstrate improved pain by reports of less than or equal to 6/10 worst pain on the verbal rating scale in order to progress toward maximal functional ability and improve QOL. Met   4/19/2022   2. Function: Patient will demonstrate improved function as indicated by a functional limitation score of less than or equal to 80 out of 100 on FOTO. Met  4/29/2022   3. Mobility: Patient will improve AROM to 50% of stated goals, listed in objective measures above, in order to progress towards independence with functional activities.  Met  4/19/2022   4. Strength: Patient will improve strength to 50% of stated goals, listed in objective measures above, in order to progress towards independence with functional activities.  Met  5/23/2022   5. HEP: Patient will demonstrate independence with HEP in order to progress toward functional independence. Met  4/19/2022      Long Term Goals:  12 weeks Progress  3/9/2022   1. Pain: Pt will demonstrate improved pain by reports of less than or equal to 3/10 worst pain on the verbal rating scale in order to progress toward maximal functional ability and improve QOL.   PM  6/8/2022   2. Function: Patient will demonstrate improved function as indicated by a functional limitation score of less than or equal to 66 out of 100 on FOTO. Met  4/29/2022   3. Mobility: Patient will improve AROM to stated goals, listed in objective measures above, in order to return to maximal functional potential and improve quality of  life. Met  6/8/2022   4. Strength: Patient will improve strength to stated goals, listed in objective measures above, in order to improve functional independence and quality of life. PM  6/8/2022   5. Patient will return to normal ADL's, IADL's, community involvement, recreational activities, and work-related activities with less than or equal to 3/10 pain and maximal function.  Met  6/8/2022        Goals Key:  PC= progressing/continue; PM= partially met;        DC= discontinue    PLAN     Continue Plan of Care (POC) and progress per patient tolerance. See treatment section for details on planned progressions next session.      Analilia Son, PT

## 2022-06-09 ENCOUNTER — PATIENT MESSAGE (OUTPATIENT)
Dept: ORTHOPEDICS | Facility: CLINIC | Age: 52
End: 2022-06-09
Payer: COMMERCIAL

## 2022-06-13 ENCOUNTER — CLINICAL SUPPORT (OUTPATIENT)
Dept: REHABILITATION | Facility: HOSPITAL | Age: 52
End: 2022-06-13
Payer: COMMERCIAL

## 2022-06-13 DIAGNOSIS — M25.612 DECREASED RANGE OF MOTION OF LEFT SHOULDER: ICD-10-CM

## 2022-06-13 DIAGNOSIS — Z74.09 DECREASED FUNCTIONAL MOBILITY AND ENDURANCE: Primary | ICD-10-CM

## 2022-06-13 DIAGNOSIS — M62.81 PROXIMAL MUSCLE WEAKNESS: ICD-10-CM

## 2022-06-13 PROCEDURE — 97110 THERAPEUTIC EXERCISES: CPT

## 2022-06-13 PROCEDURE — 97112 NEUROMUSCULAR REEDUCATION: CPT

## 2022-06-13 PROCEDURE — 97140 MANUAL THERAPY 1/> REGIONS: CPT

## 2022-06-13 NOTE — PROGRESS NOTES
[facility]   Physical Therapy Treatment Note    Name: Gerda Obrien  Clinic Number: [id number]    Therapy Diagnosis:   Encounter Diagnoses   Name Primary?    Decreased functional mobility and endurance Yes    Decreased range of motion of left shoulder     Proximal muscle weakness      Physician: Denilson Jackson    Visit Date: 6/13/2022    Physician Orders: PT Eval and Treat  Medical Diagnosis from Referral: Nontraumatic incomplete tear of left rotator cuff  Evaluation Date: 3/9/2022  Authorization Period Expiration: 12/1/2022  Plan of Care Expiration: 9/8/2022  Visit # / Visits authorized: 23/40 (+1 for evaluation)    FOTO: 1/ 3      Precautions: Standard        Surgical Procedure: L shoulder rotator cuff repair, biceps tenodesis, subacromial decompression Date of Surgery: 3/7/2022       Progress Note Due: 5/19/2022  MD Follow-up: 5/24/2022     Time In: 1533 (FOTO Next Session)  Time Out: 1635  Total Billable Time: 53 minutes     SUBJECTIVE     Patient reports: she is feeling pretty good today with no significant complaints currently. She did not perform HEP over the weekend because she was busy but plans to work on it during the week     He/She was compliant with home exercise program.  Response to previous treatment: felt fine with no pain   Functional change: increased resistance tolerated with overhead reaching interventions     Pain: 0/10     Location: L shoulder     OBJECTIVE     Objective Measures updated at progress report unless specified.     TREATMENT     Post-Op Rehab Timeline     11 weeks: 5/23/2022  15 weeks: 6/20/2022  20 weeks: 7/25/2022  24 weeks: 8/22/2022    Gerda received the treatments listed below:       MANUAL THERAPY TECHNIQUES were applied for (10) minutes, including:    Manual Intervention Performed Today    Soft Tissue Mobilization x Superficial soft tissue mobilization to left periscapular musculature, pectorals, supraspinatus ,  infraspinatus , subscapularis  and deltoid   Joint Mobilizations  Grade IV L glenohumeral distraction and inferior glides   Mobilization with movement x Subscapularis release with shoulder flexion         Functional Dry Needling        Plan for Next Visit: PRN         THERAPEUTIC EXERCISES to develop strength, endurance, ROM, flexibility, posture and core stabilization for (18) minutes including:    Intervention Performed Today    UBE x Level 3, 3' forward, 3' backward    Shoulder PROM   5 minutes in all directions    Stretches  X      x Prayer: 3 min each with shoulders in ER and IR  Pectoralis + ER stretch: 5x10s holds   Shoulder internal rotation (strap): 10x10s holds   Pectoralis corner: 10x10s holds    Bicep curl to overhead press  x 2# 3x10   Shoulder flexion punch   Red band, 3x10   Assisted overhead press   3# ball, 2x10 flexion and abduction with wall for assist    Wall push up   3x10     Plan for Next Visit: progress to AAROM flexion and abduction with dowel if tolerated          NEUROMUSCULAR RE-EDUCATION ACTIVITIES to improve Balance, Coordination, Kinesthetic, Sense, Proprioception and Posture for (25) minutes.  The following were included:    Intervention Performed Today    Shoulder internal rotation   x Standing: Blue band, 3 x 12  Elbow supported at 90* abduction: red band 3x10   B Shoulder external rotation  X  x Standing: red band, 60s, 90s with 4s eccentric release   Elbow supported at 90* abduction: red band 3x10   Shoulder extensions   30# 3x10    Scapular retractions   40# 3x10   Prone row   7#, 3x10   Prone TYI   ITY: 2#, 3x10 on L   W: 3x10 on L    Wall wash   2x30s each clockwise and counterclockwise    serratus punches   5#, 3x10    Barrel hugs  x 20# 3x10    Wall svetlana  x 3x10   Forward and lateral raises  3# 3x10 each    Plank shoulder taps (modified with hands on table)  x 4x30s    Serratus slides with foam roll X  x 20x with no resistance   2x10 with yellow band at wrists           Plan  for Next Visit:          PATIENT EDUCATION AND HOME EXERCISES     Home Exercises Provided and Patient Education Provided     Education provided: (time included with therex) minutes   Patient educated on biomechanical justification for therapeutic exercise and importance of compliance with HEP in order to improve overall impairments and QOL    Patient was educated on all the above exercise prior/during/after for proper posture, positioning, and execution for safe performance with home exercise program.    Patient educated on the importance of improved core and upper extremity strength in order to improve alignment of the spine and upper extremities with static positions and dynamic movement.    Patient educated on the importance of strong core and lower extremity musculature in order to improve both static and dynamic balance, improve gait mechanics, reduce fall risk and improve household and community mobility.       Written Home Exercises Provided: yes.  Exercises were reviewed and Gerda was able to demonstrate them prior to the end of the session.  Gerda demonstrated good  understanding of the education provided. See EMR under Patient Instructions for exercises provided during therapy sessions.      ASSESSMENT     Pt tolerated session well today. Incorporated serratus slides, barrel hugs and plank shoulder taps for improved serratus strength and shoulder stability. Added bicep curl to overhead press to improve strength with overhead reaching     Gerda is progressing well towards her goals.   Pt prognosis is Excellent.     Pt will continue to benefit from skilled outpatient physical therapy to address the deficits listed in the problem list box on initial evaluation, provide pt/family education and to maximize pt's level of independence in the home and community environment.     Pt's spiritual, cultural and educational needs considered and pt agreeable to plan of care and goals.     Anticipated  Barriers for therapy: co-morbidities and adherence to treatment plan    GOALS:     Short Term Goals:  6 weeks Progress   3/9/2022   1. Pain: Pt will demonstrate improved pain by reports of less than or equal to 6/10 worst pain on the verbal rating scale in order to progress toward maximal functional ability and improve QOL. Met   4/19/2022   2. Function: Patient will demonstrate improved function as indicated by a functional limitation score of less than or equal to 80 out of 100 on FOTO. Met  4/29/2022   3. Mobility: Patient will improve AROM to 50% of stated goals, listed in objective measures above, in order to progress towards independence with functional activities.  Met  4/19/2022   4. Strength: Patient will improve strength to 50% of stated goals, listed in objective measures above, in order to progress towards independence with functional activities.  Met  5/23/2022   5. HEP: Patient will demonstrate independence with HEP in order to progress toward functional independence. Met  4/19/2022      Long Term Goals:  12 weeks Progress  3/9/2022   1. Pain: Pt will demonstrate improved pain by reports of less than or equal to 3/10 worst pain on the verbal rating scale in order to progress toward maximal functional ability and improve QOL.   PM  6/8/2022   2. Function: Patient will demonstrate improved function as indicated by a functional limitation score of less than or equal to 66 out of 100 on FOTO. Met  4/29/2022   3. Mobility: Patient will improve AROM to stated goals, listed in objective measures above, in order to return to maximal functional potential and improve quality of life. Met  6/8/2022   4. Strength: Patient will improve strength to stated goals, listed in objective measures above, in order to improve functional independence and quality of life. PM  6/8/2022   5. Patient will return to normal ADL's, IADL's, community involvement, recreational activities, and work-related activities with less than or  equal to 3/10 pain and maximal function.  Met  6/8/2022        Goals Key:  PC= progressing/continue; PM= partially met;        DC= discontinue    PLAN     Continue Plan of Care (POC) and progress per patient tolerance. See treatment section for details on planned progressions next session.      Analilia Son, PT

## 2022-06-13 NOTE — PLAN OF CARE
Outpatient Therapy Updated Plan of Care     Visit Date: 6/8/2022    Name: Gerda Obrien  Clinic Number: [id number]    Therapy Diagnosis:   Encounter Diagnoses   Name Primary?    Decreased functional mobility and endurance Yes    Decreased range of motion of left shoulder     Proximal muscle weakness      Physician: Denilson Jackson     Physician Orders: PT Eval and Treat  Medical Diagnosis from Referral: Nontraumatic incomplete tear of left rotator cuff  Evaluation Date: 3/9/2022  Authorization Period Expiration: 12/1/2022  Plan of Care Expiration: 6/9/2022  Visit # / Visits authorized: 22/40 (+1 for evaluation)    FOTO: 1/ 3     Current Certification Period:  3/9/2022 to 6/9/2022    Precautions: Standard  Functional Level Prior to Evaluation:  wearing a sling with abduction pillow at all times.     Subjective     Update: she is feeling pretty good today with no significant complaints currently. Continued night time pain but feels this is improving with time. Mobility has improved significantly with improved reaching noted. Would like to decrease frequency to one visit per week due to financial concerns.     Objective     Update:   RANGE OF MOTION:     Cervical Right   (spine) Left     Pain/Dysfunction with Movement Goal   Cervical Flexion  100% ---       Cervical Extension  100% ---       Cervical Side Bending  100% 100%   100%   Cervical Rotation  100% 100%          Shoulder PROM Right Left Pain/Dysfunction with Movement Goal   Shoulder Flexion (180) 175/175 175/175   170   Shoulder Extension (60) 60/60 60/60   60   Shoulder Abduction (180) 175 170/175   170   Shoulder ER (90) 90 90 at 90* abduction    80   Shoulder IR (70) 70 70 at 90* abduction   60         STRENGTH:     U/E MMT Right Left Pain/Dysfunction with Movement Goal   Shoulder Flexion 4/5 4-/5   4+/5 B   Shoulder Extension 4/5 4/5   4+/5 B   Shoulder Abduction 4/5 4/5   4+/5 B   Shoulder IR 4+/5 4+/5   4+/5 B   Shoulder ER     4/5 4/5   4+/5 B   Elbow Flexion  5/5 5/5   5/5 B   Elbow Extension 5/5 5/5   5/5 B   Wrist Flexion 5/5 5/5   5/5 B   Wrist Extension 5/5 5/5   5/5 B         Palpation: Increased tone and tenderness noted with palpation of L periscapular region, upper trapezius, levator scapulae, rotator cuff musculature, biceps and deltoid      Posture:  Pt presents with postural abnormalities which include: forward head and rounded shoulders        Assessment     Update: An assessment was completed today with significant improvements noted in L shoulder ROM and gross upper extremity strength compared to prior assessment; please see exam for details. Gerda continues to have difficulty with reaching overhead due to continued shoulder weakness. The above impairments and functional deficits will continue to be addressed over the course of this plan of care. Gerda was educated on continued progression of PT, expectations for the duration of care, updates on goals set at initial evaluation, and home exercise program.  Gerda will continue to benefit from physical therapy in order to further address goals, maximize function and quality of life. Decrease frequency to one visit per week due to financial concerns with coming to therapy twice per week       GOALS:     Short Term Goals:  6 weeks Progress   3/9/2022   1. Pain: Pt will demonstrate improved pain by reports of less than or equal to 6/10 worst pain on the verbal rating scale in order to progress toward maximal functional ability and improve QOL. Met   4/19/2022   2. Function: Patient will demonstrate improved function as indicated by a functional limitation score of less than or equal to 80 out of 100 on FOTO. Met  4/29/2022   3. Mobility: Patient will improve AROM to 50% of stated goals, listed in objective measures above, in order to progress towards independence with functional activities.  Met  4/19/2022   4. Strength: Patient will improve strength to 50% of stated  goals, listed in objective measures above, in order to progress towards independence with functional activities.  Met  5/23/2022   5. HEP: Patient will demonstrate independence with HEP in order to progress toward functional independence. Met  4/19/2022      Long Term Goals:  12 weeks Progress  3/9/2022   1. Pain: Pt will demonstrate improved pain by reports of less than or equal to 3/10 worst pain on the verbal rating scale in order to progress toward maximal functional ability and improve QOL.   PM  6/8/2022   2. Function: Patient will demonstrate improved function as indicated by a functional limitation score of less than or equal to 66 out of 100 on FOTO. Met  4/29/2022   3. Mobility: Patient will improve AROM to stated goals, listed in objective measures above, in order to return to maximal functional potential and improve quality of life. Met  6/8/2022   4. Strength: Patient will improve strength to stated goals, listed in objective measures above, in order to improve functional independence and quality of life. PM  6/8/2022   5. Patient will return to normal ADL's, IADL's, community involvement, recreational activities, and work-related activities with less than or equal to 3/10 pain and maximal function.  Met  6/8/2022          Long Term Goal Status:   continue per initial plan of care.  Reasons for Recertification of Therapy:   Pt will continue to benefit from skilled outpatient physical therapy to address the deficits listed in the problem list box on initial evaluation, provide pt/family education and to maximize pt's level of independence in the home and community environment.     Plan     Updated Certification Period: 6/8/2022 to 9/8/2022  Recommended Treatment Plan: 1 times per week for 12 weeks to include any combination of the following interventions: virtual visits, dry needling, modalities, electrical stimulation (IFC, Pre-Mod, Attended with Functional Dry Needling), Aquatic Therapy,  Cervical/Lumbar Traction, Manual Therapy, Moist Heat/ Ice, Neuromuscular Re-ed, Patient Education, Self Care, Therapeutic Exercise and Therapeutic Activites    Analilia Son, PT  6/8/2022      I CERTIFY THE NEED FOR THESE SERVICES FURNISHED UNDER THIS PLAN OF TREATMENT AND WHILE UNDER MY CARE    Physician's comments:        Physician's Signature: ___________________________________________________

## 2022-06-20 ENCOUNTER — CLINICAL SUPPORT (OUTPATIENT)
Dept: REHABILITATION | Facility: HOSPITAL | Age: 52
End: 2022-06-20
Payer: COMMERCIAL

## 2022-06-20 DIAGNOSIS — M25.612 DECREASED RANGE OF MOTION OF LEFT SHOULDER: ICD-10-CM

## 2022-06-20 DIAGNOSIS — Z74.09 DECREASED FUNCTIONAL MOBILITY AND ENDURANCE: Primary | ICD-10-CM

## 2022-06-20 DIAGNOSIS — M62.81 PROXIMAL MUSCLE WEAKNESS: ICD-10-CM

## 2022-06-20 PROCEDURE — 97110 THERAPEUTIC EXERCISES: CPT

## 2022-06-20 PROCEDURE — 97140 MANUAL THERAPY 1/> REGIONS: CPT

## 2022-06-20 PROCEDURE — 97112 NEUROMUSCULAR REEDUCATION: CPT

## 2022-06-20 NOTE — PROGRESS NOTES
[facility]   Physical Therapy Treatment Note    Name: Gerda Obrien  Clinic Number: [id number]    Therapy Diagnosis:   Encounter Diagnoses   Name Primary?    Decreased functional mobility and endurance Yes    Decreased range of motion of left shoulder     Proximal muscle weakness      Physician: Denilson Jackson    Visit Date: 6/20/2022    Physician Orders: PT Eval and Treat  Medical Diagnosis from Referral: Nontraumatic incomplete tear of left rotator cuff  Evaluation Date: 3/9/2022  Authorization Period Expiration: 12/1/2022  Plan of Care Expiration: 9/8/2022  Visit # / Visits authorized: 25/40 (+1 for evaluation)    FOTO: 1/ 3      Precautions: Standard        Surgical Procedure: L shoulder rotator cuff repair, biceps tenodesis, subacromial decompression Date of Surgery: 3/7/2022       Progress Note Due: 7/8/2022  MD Follow-up: 5/24/2022     Time In: 1533 (FOTO Next Session)  Time Out: 1635  Total Billable Time: 53 minutes     SUBJECTIVE     Patient reports: she is feeling pretty good today with no significant complaints and states she has been more compliant with HEP     He/She was compliant with home exercise program.  Response to previous treatment: felt fine with no pain   Functional change: increased resistance tolerated with overhead reaching interventions     Pain: 0/10     Location: L shoulder     OBJECTIVE     Objective Measures updated at progress report unless specified.     TREATMENT     Post-Op Rehab Timeline     11 weeks: 5/23/2022  15 weeks: 6/20/2022  20 weeks: 7/25/2022  24 weeks: 8/22/2022    Gerda received the treatments listed below:       MANUAL THERAPY TECHNIQUES were applied for (10) minutes, including:    Manual Intervention Performed Today    Soft Tissue Mobilization x Superficial soft tissue mobilization to left periscapular musculature, pectorals, supraspinatus , infraspinatus , subscapularis  and deltoid   Joint Mobilizations  Grade  IV L glenohumeral distraction and inferior glides   Mobilization with movement x Subscapularis release with shoulder flexion         Functional Dry Needling        Plan for Next Visit: PRN         THERAPEUTIC EXERCISES to develop strength, endurance, ROM, flexibility, posture and core stabilization for (18) minutes including:    Intervention Performed Today    UBE x Level 3, 3' forward, 3' backward    Shoulder PROM   5 minutes in all directions    Stretches  X      x Prayer: 3 min each with shoulders in ER and IR  Pectoralis + ER stretch: 5x10s holds   Shoulder internal rotation (strap): 10x10s holds   Pectoralis corner: 10x10s holds    Bicep curl to overhead press  x 3# 3x10   Shoulder flexion punch   Red band, 3x10   Assisted overhead press   3# ball, 2x10 flexion and abduction with wall for assist    Wall push up   3x10     Plan for Next Visit: progress to AAROM flexion and abduction with dowel if tolerated          NEUROMUSCULAR RE-EDUCATION ACTIVITIES to improve Balance, Coordination, Kinesthetic, Sense, Proprioception and Posture for (25) minutes.  The following were included:    Intervention Performed Today    Shoulder internal rotation   x Standing: Blue band, 3 x 12  Elbow supported at 90* abduction: red band 3x10   B Shoulder external rotation  X  x Standing: red band, 60s, 90s with 4s eccentric release   Elbow supported at 90* abduction: red band 3x10   Shoulder extensions   30# 3x10    Scapular retractions   40# 3x10   Prone row   7#, 3x10   Prone TYI  X  x TYW: 2#, 3x10 on L   I: 4# 3x10 on L    Wall wash   2x30s each clockwise and counterclockwise    serratus punches   5#, 3x10    Barrel hugs  x 20# 3x10    Wall svetlana  x 3x10   Forward and lateral raises  3# 3x10 each    Plank shoulder taps (modified with hands on table)  x 4x30s    Serratus slides with foam roll X  x 20x with no resistance   2x10 with yellow band at wrists   *perform without foam next session*          Plan for Next Visit: shoulder  external rotation isometric with trunk rotation, plank serratus push up         PATIENT EDUCATION AND HOME EXERCISES     Home Exercises Provided and Patient Education Provided     Education provided: (time included with therex) minutes   Patient educated on biomechanical justification for therapeutic exercise and importance of compliance with HEP in order to improve overall impairments and QOL    Patient was educated on all the above exercise prior/during/after for proper posture, positioning, and execution for safe performance with home exercise program.    Patient educated on the importance of improved core and upper extremity strength in order to improve alignment of the spine and upper extremities with static positions and dynamic movement.    Patient educated on the importance of strong core and lower extremity musculature in order to improve both static and dynamic balance, improve gait mechanics, reduce fall risk and improve household and community mobility.       Written Home Exercises Provided: yes.  Exercises were reviewed and Gerda was able to demonstrate them prior to the end of the session.  Gerda demonstrated good  understanding of the education provided. See EMR under Patient Instructions for exercises provided during therapy sessions.      ASSESSMENT     Pt tolerated session well today. We continue to incorporate more overhead strengthening exercises with significant increase in fatigue reported. Increased weight tolerated with prone TYWI to improve scapular strength. Manual therapy performed at conclusion of session with significant decrease in muscle tension noted in rotator cuff and periscapular muscles following intervention    Gerda is progressing well towards her goals.   Pt prognosis is Excellent.     Pt will continue to benefit from skilled outpatient physical therapy to address the deficits listed in the problem list box on initial evaluation, provide pt/family education and to  maximize pt's level of independence in the home and community environment.     Pt's spiritual, cultural and educational needs considered and pt agreeable to plan of care and goals.     Anticipated Barriers for therapy: co-morbidities and adherence to treatment plan    GOALS:     Short Term Goals:  6 weeks Progress   3/9/2022   1. Pain: Pt will demonstrate improved pain by reports of less than or equal to 6/10 worst pain on the verbal rating scale in order to progress toward maximal functional ability and improve QOL. Met   4/19/2022   2. Function: Patient will demonstrate improved function as indicated by a functional limitation score of less than or equal to 80 out of 100 on FOTO. Met  4/29/2022   3. Mobility: Patient will improve AROM to 50% of stated goals, listed in objective measures above, in order to progress towards independence with functional activities.  Met  4/19/2022   4. Strength: Patient will improve strength to 50% of stated goals, listed in objective measures above, in order to progress towards independence with functional activities.  Met  5/23/2022   5. HEP: Patient will demonstrate independence with HEP in order to progress toward functional independence. Met  4/19/2022      Long Term Goals:  12 weeks Progress  3/9/2022   1. Pain: Pt will demonstrate improved pain by reports of less than or equal to 3/10 worst pain on the verbal rating scale in order to progress toward maximal functional ability and improve QOL.   PM  6/8/2022   2. Function: Patient will demonstrate improved function as indicated by a functional limitation score of less than or equal to 66 out of 100 on FOTO. Met  4/29/2022   3. Mobility: Patient will improve AROM to stated goals, listed in objective measures above, in order to return to maximal functional potential and improve quality of life. Met  6/8/2022   4. Strength: Patient will improve strength to stated goals, listed in objective measures above, in order to improve  functional independence and quality of life. PM  6/8/2022   5. Patient will return to normal ADL's, IADL's, community involvement, recreational activities, and work-related activities with less than or equal to 3/10 pain and maximal function.  Met  6/8/2022        Goals Key:  PC= progressing/continue; PM= partially met;        DC= discontinue    PLAN     Continue Plan of Care (POC) and progress per patient tolerance. See treatment section for details on planned progressions next session.      Analilia Son, PT

## 2022-06-23 ENCOUNTER — PATIENT MESSAGE (OUTPATIENT)
Dept: ORTHOPEDICS | Facility: CLINIC | Age: 52
End: 2022-06-23
Payer: COMMERCIAL

## 2022-06-29 ENCOUNTER — CLINICAL SUPPORT (OUTPATIENT)
Dept: REHABILITATION | Facility: HOSPITAL | Age: 52
End: 2022-06-29
Payer: COMMERCIAL

## 2022-06-29 DIAGNOSIS — M62.81 PROXIMAL MUSCLE WEAKNESS: ICD-10-CM

## 2022-06-29 DIAGNOSIS — M25.612 DECREASED RANGE OF MOTION OF LEFT SHOULDER: ICD-10-CM

## 2022-06-29 DIAGNOSIS — Z74.09 DECREASED FUNCTIONAL MOBILITY AND ENDURANCE: Primary | ICD-10-CM

## 2022-06-29 PROCEDURE — 97110 THERAPEUTIC EXERCISES: CPT

## 2022-06-29 PROCEDURE — 97112 NEUROMUSCULAR REEDUCATION: CPT

## 2022-06-29 PROCEDURE — 97140 MANUAL THERAPY 1/> REGIONS: CPT

## 2022-06-29 NOTE — PROGRESS NOTES
[facility]   Physical Therapy Treatment Note    Name: Gerda Obrien  Clinic Number: [id number]    Therapy Diagnosis:   Encounter Diagnoses   Name Primary?    Decreased functional mobility and endurance Yes    Decreased range of motion of left shoulder     Proximal muscle weakness      Physician: Denilson Jackson    Visit Date: 6/29/2022    Physician Orders: PT Eval and Treat  Medical Diagnosis from Referral: Nontraumatic incomplete tear of left rotator cuff  Evaluation Date: 3/9/2022  Authorization Period Expiration: 12/1/2022  Plan of Care Expiration: 9/8/2022  Visit # / Visits authorized: 26/40 (+1 for evaluation)    FOTO: 1/ 3      Precautions: Standard        Surgical Procedure: L shoulder rotator cuff repair, biceps tenodesis, subacromial decompression Date of Surgery: 3/7/2022       Progress Note Due: 7/8/2022  MD Follow-up: 5/24/2022     Time In: 1432 (FOTO Next Session)  Time Out: 1530  Total Billable Time: 57 minutes      SUBJECTIVE     Patient reports: she is feeling pretty good today    He/She was compliant with home exercise program.  Response to previous treatment: felt fine with no pain   Functional change: increased tolerance with weightbearing of upper extremities    Pain: 0/10     Location: L shoulder     OBJECTIVE     Objective Measures updated at progress report unless specified.     TREATMENT     Post-Op Rehab Timeline     11 weeks: 5/23/2022  15 weeks: 6/20/2022  20 weeks: 7/25/2022  24 weeks: 8/22/2022    Gerda received the treatments listed below:       MANUAL THERAPY TECHNIQUES were applied for (12) minutes, including:    Manual Intervention Performed Today    Soft Tissue Mobilization x Superficial soft tissue mobilization to left periscapular musculature, pectorals, supraspinatus , infraspinatus , subscapularis  and deltoid   Joint Mobilizations  Grade IV L glenohumeral distraction and inferior glides   Mobilization with movement   Subscapularis release with shoulder flexion         Functional Dry Needling        Plan for Next Visit: PRN         THERAPEUTIC EXERCISES to develop strength, endurance, ROM, flexibility, posture and core stabilization for (15) minutes including:    Intervention Performed Today    UBE X Level 3, 3' forward, 3' backward    Shoulder PROM   5 minutes in all directions    Stretches  X      X Prayer: 3 min each with shoulders in ER and IR  Pectoralis + ER stretch: 5x10s holds   Shoulder internal rotation (strap): 10x10s holds   Pectoralis corner: 10x10s holds    Bicep curl to overhead press  X 3# 3x10   Wall push up   3x10     Plan for Next Visit:          NEUROMUSCULAR RE-EDUCATION ACTIVITIES to improve Balance, Coordination, Kinesthetic, Sense, Proprioception and Posture for (30) minutes.  The following were included:    Intervention Performed Today    Shoulder internal rotation   X Standing: Blue band, 3 x 12  Elbow supported at 90* abduction: red band 3x10   B Shoulder external rotation    X Standing: red band, 60s, 90s with 4s eccentric release   Elbow supported at 90* abduction: red band 3x10   Prone row   7#, 3x10   Prone TYI  X  X TYW: 2#, 3x10 on L   I: 4# 3x10 on L    Wall wash   2x30s each clockwise and counterclockwise    Barrel hugs   20# 3x10    Wall svetlana   3x10   Plank shoulder taps (modified with hands on table)  X 4x30s    Serratus slides   X 20x with no resistance   2x10 with yellow band at wrists    Plank serratus push up X 2x10   Shoulder external rotation isometric with trunk rotation X 2x10     Plan for Next Visit:         PATIENT EDUCATION AND HOME EXERCISES     Home Exercises Provided and Patient Education Provided     Education provided: (time included with therex) minutes   Patient educated on biomechanical justification for therapeutic exercise and importance of compliance with HEP in order to improve overall impairments and QOL    Patient was educated on all the above exercise  prior/during/after for proper posture, positioning, and execution for safe performance with home exercise program.    Patient educated on the importance of improved core and upper extremity strength in order to improve alignment of the spine and upper extremities with static positions and dynamic movement.    Patient educated on the importance of strong core and lower extremity musculature in order to improve both static and dynamic balance, improve gait mechanics, reduce fall risk and improve household and community mobility.       Written Home Exercises Provided: yes.  Exercises were reviewed and Gerda was able to demonstrate them prior to the end of the session.  Gerda demonstrated good  understanding of the education provided. See EMR under Patient Instructions for exercises provided during therapy sessions.      ASSESSMENT     Pt tolerated session well today. Progressing with more overhead strengthening and weightbearing through B upper extremities. Increased resistance with L shoulder internal and external rotation with increased difficulty with external. Increased endurance was noted throughout performance of modified plank shoulder taps with decreased breaks between sets. Following exercise, soft tissue mobilizations were performed to decrease muscle tension.        Gerda is progressing well towards her goals.   Pt prognosis is Excellent.     Pt will continue to benefit from skilled outpatient physical therapy to address the deficits listed in the problem list box on initial evaluation, provide pt/family education and to maximize pt's level of independence in the home and community environment.     Pt's spiritual, cultural and educational needs considered and pt agreeable to plan of care and goals.     Anticipated Barriers for therapy: co-morbidities and adherence to treatment plan    GOALS:     Short Term Goals:  6 weeks Progress   3/9/2022   1. Pain: Pt will demonstrate improved pain by reports  of less than or equal to 6/10 worst pain on the verbal rating scale in order to progress toward maximal functional ability and improve QOL. Met   4/19/2022   2. Function: Patient will demonstrate improved function as indicated by a functional limitation score of less than or equal to 80 out of 100 on FOTO. Met  4/29/2022   3. Mobility: Patient will improve AROM to 50% of stated goals, listed in objective measures above, in order to progress towards independence with functional activities.  Met  4/19/2022   4. Strength: Patient will improve strength to 50% of stated goals, listed in objective measures above, in order to progress towards independence with functional activities.  Met  5/23/2022   5. HEP: Patient will demonstrate independence with HEP in order to progress toward functional independence. Met  4/19/2022      Long Term Goals:  12 weeks Progress  3/9/2022   1. Pain: Pt will demonstrate improved pain by reports of less than or equal to 3/10 worst pain on the verbal rating scale in order to progress toward maximal functional ability and improve QOL.   PM  6/8/2022   2. Function: Patient will demonstrate improved function as indicated by a functional limitation score of less than or equal to 66 out of 100 on FOTO. Met  4/29/2022   3. Mobility: Patient will improve AROM to stated goals, listed in objective measures above, in order to return to maximal functional potential and improve quality of life. Met  6/8/2022   4. Strength: Patient will improve strength to stated goals, listed in objective measures above, in order to improve functional independence and quality of life. PM  6/8/2022   5. Patient will return to normal ADL's, IADL's, community involvement, recreational activities, and work-related activities with less than or equal to 3/10 pain and maximal function.  Met  6/8/2022        Goals Key:  PC= progressing/continue; PM= partially met;        DC= discontinue    PLAN     Continue Plan of Care (POC) and  progress per patient tolerance. See treatment section for details on planned progressions next session.      Analilia Son, PT

## 2022-07-05 ENCOUNTER — OFFICE VISIT (OUTPATIENT)
Dept: DERMATOLOGY | Facility: CLINIC | Age: 52
End: 2022-07-05
Payer: COMMERCIAL

## 2022-07-05 DIAGNOSIS — L70.0 ACNE VULGARIS: Primary | ICD-10-CM

## 2022-07-05 DIAGNOSIS — L81.8 PERIORBITAL HYPERPIGMENTATION: ICD-10-CM

## 2022-07-05 PROCEDURE — 99213 PR OFFICE/OUTPT VISIT, EST, LEVL III, 20-29 MIN: ICD-10-PCS | Mod: S$GLB,,, | Performed by: DERMATOLOGY

## 2022-07-05 PROCEDURE — 1159F MED LIST DOCD IN RCRD: CPT | Mod: CPTII,S$GLB,, | Performed by: DERMATOLOGY

## 2022-07-05 PROCEDURE — 3066F PR DOCUMENTATION OF TREATMENT FOR NEPHROPATHY: ICD-10-PCS | Mod: CPTII,S$GLB,, | Performed by: DERMATOLOGY

## 2022-07-05 PROCEDURE — 99999 PR PBB SHADOW E&M-EST. PATIENT-LVL IV: ICD-10-PCS | Mod: PBBFAC,,, | Performed by: DERMATOLOGY

## 2022-07-05 PROCEDURE — 1160F RVW MEDS BY RX/DR IN RCRD: CPT | Mod: CPTII,S$GLB,, | Performed by: DERMATOLOGY

## 2022-07-05 PROCEDURE — 3044F HG A1C LEVEL LT 7.0%: CPT | Mod: CPTII,S$GLB,, | Performed by: DERMATOLOGY

## 2022-07-05 PROCEDURE — 1160F PR REVIEW ALL MEDS BY PRESCRIBER/CLIN PHARMACIST DOCUMENTED: ICD-10-PCS | Mod: CPTII,S$GLB,, | Performed by: DERMATOLOGY

## 2022-07-05 PROCEDURE — 3061F PR NEG MICROALBUMINURIA RESULT DOCUMENTED/REVIEW: ICD-10-PCS | Mod: CPTII,S$GLB,, | Performed by: DERMATOLOGY

## 2022-07-05 PROCEDURE — 1159F PR MEDICATION LIST DOCUMENTED IN MEDICAL RECORD: ICD-10-PCS | Mod: CPTII,S$GLB,, | Performed by: DERMATOLOGY

## 2022-07-05 PROCEDURE — 99999 PR PBB SHADOW E&M-EST. PATIENT-LVL IV: CPT | Mod: PBBFAC,,, | Performed by: DERMATOLOGY

## 2022-07-05 PROCEDURE — 3061F NEG MICROALBUMINURIA REV: CPT | Mod: CPTII,S$GLB,, | Performed by: DERMATOLOGY

## 2022-07-05 PROCEDURE — 99213 OFFICE O/P EST LOW 20 MIN: CPT | Mod: S$GLB,,, | Performed by: DERMATOLOGY

## 2022-07-05 PROCEDURE — 3066F NEPHROPATHY DOC TX: CPT | Mod: CPTII,S$GLB,, | Performed by: DERMATOLOGY

## 2022-07-05 PROCEDURE — 3044F PR MOST RECENT HEMOGLOBIN A1C LEVEL <7.0%: ICD-10-PCS | Mod: CPTII,S$GLB,, | Performed by: DERMATOLOGY

## 2022-07-05 RX ORDER — AZELAIC ACID 0.15 G/G
GEL TOPICAL
Qty: 50 G | Refills: 3 | Status: SHIPPED | OUTPATIENT
Start: 2022-07-05 | End: 2023-09-18

## 2022-07-05 NOTE — PROGRESS NOTES
Subjective:       Patient ID:  Gerda Obrien is a 51 y.o. female who presents for   Chief Complaint   Patient presents with    Follow-up     Periorbital hyperpigmentation ; some improvement      Hx of periorbital hyperpigmentation, last seen on 2/23/22.  She currently uses tretinoin 0.025% cream inconsistently and hydroquinone 8% qAM consistently. + improvement in hyperpimgnetation of the glabella.  Uncertain if improvement near eyes.       Review of Systems   Constitutional: Negative for fever and chills.   Gastrointestinal: Negative for nausea and vomiting.   Skin: Positive for activity-related sunscreen use. Negative for daily sunscreen use and recent sunburn.   Hematologic/Lymphatic: Does not bruise/bleed easily.        Objective:    Physical Exam   Constitutional: She appears well-developed and well-nourished. No distress.   Neurological: She is alert and oriented to person, place, and time. She is not disoriented.   Psychiatric: She has a normal mood and affect.   Skin:   Areas Examined (abnormalities noted in diagram):   Head / Face Inspection Performed  Neck Inspection Performed  RUE Inspected  LUE Inspection Performed  Nails and Digits Inspection Performed                 Assessment / Plan:        Acne vulgaris  Periorbital hyperpigmentation  -     azelaic acid (FINACEA) 15 % gel; AAA of eyelids 3 mornings/week  Dispense: 50 g; Refill: 3  -     Recommend daily sunscreen, will d/c HQ 8% given consistent use x 5 months.  Recommend increase use of tretinoin 0.025% cream to 3 nights/week and will add above med, recommend OTC vitamin C.              Follow up in about 6 months (around 1/5/2023).

## 2022-07-06 ENCOUNTER — CLINICAL SUPPORT (OUTPATIENT)
Dept: REHABILITATION | Facility: HOSPITAL | Age: 52
End: 2022-07-06
Payer: COMMERCIAL

## 2022-07-06 DIAGNOSIS — Z74.09 DECREASED FUNCTIONAL MOBILITY AND ENDURANCE: Primary | ICD-10-CM

## 2022-07-06 DIAGNOSIS — M25.612 DECREASED RANGE OF MOTION OF LEFT SHOULDER: ICD-10-CM

## 2022-07-06 DIAGNOSIS — M62.81 PROXIMAL MUSCLE WEAKNESS: ICD-10-CM

## 2022-07-06 PROCEDURE — 97110 THERAPEUTIC EXERCISES: CPT

## 2022-07-06 PROCEDURE — 97112 NEUROMUSCULAR REEDUCATION: CPT

## 2022-07-06 PROCEDURE — 97140 MANUAL THERAPY 1/> REGIONS: CPT

## 2022-07-06 NOTE — PROGRESS NOTES
[facility]   Physical Therapy Treatment Note    Name: Gerda Obrien  Clinic Number: [id number]    Therapy Diagnosis:   Encounter Diagnoses   Name Primary?    Decreased functional mobility and endurance Yes    Decreased range of motion of left shoulder     Proximal muscle weakness      Physician: Denilson Jackson    Visit Date: 7/6/2022    Physician Orders: PT Eval and Treat  Medical Diagnosis from Referral: Nontraumatic incomplete tear of left rotator cuff  Evaluation Date: 3/9/2022  Authorization Period Expiration: 12/1/2022  Plan of Care Expiration: 9/8/2022  Visit # / Visits authorized: 27/40 (+1 for evaluation)    FOTO: 1/ 3      Precautions: Standard        Surgical Procedure: L shoulder rotator cuff repair, biceps tenodesis, subacromial decompression Date of Surgery: 3/7/2022       Progress Note Due: 7/8/2022  MD Follow-up: 5/24/2022     Time In: 1440 (FOTO Next Session)  Time Out: 1535  Total Billable Time: 49 minutes      SUBJECTIVE     Patient reports: she is feeling good today. Reports difficulty and discomfort with overhead abduction, particularly first thing in the morning.     He/She was compliant with home exercise program.  Response to previous treatment: felt fine with no pain    Functional change: increased tolerance with weightbearing of upper extremities    Pain: 0/10     Location: L shoulder     OBJECTIVE     Objective Measures updated at progress report unless specified.     FUNCTION      TREATMENT     Post-Op Rehab Timeline     11 weeks: 5/23/2022  15 weeks: 6/20/2022  20 weeks: 7/25/2022  24 weeks: 8/22/2022    Gerda received the treatments listed below:       MANUAL THERAPY TECHNIQUES were applied for (12) minutes, including:    Manual Intervention Performed Today    Soft Tissue Mobilization x Superficial soft tissue mobilization to left periscapular musculature, pectorals, supraspinatus , infraspinatus , subscapularis  and deltoid   Joint  Mobilizations  Grade IV L glenohumeral distraction and inferior glides   Mobilization with movement x Subscapularis release with shoulder flexion         Functional Dry Needling        Plan for Next Visit: PRN         THERAPEUTIC EXERCISES to develop strength, endurance, ROM, flexibility, posture and core stabilization for (12) minutes including:    Intervention Performed Today    UBE X Level 3, 3' forward, 3' backward    Shoulder PROM   5 minutes in all directions    Stretches      x  X  x Prayer: 3 min each with shoulders in ER and IR  Pectoralis + ER stretch: 5x10s holds   Shoulder internal rotation (strap): 10x10s holds   Pectoralis corner: 10x10s holds   Inferior capsuleL 10x10s holds    Bicep curl to overhead press   3# 3x10   Wall push up   3x10     Plan for Next Visit:          NEUROMUSCULAR RE-EDUCATION ACTIVITIES to improve Balance, Coordination, Kinesthetic, Sense, Proprioception and Posture for (25) minutes.  The following were included:    Intervention Performed Today    Shoulder internal rotation    Standing: Blue band, 3 x 12  Elbow supported at 90* abduction: red band 3x10   B Shoulder external rotation  x   Standing: red band, 60s, 90s with 4s eccentric release   Elbow supported at 90* abduction: red band 3x10   Prone row   7#, 3x10   Prone TYI   TYW: 2#, 3x10 on L   I: 4# 3x10 on L    Wall wash   2x30s each clockwise and counterclockwise    Barrel hugs   20# 3x10    Wall svetlana  x 3x10   Plank shoulder taps (modified with hands on table)  X 2x10 taps B    Serratus slides    20x with no resistance   2x10 with yellow band at wrists    Wall clocks (3 points)  x Yellow band 10x B    Plank serratus push up  2x10   Shoulder external rotation isometric with trunk rotation X Yellow band, 3x8   Deltoid raises (lateral-forward-lateral) x 2# 3x8     Plan for Next Visit:         PATIENT EDUCATION AND HOME EXERCISES     Home Exercises Provided and Patient Education Provided     Education provided: (time included  with therex) minutes   Patient educated on biomechanical justification for therapeutic exercise and importance of compliance with HEP in order to improve overall impairments and QOL    Patient was educated on all the above exercise prior/during/after for proper posture, positioning, and execution for safe performance with home exercise program.    Patient educated on the importance of improved core and upper extremity strength in order to improve alignment of the spine and upper extremities with static positions and dynamic movement.    Patient educated on the importance of strong core and lower extremity musculature in order to improve both static and dynamic balance, improve gait mechanics, reduce fall risk and improve household and community mobility.       Written Home Exercises Provided: yes.  Exercises were reviewed and Gerda was able to demonstrate them prior to the end of the session.  Gerda demonstrated good  understanding of the education provided. See EMR under Patient Instructions for exercises provided during therapy sessions.      ASSESSMENT     Pt tolerated session well today. Progressing with more overhead strengthening and weightbearing through B upper extremities. Progressed repetitions for shoulder external rotation isometrics with trunk rotation to improve strength in shoulder abduction position.        Gerda is progressing well towards her goals.   Pt prognosis is Excellent.     Pt will continue to benefit from skilled outpatient physical therapy to address the deficits listed in the problem list box on initial evaluation, provide pt/family education and to maximize pt's level of independence in the home and community environment.     Pt's spiritual, cultural and educational needs considered and pt agreeable to plan of care and goals.     Anticipated Barriers for therapy: co-morbidities and adherence to treatment plan    GOALS:     Short Term Goals:  6 weeks Progress   3/9/2022    1. Pain: Pt will demonstrate improved pain by reports of less than or equal to 6/10 worst pain on the verbal rating scale in order to progress toward maximal functional ability and improve QOL. Met   4/19/2022   2. Function: Patient will demonstrate improved function as indicated by a functional limitation score of less than or equal to 80 out of 100 on FOTO. Met  4/29/2022   3. Mobility: Patient will improve AROM to 50% of stated goals, listed in objective measures above, in order to progress towards independence with functional activities.  Met  4/19/2022   4. Strength: Patient will improve strength to 50% of stated goals, listed in objective measures above, in order to progress towards independence with functional activities.  Met  5/23/2022   5. HEP: Patient will demonstrate independence with HEP in order to progress toward functional independence. Met  4/19/2022      Long Term Goals:  12 weeks Progress  3/9/2022   1. Pain: Pt will demonstrate improved pain by reports of less than or equal to 3/10 worst pain on the verbal rating scale in order to progress toward maximal functional ability and improve QOL.   PM  6/8/2022   2. Function: Patient will demonstrate improved function as indicated by a functional limitation score of less than or equal to 66 out of 100 on FOTO. Met  4/29/2022   3. Mobility: Patient will improve AROM to stated goals, listed in objective measures above, in order to return to maximal functional potential and improve quality of life. Met  6/8/2022   4. Strength: Patient will improve strength to stated goals, listed in objective measures above, in order to improve functional independence and quality of life. PM  6/8/2022   5. Patient will return to normal ADL's, IADL's, community involvement, recreational activities, and work-related activities with less than or equal to 3/10 pain and maximal function.  Met  6/8/2022        Goals Key:  PC= progressing/continue; PM= partially met;        DC=  discontinue    PLAN     Continue Plan of Care (POC) and progress per patient tolerance. See treatment section for details on planned progressions next session.      Analilia Son PT

## 2022-07-13 ENCOUNTER — CLINICAL SUPPORT (OUTPATIENT)
Dept: REHABILITATION | Facility: HOSPITAL | Age: 52
End: 2022-07-13
Payer: COMMERCIAL

## 2022-07-13 DIAGNOSIS — Z74.09 DECREASED FUNCTIONAL MOBILITY AND ENDURANCE: Primary | ICD-10-CM

## 2022-07-13 DIAGNOSIS — M62.81 PROXIMAL MUSCLE WEAKNESS: ICD-10-CM

## 2022-07-13 DIAGNOSIS — M25.612 DECREASED RANGE OF MOTION OF LEFT SHOULDER: ICD-10-CM

## 2022-07-13 PROCEDURE — 97112 NEUROMUSCULAR REEDUCATION: CPT

## 2022-07-13 PROCEDURE — 97140 MANUAL THERAPY 1/> REGIONS: CPT

## 2022-07-13 PROCEDURE — 97110 THERAPEUTIC EXERCISES: CPT

## 2022-07-13 NOTE — PROGRESS NOTES
[facility]   Physical Therapy Treatment Note    Name: Gerda Obrien  Clinic Number: [id number]    Therapy Diagnosis:   Encounter Diagnoses   Name Primary?    Decreased functional mobility and endurance Yes    Decreased range of motion of left shoulder     Proximal muscle weakness      Physician: Denilson Jackson    Visit Date: 7/13/2022    Physician Orders: PT Eval and Treat  Medical Diagnosis from Referral: Nontraumatic incomplete tear of left rotator cuff  Evaluation Date: 3/9/2022  Authorization Period Expiration: 12/1/2022  Plan of Care Expiration: 9/8/2022  Visit # / Visits authorized: 28/40 (+1 for evaluation)    FOTO: 1/ 3      Precautions: Standard        Surgical Procedure: L shoulder rotator cuff repair, biceps tenodesis, subacromial decompression Date of Surgery: 3/7/2022       Progress Note Due: 7/8/2022  MD Follow-up: 5/24/2022     Time In: 1437 (PN + FOTO Next Session)   Time Out: 1530  Total Billable Time: 50 minutes      SUBJECTIVE     Patient reports: she is feeling okay, feeling tightness in anterior shoulder. Still not able to sleep through the night comfortably.     He/She was compliant with home exercise program.  Response to previous treatment: felt fine with no pain    Functional change: increased tolerance with weightbearing of upper extremities    Pain: 0/10     Location: L shoulder     OBJECTIVE     Objective Measures updated at progress report unless specified.     TREATMENT     Post-Op Rehab Timeline     11 weeks: 5/23/2022  15 weeks: 6/20/2022  20 weeks: 7/25/2022  24 weeks: 8/22/2022    Gerda received the treatments listed below:       MANUAL THERAPY TECHNIQUES were applied for (15) minutes, including:    Manual Intervention Performed Today    Soft Tissue Mobilization x Superficial soft tissue mobilization to left periscapular musculature, pectorals, supraspinatus , infraspinatus , subscapularis  and deltoid   Joint Mobilizations   Grade IV L glenohumeral distraction and inferior glides   Mobilization with movement x Subscapularis release with shoulder flexion         Functional Dry Needling        Plan for Next Visit: PRN         THERAPEUTIC EXERCISES to develop strength, endurance, ROM, flexibility, posture and core stabilization for (10) minutes including:    Intervention Performed Today    UBE X Level 3, 3' forward, 3' backward    Shoulder PROM   5 minutes in all directions    Stretches      X  X  X Prayer: 3 min each with shoulders in ER and IR  Pectoralis + ER stretch: 5x10s holds   Shoulder internal rotation (strap): 10x10s holds   Pectoralis corner: 10x10s holds   Inferior capsule: 10x10s holds    Bicep curl to overhead press   3# 3x10   Wall push up   3x10     Plan for Next Visit:          NEUROMUSCULAR RE-EDUCATION ACTIVITIES to improve Balance, Coordination, Kinesthetic, Sense, Proprioception and Posture for (25) minutes.  The following were included:    Intervention Performed Today    Shoulder internal rotation    Standing: Blue band, 3 x 12  Elbow supported at 90* abduction: red band 3x10   B Shoulder external rotation  X   Standing: red band, 60s, 90s with 4s eccentric release   Elbow supported at 90* abduction: red band 3x10   Prone row   7#, 3x10   Prone TYI   TYW: 2#, 3x10 on L   I: 4# 3x10 on L    Wall wash   2x30s each clockwise and counterclockwise    Barrel hugs   20# 3x10    Wall svetlana  X 3x10   Plank shoulder taps (modified with hands on chair)  X 2x10 taps B    Serratus slides    20x with no resistance   2x10 with yellow band at wrists    Wall clocks (3 points)  X Yellow band 10x B    Plank serratus push up  2x10   Shoulder external rotation isometric with trunk rotation X  X Yellow band, 3x8  2x5 with overhead press   Steering wheels X 5# 2x10   Deltoid raises (lateral-forward-lateral) X 2# 3x8     Plan for Next Visit:         PATIENT EDUCATION AND HOME EXERCISES     Home Exercises Provided and Patient Education  Provided     Education provided: (time included with therex) minutes   Patient educated on biomechanical justification for therapeutic exercise and importance of compliance with HEP in order to improve overall impairments and QOL    Patient was educated on all the above exercise prior/during/after for proper posture, positioning, and execution for safe performance with home exercise program.    Patient educated on the importance of improved core and upper extremity strength in order to improve alignment of the spine and upper extremities with static positions and dynamic movement.    Patient educated on the importance of strong core and lower extremity musculature in order to improve both static and dynamic balance, improve gait mechanics, reduce fall risk and improve household and community mobility.       Written Home Exercises Provided: yes.  Exercises were reviewed and Gerda was able to demonstrate them prior to the end of the session.  Gerda demonstrated good  understanding of the education provided. See EMR under Patient Instructions for exercises provided during therapy sessions.      ASSESSMENT     Pt tolerated session well today. Progressed shoulder external rotation isometric with trunk rotation by adding an overhead press component, pt tolerated progression well, demonstrating moderate difficulty. Pt is continuing to increase her tolerance with upper extremity weightbearing.      Gerda is progressing well towards her goals.   Pt prognosis is Excellent.     Pt will continue to benefit from skilled outpatient physical therapy to address the deficits listed in the problem list box on initial evaluation, provide pt/family education and to maximize pt's level of independence in the home and community environment.     Pt's spiritual, cultural and educational needs considered and pt agreeable to plan of care and goals.     Anticipated Barriers for therapy: co-morbidities and adherence to treatment  plan    GOALS:     Short Term Goals:  6 weeks Progress   3/9/2022   1. Pain: Pt will demonstrate improved pain by reports of less than or equal to 6/10 worst pain on the verbal rating scale in order to progress toward maximal functional ability and improve QOL. Met   4/19/2022   2. Function: Patient will demonstrate improved function as indicated by a functional limitation score of less than or equal to 80 out of 100 on FOTO. Met  4/29/2022   3. Mobility: Patient will improve AROM to 50% of stated goals, listed in objective measures above, in order to progress towards independence with functional activities.  Met  4/19/2022   4. Strength: Patient will improve strength to 50% of stated goals, listed in objective measures above, in order to progress towards independence with functional activities.  Met  5/23/2022   5. HEP: Patient will demonstrate independence with HEP in order to progress toward functional independence. Met  4/19/2022      Long Term Goals:  12 weeks Progress  3/9/2022   1. Pain: Pt will demonstrate improved pain by reports of less than or equal to 3/10 worst pain on the verbal rating scale in order to progress toward maximal functional ability and improve QOL.   PM  6/8/2022   2. Function: Patient will demonstrate improved function as indicated by a functional limitation score of less than or equal to 66 out of 100 on FOTO. Met  4/29/2022   3. Mobility: Patient will improve AROM to stated goals, listed in objective measures above, in order to return to maximal functional potential and improve quality of life. Met  6/8/2022   4. Strength: Patient will improve strength to stated goals, listed in objective measures above, in order to improve functional independence and quality of life. PM  6/8/2022   5. Patient will return to normal ADL's, IADL's, community involvement, recreational activities, and work-related activities with less than or equal to 3/10 pain and maximal function.  Met  6/8/2022         Goals Key:  PC= progressing/continue; PM= partially met;        DC= discontinue    PLAN     Continue Plan of Care (POC) and progress per patient tolerance. See treatment section for details on planned progressions next session.      Analilia Son PT

## 2022-07-18 ENCOUNTER — CLINICAL SUPPORT (OUTPATIENT)
Dept: REHABILITATION | Facility: HOSPITAL | Age: 52
End: 2022-07-18
Payer: COMMERCIAL

## 2022-07-18 DIAGNOSIS — M25.612 DECREASED RANGE OF MOTION OF LEFT SHOULDER: ICD-10-CM

## 2022-07-18 DIAGNOSIS — M62.81 PROXIMAL MUSCLE WEAKNESS: ICD-10-CM

## 2022-07-18 DIAGNOSIS — Z74.09 DECREASED FUNCTIONAL MOBILITY AND ENDURANCE: Primary | ICD-10-CM

## 2022-07-18 PROCEDURE — 97110 THERAPEUTIC EXERCISES: CPT

## 2022-07-18 PROCEDURE — 97140 MANUAL THERAPY 1/> REGIONS: CPT

## 2022-07-18 PROCEDURE — 97112 NEUROMUSCULAR REEDUCATION: CPT

## 2022-07-18 NOTE — PROGRESS NOTES
[facility]   Physical Therapy Treatment Note / Progress Note    Name: Gerda Obrien  Clinic Number: [id number]    Therapy Diagnosis:   Encounter Diagnoses   Name Primary?    Decreased functional mobility and endurance Yes    Decreased range of motion of left shoulder     Proximal muscle weakness      Physician: Denilson Jackson    Visit Date: 7/18/2022    Physician Orders: PT Eval and Treat  Medical Diagnosis from Referral: Nontraumatic incomplete tear of left rotator cuff  Evaluation Date: 3/9/2022  Authorization Period Expiration: 12/1/2022  Plan of Care Expiration: 9/8/2022  Visit # / Visits authorized: 29/40 (+1 for evaluation)    FOTO: 1/ 3      Precautions: Standard        Surgical Procedure: L shoulder rotator cuff repair, biceps tenodesis, subacromial decompression Date of Surgery: 3/7/2022       Progress Note Due: 7/8/2022  MD Follow-up: 5/24/2022     Time In: 1432 (FOTO Next Session)   Time Out: 1532  Total Billable Time: 55 minutes      SUBJECTIVE     Patient reports: no significant changes since previous session. Continues to report the most pain at night time and most difficulty with reaching overhead     He/She was compliant with home exercise program.  Response to previous treatment: felt fine with no pain    Functional change: increased tolerance with weightbearing of upper extremities    Pain: 0/10     Location: L shoulder     OBJECTIVE     Objective Measures updated at progress report unless specified.     RANGE OF MOTION:     Cervical Right   (spine) Left     Pain/Dysfunction with Movement Goal   Cervical Flexion  100% ---       Cervical Extension  100% ---       Cervical Side Bending  100% 100%   100%   Cervical Rotation  100% 100%          Shoulder PROM Right Left Pain/Dysfunction with Movement Goal   Shoulder Flexion (180) 175/175 175/175   170   Shoulder Extension (60) 60/60 60/60   60   Shoulder Abduction (180) 175 170/175   170   Shoulder ER  (90) 90 90 at 90* abduction    80   Shoulder IR (70) 70 70 at 90* abduction   60         STRENGTH:     U/E MMT Right Left Pain/Dysfunction with Movement Goal   Shoulder Flexion 4/5 4/5   4+/5 B   Shoulder Extension 4+/5 4+/5   4+/5 B   Shoulder Abduction 4/5 4/5   4+/5 B   Shoulder IR 5/5 5/5   4+/5 B   Shoulder ER    4+/5 4+/5   4+/5 B   Elbow Flexion  5/5 5/5   5/5 B   Elbow Extension 5/5 5/5   5/5 B   Wrist Flexion 5/5 5/5   5/5 B   Wrist Extension 5/5 5/5   5/5 B         Palpation: Increased tone and tenderness noted with palpation of L periscapular region, upper trapezius, levator scapulae, rotator cuff musculature, biceps and deltoid      Posture:  Pt presents with postural abnormalities which include: forward head and rounded shoulders     Movement Analysis: pt demonstrates improved fluidity of movement and decreased pain with overhead elevation.     TREATMENT     Post-Op Rehab Timeline     11 weeks: 5/23/2022  15 weeks: 6/20/2022  20 weeks: 7/25/2022  24 weeks: 8/22/2022    Gerda received the treatments listed below:       MANUAL THERAPY TECHNIQUES were applied for (15) minutes, including:    Manual Intervention Performed Today    Soft Tissue Mobilization x Superficial soft tissue mobilization to left periscapular musculature, pectorals, supraspinatus , infraspinatus , subscapularis  and deltoid   Joint Mobilizations  Grade IV L glenohumeral distraction and inferior glides   Mobilization with movement X  x Subscapularis release with shoulder flexion   Teres major and latissimus release with shoulder abduction         Functional Dry Needling        Plan for Next Visit: PRN         THERAPEUTIC EXERCISES to develop strength, endurance, ROM, flexibility, posture and core stabilization for (10) minutes including:    Intervention Performed Today    UBE X Level 3, 3' forward, 3' backward    Shoulder PROM   5 minutes in all directions    Stretches        X  X Prayer: 3 min each with shoulders in ER and  IR  Pectoralis + ER stretch: 5x10s holds   Shoulder internal rotation (strap): 10x10s holds   Pectoralis corner: 10x10s holds   Inferior capsule: 10x10s holds    Bicep curl to overhead press   3# 3x10   Wall push up   3x10     Plan for Next Visit:          NEUROMUSCULAR RE-EDUCATION ACTIVITIES to improve Balance, Coordination, Kinesthetic, Sense, Proprioception and Posture for (30) minutes.  The following were included:    Intervention Performed Today    Shoulder internal rotation    Standing: Blue band, 3 x 12  Elbow supported at 90* abduction: red band 3x10   B Shoulder external rotation    x Standing: red band, 60s, 90s with 4s eccentric release   Elbow supported at 90* abduction: 4# 3x10   Prone row   7#, 3x10   Prone TYI   TYW: 2#, 3x10 on L   I: 4# 3x10 on L    Wall wash   2x30s each clockwise and counterclockwise    Barrel hugs   20# 3x10    Wall svetlana  X 3x10   Plank shoulder taps (modified with hands on chair)  X 2x10 taps B    Serratus slides    20x with no resistance   2x10 with yellow band at wrists    Wall clocks (3 points)   Yellow band 10x B    Plank serratus push up x 2x10   Supine serratus punch  x 7# 2x20   Shoulder external rotation isometric with trunk rotation  Yellow band, 3x8  2x5 with overhead press   Steering wheels  5# 2x10   Deltoid raises (lateral-forward-lateral)  2# 3x8   Assisted overhead press with eccentric release  X  x 3# 10x   3#, 10 with 3s hold at the top    Side lying shoulder abduction  x Red band 2x10    Supine pull aparts  x Green band, 2x10 each horizontal and diagonal      Plan for Next Visit:         PATIENT EDUCATION AND HOME EXERCISES     Home Exercises Provided and Patient Education Provided     Education provided: (time included with therex) minutes   Patient educated on biomechanical justification for therapeutic exercise and importance of compliance with HEP in order to improve overall impairments and QOL    Patient was educated on all the above exercise  prior/during/after for proper posture, positioning, and execution for safe performance with home exercise program.    Patient educated on the importance of improved core and upper extremity strength in order to improve alignment of the spine and upper extremities with static positions and dynamic movement.    Patient educated on the importance of strong core and lower extremity musculature in order to improve both static and dynamic balance, improve gait mechanics, reduce fall risk and improve household and community mobility.       Written Home Exercises Provided: yes.  Exercises were reviewed and Gerda was able to demonstrate them prior to the end of the session.  Gerda demonstrated good  understanding of the education provided. See EMR under Patient Instructions for exercises provided during therapy sessions.      ASSESSMENT     Pt tolerated session well today. Added assisted overhead press with controlled eccentric release, side lying shoulder abduction and supine pull aparts to improve strength into shoulder elevation range. Improved strength and coordination noted with plank serratus push ups to improve scapular stability.     A progress note was completed today with significant improvements noted in shoulder strength compared to prior assessment; please see exam for details. Gerda continues to have difficulty with overhead reaching due to stiffness and weakness but reports that this is getting better with time. The above impairments and functional deficits will continue to be addressed over the course of this plan of care. Gerda was educated on continued progression of PT, expectations for the duration of care, updates on goals set at initial evaluation, and home exercise program.  Gerda will continue to benefit from physical therapy in order to further address goals, maximize function and quality of life.       Gerda is progressing well towards her goals.   Pt prognosis is Excellent.      Pt will continue to benefit from skilled outpatient physical therapy to address the deficits listed in the problem list box on initial evaluation, provide pt/family education and to maximize pt's level of independence in the home and community environment.     Pt's spiritual, cultural and educational needs considered and pt agreeable to plan of care and goals.     Anticipated Barriers for therapy: co-morbidities and adherence to treatment plan    GOALS:     Short Term Goals:  6 weeks Progress   3/9/2022   1. Pain: Pt will demonstrate improved pain by reports of less than or equal to 6/10 worst pain on the verbal rating scale in order to progress toward maximal functional ability and improve QOL. Met   4/19/2022   2. Function: Patient will demonstrate improved function as indicated by a functional limitation score of less than or equal to 80 out of 100 on FOTO. Met  4/29/2022   3. Mobility: Patient will improve AROM to 50% of stated goals, listed in objective measures above, in order to progress towards independence with functional activities.  Met  4/19/2022   4. Strength: Patient will improve strength to 50% of stated goals, listed in objective measures above, in order to progress towards independence with functional activities.  Met  5/23/2022   5. HEP: Patient will demonstrate independence with HEP in order to progress toward functional independence. Met  4/19/2022      Long Term Goals:  12 weeks Progress  3/9/2022   1. Pain: Pt will demonstrate improved pain by reports of less than or equal to 3/10 worst pain on the verbal rating scale in order to progress toward maximal functional ability and improve QOL.   PM  6/8/2022   2. Function: Patient will demonstrate improved function as indicated by a functional limitation score of less than or equal to 66 out of 100 on FOTO. Met  4/29/2022   3. Mobility: Patient will improve AROM to stated goals, listed in objective measures above, in order to return to maximal  functional potential and improve quality of life. Met  6/8/2022   4. Strength: Patient will improve strength to stated goals, listed in objective measures above, in order to improve functional independence and quality of life. PM  6/8/2022   5. Patient will return to normal ADL's, IADL's, community involvement, recreational activities, and work-related activities with less than or equal to 3/10 pain and maximal function.  Met  6/8/2022        Goals Key:  PC= progressing/continue; PM= partially met;        DC= discontinue    PLAN     Continue Plan of Care (POC) and progress per patient tolerance. See treatment section for details on planned progressions next session.      Analilia Son, PT

## 2022-07-26 ENCOUNTER — CLINICAL SUPPORT (OUTPATIENT)
Dept: REHABILITATION | Facility: HOSPITAL | Age: 52
End: 2022-07-26
Payer: COMMERCIAL

## 2022-07-26 DIAGNOSIS — M62.81 PROXIMAL MUSCLE WEAKNESS: ICD-10-CM

## 2022-07-26 DIAGNOSIS — M25.612 DECREASED RANGE OF MOTION OF LEFT SHOULDER: ICD-10-CM

## 2022-07-26 DIAGNOSIS — Z74.09 DECREASED FUNCTIONAL MOBILITY AND ENDURANCE: Primary | ICD-10-CM

## 2022-07-26 PROCEDURE — 97112 NEUROMUSCULAR REEDUCATION: CPT | Performed by: PHYSICAL THERAPIST

## 2022-07-26 PROCEDURE — 97140 MANUAL THERAPY 1/> REGIONS: CPT | Performed by: PHYSICAL THERAPIST

## 2022-07-26 PROCEDURE — 97110 THERAPEUTIC EXERCISES: CPT | Performed by: PHYSICAL THERAPIST

## 2022-07-26 NOTE — PROGRESS NOTES
[facility]   Physical Therapy Treatment Note     Name: Gerda Obrien  Clinic Number: [id number]    Therapy Diagnosis:   Encounter Diagnoses   Name Primary?    Decreased functional mobility and endurance Yes    Decreased range of motion of left shoulder     Proximal muscle weakness      Physician: Denilson Jackson    Visit Date: 7/26/2022    Physician Orders: PT Eval and Treat  Medical Diagnosis from Referral: Nontraumatic incomplete tear of left rotator cuff  Evaluation Date: 3/9/2022  Authorization Period Expiration: 12/1/2022  Plan of Care Expiration: 9/8/2022  Visit # / Visits authorized: 30/40 (+1 for evaluation)    FOTO: 1/ 3      Precautions: Standard        Surgical Procedure: L shoulder rotator cuff repair, biceps tenodesis, subacromial decompression Date of Surgery: 3/7/2022       Progress Note Due: 7/8/2022  MD Follow-up: 5/24/2022     Time In: 1432 (FOTO Next Session)   Time Out: 1518  Total Billable Time: 45 minutes      SUBJECTIVE     Patient reports: she does not really have any pain, but she still has stiffness. She mostly feels the tension and stiffness along her shoulder blade, upper trap, and under her left arm. Patient reports that she was doing sit ups today and felt a popping in her shoulder while she was doing them that was painful. PT talked to patient about not pushing through pain and noticing arm position to see if she can position it in a more neutral position without pain. She expressed understanding.     He/She was compliant with home exercise program.  Response to previous treatment: felt fine with no pain    Functional change: increased tolerance with weightbearing of upper extremities    Pain: 0/10     Location: L shoulder     OBJECTIVE     Objective Measures updated at progress report unless specified.     TREATMENT     Post-Op Rehab Timeline     11 weeks: 5/23/2022  15 weeks: 6/20/2022  20 weeks: 7/25/2022  24 weeks:  8/22/2022    Gerda received the treatments listed below:       MANUAL THERAPY TECHNIQUES were applied for (10) minutes, including:    Manual Intervention Performed Today    Soft Tissue Mobilization x Superficial soft tissue mobilization to left periscapular musculature, pectorals, supraspinatus , infraspinatus , subscapularis  and deltoid   Joint Mobilizations  Grade IV L glenohumeral distraction and inferior glides   Mobilization with movement X  x Subscapularis release with shoulder flexion   Teres major and latissimus release with shoulder abduction         Functional Dry Needling        Plan for Next Visit: PRN         THERAPEUTIC EXERCISES to develop strength, endurance, ROM, flexibility, posture and core stabilization for (8) minutes including:    Intervention Performed Today    UBE X Level 3, 3' forward, 3' backward    Shoulder PROM   5 minutes in all directions    Stretches        X   Prayer: 3 min each with shoulders in ER and IR  Pectoralis + ER stretch: 5x10s holds   Shoulder internal rotation (strap): 10x10s holds   Pectoralis corner: 10x10s holds   Inferior capsule: 10x10s holds    Bicep curl to overhead press   3# 3x10   Wall push up   3x10     Plan for Next Visit:          NEUROMUSCULAR RE-EDUCATION ACTIVITIES to improve Balance, Coordination, Kinesthetic, Sense, Proprioception and Posture for (27) minutes.  The following were included:    Intervention Performed Today    Shoulder internal rotation    Standing: Blue band, 3 x 12  Elbow supported at 90* abduction: red band 3x10   B Shoulder external rotation    x Standing: red band, 60s, 90s with 4s eccentric release   Elbow supported at 90* abduction: 4# 3x10   Prone row   7#, 3x10   Prone TYI   TYW: 2#, 3x10 on L   I: 4# 3x10 on L    Wall wash   2x30s each clockwise and counterclockwise    Barrel hugs   20# 3x10    Wall svetlana  X 3x10   Plank shoulder taps (modified with hands on chair)  X 2x10 taps B    Serratus slides    20x with no resistance    2x10 with yellow band at wrists    Wall clocks (3 points)   Yellow band 10x B    Plank serratus push up x 2x10   Supine serratus punch  x 7# 2x20   Shoulder external rotation isometric with trunk rotation  Yellow band, 3x8  2x5 with overhead press   Steering wheels  5# 2x10   Deltoid raises (lateral-forward-lateral)  2# 3x8   Assisted overhead press with eccentric release  X  x 3# 10x   3#, 10 with 3s hold at the top    Side lying shoulder abduction  x Red band 2x10    Supine pull aparts  x Green band, 2x10 each horizontal and diagonal      Plan for Next Visit:         PATIENT EDUCATION AND HOME EXERCISES     Home Exercises Provided and Patient Education Provided     Education provided: (time included with therex) minutes   Patient educated on biomechanical justification for therapeutic exercise and importance of compliance with HEP in order to improve overall impairments and QOL    Patient was educated on all the above exercise prior/during/after for proper posture, positioning, and execution for safe performance with home exercise program.    Patient educated on the importance of improved core and upper extremity strength in order to improve alignment of the spine and upper extremities with static positions and dynamic movement.    Patient educated on the importance of strong core and lower extremity musculature in order to improve both static and dynamic balance, improve gait mechanics, reduce fall risk and improve household and community mobility.       Written Home Exercises Provided: yes.  Exercises were reviewed and Gerda was able to demonstrate them prior to the end of the session.  Gerda demonstrated good  understanding of the education provided. See EMR under Patient Instructions for exercises provided during therapy sessions.      ASSESSMENT     Pt tolerated session well today. She demonstrated less difficult with progressions, but she did still fatigue easily, especially with overhead press.  Patient required assistance and cues with this activity to keep core activated and to prevent compensatory thoracic extension. Her overhead and scapular stability continue to improve. Patient reported good relief and release in tension with manual therapy today.     Gerda is progressing well towards her goals.   Pt prognosis is Excellent.     Pt will continue to benefit from skilled outpatient physical therapy to address the deficits listed in the problem list box on initial evaluation, provide pt/family education and to maximize pt's level of independence in the home and community environment.     Pt's spiritual, cultural and educational needs considered and pt agreeable to plan of care and goals.     Anticipated Barriers for therapy: co-morbidities and adherence to treatment plan    GOALS:     Short Term Goals:  6 weeks Progress   3/9/2022   1. Pain: Pt will demonstrate improved pain by reports of less than or equal to 6/10 worst pain on the verbal rating scale in order to progress toward maximal functional ability and improve QOL. Met   4/19/2022   2. Function: Patient will demonstrate improved function as indicated by a functional limitation score of less than or equal to 80 out of 100 on FOTO. Met  4/29/2022   3. Mobility: Patient will improve AROM to 50% of stated goals, listed in objective measures above, in order to progress towards independence with functional activities.  Met  4/19/2022   4. Strength: Patient will improve strength to 50% of stated goals, listed in objective measures above, in order to progress towards independence with functional activities.  Met  5/23/2022   5. HEP: Patient will demonstrate independence with HEP in order to progress toward functional independence. Met  4/19/2022      Long Term Goals:  12 weeks Progress  3/9/2022   1. Pain: Pt will demonstrate improved pain by reports of less than or equal to 3/10 worst pain on the verbal rating scale in order to progress toward maximal  functional ability and improve QOL.   PM  6/8/2022   2. Function: Patient will demonstrate improved function as indicated by a functional limitation score of less than or equal to 66 out of 100 on FOTO. Met  4/29/2022   3. Mobility: Patient will improve AROM to stated goals, listed in objective measures above, in order to return to maximal functional potential and improve quality of life. Met  6/8/2022   4. Strength: Patient will improve strength to stated goals, listed in objective measures above, in order to improve functional independence and quality of life. PM  6/8/2022   5. Patient will return to normal ADL's, IADL's, community involvement, recreational activities, and work-related activities with less than or equal to 3/10 pain and maximal function.  Met  6/8/2022        Goals Key:  PC= progressing/continue; PM= partially met;        DC= discontinue    PLAN     Continue Plan of Care (POC) and progress per patient tolerance. See treatment section for details on planned progressions next session.      Melani Allen, PT

## 2022-08-01 ENCOUNTER — CLINICAL SUPPORT (OUTPATIENT)
Dept: REHABILITATION | Facility: HOSPITAL | Age: 52
End: 2022-08-01
Attending: STUDENT IN AN ORGANIZED HEALTH CARE EDUCATION/TRAINING PROGRAM
Payer: COMMERCIAL

## 2022-08-01 DIAGNOSIS — Z74.09 DECREASED FUNCTIONAL MOBILITY AND ENDURANCE: Primary | ICD-10-CM

## 2022-08-01 DIAGNOSIS — M25.612 DECREASED RANGE OF MOTION OF LEFT SHOULDER: ICD-10-CM

## 2022-08-01 DIAGNOSIS — M62.81 PROXIMAL MUSCLE WEAKNESS: ICD-10-CM

## 2022-08-01 PROCEDURE — 97140 MANUAL THERAPY 1/> REGIONS: CPT

## 2022-08-01 PROCEDURE — 97110 THERAPEUTIC EXERCISES: CPT

## 2022-08-01 PROCEDURE — 97112 NEUROMUSCULAR REEDUCATION: CPT

## 2022-08-01 NOTE — PROGRESS NOTES
[facility]   Physical Therapy Treatment Note     Name: Gerda Obrien  Clinic Number: [id number]    Therapy Diagnosis:   Encounter Diagnoses   Name Primary?    Decreased functional mobility and endurance Yes    Decreased range of motion of left shoulder     Proximal muscle weakness      Physician: Denilson Jackson    Visit Date: 8/1/2022    Physician Orders: PT Eval and Treat  Medical Diagnosis from Referral: Nontraumatic incomplete tear of left rotator cuff  Evaluation Date: 3/9/2022  Authorization Period Expiration: 12/1/2022  Plan of Care Expiration: 9/8/2022  Visit # / Visits authorized: 31/40 (+1 for evaluation)    FOTO: 1/ 3      Precautions: Standard        Surgical Procedure: L shoulder rotator cuff repair, biceps tenodesis, subacromial decompression Date of Surgery: 3/7/2022       Progress Note Due: 8/18/2022      Time In: 1530 (FOTO Next Session)   Time Out: 1638  Total Billable Time: 60 minutes      SUBJECTIVE     Patient reports: she has been sleeping better and feels that her sleeping is almost back to normal. Continues to have the most stiffness and difficulty with overhead activities but notes that after a few repetitions of overhead movement, she usually loosens up.     He/She was compliant with home exercise program.  Response to previous treatment: felt fine with no pain    Functional change: increased tolerance with weightbearing of upper extremities    Pain: 0/10     Location: L shoulder     OBJECTIVE     Objective Measures updated at progress report unless specified.     TREATMENT     Post-Op Rehab Timeline     11 weeks: 5/23/2022  15 weeks: 6/20/2022  20 weeks: 7/25/2022  24 weeks: 8/22/2022    Gerda received the treatments listed below:       MANUAL THERAPY TECHNIQUES were applied for (15) minutes, including:    Manual Intervention Performed Today    Soft Tissue Mobilization x Superficial soft tissue mobilization to left periscapular  musculature, pectorals, supraspinatus , infraspinatus , subscapularis  and deltoid   Joint Mobilizations  Grade IV L glenohumeral distraction and inferior glides   Mobilization with movement X  x Subscapularis release with shoulder flexion   Teres major and latissimus release with shoulder abduction         Functional Dry Needling        Plan for Next Visit: PRN         THERAPEUTIC EXERCISES to develop strength, endurance, ROM, flexibility, posture and core stabilization for (10) minutes including:    Intervention Performed Today    UBE X Level 3, 3' forward, 3' backward    Shoulder PROM   5 minutes in all directions    Stretches        X  x Prayer: 3 min each with shoulders in ER and IR  Pectoralis + ER stretch: 5x10s holds   Shoulder internal rotation (strap): 10x10s holds   Pectoralis corner: 10x10s holds   Inferior capsule: 10x10s holds    Bicep curl to overhead press   3# 3x10   Wall push up   3x10     Plan for Next Visit:          NEUROMUSCULAR RE-EDUCATION ACTIVITIES to improve Balance, Coordination, Kinesthetic, Sense, Proprioception and Posture for (35) minutes.  The following were included:    Intervention Performed Today    B Shoulder external rotation  x Standing: red band, 2x90s with 4s eccentric release    Shoulder external rotation at 90* abduction  x Yellow band 2x10    Shoulder internal rotation at 90* abduction  x Yellow band 3x10    Prone row  x 7#, 3x10   Prone TYI   TYW: 2#, 3x10 on L   I: 4# 3x10 on L    Wall wash   2x30s each clockwise and counterclockwise    Barrel hugs   20# 3x10    Wall svetlana  X 3x12   Plank shoulder taps (modified with hands on chair)   2x10 taps B    Serratus slides    20x with no resistance   2x10 with yellow band at wrists    Wall clocks (3 points)   Yellow band 10x B    Plank serratus push up  2x10   Supine serratus punch   7# 2x20   Shoulder external rotation isometric with trunk rotation  Yellow band, 3x8  2x5 with overhead press   Deltoid raises  (lateral-forward-lateral)  2# 3x8   Overhead press (assisted on wall)  x 3# 3x10   Side lying shoulder abduction   Red band 2x10    Standing pull aparts  x Yellow band, 2x10 each horizontal and diagonal      Plan for Next Visit:         PATIENT EDUCATION AND HOME EXERCISES     Home Exercises Provided and Patient Education Provided     Education provided: (time included with therex) minutes   Patient educated on biomechanical justification for therapeutic exercise and importance of compliance with HEP in order to improve overall impairments and QOL    Patient was educated on all the above exercise prior/during/after for proper posture, positioning, and execution for safe performance with home exercise program.    Patient educated on the importance of improved core and upper extremity strength in order to improve alignment of the spine and upper extremities with static positions and dynamic movement.    Patient educated on the importance of strong core and lower extremity musculature in order to improve both static and dynamic balance, improve gait mechanics, reduce fall risk and improve household and community mobility.       Written Home Exercises Provided: yes.  Exercises were reviewed and Gerda was able to demonstrate them prior to the end of the session.  Gerda demonstrated good  understanding of the education provided. See EMR under Patient Instructions for exercises provided during therapy sessions.      ASSESSMENT     Pt tolerated session well today. incorporated overhead press on the wall to strengthen with overhead elevation. Increased shoulder strength and stability noted today by ability to perform shoulder internal rotation and external rotation with arm in 90* abduction.     Gerda is progressing well towards her goals.   Pt prognosis is Excellent.     Pt will continue to benefit from skilled outpatient physical therapy to address the deficits listed in the problem list box on initial  evaluation, provide pt/family education and to maximize pt's level of independence in the home and community environment.     Pt's spiritual, cultural and educational needs considered and pt agreeable to plan of care and goals.     Anticipated Barriers for therapy: co-morbidities and adherence to treatment plan    GOALS:     Short Term Goals:  6 weeks Progress   3/9/2022   1. Pain: Pt will demonstrate improved pain by reports of less than or equal to 6/10 worst pain on the verbal rating scale in order to progress toward maximal functional ability and improve QOL. Met   4/19/2022   2. Function: Patient will demonstrate improved function as indicated by a functional limitation score of less than or equal to 80 out of 100 on FOTO. Met  4/29/2022   3. Mobility: Patient will improve AROM to 50% of stated goals, listed in objective measures above, in order to progress towards independence with functional activities.  Met  4/19/2022   4. Strength: Patient will improve strength to 50% of stated goals, listed in objective measures above, in order to progress towards independence with functional activities.  Met  5/23/2022   5. HEP: Patient will demonstrate independence with HEP in order to progress toward functional independence. Met  4/19/2022      Long Term Goals:  12 weeks Progress  3/9/2022   1. Pain: Pt will demonstrate improved pain by reports of less than or equal to 3/10 worst pain on the verbal rating scale in order to progress toward maximal functional ability and improve QOL.   PM  6/8/2022   2. Function: Patient will demonstrate improved function as indicated by a functional limitation score of less than or equal to 66 out of 100 on FOTO. Met  4/29/2022   3. Mobility: Patient will improve AROM to stated goals, listed in objective measures above, in order to return to maximal functional potential and improve quality of life. Met  6/8/2022   4. Strength: Patient will improve strength to stated goals, listed in  objective measures above, in order to improve functional independence and quality of life. PM  6/8/2022   5. Patient will return to normal ADL's, IADL's, community involvement, recreational activities, and work-related activities with less than or equal to 3/10 pain and maximal function.  Met  6/8/2022        Goals Key:  PC= progressing/continue; PM= partially met;        DC= discontinue    PLAN     Continue Plan of Care (POC) and progress per patient tolerance. See treatment section for details on planned progressions next session.      Analilia Son, PT

## 2022-08-09 ENCOUNTER — CLINICAL SUPPORT (OUTPATIENT)
Dept: REHABILITATION | Facility: HOSPITAL | Age: 52
End: 2022-08-09
Attending: STUDENT IN AN ORGANIZED HEALTH CARE EDUCATION/TRAINING PROGRAM
Payer: COMMERCIAL

## 2022-08-09 DIAGNOSIS — M25.612 DECREASED RANGE OF MOTION OF LEFT SHOULDER: ICD-10-CM

## 2022-08-09 DIAGNOSIS — Z74.09 DECREASED FUNCTIONAL MOBILITY AND ENDURANCE: Primary | ICD-10-CM

## 2022-08-09 DIAGNOSIS — M62.81 PROXIMAL MUSCLE WEAKNESS: ICD-10-CM

## 2022-08-09 PROCEDURE — 97112 NEUROMUSCULAR REEDUCATION: CPT | Performed by: PHYSICAL THERAPIST

## 2022-08-09 PROCEDURE — 97140 MANUAL THERAPY 1/> REGIONS: CPT | Performed by: PHYSICAL THERAPIST

## 2022-08-09 NOTE — PROGRESS NOTES
"[facility]   Physical Therapy Treatment Note     Name: Gerda Obrien  Clinic Number: [id number]    Therapy Diagnosis:   Encounter Diagnoses   Name Primary?    Decreased functional mobility and endurance Yes    Decreased range of motion of left shoulder     Proximal muscle weakness      Physician: Denilson Jackson    Visit Date: 8/9/2022    Physician Orders: PT Eval and Treat  Medical Diagnosis from Referral: Nontraumatic incomplete tear of left rotator cuff  Evaluation Date: 3/9/2022  Authorization Period Expiration: 12/1/2022  Plan of Care Expiration: 9/8/2022  Visit # / Visits authorized: 32/40 (+1 for evaluation)    FOTO: 4/ 3      Precautions: Standard        Surgical Procedure: L shoulder rotator cuff repair, biceps tenodesis, subacromial decompression Date of Surgery: 3/7/2022       Progress Note Due: 8/18/2022      Time In: 1516   Time Out: 1605  Total Billable Time: 50 minutes      SUBJECTIVE     Patient reports: that last Wednesay or Thursday her left shoulder just randomly started hurting her, and she isn't sure why. She couldn't do anything with the shoulder, and it felt like it did initially following surgery. It has been pretty painful and weak since then. Yesterday was the first day she was able to start using it more again. She thinks taking ibuprofen and tylenol is what helped it to start feeling better yesterday. Patient reports that today she can move it better and feels it is almost back to "normal" or where she was prior to last Wednesday; however, her range of motion is still limited. She is still unsure of what caused the increased pain levels. She doesn't know if she slept wrong or just did too much. She was not lifting anything, and she felt fine after her last PT visit.     He/She was compliant with home exercise program.  Response to previous treatment: felt fine with no pain    Functional change: increased tolerance with weightbearing of " upper extremities    Pain: 4/10   (only when moving it)  Location: L shoulder     OBJECTIVE     Objective Measures updated at progress report unless specified.     TREATMENT     Post-Op Rehab Timeline     11 weeks: 5/23/2022  15 weeks: 6/20/2022  20 weeks: 7/25/2022  24 weeks: 8/22/2022    Gerda received the treatments listed below:       MANUAL THERAPY TECHNIQUES were applied for (15) minutes, including:    Manual Intervention Performed Today    Soft Tissue Mobilization x Superficial soft tissue mobilization to left upper trapezius, levator scapulae , periscapular musculature, pectorals, supraspinatus , infraspinatus , subscapularis  and deltoid   Joint Mobilizations  Grade IV L glenohumeral distraction and inferior glides   Mobilization with movement X  x Subscapularis release with shoulder flexion   Teres major and latissimus release with shoulder abduction         Functional Dry Needling        Plan for Next Visit: PRN       THERAPEUTIC EXERCISES to develop strength, endurance, ROM, flexibility, posture and core stabilization for (6) minutes including:    Intervention Performed Today    UBE X Level 3, 3' forward, 3' backward    Shoulder PROM   5 minutes in all directions    Stretches           Prayer: 3 min each with shoulders in ER and IR  Pectoralis + ER stretch: 5x10s holds   Shoulder internal rotation (strap): 10x10s holds   Pectoralis corner: 10x10s holds   Inferior capsule: 10x10s holds    Bicep curl to overhead press   3# 3x10   Wall push up   3x10     Plan for Next Visit:          NEUROMUSCULAR RE-EDUCATION ACTIVITIES to improve Balance, Coordination, Kinesthetic, Sense, Proprioception and Posture for (29) minutes.  The following were included:    Intervention Performed Today    B Shoulder external rotation   Standing: red band, 2x90s with 4s eccentric release    Shoulder external rotation at 90* abduction  X  x Yellow band ~3 repetitions  Performed in neutral abduction at side of body 2 x 10, yellow  "theraband    Shoulder internal rotation at 90* abduction  x Yellow band 1x10    Prone lower trap isometric x 2', 3" holds   Prone row   7#, 3x10   Prone TYI   TYW: 2#, 3x10 on L   I: 4# 3x10 on L    Wall wash   2x30s each clockwise and counterclockwise    Barrel hugs   20# 3x10    Wall svetlana   3x12   Plank shoulder taps (modified with hands on chair)   2x10 taps B    Serratus slides    20x with no resistance   2x10 with yellow band at wrists    Wall clocks (3 points)   Yellow band 10x B    Plank serratus push up  2x10   Supine serratus punch   7# 2x20   Shoulder external rotation isometric with trunk rotation  Yellow band, 3x8  2x5 with overhead press   Deltoid raises (lateral-forward-lateral)  2# 3x8   Overhead press (assisted on wall)   3# 3x10   Side lying shoulder abduction   Red band 2x10    Standing pull aparts  x Yellow band, 2x10 each horizontal and diagonal (only able to perform diagonally up with R UE today)     Plan for Next Visit:         PATIENT EDUCATION AND HOME EXERCISES     Home Exercises Provided and Patient Education Provided     Education provided: (time included with therex) minutes   Patient educated on biomechanical justification for therapeutic exercise and importance of compliance with HEP in order to improve overall impairments and QOL    Patient was educated on all the above exercise prior/during/after for proper posture, positioning, and execution for safe performance with home exercise program.    Patient educated on the importance of improved core and upper extremity strength in order to improve alignment of the spine and upper extremities with static positions and dynamic movement.    Patient educated on the importance of strong core and lower extremity musculature in order to improve both static and dynamic balance, improve gait mechanics, reduce fall risk and improve household and community mobility.       Written Home Exercises Provided: yes.  Exercises were reviewed and " Gerda was able to demonstrate them prior to the end of the session.  Gerda demonstrated good  understanding of the education provided. See EMR under Patient Instructions for exercises provided during therapy sessions.      ASSESSMENT     Pt tolerated session fairly well today. Treatment was modified today due to patient's recent increase in pain levels. She was limited in AROM flexion, but especially abduction today. Overhead strengthening held. Each exercise performed today was taken slowly, and patient was asked to notify PT if she had any increase in pain levels. She expressed understanding. External rotation was changed back to neutral position instead of 90 degrees abduction. The rest of treatment focused on manual therapy today. Mild guarding noted with PROM. Patient responded well to manual therapy and reported a decrease in pain levels to a 2/10 following treatment today. PT advised patient to continue to monitor s/s and to let us know if anything changes or worsens.     Gerda is progressing well towards her goals.   Pt prognosis is Excellent.     Pt will continue to benefit from skilled outpatient physical therapy to address the deficits listed in the problem list box on initial evaluation, provide pt/family education and to maximize pt's level of independence in the home and community environment.     Pt's spiritual, cultural and educational needs considered and pt agreeable to plan of care and goals.     Anticipated Barriers for therapy: co-morbidities and adherence to treatment plan    GOALS:     Short Term Goals:  6 weeks Progress   3/9/2022   1. Pain: Pt will demonstrate improved pain by reports of less than or equal to 6/10 worst pain on the verbal rating scale in order to progress toward maximal functional ability and improve QOL. Met   4/19/2022   2. Function: Patient will demonstrate improved function as indicated by a functional limitation score of less than or equal to 80 out of 100 on  FOTO. Met  4/29/2022   3. Mobility: Patient will improve AROM to 50% of stated goals, listed in objective measures above, in order to progress towards independence with functional activities.  Met  4/19/2022   4. Strength: Patient will improve strength to 50% of stated goals, listed in objective measures above, in order to progress towards independence with functional activities.  Met  5/23/2022   5. HEP: Patient will demonstrate independence with HEP in order to progress toward functional independence. Met  4/19/2022      Long Term Goals:  12 weeks Progress  3/9/2022   1. Pain: Pt will demonstrate improved pain by reports of less than or equal to 3/10 worst pain on the verbal rating scale in order to progress toward maximal functional ability and improve QOL.   PM  6/8/2022   2. Function: Patient will demonstrate improved function as indicated by a functional limitation score of less than or equal to 66 out of 100 on FOTO. Met  4/29/2022   3. Mobility: Patient will improve AROM to stated goals, listed in objective measures above, in order to return to maximal functional potential and improve quality of life. Met  6/8/2022   4. Strength: Patient will improve strength to stated goals, listed in objective measures above, in order to improve functional independence and quality of life. PM  6/8/2022   5. Patient will return to normal ADL's, IADL's, community involvement, recreational activities, and work-related activities with less than or equal to 3/10 pain and maximal function.  Met  6/8/2022        Goals Key:  PC= progressing/continue; PM= partially met;        DC= discontinue    PLAN     Continue Plan of Care (POC) and progress per patient tolerance. See treatment section for details on planned progressions next session.      Melani Allen, PT

## 2022-08-15 ENCOUNTER — CLINICAL SUPPORT (OUTPATIENT)
Dept: REHABILITATION | Facility: HOSPITAL | Age: 52
End: 2022-08-15
Attending: STUDENT IN AN ORGANIZED HEALTH CARE EDUCATION/TRAINING PROGRAM
Payer: COMMERCIAL

## 2022-08-15 DIAGNOSIS — Z74.09 DECREASED FUNCTIONAL MOBILITY AND ENDURANCE: Primary | ICD-10-CM

## 2022-08-15 DIAGNOSIS — M62.81 PROXIMAL MUSCLE WEAKNESS: ICD-10-CM

## 2022-08-15 DIAGNOSIS — M25.612 DECREASED RANGE OF MOTION OF LEFT SHOULDER: ICD-10-CM

## 2022-08-15 PROCEDURE — 97110 THERAPEUTIC EXERCISES: CPT

## 2022-08-15 PROCEDURE — 97140 MANUAL THERAPY 1/> REGIONS: CPT

## 2022-08-15 PROCEDURE — 97112 NEUROMUSCULAR REEDUCATION: CPT

## 2022-08-15 NOTE — PROGRESS NOTES
[facility]   Physical Therapy Treatment Note     Name: Gerda Obrien  Clinic Number: [id number]    Therapy Diagnosis:   Encounter Diagnoses   Name Primary?    Decreased functional mobility and endurance Yes    Decreased range of motion of left shoulder     Proximal muscle weakness      Physician: Denilson Jackson    Visit Date: 8/15/2022    Physician Orders: PT Eval and Treat  Medical Diagnosis from Referral: Nontraumatic incomplete tear of left rotator cuff  Evaluation Date: 3/9/2022  Authorization Period Expiration: 12/1/2022  Plan of Care Expiration: 9/8/2022  Visit # / Visits authorized: 33/40 (+1 for evaluation)    FOTO: 4/ 3      Precautions: Standard        Surgical Procedure: L shoulder rotator cuff repair, biceps tenodesis, subacromial decompression Date of Surgery: 3/7/2022       Progress Note Due: 8/18/2022      Time In: 1530 (PN + FOTO)   Time Out: 1618  Total Billable Time: 45 minutes      SUBJECTIVE     Patient reports: shoulder pain is slowly improving since recent exacerbation of symptoms. States she has been having a lot of popping and clicking in the shoulder     He/She was compliant with home exercise program.  Response to previous treatment: felt fine with no pain    Functional change: increased tolerance with weightbearing of upper extremities    Pain: 5/10   (only when moving it)  Location: L shoulder     OBJECTIVE     Objective Measures updated at progress report unless specified.     TREATMENT     Post-Op Rehab Timeline     11 weeks: 5/23/2022  15 weeks: 6/20/2022  20 weeks: 7/25/2022  24 weeks: 8/22/2022    Gerda received the treatments listed below:       MANUAL THERAPY TECHNIQUES were applied for (12) minutes, including:    Manual Intervention Performed Today    Soft Tissue Mobilization x Superficial soft tissue mobilization to left upper trapezius, levator scapulae , periscapular musculature, pectorals, supraspinatus , infraspinatus ,  "subscapularis  and deltoid   Joint Mobilizations  Grade IV L glenohumeral distraction and inferior glides   Mobilization with movement  Subscapularis release with shoulder flexion   Teres major and latissimus release with shoulder abduction    Active and passive release  x Infraspinatus    Functional Dry Needling        Plan for Next Visit: PRN       THERAPEUTIC EXERCISES to develop strength, endurance, ROM, flexibility, posture and core stabilization for (10) minutes including:    Intervention Performed Today    UBE  Level 3, 3' forward, 3' backward    Shoulder PROM  x 3 minutes in all directions    Stretches           Prayer: 3 min each with shoulders in ER and IR  Pectoralis + ER stretch: 5x10s holds   Shoulder internal rotation (strap): 10x10s holds   Pectoralis corner: 10x10s holds   Inferior capsule: 10x10s holds    Bicep curl to overhead press   3# 3x10   Wall push up   3x10   Rows  x Green band 3x10    Shoulder extensions  x Red band 3x10           Plan for Next Visit:          NEUROMUSCULAR RE-EDUCATION ACTIVITIES to improve Balance, Coordination, Kinesthetic, Sense, Proprioception and Posture for (23) minutes.  The following were included:    Intervention Performed Today    B Shoulder external rotation   Standing: red band, 2x90s with 4s eccentric release    Shoulder external rotation at 90* abduction   Yellow band ~3 repetitions  Performed in neutral abduction at side of body 2 x 10, yellow theraband    Shoulder internal rotation at 90* abduction   Yellow band 1x10    Prone lower trap isometric  2', 3" holds   Prone row   7#, 3x10   Prone TYI   TYW: 2#, 3x10 on L   I: 4# 3x10 on L    Wall wash  x 2x30s each clockwise and counterclockwise    Barrel hugs   20# 3x10    Wall svetlana   3x12   Plank shoulder taps (modified with hands on chair)   2x10 taps B    Serratus slides    20x with no resistance   2x10 with yellow band at wrists    Wall clocks (3 points)   Yellow band 10x B    Plank serratus push up  2x10 "   Supine serratus punch   7# 2x20   Shoulder external rotation isometric with trunk rotation  Yellow band, 3x8  2x5 with overhead press   Deltoid raises (lateral-forward-lateral)  2# 3x8   Overhead press (assisted on wall)   3# 3x10   Side lying shoulder abduction to 90* x 3x10   Side lying shoulder flexion to 90*  x 3x10    Side lying shoulder external rotation  x 3x10    Standing pull aparts   Yellow band, 2x10 each horizontal and diagonal (only able to perform diagonally up with R UE today)   Supine ABC x 2# ball, 2x    Shoulder walk outs  x Green band 20x each internal and external           Plan for Next Visit:               PATIENT EDUCATION AND HOME EXERCISES     Home Exercises Provided and Patient Education Provided     Education provided: (time included with therex) minutes   Patient educated on biomechanical justification for therapeutic exercise and importance of compliance with HEP in order to improve overall impairments and QOL    Patient was educated on all the above exercise prior/during/after for proper posture, positioning, and execution for safe performance with home exercise program.    Patient educated on the importance of improved core and upper extremity strength in order to improve alignment of the spine and upper extremities with static positions and dynamic movement.    Patient educated on the importance of strong core and lower extremity musculature in order to improve both static and dynamic balance, improve gait mechanics, reduce fall risk and improve household and community mobility.       Written Home Exercises Provided: yes.  Exercises were reviewed and Gerda was able to demonstrate them prior to the end of the session.  Gerda demonstrated good  understanding of the education provided. See EMR under Patient Instructions for exercises provided during therapy sessions.      ASSESSMENT     Pt tolerated session fairly well today. Improved range of motion and pain noted since  previous visit. Significant tenderness to palpation and referral of pain with soft tissue mobilization of periscapular muscles and infraspinatus. Significant reduction in muscle tension noted following manual interventions. Continued regression of interventions due to symptoms. Incorporated walkouts for rotator cuff strength without aggravating symptoms.     Gerda is progressing well towards her goals.   Pt prognosis is Excellent.     Pt will continue to benefit from skilled outpatient physical therapy to address the deficits listed in the problem list box on initial evaluation, provide pt/family education and to maximize pt's level of independence in the home and community environment.     Pt's spiritual, cultural and educational needs considered and pt agreeable to plan of care and goals.     Anticipated Barriers for therapy: co-morbidities and adherence to treatment plan    GOALS:     Short Term Goals:  6 weeks Progress   3/9/2022   1. Pain: Pt will demonstrate improved pain by reports of less than or equal to 6/10 worst pain on the verbal rating scale in order to progress toward maximal functional ability and improve QOL. Met   4/19/2022   2. Function: Patient will demonstrate improved function as indicated by a functional limitation score of less than or equal to 80 out of 100 on FOTO. Met  4/29/2022   3. Mobility: Patient will improve AROM to 50% of stated goals, listed in objective measures above, in order to progress towards independence with functional activities.  Met  4/19/2022   4. Strength: Patient will improve strength to 50% of stated goals, listed in objective measures above, in order to progress towards independence with functional activities.  Met  5/23/2022   5. HEP: Patient will demonstrate independence with HEP in order to progress toward functional independence. Met  4/19/2022      Long Term Goals:  12 weeks Progress  3/9/2022   1. Pain: Pt will demonstrate improved pain by reports of less  than or equal to 3/10 worst pain on the verbal rating scale in order to progress toward maximal functional ability and improve QOL.   PM  6/8/2022   2. Function: Patient will demonstrate improved function as indicated by a functional limitation score of less than or equal to 66 out of 100 on FOTO. Met  4/29/2022   3. Mobility: Patient will improve AROM to stated goals, listed in objective measures above, in order to return to maximal functional potential and improve quality of life. Met  6/8/2022   4. Strength: Patient will improve strength to stated goals, listed in objective measures above, in order to improve functional independence and quality of life. PM  6/8/2022   5. Patient will return to normal ADL's, IADL's, community involvement, recreational activities, and work-related activities with less than or equal to 3/10 pain and maximal function.  Met  6/8/2022        Goals Key:  PC= progressing/continue; PM= partially met;        DC= discontinue    PLAN     Continue Plan of Care (POC) and progress per patient tolerance. See treatment section for details on planned progressions next session.      Analilia Son, PT

## 2022-08-22 ENCOUNTER — CLINICAL SUPPORT (OUTPATIENT)
Dept: REHABILITATION | Facility: HOSPITAL | Age: 52
End: 2022-08-22
Attending: STUDENT IN AN ORGANIZED HEALTH CARE EDUCATION/TRAINING PROGRAM
Payer: COMMERCIAL

## 2022-08-22 DIAGNOSIS — Z74.09 DECREASED FUNCTIONAL MOBILITY AND ENDURANCE: Primary | ICD-10-CM

## 2022-08-22 DIAGNOSIS — M62.81 PROXIMAL MUSCLE WEAKNESS: ICD-10-CM

## 2022-08-22 DIAGNOSIS — M25.612 DECREASED RANGE OF MOTION OF LEFT SHOULDER: ICD-10-CM

## 2022-08-22 PROCEDURE — 97140 MANUAL THERAPY 1/> REGIONS: CPT

## 2022-08-22 PROCEDURE — 97110 THERAPEUTIC EXERCISES: CPT

## 2022-08-22 PROCEDURE — 97112 NEUROMUSCULAR REEDUCATION: CPT

## 2022-08-22 NOTE — Clinical Note
Adalgisa Jackson,   Just wanted to give an update on Ms. Lorenz. She is doing really well and only really has limitations with strength in overhead positions but has improved significantly with this. I have only been seeing her once a week but we are decreasing frequency further to once every other week due to her financial concerns. It doesn't look like she has any other follow-ups with you. Did you want her to schedule something with you?

## 2022-08-24 NOTE — PROGRESS NOTES
"[facility]   Physical Therapy Treatment Note / Plan of Care Update    Name: Gerda Obrien  Clinic Number: [id number]    Therapy Diagnosis:   Encounter Diagnoses   Name Primary?    Decreased functional mobility and endurance Yes    Decreased range of motion of left shoulder     Proximal muscle weakness      Physician: Denilson Jackson    Visit Date: 8/22/2022    Physician Orders: PT Eval and Treat  Medical Diagnosis from Referral: Nontraumatic incomplete tear of left rotator cuff  Evaluation Date: 3/9/2022  Authorization Period Expiration: 12/1/2022  Plan of Care Expiration: 9/8/2022   Visit # / Visits authorized: 34/40 (+1 for evaluation)    FOTO: 4/ 3      Precautions: Standard        Surgical Procedure: L shoulder rotator cuff repair, biceps tenodesis, subacromial decompression Date of Surgery: 3/7/2022       Progress Note Due: 8/18/2022      Time In: 1530   Time Out: 1629  Total Billable Time: 55 minutes      SUBJECTIVE     Patient reports: her shoulder has been feeling great and pt states that it feels "back to normal" after recent exacerbation of symptoms. Pt would like to decrease frequency of visits due to financial concerns. States overhead strength is getting easier with less pain and tension     He/She was compliant with home exercise program.  Response to previous treatment: felt fine with no pain    Functional change: increased tolerance with weightbearing of upper extremities    Pain: 0/10   Location: L shoulder     OBJECTIVE     Objective Measures updated at progress report unless specified.     RANGE OF MOTION:     Cervical Right   (spine) Left     Pain/Dysfunction with Movement Goal   Cervical Flexion  100% ---       Cervical Extension  100% ---       Cervical Side Bending  100% 100%   100%   Cervical Rotation  100% 100%          Shoulder PROM Right Left Pain/Dysfunction with Movement Goal   Shoulder Flexion (180) 175/175 175/175   170   Shoulder " Extension (60) 60/60 60/60   60   Shoulder Abduction (180) 175 170/175   170   Shoulder ER (90) 90 90 at 90* abduction    80   Shoulder IR (70) 70 70 at 90* abduction   60         STRENGTH:     U/E MMT Right Left Pain/Dysfunction with Movement Goal   Shoulder Flexion 4/5 4/5   4+/5 B   Shoulder Extension 5/5 5/5   4+/5 B   Shoulder Abduction 4/5 4/5   4+/5 B   Shoulder IR 5/5 5/5   4+/5 B   Shoulder ER    4+/5 4+/5   4+/5 B   Elbow Flexion  5/5 5/5   5/5 B   Elbow Extension 5/5 5/5   5/5 B   Wrist Flexion 5/5 5/5   5/5 B   Wrist Extension 5/5 5/5   5/5 B         Palpation: Increased tone and tenderness noted with palpation of L periscapular region, upper trapezius, levator scapulae, rotator cuff musculature, biceps and deltoid      Posture:  Pt presents with postural abnormalities which include: forward head and rounded shoulders      Movement Analysis: pt continues to demonstrate improved fluidity of movement and decreased pain with overhead elevation.     FUNCTION:       TREATMENT     Gerda received the treatments listed below:       MANUAL THERAPY TECHNIQUES were applied for (12) minutes, including:    Manual Intervention Performed Today    Soft Tissue Mobilization x Superficial soft tissue mobilization to left upper trapezius, levator scapulae , periscapular musculature, pectorals, supraspinatus , infraspinatus , subscapularis  and deltoid   Joint Mobilizations  Grade IV L glenohumeral distraction and inferior glides   Mobilization with movement  Subscapularis release with shoulder flexion   Teres major and latissimus release with shoulder abduction    Active and passive release  x Infraspinatus, teres major, teres minor, subscapularis    Functional Dry Needling        Plan for Next Visit: PRN       THERAPEUTIC EXERCISES to develop strength, endurance, ROM, flexibility, posture and core stabilization for (20) minutes including:    Intervention Performed Today    UBE x Level 3, 3' forward, 3' backward     Shoulder PROM   3 minutes in all directions    Stretches        X  x Prayer: 3 min each with shoulders in ER and IR  Pectoralis + ER stretch: 5x10s holds   Shoulder internal rotation (strap): 10x10s holds   Pectoralis corner: 10x10s holds   Inferior capsule: 10x10s holds    Bicep curl to overhead press  x 3# 3x10   Wall push up   3x10   Rows  x Green band 3x10    Shoulder extensions  x Red band 3x10           Plan for Next Visit:          NEUROMUSCULAR RE-EDUCATION ACTIVITIES to improve Balance, Coordination, Kinesthetic, Sense, Proprioception and Posture for (23) minutes.  The following were included:    Intervention Performed Today    B Shoulder external rotation   Standing: red band, 2x90s with 4s eccentric release    Shoulder external rotation at 90* abduction   Yellow band ~3 repetitions  Performed in neutral abduction at side of body 2 x 10, yellow theraband    Shoulder internal rotation at 90* abduction   Yellow band 1x10    Prone row   7#, 3x10   Prone TYI  X  x TYW: 2#, 3x10 on L   I: 4# 3x10 on L    Wall wash  x 2x30s each clockwise and counterclockwise    Wall svetlana  x Yellow band 3x10   Plank shoulder taps (modified with hands on chair)  x 2x10 taps B    Serratus slides x 3x10 with yellow band at wrists    Wall clocks (3 points)   Yellow band 10x B    Plank serratus push up  2x10   Shoulder external rotation isometric with trunk rotation and overhead press  x Yellow band 3x10    Deltoid raises (lateral-forward-lateral)  2# 3x8   Overhead press (assisted on wall)   3# 3x10   Side lying shoulder abduction to 90*  3x10   Side lying shoulder flexion to 90*   3x10    Side lying shoulder external rotation   3x10    Standing pull aparts  x Red  band, 2x10 each horizontal and diagonal (only able to perform diagonally up with R UE today)   Supine ABC x 2# ball, 2x    Shoulder walk outs  x Green band 20x each internal and external           Plan for Next Visit:               PATIENT EDUCATION AND HOME EXERCISES      Home Exercises Provided and Patient Education Provided     Education provided: (time included with therex) minutes  Patient educated on biomechanical justification for therapeutic exercise and importance of compliance with HEP in order to improve overall impairments and QOL   Patient was educated on all the above exercise prior/during/after for proper posture, positioning, and execution for safe performance with home exercise program.   Patient educated on the importance of improved core and upper extremity strength in order to improve alignment of the spine and upper extremities with static positions and dynamic movement.   Patient educated on the importance of strong core and lower extremity musculature in order to improve both static and dynamic balance, improve gait mechanics, reduce fall risk and improve household and community mobility.       Written Home Exercises Provided: yes.  Exercises were reviewed and Gerda was able to demonstrate them prior to the end of the session.  Gerda demonstrated good  understanding of the education provided. See EMR under Patient Instructions for exercises provided during therapy sessions.      ASSESSMENT     Pt tolerated session well today. She reports improved strength during shoulder external rotation isometric with overhead press. Improved form noted during bicep curl with overhead press. A progress note was completed today with significant improvements noted in shoulder ROM, gross upper extremity strength and movement coordination compared to initial evaluation; please see exam for details. Gerda continues to have difficulty with shoulder strength at end range elevation; however, this .  FOTO scores noted above indicate the patients perceived functional levels are improving since prior assessment. The above impairments and functional deficits will continue to be addressed over the course of this plan of care. Gerda was educated on continued progression of  PT, expectations for the duration of care, updates on goals set at initial evaluation, and home exercise program.  Gerda will continue to benefit from physical therapy in order to further address goals, maximize function and quality of life.       Gerda is progressing well towards her goals.   Pt prognosis is Excellent.     Pt will continue to benefit from skilled outpatient physical therapy to address the deficits listed in the problem list box on initial evaluation, provide pt/family education and to maximize pt's level of independence in the home and community environment.     Pt's spiritual, cultural and educational needs considered and pt agreeable to plan of care and goals.     Anticipated Barriers for therapy: co-morbidities and adherence to treatment plan    GOALS:     Short Term Goals:  6 weeks Progress   3/9/2022   Pain: Pt will demonstrate improved pain by reports of less than or equal to 6/10 worst pain on the verbal rating scale in order to progress toward maximal functional ability and improve QOL. Met   4/19/2022   Function: Patient will demonstrate improved function as indicated by a functional limitation score of less than or equal to 80 out of 100 on FOTO. Met  4/29/2022   Mobility: Patient will improve AROM to 50% of stated goals, listed in objective measures above, in order to progress towards independence with functional activities.  Met  4/19/2022   Strength: Patient will improve strength to 50% of stated goals, listed in objective measures above, in order to progress towards independence with functional activities.  Met  5/23/2022   HEP: Patient will demonstrate independence with HEP in order to progress toward functional independence. Met  4/19/2022      Long Term Goals:  12 weeks Progress  3/9/2022   Pain: Pt will demonstrate improved pain by reports of less than or equal to 3/10 worst pain on the verbal rating scale in order to progress toward maximal functional ability and improve  QOL.   PM  6/8/2022   Function: Patient will demonstrate improved function as indicated by a functional limitation score of less than or equal to 66 out of 100 on FOTO. Met  4/29/2022   Mobility: Patient will improve AROM to stated goals, listed in objective measures above, in order to return to maximal functional potential and improve quality of life. Met  6/8/2022   Strength: Patient will improve strength to stated goals, listed in objective measures above, in order to improve functional independence and quality of life. PM  6/8/2022   Patient will return to normal ADL's, IADL's, community involvement, recreational activities, and work-related activities with less than or equal to 3/10 pain and maximal function.  Met  6/8/2022        Goals Key:  PC= progressing/continue; PM= partially met;        DC= discontinue    PLAN     Plan of care Certification: 8/22/2022 to 10/22/2022.     Outpatient Physical Therapy 1 times every other week for 8 weeks to include any combination of the following interventions: virtual visits, dry needling, modalities, electrical stimulation (IFC, Pre-Mod, Attended with Functional Dry Needling), Cervical/Lumbar Traction, Manual Therapy, Neuromuscular Re-ed, Patient Education, Self Care, Therapeutic Exercise and Therapeutic Activites      Decrease frequency to one visit every other week.       Analilia Son, PT

## 2022-09-09 ENCOUNTER — TELEPHONE (OUTPATIENT)
Dept: REHABILITATION | Facility: HOSPITAL | Age: 52
End: 2022-09-09
Payer: COMMERCIAL

## 2022-09-09 ENCOUNTER — DOCUMENTATION ONLY (OUTPATIENT)
Dept: REHABILITATION | Facility: HOSPITAL | Age: 52
End: 2022-09-09
Payer: COMMERCIAL

## 2022-09-09 NOTE — PROGRESS NOTES
Patient arrived 20 minutes late for her PT appointment today. When she arrived, she stated that she forgot about the appointment but was luckily in the area. I let patient know that I could definitely still see her; however, it would be a shortened session due to late arrival. She stated that she understood and would rather reschedule today's visit as she did not want to pay a copay without a full treatment. She is scheduled for every other week, but PT asked if she would like to reschedule for next week since she did not want to stay today. Patient reports that she is doing very well and would like to think about if she needs to reschedule to next week or if she will wait until her next regularly scheduled appointment. She will contact us if she decides to schedule for next week.   Melani Allen, PT, DPT

## 2022-10-04 ENCOUNTER — CLINICAL SUPPORT (OUTPATIENT)
Dept: REHABILITATION | Facility: HOSPITAL | Age: 52
End: 2022-10-04
Payer: COMMERCIAL

## 2022-10-04 DIAGNOSIS — M62.81 PROXIMAL MUSCLE WEAKNESS: ICD-10-CM

## 2022-10-04 DIAGNOSIS — M25.612 DECREASED RANGE OF MOTION OF LEFT SHOULDER: ICD-10-CM

## 2022-10-04 DIAGNOSIS — Z74.09 DECREASED FUNCTIONAL MOBILITY AND ENDURANCE: Primary | ICD-10-CM

## 2022-10-04 PROCEDURE — 97110 THERAPEUTIC EXERCISES: CPT

## 2022-10-04 PROCEDURE — 97140 MANUAL THERAPY 1/> REGIONS: CPT

## 2022-10-04 PROCEDURE — 97112 NEUROMUSCULAR REEDUCATION: CPT

## 2022-10-04 NOTE — PROGRESS NOTES
[facility]   Physical Therapy Treatment Note / Progress Note    Name: Gerda Obrien  Clinic Number: [id number]    Therapy Diagnosis:   Encounter Diagnoses   Name Primary?    Decreased functional mobility and endurance Yes    Decreased range of motion of left shoulder     Proximal muscle weakness      Physician: Denilson Jackson    Visit Date: 10/4/2022    Physician Orders: PT Eval and Treat  Medical Diagnosis from Referral: Nontraumatic incomplete tear of left rotator cuff  Evaluation Date: 3/9/2022  Authorization Period Expiration: 12/1/2022  Plan of Care Expiration: 10/22/2022   Visit # / Visits authorized: 35/40 (+1 for evaluation)    FOTO: 5/ 3      Precautions: Standard        Surgical Procedure: L shoulder rotator cuff repair, biceps tenodesis, subacromial decompression Date of Surgery: 3/7/2022       Progress Note Due: 8/18/2022      Time In: 0910 (discharge next session)  Time Out: 1005  Total Billable Time: 53 minutes      SUBJECTIVE     Patient reports: she has her good and bad days but states that the shoulder feels pretty good overall. Continues to have occasional popping in the shoulder     He/She was compliant with home exercise program.  Response to previous treatment: felt fine with no pain    Functional change: increased tolerance with weightbearing of upper extremities    Pain: 0/10   Location: L shoulder     OBJECTIVE     Objective Measures updated at progress report unless specified.     RANGE OF MOTION:     Cervical Right   (spine) Left     Pain/Dysfunction with Movement Goal   Cervical Flexion  100% ---       Cervical Extension  100% ---       Cervical Side Bending  100% 100%   100%   Cervical Rotation  100% 100%          Shoulder PROM Right Left Pain/Dysfunction with Movement Goal   Shoulder Flexion (180) 175/175 175/175   170   Shoulder Extension (60) 60/60 60/60   60   Shoulder Abduction (180) 175 170/175   170   Shoulder ER (90) 90 90 at 90*  abduction    80   Shoulder IR (70) 70 70 at 90* abduction   60         STRENGTH:     U/E MMT Right Left Pain/Dysfunction with Movement Goal   Shoulder Flexion 4/5 4/5   4+/5 B   Shoulder Extension 5/5 5/5   4+/5 B   Shoulder Abduction 4/5 4/5   4+/5 B   Shoulder IR 5/5 5/5   4+/5 B   Shoulder ER    4+/5 4+/5   4+/5 B   Elbow Flexion  5/5 5/5   5/5 B   Elbow Extension 5/5 5/5   5/5 B   Wrist Flexion 5/5 5/5   5/5 B   Wrist Extension 5/5 5/5   5/5 B         Palpation: Increased tone and tenderness noted with palpation of L periscapular region, upper trapezius, levator scapulae, rotator cuff musculature, biceps and deltoid      Posture:  Pt presents with postural abnormalities which include: forward head and rounded shoulders      Movement Analysis: pt continues to demonstrate improved fluidity of movement and decreased pain with overhead elevation.     FUNCTION:       TREATMENT     Gerda received the treatments listed below:       MANUAL THERAPY TECHNIQUES were applied for (12) minutes, including:    Manual Intervention Performed Today    Soft Tissue Mobilization x Superficial soft tissue mobilization to left upper trapezius, levator scapulae , periscapular musculature, pectorals, supraspinatus , infraspinatus , subscapularis  and deltoid   Joint Mobilizations  Grade IV L glenohumeral distraction and inferior glides   Mobilization with movement  Subscapularis release with shoulder flexion   Teres major and latissimus release with shoulder abduction    Active and passive release  x Infraspinatus, teres major, teres minor, subscapularis    Functional Dry Needling        Plan for Next Visit: PRN       THERAPEUTIC EXERCISES to develop strength, endurance, ROM, flexibility, posture and core stabilization for (18) minutes including:    Intervention Performed Today    UBE x Level 3, 3' forward, 3' backward    Shoulder PROM  x 3 minutes in all directions    Stretches        X  x Prayer: 3 min each with shoulders in ER and  IR  Pectoralis + ER stretch: 5x10s holds   Shoulder internal rotation (strap): 10x10s holds   Pectoralis corner: 10x10s holds   Inferior capsule: 10x10s holds    Bicep curl to overhead press  x 3# 3x10   Wall push up  x 3x10   Rows   Green band 3x10    Shoulder extensions   Red band 3x10           Plan for Next Visit:          NEUROMUSCULAR RE-EDUCATION ACTIVITIES to improve Balance, Coordination, Kinesthetic, Sense, Proprioception and Posture for (23) minutes.  The following were included:    Intervention Performed Today    B Shoulder external rotation   Standing: red band, 2x90s with 4s eccentric release    Shoulder external rotation at 90* abduction   Yellow band ~3 repetitions  Performed in neutral abduction at side of body 2 x 10, yellow theraband    Shoulder internal rotation at 90* abduction   Yellow band 1x10    Prone row   7#, 3x10   Prone TYI  X  x TYW: 2#, 3x10 on L   I: 4# 3x10 on L    Wall wash   2x30s each clockwise and counterclockwise    Wall svetlana  x Yellow band 3x10   Plank shoulder taps (modified with hands on chair)  x 2x10 taps B    Serratus slides  3x10 with yellow band at wrists    Wall clocks (3 points)  x Yellow band 10x B    Plank serratus push up  2x10   Shoulder external rotation isometric with trunk rotation and overhead press  x Yellow band 3x10    Deltoid raises (lateral-forward-lateral)  2# 3x8   Overhead press (assisted on wall)   3# 3x10   Side lying shoulder abduction to 90*  3x10   Side lying shoulder flexion to 90*   3x10    Side lying shoulder external rotation   3x10    Standing pull aparts  x Red  band, 2x10 each horizontal and diagonal (only able to perform diagonally up with R UE today)   Supine ABC  2# ball, 2x    Shoulder walk outs   Green band 20x each internal and external           Plan for Next Visit:               PATIENT EDUCATION AND HOME EXERCISES     Home Exercises Provided and Patient Education Provided     Education provided: (time included with therex)  minutes  Patient educated on biomechanical justification for therapeutic exercise and importance of compliance with HEP in order to improve overall impairments and QOL   Patient was educated on all the above exercise prior/during/after for proper posture, positioning, and execution for safe performance with home exercise program.   Patient educated on the importance of improved core and upper extremity strength in order to improve alignment of the spine and upper extremities with static positions and dynamic movement.   Patient educated on the importance of strong core and lower extremity musculature in order to improve both static and dynamic balance, improve gait mechanics, reduce fall risk and improve household and community mobility.       Written Home Exercises Provided: yes.  Exercises were reviewed and Gerda was able to demonstrate them prior to the end of the session.  Gerda demonstrated good  understanding of the education provided. See EMR under Patient Instructions for exercises provided during therapy sessions.      ASSESSMENT     Pt tolerated session well today. No significant changes in interventions performed due to pt not having been to therapy in over a month. Significant fatigue reported with previously performed interventions. A progress note was completed today with no significant changes noted since prior assessment since pt has not attended therapy since prior assessment. FOTO scores noted above indicate the patients perceived functional levels are improving since prior assessment. Pt has one more visit in two weeks and will then be discharged from PT due to maximum function achieved.         Gerda is progressing well towards her goals.   Pt prognosis is Excellent.     Pt will continue to benefit from skilled outpatient physical therapy to address the deficits listed in the problem list box on initial evaluation, provide pt/family education and to maximize pt's level of independence in  the home and community environment.     Pt's spiritual, cultural and educational needs considered and pt agreeable to plan of care and goals.     Anticipated Barriers for therapy: co-morbidities and adherence to treatment plan    GOALS:     Short Term Goals:  6 weeks Progress   3/9/2022   Pain: Pt will demonstrate improved pain by reports of less than or equal to 6/10 worst pain on the verbal rating scale in order to progress toward maximal functional ability and improve QOL. Met   4/19/2022   Function: Patient will demonstrate improved function as indicated by a functional limitation score of less than or equal to 80 out of 100 on FOTO. Met  4/29/2022   Mobility: Patient will improve AROM to 50% of stated goals, listed in objective measures above, in order to progress towards independence with functional activities.  Met  4/19/2022   Strength: Patient will improve strength to 50% of stated goals, listed in objective measures above, in order to progress towards independence with functional activities.  Met  5/23/2022   HEP: Patient will demonstrate independence with HEP in order to progress toward functional independence. Met  4/19/2022      Long Term Goals:  12 weeks Progress  3/9/2022   Pain: Pt will demonstrate improved pain by reports of less than or equal to 3/10 worst pain on the verbal rating scale in order to progress toward maximal functional ability and improve QOL.   PM  6/8/2022   Function: Patient will demonstrate improved function as indicated by a functional limitation score of less than or equal to 66 out of 100 on FOTO. Met  4/29/2022   Mobility: Patient will improve AROM to stated goals, listed in objective measures above, in order to return to maximal functional potential and improve quality of life. Met  6/8/2022   Strength: Patient will improve strength to stated goals, listed in objective measures above, in order to improve functional independence and quality of life. PM  6/8/2022   Patient  will return to normal ADL's, IADL's, community involvement, recreational activities, and work-related activities with less than or equal to 3/10 pain and maximal function.  Met  6/8/2022        Goals Key:  PC= progressing/continue; PM= partially met;        DC= discontinue    PLAN     Discharge next session         Analilia Son PT

## 2022-10-18 ENCOUNTER — CLINICAL SUPPORT (OUTPATIENT)
Dept: REHABILITATION | Facility: HOSPITAL | Age: 52
End: 2022-10-18
Payer: COMMERCIAL

## 2022-10-18 DIAGNOSIS — M25.612 DECREASED RANGE OF MOTION OF LEFT SHOULDER: ICD-10-CM

## 2022-10-18 DIAGNOSIS — M62.81 PROXIMAL MUSCLE WEAKNESS: ICD-10-CM

## 2022-10-18 DIAGNOSIS — Z74.09 DECREASED FUNCTIONAL MOBILITY AND ENDURANCE: Primary | ICD-10-CM

## 2022-10-18 PROCEDURE — 97140 MANUAL THERAPY 1/> REGIONS: CPT

## 2022-10-18 PROCEDURE — 97112 NEUROMUSCULAR REEDUCATION: CPT

## 2022-10-18 PROCEDURE — 97110 THERAPEUTIC EXERCISES: CPT

## 2022-10-18 NOTE — PROGRESS NOTES
[facility]   Physical Therapy Treatment Note / Progress Note    Name: Gerda Obrien  Clinic Number: [id number]    Therapy Diagnosis:   Encounter Diagnoses   Name Primary?    Decreased functional mobility and endurance Yes    Decreased range of motion of left shoulder     Proximal muscle weakness        Physician: Denilson Jackson    Visit Date: 10/18/2022    Physician Orders: PT Eval and Treat  Medical Diagnosis from Referral: Nontraumatic incomplete tear of left rotator cuff  Evaluation Date: 3/9/2022  Authorization Period Expiration: 12/1/2022  Plan of Care Expiration: 10/22/2022   Visit # / Visits authorized: 36/40 (+1 for evaluation)    FOTO: 5/ 3      Precautions: Standard        Surgical Procedure: L shoulder rotator cuff repair, biceps tenodesis, subacromial decompression Date of Surgery: 3/7/2022       Progress Note Due: 8/18/2022      Time In: 1500   Time Out: 1600  Total Billable Time: 53 minutes      SUBJECTIVE     Patient reports: she has her good and bad days but states that the shoulder feels pretty good overall. She is ready for discharge today     He/She was compliant with home exercise program.  Response to previous treatment: felt fine with no pain    Functional change: increased tolerance with weightbearing of upper extremities    Pain: 0/10   Location: L shoulder     OBJECTIVE     Objective Measures updated at progress report unless specified.     Flexion 160  Abduction 145  ER: T3  INTERNAL ROTATION T12       RANGE OF MOTION:     Cervical Right   (spine) Left     Pain/Dysfunction with Movement Goal   Cervical Flexion  100% ---       Cervical Extension  100% ---       Cervical Side Bending  100% 100%   100%   Cervical Rotation  100% 100%          Shoulder PROM Right Left Pain/Dysfunction with Movement Goal   Shoulder Flexion (180) 175/175 175/175   170   Shoulder Extension (60) 60/60 60/60   60   Shoulder Abduction (180) 175 170/175   170   Shoulder  ER (90) 90 90    80   Shoulder IR (70) 70 70    60         STRENGTH:     U/E MMT Right Left Pain/Dysfunction with Movement Goal   Shoulder Flexion 4+/5 4+/5   4+/5 B   Shoulder Extension 5/5 5/5   4+/5 B   Shoulder Abduction 4+/5 4+/5   4+/5 B   Shoulder IR 5/5 5/5   4+/5 B   Shoulder ER    4+/5 4+/5   4+/5 B   Elbow Flexion  5/5 5/5   5/5 B   Elbow Extension 5/5 5/5   5/5 B   Wrist Flexion 5/5 5/5   5/5 B   Wrist Extension 5/5 5/5   5/5 B         FUNCTION:       TREATMENT     Gerda received the treatments listed below:       MANUAL THERAPY TECHNIQUES were applied for (10) minutes, including:    Manual Intervention Performed Today    Soft Tissue Mobilization x Superficial soft tissue mobilization to left upper trapezius, levator scapulae , periscapular musculature, pectorals, supraspinatus , infraspinatus , subscapularis  and deltoid   Joint Mobilizations  Grade IV L glenohumeral distraction and inferior glides   Mobilization with movement  Subscapularis release with shoulder flexion   Teres major and latissimus release with shoulder abduction    Active and passive release  x Infraspinatus, teres major, teres minor, subscapularis    Functional Dry Needling        Plan for Next Visit: PRN       THERAPEUTIC EXERCISES to develop strength, endurance, ROM, flexibility, posture and core stabilization for (20) minutes including:    Intervention Performed Today    UBE x Level 3, 3' forward, 3' backward    Shoulder PROM  x 3 minutes in all directions    Stretches        X  x Prayer: 3 min each with shoulders in ER and IR  Pectoralis + ER stretch: 5x10s holds   Shoulder internal rotation (strap): 10x10s holds   Pectoralis corner: 10x10s holds   Inferior capsule: 10x10s holds    Bicep curl to overhead press   3# 3x10   Wall push up  x 3x10   Rows   Green band 3x10    Shoulder extensions   Red band 3x10    Scaption  x 2# 3x10      Plan for Next Visit:          NEUROMUSCULAR RE-EDUCATION ACTIVITIES to improve Balance,  Coordination, Kinesthetic, Sense, Proprioception and Posture for (23) minutes.  The following were included:    Intervention Performed Today    B Shoulder external rotation  x Green band 3x10     Shoulder external rotation at 90* abduction   Yellow band ~3 repetitions  Performed in neutral abduction at side of body 2 x 10, yellow theraband    Shoulder internal rotation at 90* abduction   Yellow band 1x10    Prone row   7#, 3x10   Prone TYI   TYW: 2#, 3x10 on L   I: 4# 3x10 on L    Wall wash   2x30s each clockwise and counterclockwise    Wall svetlana   Yellow band 3x10   Plank shoulder taps (modified with hands on chair)   2x10 taps B    Serratus slides  3x10 with yellow band at wrists    Wall clocks (3 points)   Yellow band 10x B    Plank serratus push up  2x10   Shoulder external rotation isometric with trunk rotation and overhead press   Yellow band 3x10    Deltoid raises (lateral-forward-lateral)  2# 3x8   Overhead press (assisted on wall)   3# 3x10   Side lying shoulder abduction to 90*  3x10   Side lying shoulder flexion to 90*   3x10    Side lying shoulder external rotation   3x10    Standing pull aparts  x Red  band, 2x10 each horizontal and diagonal (only able to perform diagonally up with R UE today)   Supine ABC  2# ball, 2x    Shoulder walk outs   Green band 20x each internal and external    Rows  x blue band 3x10     Shoulder extension  x blue band 3x10     Shoulder internal rotation  x blue band 3x10                 Plan for Next Visit:               PATIENT EDUCATION AND HOME EXERCISES     Home Exercises Provided and Patient Education Provided     Education provided: (time included with therex) minutes  Patient educated on biomechanical justification for therapeutic exercise and importance of compliance with HEP in order to improve overall impairments and QOL   Patient was educated on all the above exercise prior/during/after for proper posture, positioning, and execution for safe performance with home  exercise program.   Patient educated on the importance of improved core and upper extremity strength in order to improve alignment of the spine and upper extremities with static positions and dynamic movement.   Patient educated on the importance of strong core and lower extremity musculature in order to improve both static and dynamic balance, improve gait mechanics, reduce fall risk and improve household and community mobility.       Written Home Exercises Provided: yes.  Exercises were reviewed and Gerda was able to demonstrate them prior to the end of the session.  Gerda demonstrated good  understanding of the education provided. See EMR under Patient Instructions for exercises provided during therapy sessions.      ASSESSMENT     Pt tolerated session well today. We spent today's session reviewing previously performed exercises as a part of discharge HEP; pt demonstrated good understanding of all interventions. An assessment was completed today with significant improvements noted in gross upper extremity strength compared to prior assessment; please see exam for details. Gerda reports that she has no functional limitations and feels ready for discharge.  FOTO scores noted above indicate the patient has exceeded predicted functional levels. The pt has reached max potential in physical therapy and will be discharged today.           Gerda is progressing well towards her goals.   Pt prognosis is Excellent.     Pt will continue to benefit from skilled outpatient physical therapy to address the deficits listed in the problem list box on initial evaluation, provide pt/family education and to maximize pt's level of independence in the home and community environment.     Pt's spiritual, cultural and educational needs considered and pt agreeable to plan of care and goals.     Anticipated Barriers for therapy: co-morbidities and adherence to treatment plan    GOALS:     Short Term Goals:  6 weeks Progress    3/9/2022   Pain: Pt will demonstrate improved pain by reports of less than or equal to 6/10 worst pain on the verbal rating scale in order to progress toward maximal functional ability and improve QOL. Met   4/19/2022   Function: Patient will demonstrate improved function as indicated by a functional limitation score of less than or equal to 80 out of 100 on FOTO. Met  4/29/2022   Mobility: Patient will improve AROM to 50% of stated goals, listed in objective measures above, in order to progress towards independence with functional activities.  Met  4/19/2022   Strength: Patient will improve strength to 50% of stated goals, listed in objective measures above, in order to progress towards independence with functional activities.  Met  5/23/2022   HEP: Patient will demonstrate independence with HEP in order to progress toward functional independence. Met  4/19/2022      Long Term Goals:  12 weeks Progress  3/9/2022   Pain: Pt will demonstrate improved pain by reports of less than or equal to 3/10 worst pain on the verbal rating scale in order to progress toward maximal functional ability and improve QOL.   PM  6/8/2022   Function: Patient will demonstrate improved function as indicated by a functional limitation score of less than or equal to 66 out of 100 on FOTO. Met  4/29/2022   Mobility: Patient will improve AROM to stated goals, listed in objective measures above, in order to return to maximal functional potential and improve quality of life. Met  6/8/2022   Strength: Patient will improve strength to stated goals, listed in objective measures above, in order to improve functional independence and quality of life. PM  6/8/2022   Patient will return to normal ADL's, IADL's, community involvement, recreational activities, and work-related activities with less than or equal to 3/10 pain and maximal function.  Met  6/8/2022        Goals Key:  PC= progressing/continue; PM= partially met;        DC=  discontinue    PLAN     Patient discharged from physical therapy         Analilia Son, PT

## 2022-10-21 PROBLEM — M25.612 DECREASED RANGE OF MOTION OF LEFT SHOULDER: Status: RESOLVED | Noted: 2022-03-10 | Resolved: 2022-10-21

## 2022-10-21 PROBLEM — M62.81 PROXIMAL MUSCLE WEAKNESS: Status: RESOLVED | Noted: 2022-03-10 | Resolved: 2022-10-21

## 2022-10-21 PROBLEM — Z74.09 DECREASED FUNCTIONAL MOBILITY AND ENDURANCE: Status: RESOLVED | Noted: 2022-03-10 | Resolved: 2022-10-21

## 2022-12-01 ENCOUNTER — OFFICE VISIT (OUTPATIENT)
Dept: INTERNAL MEDICINE | Facility: CLINIC | Age: 52
End: 2022-12-01
Payer: COMMERCIAL

## 2022-12-01 ENCOUNTER — LAB VISIT (OUTPATIENT)
Dept: LAB | Facility: HOSPITAL | Age: 52
End: 2022-12-01
Attending: INTERNAL MEDICINE
Payer: COMMERCIAL

## 2022-12-01 VITALS
DIASTOLIC BLOOD PRESSURE: 74 MMHG | HEIGHT: 62 IN | WEIGHT: 209.69 LBS | HEART RATE: 82 BPM | SYSTOLIC BLOOD PRESSURE: 109 MMHG | BODY MASS INDEX: 38.59 KG/M2 | TEMPERATURE: 98 F

## 2022-12-01 DIAGNOSIS — M79.10 MYALGIA: ICD-10-CM

## 2022-12-01 DIAGNOSIS — R53.83 FATIGUE, UNSPECIFIED TYPE: ICD-10-CM

## 2022-12-01 DIAGNOSIS — Z86.39 HISTORY OF IRON DEFICIENCY: ICD-10-CM

## 2022-12-01 DIAGNOSIS — U09.9 LONG COVID: Primary | ICD-10-CM

## 2022-12-01 DIAGNOSIS — M25.50 COVID-19 LONG HAULER MANIFESTING CHRONIC JOINT PAIN: ICD-10-CM

## 2022-12-01 DIAGNOSIS — G89.29 COVID-19 LONG HAULER MANIFESTING CHRONIC JOINT PAIN: ICD-10-CM

## 2022-12-01 DIAGNOSIS — U09.9 COVID-19 LONG HAULER MANIFESTING CHRONIC JOINT PAIN: ICD-10-CM

## 2022-12-01 LAB
ALBUMIN SERPL BCP-MCNC: 3.8 G/DL (ref 3.5–5.2)
ALP SERPL-CCNC: 102 U/L (ref 55–135)
ALT SERPL W/O P-5'-P-CCNC: 16 U/L (ref 10–44)
ANION GAP SERPL CALC-SCNC: 7 MMOL/L (ref 8–16)
AST SERPL-CCNC: 16 U/L (ref 10–40)
BASOPHILS # BLD AUTO: 0.02 K/UL (ref 0–0.2)
BASOPHILS NFR BLD: 0.4 % (ref 0–1.9)
BILIRUB SERPL-MCNC: 0.3 MG/DL (ref 0.1–1)
BUN SERPL-MCNC: 19 MG/DL (ref 6–20)
CALCIUM SERPL-MCNC: 10.4 MG/DL (ref 8.7–10.5)
CHLORIDE SERPL-SCNC: 102 MMOL/L (ref 95–110)
CO2 SERPL-SCNC: 30 MMOL/L (ref 23–29)
CREAT SERPL-MCNC: 1.1 MG/DL (ref 0.5–1.4)
DIFFERENTIAL METHOD: ABNORMAL
EOSINOPHIL # BLD AUTO: 0.1 K/UL (ref 0–0.5)
EOSINOPHIL NFR BLD: 1.5 % (ref 0–8)
ERYTHROCYTE [DISTWIDTH] IN BLOOD BY AUTOMATED COUNT: 14.6 % (ref 11.5–14.5)
EST. GFR  (NO RACE VARIABLE): >60 ML/MIN/1.73 M^2
FERRITIN SERPL-MCNC: 53 NG/ML (ref 20–300)
GLUCOSE SERPL-MCNC: 127 MG/DL (ref 70–110)
HCT VFR BLD AUTO: 45.2 % (ref 37–48.5)
HGB BLD-MCNC: 14.3 G/DL (ref 12–16)
IMM GRANULOCYTES # BLD AUTO: 0.01 K/UL (ref 0–0.04)
IMM GRANULOCYTES NFR BLD AUTO: 0.2 % (ref 0–0.5)
IRON SERPL-MCNC: 70 UG/DL (ref 30–160)
LYMPHOCYTES # BLD AUTO: 2.2 K/UL (ref 1–4.8)
LYMPHOCYTES NFR BLD: 41.1 % (ref 18–48)
MCH RBC QN AUTO: 28.9 PG (ref 27–31)
MCHC RBC AUTO-ENTMCNC: 31.6 G/DL (ref 32–36)
MCV RBC AUTO: 91 FL (ref 82–98)
MONOCYTES # BLD AUTO: 0.5 K/UL (ref 0.3–1)
MONOCYTES NFR BLD: 8.7 % (ref 4–15)
NEUTROPHILS # BLD AUTO: 2.6 K/UL (ref 1.8–7.7)
NEUTROPHILS NFR BLD: 48.1 % (ref 38–73)
NRBC BLD-RTO: 0 /100 WBC
PLATELET # BLD AUTO: 288 K/UL (ref 150–450)
PMV BLD AUTO: 10.6 FL (ref 9.2–12.9)
POTASSIUM SERPL-SCNC: 4.6 MMOL/L (ref 3.5–5.1)
PROT SERPL-MCNC: 7.5 G/DL (ref 6–8.4)
RBC # BLD AUTO: 4.95 M/UL (ref 4–5.4)
SATURATED IRON: 15 % (ref 20–50)
SODIUM SERPL-SCNC: 139 MMOL/L (ref 136–145)
TOTAL IRON BINDING CAPACITY: 453 UG/DL (ref 250–450)
TRANSFERRIN SERPL-MCNC: 306 MG/DL (ref 200–375)
TSH SERPL DL<=0.005 MIU/L-ACNC: 0.58 UIU/ML (ref 0.4–4)
VIT B12 SERPL-MCNC: 1275 PG/ML (ref 210–950)
WBC # BLD AUTO: 5.38 K/UL (ref 3.9–12.7)

## 2022-12-01 PROCEDURE — 80053 COMPREHEN METABOLIC PANEL: CPT | Performed by: INTERNAL MEDICINE

## 2022-12-01 PROCEDURE — 3074F PR MOST RECENT SYSTOLIC BLOOD PRESSURE < 130 MM HG: ICD-10-PCS | Mod: CPTII,S$GLB,, | Performed by: INTERNAL MEDICINE

## 2022-12-01 PROCEDURE — 3078F DIAST BP <80 MM HG: CPT | Mod: CPTII,S$GLB,, | Performed by: INTERNAL MEDICINE

## 2022-12-01 PROCEDURE — 3044F HG A1C LEVEL LT 7.0%: CPT | Mod: CPTII,S$GLB,, | Performed by: INTERNAL MEDICINE

## 2022-12-01 PROCEDURE — 3078F PR MOST RECENT DIASTOLIC BLOOD PRESSURE < 80 MM HG: ICD-10-PCS | Mod: CPTII,S$GLB,, | Performed by: INTERNAL MEDICINE

## 2022-12-01 PROCEDURE — 82728 ASSAY OF FERRITIN: CPT | Performed by: INTERNAL MEDICINE

## 2022-12-01 PROCEDURE — 3074F SYST BP LT 130 MM HG: CPT | Mod: CPTII,S$GLB,, | Performed by: INTERNAL MEDICINE

## 2022-12-01 PROCEDURE — 3066F NEPHROPATHY DOC TX: CPT | Mod: CPTII,S$GLB,, | Performed by: INTERNAL MEDICINE

## 2022-12-01 PROCEDURE — 3044F PR MOST RECENT HEMOGLOBIN A1C LEVEL <7.0%: ICD-10-PCS | Mod: CPTII,S$GLB,, | Performed by: INTERNAL MEDICINE

## 2022-12-01 PROCEDURE — 99203 OFFICE O/P NEW LOW 30 MIN: CPT | Mod: S$GLB,,, | Performed by: INTERNAL MEDICINE

## 2022-12-01 PROCEDURE — 82607 VITAMIN B-12: CPT | Performed by: INTERNAL MEDICINE

## 2022-12-01 PROCEDURE — 3008F PR BODY MASS INDEX (BMI) DOCUMENTED: ICD-10-PCS | Mod: CPTII,S$GLB,, | Performed by: INTERNAL MEDICINE

## 2022-12-01 PROCEDURE — 84443 ASSAY THYROID STIM HORMONE: CPT | Performed by: INTERNAL MEDICINE

## 2022-12-01 PROCEDURE — 3008F BODY MASS INDEX DOCD: CPT | Mod: CPTII,S$GLB,, | Performed by: INTERNAL MEDICINE

## 2022-12-01 PROCEDURE — 85025 COMPLETE CBC W/AUTO DIFF WBC: CPT | Performed by: INTERNAL MEDICINE

## 2022-12-01 PROCEDURE — 84466 ASSAY OF TRANSFERRIN: CPT | Performed by: INTERNAL MEDICINE

## 2022-12-01 PROCEDURE — 99999 PR PBB SHADOW E&M-EST. PATIENT-LVL V: CPT | Mod: PBBFAC,,, | Performed by: INTERNAL MEDICINE

## 2022-12-01 PROCEDURE — 36415 COLL VENOUS BLD VENIPUNCTURE: CPT | Performed by: INTERNAL MEDICINE

## 2022-12-01 PROCEDURE — 3061F PR NEG MICROALBUMINURIA RESULT DOCUMENTED/REVIEW: ICD-10-PCS | Mod: CPTII,S$GLB,, | Performed by: INTERNAL MEDICINE

## 2022-12-01 PROCEDURE — 3066F PR DOCUMENTATION OF TREATMENT FOR NEPHROPATHY: ICD-10-PCS | Mod: CPTII,S$GLB,, | Performed by: INTERNAL MEDICINE

## 2022-12-01 PROCEDURE — 3061F NEG MICROALBUMINURIA REV: CPT | Mod: CPTII,S$GLB,, | Performed by: INTERNAL MEDICINE

## 2022-12-01 PROCEDURE — 99999 PR PBB SHADOW E&M-EST. PATIENT-LVL V: ICD-10-PCS | Mod: PBBFAC,,, | Performed by: INTERNAL MEDICINE

## 2022-12-01 PROCEDURE — 99203 PR OFFICE/OUTPT VISIT, NEW, LEVL III, 30-44 MIN: ICD-10-PCS | Mod: S$GLB,,, | Performed by: INTERNAL MEDICINE

## 2022-12-01 RX ORDER — CLONAZEPAM 0.5 MG/1
0.5 TABLET ORAL DAILY PRN
Start: 2022-12-01 | End: 2023-04-12 | Stop reason: SDUPTHER

## 2022-12-05 ENCOUNTER — PATIENT MESSAGE (OUTPATIENT)
Dept: INTERNAL MEDICINE | Facility: CLINIC | Age: 52
End: 2022-12-05
Payer: COMMERCIAL

## 2022-12-07 ENCOUNTER — TELEPHONE (OUTPATIENT)
Dept: ORTHOPEDICS | Facility: CLINIC | Age: 52
End: 2022-12-07
Payer: COMMERCIAL

## 2022-12-07 ENCOUNTER — PATIENT MESSAGE (OUTPATIENT)
Dept: ORTHOPEDICS | Facility: CLINIC | Age: 52
End: 2022-12-07

## 2022-12-07 ENCOUNTER — OFFICE VISIT (OUTPATIENT)
Dept: ORTHOPEDICS | Facility: CLINIC | Age: 52
End: 2022-12-07
Payer: COMMERCIAL

## 2022-12-07 VITALS — BODY MASS INDEX: 38.46 KG/M2 | HEIGHT: 62 IN | WEIGHT: 209 LBS

## 2022-12-07 DIAGNOSIS — M54.12 CERVICAL RADICULOPATHY: ICD-10-CM

## 2022-12-07 DIAGNOSIS — Z98.890 STATUS POST LEFT ROTATOR CUFF REPAIR: Primary | ICD-10-CM

## 2022-12-07 PROCEDURE — 99999 PR PBB SHADOW E&M-EST. PATIENT-LVL IV: ICD-10-PCS | Mod: PBBFAC,,, | Performed by: STUDENT IN AN ORGANIZED HEALTH CARE EDUCATION/TRAINING PROGRAM

## 2022-12-07 PROCEDURE — 3008F BODY MASS INDEX DOCD: CPT | Mod: CPTII,S$GLB,, | Performed by: STUDENT IN AN ORGANIZED HEALTH CARE EDUCATION/TRAINING PROGRAM

## 2022-12-07 PROCEDURE — 3066F NEPHROPATHY DOC TX: CPT | Mod: CPTII,S$GLB,, | Performed by: STUDENT IN AN ORGANIZED HEALTH CARE EDUCATION/TRAINING PROGRAM

## 2022-12-07 PROCEDURE — 3061F PR NEG MICROALBUMINURIA RESULT DOCUMENTED/REVIEW: ICD-10-PCS | Mod: CPTII,S$GLB,, | Performed by: STUDENT IN AN ORGANIZED HEALTH CARE EDUCATION/TRAINING PROGRAM

## 2022-12-07 PROCEDURE — 99214 OFFICE O/P EST MOD 30 MIN: CPT | Mod: S$GLB,,, | Performed by: STUDENT IN AN ORGANIZED HEALTH CARE EDUCATION/TRAINING PROGRAM

## 2022-12-07 PROCEDURE — 3044F HG A1C LEVEL LT 7.0%: CPT | Mod: CPTII,S$GLB,, | Performed by: STUDENT IN AN ORGANIZED HEALTH CARE EDUCATION/TRAINING PROGRAM

## 2022-12-07 PROCEDURE — 99214 PR OFFICE/OUTPT VISIT, EST, LEVL IV, 30-39 MIN: ICD-10-PCS | Mod: S$GLB,,, | Performed by: STUDENT IN AN ORGANIZED HEALTH CARE EDUCATION/TRAINING PROGRAM

## 2022-12-07 PROCEDURE — 1160F PR REVIEW ALL MEDS BY PRESCRIBER/CLIN PHARMACIST DOCUMENTED: ICD-10-PCS | Mod: CPTII,S$GLB,, | Performed by: STUDENT IN AN ORGANIZED HEALTH CARE EDUCATION/TRAINING PROGRAM

## 2022-12-07 PROCEDURE — 3008F PR BODY MASS INDEX (BMI) DOCUMENTED: ICD-10-PCS | Mod: CPTII,S$GLB,, | Performed by: STUDENT IN AN ORGANIZED HEALTH CARE EDUCATION/TRAINING PROGRAM

## 2022-12-07 PROCEDURE — 3061F NEG MICROALBUMINURIA REV: CPT | Mod: CPTII,S$GLB,, | Performed by: STUDENT IN AN ORGANIZED HEALTH CARE EDUCATION/TRAINING PROGRAM

## 2022-12-07 PROCEDURE — 1159F PR MEDICATION LIST DOCUMENTED IN MEDICAL RECORD: ICD-10-PCS | Mod: CPTII,S$GLB,, | Performed by: STUDENT IN AN ORGANIZED HEALTH CARE EDUCATION/TRAINING PROGRAM

## 2022-12-07 PROCEDURE — 3066F PR DOCUMENTATION OF TREATMENT FOR NEPHROPATHY: ICD-10-PCS | Mod: CPTII,S$GLB,, | Performed by: STUDENT IN AN ORGANIZED HEALTH CARE EDUCATION/TRAINING PROGRAM

## 2022-12-07 PROCEDURE — 1160F RVW MEDS BY RX/DR IN RCRD: CPT | Mod: CPTII,S$GLB,, | Performed by: STUDENT IN AN ORGANIZED HEALTH CARE EDUCATION/TRAINING PROGRAM

## 2022-12-07 PROCEDURE — 99999 PR PBB SHADOW E&M-EST. PATIENT-LVL IV: CPT | Mod: PBBFAC,,, | Performed by: STUDENT IN AN ORGANIZED HEALTH CARE EDUCATION/TRAINING PROGRAM

## 2022-12-07 PROCEDURE — 3044F PR MOST RECENT HEMOGLOBIN A1C LEVEL <7.0%: ICD-10-PCS | Mod: CPTII,S$GLB,, | Performed by: STUDENT IN AN ORGANIZED HEALTH CARE EDUCATION/TRAINING PROGRAM

## 2022-12-07 PROCEDURE — 1159F MED LIST DOCD IN RCRD: CPT | Mod: CPTII,S$GLB,, | Performed by: STUDENT IN AN ORGANIZED HEALTH CARE EDUCATION/TRAINING PROGRAM

## 2022-12-07 RX ORDER — CYCLOBENZAPRINE HCL 5 MG
5 TABLET ORAL 3 TIMES DAILY PRN
Qty: 20 TABLET | Refills: 0 | Status: SHIPPED | OUTPATIENT
Start: 2022-12-07 | End: 2023-06-12 | Stop reason: SDUPTHER

## 2022-12-07 RX ORDER — METHYLPREDNISOLONE 4 MG/1
TABLET ORAL
Qty: 21 EACH | Refills: 0 | Status: SHIPPED | OUTPATIENT
Start: 2022-12-07 | End: 2022-12-28

## 2022-12-07 NOTE — TELEPHONE ENCOUNTER
Called patient to schedule an appointment couldn't reach her on phone number provider or leave a voice message. Sent her a message in GOWEX as well. -AM        ----- Message from Modesta Leon sent at 12/7/2022 11:54 AM CST -----  Contact: Gerda  Type:  Same Day Appointment Request    Caller is requesting a same day appointment.  Caller declined first available appointment listed below.    Name of Caller: Gerda  When is the first available appointment? N/a  Symptoms: problem lifting arm follow rotator cuff repair surgery   Best Call Back Number: [phone] (home) [phone] (work)   Additional Information: Gerda would like to be seen today but is in a training meeting and if the call is missed leave a message or send a message via GOWEX

## 2022-12-07 NOTE — PROGRESS NOTES
Orthopaedic Follow-Up Visit    Last Appointment: 5/24/22  Diagnosis: s/p left shoulder arthroscopic rotator cuff repair, biceps tenodesis, subacromial space debridement  Prior Procedure: None    Gerda Obrien is a 52 y.o. female who is here for f/u evaluation of her left shoulder. The patient was last seen here by me on 5/24/22 at which point she was progressing well with PT and we decided to follow up with her has needed. The patient returns today reporting that yesterday she woke up and could not lift her arm. She endorses pain over the trap and limited active ROM of the shoulder.  She reports that only the day before she had normal function of her shoulder    To review her history, she is now 9 months s/p left shoulder rotator cuff repair with biceps tenodesis and subacromial debridement.    Patient's medications, allergies, past medical, surgical, social and family histories were reviewed and updated as appropriate.    Review of Systems   All systems reviewed were negative.  Specifically, the patient denies fever, chills, weight loss, chest pain, shortness of breath, or dyspnea on exertion.      Past Medical History:   Diagnosis Date    Allergy     Anxiety     Biotin deficiency disease     Chlamydia     Diabetes mellitus     Fibroids     Herpes simplex     Knee pain, bilateral     Metabolic syndrome     Sleep apnea     Vitamin D deficiency        Past Surgical History:   Procedure Laterality Date    ARTHROSCOPIC REPAIR OF ROTATOR CUFF OF SHOULDER Left 03/07/2022    Procedure: REPAIR, ROTATOR CUFF, ARTHROSCOPIC;  Surgeon: Denilson Jackson MD;  Location: [facility] OR;  Service: Orthopedics;  Laterality: Left;    ARTHROSCOPY OF SHOULDER WITH DECOMPRESSION OF SUBACROMIAL SPACE Left 03/07/2022    Procedure: ARTHROSCOPY, SHOULDER, WITH SUBACROMIAL SPACE DECOMPRESSION;  Surgeon: Denilson Jackson MD;  Location: [facility] OR;  Service: Orthopedics;  Laterality: Left;    BREAST SURGERY      BUNIONECTOMY       CERVICAL BIOPSY  W/ LOOP ELECTRODE EXCISION      COLONOSCOPY N/A 10/15/2021    Procedure: COLONOSCOPY;  Surgeon: Alaina Ceja MD;  Location: [facility] ENDO;  Service: Endoscopy;  Laterality: N/A;    FIXATION OF TENDON Left 03/07/2022    Procedure: FIXATION, TENDON;  Surgeon: Denilson Jackson MD;  Location: [facility] OR;  Service: Orthopedics;  Laterality: Left;    FOOT SURGERY      hammer toe Bilateral     HYSTERECTOMY      Laparoscopic assisted vaginal hysterectomy with BSO  11/22/2021    Path--Leiomyomata/Adenomyosis    LASER ABLATION OF THE CERVIX      PLANTAR FASCIA SURGERY Left     TOTAL REDUCTION MAMMOPLASTY      TUBAL LIGATION         Patient's Medications   New Prescriptions    No medications on file   Previous Medications    ALBUTEROL (PROVENTIL/VENTOLIN HFA) 90 MCG/ACTUATION INHALER    Inhale 2 puffs into the lungs every 6 (six) hours as needed for Wheezing.    ATORVASTATIN (LIPITOR) 10 MG TABLET    Take 1 tablet (10 mg total) by mouth every evening.    AZELAIC ACID (FINACEA) 15 % GEL    AAA of eyelids 3 mornings/week    BIOTIN 10,000 MCG CAP    Take 1 capsule by mouth once daily.    BLOOD SUGAR DIAGNOSTIC (PRECISION XTRA TEST) STRP    USE TO CHECK BLOOD GLUCOSE LEVELS TWICE DAILY.    CHOLECALCIFEROL, VITAMIN D3, (VITAMIN D3) 250 MCG (10,000 UNIT) CAP    Take 1 tablet by mouth. 1 tablet 3 times a week    CLONAZEPAM (KLONOPIN) 0.5 MG TABLET    Take 1 tablet (0.5 mg total) by mouth daily as needed for Anxiety.    DESLORATADINE (CLARINEX) 5 MG TABLET    desloratadine 5 mg tablet    DYMISTA 137-50 MCG/SPRAY SPRY NASSAL SPRAY    1 spray by Each Nare route 2 (two) times daily as needed.    ESTRADIOL (VIVELLE-DOT) 0.1 MG/24 HR PTSW    estradiol 0.1 mg/24 hr semiweekly transdermal patch   APPLY 1 PATCH TWICE A WEEK    FARXIGA 10 MG TABLET    TAKE 1 TABLET BY MOUTH EVERY DAY    FERROUS SULFATE (FEOSOL) 325 MG (65 MG IRON) TAB TABLET    TAKE 1 TABLET BY MOUTH EVERY DAY WITH BREAKFAST    FLUCONAZOLE  (DIFLUCAN) 150 MG TAB    fluconazole 150 mg tablet   PLEASE SEE ATTACHED FOR DETAILED DIRECTIONS    FLUTICASONE PROPIONATE (FLONASE) 50 MCG/ACTUATION NASAL SPRAY    SPRAY 2 SPRAYS IN EACH NOSTRIL ONCE DAILY    FREESTYLE LANCETS 28 GAUGE LANCETS    USE TWICE DAILY BEFORE MEALS.    HYDROQUINONE 4 % CREA    Apply to dark spots once daily. Use with sunscreen if outdoors    KETOCONAZOLE (NIZORAL) 2 % CREAM    APPLY TO FEET ONCE DAILY    MAGNESIUM CHLORIDE (SLOW-MAG) 71.5 MG TBEC    Take 1 tablet by mouth once. One tablet 3 times a week    MELOXICAM (MOBIC) 15 MG TABLET    Take by mouth.    MULTIVITAMIN CAPSULE    Take by mouth.    OMEPRAZOLE (PRILOSEC) 40 MG CAPSULE    Take 1 capsule (40 mg total) by mouth every morning.    OXYCODONE (ROXICODONE) 5 MG IMMEDIATE RELEASE TABLET    Take by mouth.    SITAGLIPTIN (JANUVIA) 100 MG TAB    Take 1 tablet (100 mg total) by mouth once daily.    TRETINOIN (RETIN-A) 0.025 % CREAM    Apply to areas 3 times/week    VALACYCLOVIR (VALTREX) 500 MG TABLET    Take 500 mg by mouth 2 (two) times daily.   Modified Medications    No medications on file   Discontinued Medications    No medications on file       Family History   Problem Relation Age of Onset    Diabetes Paternal Grandfather     Epilepsy Maternal Grandmother     Cancer Father         Prostrate    Diabetes Father     Breast cancer Cousin     Heart disease Neg Hx     Hypertension Neg Hx        Review of patient's allergies indicates:   Allergen Reactions    Doxycycline     Emtricitabine      Other reaction(s): Rash    Lexapro [escitalopram oxalate]      spasms    Sulfa (sulfonamide antibiotics)     Tenofovir      Other reaction(s): Rash    Trulicity [dulaglutide]     Bydureon [exenatide microspheres] Rash     Skin reaction at site of injection.         Objective:      Physical Exam  Patient is alert and oriented, no distress. Skin is intact. Neuro is normal with no focal motor or sensory findings.    Cervical exam is unremarkable.  Intact cervical ROM. Negative Spurling's test    Physical Exam:  RIGHT    LEFT    Scap Dyskinesis/Winging (-)    (-)    Tenderness:          Greater Tuberosity  (-)    (-)  Bicipital Groove  (-)    (-)  AC joint   (-)    (-)  Other:         +trap    ROM:  Forward Elevation 180    60/90  Abduction  120    80/90  ER (at side)  80    60    Strength:   Supraspinatus  5/5    3/5  Infraspinatus  5/5    3/5  Subscap / IR  5/5    5/5     Special Tests:   Neer:    (-)    +   Greenberg:   (-)    +   SS Stress:   (-)    +   Bear Hug:   (-)    (-)   [place]'s:   (-)    +    Neurovascular examination  - Motor grossly intact bilaterally to shoulder abduction, elbow flexion and extension, wrist flexion and extension, and intrinsic hand musculature  - Sensation intact to light touch bilaterally in axillary, median, radial, and ulnar distributions  - Symmetrical radial pulses    Imaging:    XR Results:  Results for orders placed during the hospital encounter of 01/31/22    X-Ray Shoulder 2 or More Views Left    Narrative  EXAMINATION:  XR SHOULDER COMPLETE 2 OR MORE VIEWS LEFT    CLINICAL HISTORY:  Pain in left shoulder    TECHNIQUE:  Two or three views of the left shoulder were performed.    COMPARISON:  None    FINDINGS:  There is no radiographic evidence of acute osseous, articular, or soft tissue abnormality.  There is mild AC joint arthrosis.  Glenohumeral joint space appears preserved.    Impression  As above.  No acute findings.      Electronically signed by: Cullen Meade MD  Date:    01/31/2022  Time:    15:11      MRI Results:  Results for orders placed during the hospital encounter of 02/02/22    MRI Shoulder Without Contrast Left    Narrative  EXAMINATION:  MRI SHOULDER WITHOUT CONTRAST LEFT    CLINICAL HISTORY:  Shoulder pain, rotator cuff disorder suspected, xray done;  Pain in left shoulder    TECHNIQUE:  Multisequence, multiplanar MR imaging of the shoulder performed without contrast.    COMPARISON:  Left shoulder  radiographs from 01/31/2022    FINDINGS:  Rotator cuff:    Supraspinatus: There is a small full-thickness tear of the far anterior distal fibers of the supraspinatus tendon with fluid-filled gap near the insertion site.  Please see series 5 image 5 for example.  Or alternatively see image 13 of series 4.  Defect measures on the order of 7 mm.    Infraspinatus: Intact    Subscapularis: Intact    Teres minor: Intact    Muscle bulk is maintained.    Labrum: Limited evaluation on this non-arthrographic study.  Degenerative changes noted.  No gross labral deformity or perilabral cyst.    Biceps: Long head biceps tendon is intact.    Bone: No fracture present.  Bone marrow signal is unremarkable.    Acromioclavicular joint:Mild degenerative changes of the AC joint with small osteophytes noted.    Cartilage: Articular cartilage of the glenohumeral joint is preserved    Miscellaneous: No significant joint effusion.  Small volume subacromial/subdeltoid bursal fluid present.    Impression  Acute full-thickness supraspinatus tear, degenerative changes, mild subdeltoid bursitis, and other incidental findings as above.      Electronically signed by: Chavez Gerard MD  Date:    02/02/2022  Time:    14:01      CT Results:  No results found for this or any previous visit.      Physician read: I agree with the above impression.    Assessment/Plan:   Gerda Obrien is a 52 y.o. female with s/p left shoulder arthroscopic rotator cuff repair, biceps tenodesis, subacromial space debridement with new onset of pain and dysfunction suspicious for cervical radiculopathy    Plan:    Discussed diagnosis and treatment options with her today.  She has new onset of significant pain in the shoulder and trap as well as very limited range of motion of the shoulder.  She has normal sensation in the left upper extremity.  She reports no injury to her left shoulder, so I suspect that her symptoms are result of cervical  radiculopathy.  Prescription for a Medrol dose pack and muscle relaxer provided today.   We will call her early in the week to assess her progress. If she has had no improvement then we will move forward with MRI of the shoulder.          Denilson Jackson MD    I, Sherwin Reddy, acted as a scribe for Denilson Jackson MD for the duration of this office visit.

## 2022-12-09 NOTE — PROGRESS NOTES
Subjective:       Patient ID: Gerda Obrien is a 52 y.o. female.    Chief Complaint: Long COVID clinic    HPI:  Referred for Long COVID clinic.  Works 2 jobs currently, 1 in public [employer]. COVID infection Sept 2020 which she recovered well from. Just had another infection Sept '22 which she feels has left her with residual fatigue, headaches. Hx of migraines. Notes rhinosinusitis symptoms. Using Flonase. HA's not daily at this point. Notes snoring and hx of sleep apnea. Not using CPAP recently because of recall but just got new one. Feels like hungover in am sometimes. Notes no cough or cp. Breathing okay.      Past Medical History:   Diagnosis Date    Allergy     Anxiety     Biotin deficiency disease     Chlamydia     Diabetes mellitus     Fibroids     Herpes simplex     Knee pain, bilateral     Metabolic syndrome     Sleep apnea     Vitamin D deficiency          Current Outpatient Medications:     albuterol (PROVENTIL/VENTOLIN HFA) 90 mcg/actuation inhaler, Inhale 2 puffs into the lungs every 6 (six) hours as needed for Wheezing., Disp: 18 g, Rfl: 5    atorvastatin (LIPITOR) 10 MG tablet, Take 1 tablet (10 mg total) by mouth every evening., Disp: 90 tablet, Rfl: 1    azelaic acid (FINACEA) 15 % gel, AAA of eyelids 3 mornings/week, Disp: 50 g, Rfl: 3    biotin 10,000 mcg Cap, Take 1 capsule by mouth once daily., Disp: , Rfl:     blood sugar diagnostic (PRECISION XTRA TEST) Strp, USE TO CHECK BLOOD GLUCOSE LEVELS TWICE DAILY., Disp: 100 strip, Rfl: 0    cholecalciferol, vitamin D3, (VITAMIN D3) 250 mcg (10,000 unit) Cap, Take 1 tablet by mouth. 1 tablet 3 times a week, Disp: , Rfl:     desloratadine (CLARINEX) 5 mg tablet, desloratadine 5 mg tablet, Disp: , Rfl:     DYMISTA 137-50 mcg/spray [place] nassal spray, 1 spray by Each Nare route 2 (two) times daily as needed., Disp: 23 g, Rfl: 5    estradioL (VIVELLE-DOT) 0.1 mg/24 hr PTSW, estradiol 0.1 mg/24 hr semiweekly transdermal patch  APPLY 1 PATCH  TWICE A WEEK, Disp: , Rfl:     FARXIGA 10 mg tablet, TAKE 1 TABLET BY MOUTH EVERY DAY, Disp: 90 tablet, Rfl: 1    ferrous sulfate (FEOSOL) 325 mg (65 mg iron) Tab tablet, TAKE 1 TABLET BY MOUTH EVERY DAY WITH BREAKFAST, Disp: 90 tablet, Rfl: 1    fluconazole (DIFLUCAN) 150 MG Tab, fluconazole 150 mg tablet  PLEASE SEE ATTACHED FOR DETAILED DIRECTIONS, Disp: , Rfl:     fluticasone propionate (FLONASE) 50 mcg/actuation nasal spray, SPRAY 2 SPRAYS IN EACH NOSTRIL ONCE DAILY, Disp: , Rfl:     FREESTYLE LANCETS 28 gauge lancets, USE TWICE DAILY BEFORE MEALS., Disp: 100 each, Rfl: 5    hydroquinone 4 % Crea, Apply to dark spots once daily. Use with sunscreen if outdoors, Disp: 28 g, Rfl: 1    ketoconazole (NIZORAL) 2 % cream, APPLY TO FEET ONCE DAILY, Disp: , Rfl: 0    magnesium chloride (SLOW-MAG) 71.5 mg TbEC, Take 1 tablet by mouth once. One tablet 3 times a week, Disp: , Rfl:     meloxicam (MOBIC) 15 MG tablet, Take by mouth., Disp: , Rfl:     multivitamin capsule, Take by mouth., Disp: , Rfl:     omeprazole (PRILOSEC) 40 MG capsule, Take 1 capsule (40 mg total) by mouth every morning., Disp: 30 capsule, Rfl: 5    oxyCODONE (ROXICODONE) 5 MG immediate release tablet, Take by mouth., Disp: , Rfl:     SITagliptin (JANUVIA) 100 MG Tab, Take 1 tablet (100 mg total) by mouth once daily., Disp: 90 tablet, Rfl: 1    tretinoin (RETIN-A) 0.025 % cream, Apply to areas 3 times/week, Disp: 20 g, Rfl: 6    valACYclovir (VALTREX) 500 MG tablet, Take 500 mg by mouth 2 (two) times daily., Disp: , Rfl:     clonazePAM (KLONOPIN) 0.5 MG tablet, Take 1 tablet (0.5 mg total) by mouth daily as needed for Anxiety., Disp: , Rfl:     cyclobenzaprine (FLEXERIL) 5 MG tablet, Take 1 tablet (5 mg total) by mouth 3 (three) times daily as needed for Muscle spasms., Disp: 20 tablet, Rfl: 0    methylPREDNISolone (MEDROL DOSEPACK) 4 mg tablet, use as directed, Disp: 21 each, Rfl: 0    Past Surgical History:   Procedure Laterality Date    ARTHROSCOPIC  REPAIR OF ROTATOR CUFF OF SHOULDER Left 03/07/2022    Procedure: REPAIR, ROTATOR CUFF, ARTHROSCOPIC;  Surgeon: Denilson Jackson MD;  Location: [facility] OR;  Service: Orthopedics;  Laterality: Left;    ARTHROSCOPY OF SHOULDER WITH DECOMPRESSION OF SUBACROMIAL SPACE Left 03/07/2022    Procedure: ARTHROSCOPY, SHOULDER, WITH SUBACROMIAL SPACE DECOMPRESSION;  Surgeon: Denilson Jackson MD;  Location: [facility] OR;  Service: Orthopedics;  Laterality: Left;    BREAST SURGERY      BUNIONECTOMY      CERVICAL BIOPSY  W/ LOOP ELECTRODE EXCISION      COLONOSCOPY N/A 10/15/2021    Procedure: COLONOSCOPY;  Surgeon: Alaina Ceja MD;  Location: [facility] ENDO;  Service: Endoscopy;  Laterality: N/A;    FIXATION OF TENDON Left 03/07/2022    Procedure: FIXATION, TENDON;  Surgeon: Denilson Jackson MD;  Location: [facility] OR;  Service: Orthopedics;  Laterality: Left;    FOOT SURGERY      hammer toe Bilateral     HYSTERECTOMY      Laparoscopic assisted vaginal hysterectomy with BSO  11/22/2021    Path--Leiomyomata/Adenomyosis    LASER ABLATION OF THE CERVIX      PLANTAR FASCIA SURGERY Left     TOTAL REDUCTION MAMMOPLASTY      TUBAL LIGATION         Social History     Tobacco Use    Smoking status: Never    Smokeless tobacco: Never   Substance Use Topics    Alcohol use: Yes     Alcohol/week: 1.0 standard drink     Types: 1 Glasses of wine per week     Comment: ocassional     Drug use: No         Objective:      Vitals:    12/01/22 1321   BP: 109/74   Pulse: 82   Temp: 98.1 °F (36.7 °C)       Physical Exam  Constitutional:       General: She is not in acute distress.     Appearance: Normal appearance. She is not ill-appearing.   HENT:      Head: Normocephalic and atraumatic.   Eyes:      Extraocular Movements: Extraocular movements intact.      Conjunctiva/sclera: Conjunctivae normal.   Cardiovascular:      Rate and Rhythm: Normal rate and regular rhythm.      Heart sounds: Normal heart sounds.   Pulmonary:      Effort:  Pulmonary effort is normal. No respiratory distress.      Breath sounds: Normal breath sounds.   Musculoskeletal:      Right lower leg: No edema.      Left lower leg: No edema.   Neurological:      General: No focal deficit present.      Mental Status: She is alert and oriented to person, place, and time.   Psychiatric:         Mood and Affect: Mood normal.         Behavior: Behavior normal.         Thought Content: Thought content normal.         Judgment: Judgment normal.         Assessment/Plan:     1) Long COVID clinic - Infection with persistent symptoms since 9/22.  2) Fatigue  3) HA  4) NEERAJ - Off CPAP recently, just got new machine after recall.  5) Rhinosinusitis    - Post COVID PT referral.  - Discussed CPAP use nightly. Based on symptoms, I would suspect his will have at least some beneficial effect.  - Continue Flonase. HA's not daily at this point. Discussed trying Tylenol +/- caffeine.  - Ordering updated labs, including TSH.

## 2022-12-16 ENCOUNTER — PATIENT MESSAGE (OUTPATIENT)
Dept: ORTHOPEDICS | Facility: CLINIC | Age: 52
End: 2022-12-16
Payer: COMMERCIAL

## 2022-12-16 ENCOUNTER — TELEPHONE (OUTPATIENT)
Dept: ORTHOPEDICS | Facility: CLINIC | Age: 52
End: 2022-12-16
Payer: COMMERCIAL

## 2022-12-16 DIAGNOSIS — Z98.890 STATUS POST LEFT ROTATOR CUFF REPAIR: Primary | ICD-10-CM

## 2022-12-16 NOTE — TELEPHONE ENCOUNTER
Called patient to follow up on shoulder and see if she still needs an MRI. Couldn't leave voicemail.- AM

## 2022-12-18 DIAGNOSIS — Z98.890 STATUS POST LEFT ROTATOR CUFF REPAIR: Primary | ICD-10-CM

## 2022-12-22 ENCOUNTER — PATIENT MESSAGE (OUTPATIENT)
Dept: INTERNAL MEDICINE | Facility: CLINIC | Age: 52
End: 2022-12-22
Payer: COMMERCIAL

## 2023-01-04 ENCOUNTER — HOSPITAL ENCOUNTER (OUTPATIENT)
Dept: RADIOLOGY | Facility: HOSPITAL | Age: 53
Discharge: HOME | End: 2023-01-04
Attending: PHYSICIAN ASSISTANT
Payer: COMMERCIAL

## 2023-01-04 ENCOUNTER — PATIENT MESSAGE (OUTPATIENT)
Dept: ORTHOPEDICS | Facility: CLINIC | Age: 53
End: 2023-01-04
Payer: COMMERCIAL

## 2023-01-04 ENCOUNTER — TELEPHONE (OUTPATIENT)
Dept: ORTHOPEDICS | Facility: CLINIC | Age: 53
End: 2023-01-04
Payer: COMMERCIAL

## 2023-01-04 DIAGNOSIS — Z98.890 STATUS POST LEFT ROTATOR CUFF REPAIR: ICD-10-CM

## 2023-01-04 PROCEDURE — 73221 MRI SHOULDER WITHOUT CONTRAST LEFT: ICD-10-PCS | Mod: 26,LT,, | Performed by: RADIOLOGY

## 2023-01-04 PROCEDURE — 73221 MRI JOINT UPR EXTREM W/O DYE: CPT | Mod: 26,LT,, | Performed by: RADIOLOGY

## 2023-01-04 PROCEDURE — 73221 MRI JOINT UPR EXTREM W/O DYE: CPT | Mod: TC,PN,LT

## 2023-01-04 NOTE — TELEPHONE ENCOUNTER
----- Message from Elton Arellano sent at 1/4/2023  3:21 PM CST -----  Contact: teena Doss is returning a call, please call her back after 3:30 on cell [phone].          Thanks  DD

## 2023-01-04 NOTE — TELEPHONE ENCOUNTER
Returned patient's call and got her f/u visit with Dr. Jackson scheduled on 1/18 at 4pm to review the results of her left shoulder MRI. Assured patient that we would add her to the wait list as well, so she will be notified if a sooner appointment time becomes available. Patient verbalized understanding and was grateful for the call back.

## 2023-01-04 NOTE — TELEPHONE ENCOUNTER
----- Message from Gaurav Hammonds sent at 1/4/2023  2:53 PM CST -----  Contact: [phone]  Type:  Patient Returning Call    Who Called:Gerda   Who Left Message for Patient:Eulalia  Does the patient know what this is regarding?:n/a   Would the patient rather a call back or a response via [facility]? Call back  Best Call Back Number:[phone]  Additional Information: pt stated that if return phone call before 3:30 to reach her at [phone]      Thanks KB

## 2023-01-04 NOTE — TELEPHONE ENCOUNTER
Called and left message to  requesting a call back from patient to get her follow-up visit with Dr. Jackson to review the results of her MRI taken today (1/4).

## 2023-01-09 ENCOUNTER — CLINICAL SUPPORT (OUTPATIENT)
Dept: REHABILITATION | Facility: HOSPITAL | Age: 53
End: 2023-01-09
Payer: COMMERCIAL

## 2023-01-09 ENCOUNTER — PATIENT MESSAGE (OUTPATIENT)
Dept: INTERNAL MEDICINE | Facility: CLINIC | Age: 53
End: 2023-01-09
Payer: COMMERCIAL

## 2023-01-09 DIAGNOSIS — U09.9 LONG COVID: ICD-10-CM

## 2023-01-09 DIAGNOSIS — R29.898 WEAKNESS OF BOTH HIPS: ICD-10-CM

## 2023-01-09 DIAGNOSIS — R53.83 FATIGUE, UNSPECIFIED TYPE: ICD-10-CM

## 2023-01-09 PROCEDURE — 97110 THERAPEUTIC EXERCISES: CPT | Mod: PN

## 2023-01-09 PROCEDURE — 97161 PT EVAL LOW COMPLEX 20 MIN: CPT | Mod: PN

## 2023-01-10 PROBLEM — R29.898 WEAKNESS OF HIP: Status: ACTIVE | Noted: 2023-01-10

## 2023-01-10 NOTE — PLAN OF CARE
"[facility]  Physical Therapy Initial Evaluation  Post-COVID Recovery    Name: Gerda Obrien  Clinic Number: [id number]    Therapy Diagnosis:   Encounter Diagnoses   Name Primary?    Fatigue, unspecified type     Long COVID     Weakness of both hips      Physician: Reginaldo Umana MD    Physician Orders: PT Eval and Treat   Medical Diagnosis from Referral:   R53.83 (ICD-10-CM) - Fatigue, unspecified type   U09.9 (ICD-10-CM) - Long COVID     Evaluation Date: 1/9/2023  Authorization Period Expiration: 12/02/2023  Plan of Care Expiration: 03/10/2023  Progress note due: 02/08/2023  Visit # / Visits authorized: 1/1    Time In: 0730  Time Out: 0810  Total Billable Time: 8 minutes    Precautions: Standard    Subjective   Date of onset: 12/03/2022  History of current condition - Gerda reports: She feels like she has been out all night when she wakes up in the morning.  States she has headache (Points posteriorly) which is aggravated by weather and sinus pressure.  She reports "no symptoms" during COVID illness (15, 2022)  She reports pain of the left shoulder and RTC repair during the last year.   She has started a walking program and will restart shoulder exercises.     Medical History:   Past Medical History:   Diagnosis Date    Allergy     Anxiety     Biotin deficiency disease     Chlamydia     Diabetes mellitus     Fibroids     Herpes simplex     Knee pain, bilateral     Metabolic syndrome     Sleep apnea     Vitamin D deficiency        Surgical History:   Gerda Obrien  has a past surgical history that includes Tubal ligation; Bunionectomy; Breast surgery; Cervical biopsy w/ loop electrode excision; Plantar fascia surgery (Left); Foot surgery; hammer toe (Bilateral); Laser ablation of the cervix; Colonoscopy (N/A, 10/15/2021); Laparoscopic assisted vaginal hysterectomy with BSO (11/22/2021); Hysterectomy; Arthroscopic repair of rotator cuff of shoulder (Left, " 03/07/2022); Arthroscopy of shoulder with decompression of subacromial space (Left, 03/07/2022); Fixation of tendon (Left, 03/07/2022); and Total Reduction Mammoplasty.    Medications:   Gerda has a current medication list which includes the following prescription(s): albuterol, atorvastatin, azelaic acid, biotin, blood sugar diagnostic, cholecalciferol (vitamin d3), clonazepam, cyclobenzaprine, desloratadine, dymista, estradiol, farxiga, ferrous sulfate, fluconazole, fluticasone propionate, freestyle lancets, hydroquinone, ketoconazole, magnesium chloride, meloxicam, multivitamin, omeprazole, oxycodone, sitagliptin phosphate, tretinoin, and valacyclovir.    Allergies:   Review of patient's allergies indicates:   Allergen Reactions    Doxycycline     Emtricitabine      Other reaction(s): Rash    Lexapro [escitalopram oxalate]      spasms    Sulfa (sulfonamide antibiotics)     Tenofovir      Other reaction(s): Rash    Trulicity [dulaglutide]     Bydureon [exenatide microspheres] Rash     Skin reaction at site of injection.        Imaging, MRI studies: Left shoulder.  See Epic    Prior Therapy: Last visit 11/21/2022 s/p RTC repair 3/7/2022  Occupation: Medical records for [employer]  Prior Level of Function: Independent  Current Level of Function: Independent with fatigue    Pain:  Current 4/10, worst 5/10, best 0/10   Location: bilateral posterior headache   Description: Aching  Aggravating Factors: Sinuses and weather changes  Easing Factors: rest      Patients goals:   [] Decrease pain  [] Increase strength  [] Increase range of motion   [] Return to work  [x] Increase endurance  [] Return to prior level of function  [] Return to sports  [] Other: N/A       Objective   Resting vitals:  BP: 130/75  HR: 68  O2 sats: 98%    Strength: manual muscle test grades below     Cervical AROM is 100% in flexion/extension/side bending/rotation.   Patient started to have left upper trap tightness with  range of motion assessment with no specific motion.    Left shoulder flexion to 140 degrees supine active and passive with discomfort at end range.  Left shoulder flexion strength 3-/5      Lower Extremity Strength  Right LE  Left LE    Hip Flexion: 5/5 Hip Flexion: 5/5   Hip ER:  5/5 Hip ER: 5/5   Hip IR: 5/5 Hip IR: 5/5   Hip Abduction: 5/5 Hip Abduction 5/5   Knee Extension: 5/5 Knee Extension: 5/5   Knee Flexion: 5/5 Knee Flexion: 5/5   Ankle Dorsiflexion: 5/5 Ankle Dorsiflexion: 5/5   Ankle Plantarflexion: 5/5 Ankle Plantarflexion: 5/5       Special Tests     Balance Assessment:     Evaluation Reassessment Reassessment   Single Limb Stance R LE 40  (<10 sec = HIGH FALL RISK)     Single Limb Stance L LE 30  (<10 sec = HIGH FALL RISK)         Gait Assessment:  - AD used: None  - Assistance: Independent  - 6 Minute Walk Test Distance: 928 feet  - O2 sats after: 98%  - HR after: 98 bpm    Endurance Assessment:     Evaluation Reassessment Reassessment   Timed Up and Go 7 sec     30 Second Chair Rise 10       Table: Population Norms for TUG    Age  Average TUG    60 - 69 years  8.1 seconds    70 - 79 years  9.2 seconds    80 - 99 years  11.3 seconds      Table: Normative Data 30 Second Chair Rise (by gender & age)      TREATMENT     Total Treatment time separate from Evaluation: 8 minutes    Gerda received therapeutic exercises to develop ROM for 8 minutes including:  Supine shoulder flexion with wand  Standing shoulder flexion with wall slides    Home Exercises and Patient Education Provided    Education provided:   - Additional exercises to be performed with home program previously issued for left shoulder  - Instructed in the importance in performing home program and shoulder range of motion.     Written Home Exercises Provided: yes.  Exercises were reviewed and Gerda was able to demonstrate them prior to the end of the session.  Gerda demonstrated good  understanding of the education provided.     See  EMR under Patient Instructions for exercises provided 1/9/2023.    Assessment   Gerda is a 52 y.o. female referred to outpatient Physical Therapy with a medical diagnosis of fatigue/post COVID. Pt presents with:  Fatigue  Headaches  Decreased left shoulder flexion  Decreased hip strength during sit<>stand test    Pt prognosis is Good.   Pt will benefit from skilled outpatient Physical Therapy to address the deficits stated above and in the chart below, provide pt/family education, and to maximize pt's level of independence.     Plan of care discussed with patient: Yes  Pt's spiritual, cultural and educational needs considered and patient is agreeable to the plan of care and goals as stated below:     Anticipated Barriers for therapy: None    Medical Necessity is demonstrated by the following  History  Co-morbidities and personal factors that may impact the plan of care Co-morbidities:   See Above    Personal Factors:   no deficits     low   Examination  Body Structures and Functions, activity limitations and participation restrictions that may impact the plan of care Body Regions:   upper extremities    Body Systems:    ROM    Participation Restrictions:   none    Activity limitations:   Learning and applying knowledge  no deficits    General Tasks and Commands  no deficits    Communication  no deficits    Mobility  lifting and carrying objects    Self care  washing oneself (bathing, drying, washing hands)  looking after one's health    Domestic Life  assisting others    Interactions/Relationships  no deficits    Life Areas  no deficits    Community and Social Life  community life  recreation and leisure         low   Clinical Presentation stable and uncomplicated low   Decision Making/ Complexity Score: low     Goals:  Short Term Goals: In 4 weeks   1.I with HEP  2.Patient to increase GH ROM from 140 to 150 degrees flexion    3.Patient to have pain less than 4/10 at all times.  3. Sit<>stand 12 times in 30  seconds    Long Term Goals: In 8 weeks  1. Patient to demo increase GH ROM to 160 degrees left shoulder flexion  2. Patient to demo increase in UE strength to 5/5  3. Patient to have decreased pain to 1/10 at all times.  4.Patient to perform daily activities including walking and overhead activity without limitation.  5. Sit<>stand 14 times in 30 seconds      Plan   Plan of care Certification: 1/9/2023 to 03/10/2023.    Outpatient Physical Therapy 2 times weekly for 8 weeks to include the following interventions: Electrical Stimulation to the left shoulder, Manual Therapy, Moist Heat/ Ice, Neuromuscular Re-ed, Patient Education, Therapeutic Activities, Therapeutic Exercise, and dry needling.     Cezar Law, PT

## 2023-01-11 ENCOUNTER — CLINICAL SUPPORT (OUTPATIENT)
Dept: INTERNAL MEDICINE | Facility: CLINIC | Age: 53
End: 2023-01-11
Payer: COMMERCIAL

## 2023-01-11 ENCOUNTER — PATIENT MESSAGE (OUTPATIENT)
Dept: INTERNAL MEDICINE | Facility: CLINIC | Age: 53
End: 2023-01-11

## 2023-01-11 DIAGNOSIS — E88.810 DYSMETABOLIC SYNDROME X: ICD-10-CM

## 2023-01-11 DIAGNOSIS — Z71.3 DIETARY COUNSELING: Primary | ICD-10-CM

## 2023-01-11 PROCEDURE — 97802 PR MED NUTR THER, 1ST, INDIV, EA 15 MIN: ICD-10-PCS | Mod: 95,,, | Performed by: DIETITIAN, REGISTERED

## 2023-01-11 PROCEDURE — 97802 MEDICAL NUTRITION INDIV IN: CPT | Mod: 95,,, | Performed by: DIETITIAN, REGISTERED

## 2023-01-11 NOTE — PROGRESS NOTES
The patient location is: [place]  The chief complaint leading to consultation is: nutrition referral    Visit type: audio only    Face to Face time with patient: 60 minutes  65 minutes of total time spent on the encounter, which includes face to face time and non-face to face time preparing to see the patient (eg, review of tests), Obtaining and/or reviewing separately obtained history, Documenting clinical information in the electronic or other health record, Independently interpreting results (not separately reported) and communicating results to the patient/family/caregiver, or Care coordination (not separately reported).         Each patient to whom he or she provides medical services by telemedicine is:  (1) informed of the relationship between the physician and patient and the respective role of any other health care provider with respect to management of the patient; and (2) notified that he or she may decline to receive medical services by telemedicine and may withdraw from such care at any time.    Notes:   Nutrition Assessment  Session Time:  60 minutes      Client name:  Gerda bOrien  :  1970  Age:  52 y.o.  Gender:  female    Client states:  referred by Meli Gutierrez MD with a diagnosis of:   -Dysmetabolic syndrome X    Reports receiving diagnosis in approximately  and is in need of further guidance for improving A1c and weight.     Current intake:  Breakfast: 2 eggs + 2 turkey diaz  Snack: trail mix  Lunch: 2 grilled cheese sandwiches on white bread from work cafe// baked chicken + fettucini// fried chicken/ /salad from [employer]//     Likes fruits: bananas, oranges, apples  Likes vegetables: variety    Drinks: water (not much), wine with meal (not often), diet gingerale or diet dr. Melton (very rarely)  Alcohol: on weekends    Exercise: was walking 5 miles daily but then stopped due to winter weather. Changed job and began working two jobs resulting in less time to  "dedicate to exercise. Both jobs are mostly sedentary.         Anthropometrics  Height:  62"     Weight:  12-7-2022: 209 lbs  BMI:  38.3  % Body Fat:  n/a    Clinical Signs/Symptoms  N/V/D:  none noted  Appetite:  good       Past Medical History:   Diagnosis Date    Allergy     Anxiety     Biotin deficiency disease     Chlamydia     Diabetes mellitus     Fibroids     Herpes simplex     Knee pain, bilateral     Metabolic syndrome     Sleep apnea     Vitamin D deficiency        Past Surgical History:   Procedure Laterality Date    ARTHROSCOPIC REPAIR OF ROTATOR CUFF OF SHOULDER Left 03/07/2022    Procedure: REPAIR, ROTATOR CUFF, ARTHROSCOPIC;  Surgeon: Denilson Jackson MD;  Location: [facility] OR;  Service: Orthopedics;  Laterality: Left;    ARTHROSCOPY OF SHOULDER WITH DECOMPRESSION OF SUBACROMIAL SPACE Left 03/07/2022    Procedure: ARTHROSCOPY, SHOULDER, WITH SUBACROMIAL SPACE DECOMPRESSION;  Surgeon: Denilson Jackson MD;  Location: [facility] OR;  Service: Orthopedics;  Laterality: Left;    BREAST SURGERY      BUNIONECTOMY      CERVICAL BIOPSY  W/ LOOP ELECTRODE EXCISION      COLONOSCOPY N/A 10/15/2021    Procedure: COLONOSCOPY;  Surgeon: Alaina Ceja MD;  Location: [facility] ENDO;  Service: Endoscopy;  Laterality: N/A;    FIXATION OF TENDON Left 03/07/2022    Procedure: FIXATION, TENDON;  Surgeon: Denilson Jackson MD;  Location: [facility] OR;  Service: Orthopedics;  Laterality: Left;    FOOT SURGERY      hammer toe Bilateral     HYSTERECTOMY      Laparoscopic assisted vaginal hysterectomy with BSO  11/22/2021    Path--Leiomyomata/Adenomyosis    LASER ABLATION OF THE CERVIX      PLANTAR FASCIA SURGERY Left     TOTAL REDUCTION MAMMOPLASTY      TUBAL LIGATION         Medications    has a current medication list which includes the following prescription(s): albuterol, atorvastatin, azelaic acid, biotin, blood sugar diagnostic, cholecalciferol (vitamin d3), clonazepam, cyclobenzaprine, desloratadine, dymista, " estradiol, farxiga, ferrous sulfate, fluconazole, fluticasone propionate, freestyle lancets, hydroquinone, ketoconazole, magnesium chloride, meloxicam, multivitamin, omeprazole, oxycodone, sitagliptin phosphate, tretinoin, and valacyclovir.    Vitamins, Minerals, and/or Supplements:  not discussed     Food/Medication Interactions:  Reviewed     Food Allergies or Intolerances:  NKFA     Social History    Marital status:    Employment:  yes    Social History     Tobacco Use    Smoking status: Never    Smokeless tobacco: Never   Substance Use Topics    Alcohol use: Yes     Alcohol/week: 1.0 standard drink     Types: 1 Glasses of wine per week     Comment: Count includes the [facility]         Lab Reports   Sodium   Date Value Ref Range Status   12/01/2022 139 136 - 145 mmol/L Final     Potassium   Date Value Ref Range Status   12/01/2022 4.6 3.5 - 5.1 mmol/L Final     Chloride   Date Value Ref Range Status   12/01/2022 102 95 - 110 mmol/L Final     CO2   Date Value Ref Range Status   12/01/2022 30 (H) 23 - 29 mmol/L Final     Glucose   Date Value Ref Range Status   12/01/2022 127 (H) 70 - 110 mg/dL Final     BUN   Date Value Ref Range Status   12/01/2022 19 6 - 20 mg/dL Final     Creatinine   Date Value Ref Range Status   12/01/2022 1.1 0.5 - 1.4 mg/dL Final     Calcium   Date Value Ref Range Status   12/01/2022 10.4 8.7 - 10.5 mg/dL Final     Total Protein   Date Value Ref Range Status   12/01/2022 7.5 6.0 - 8.4 g/dL Final     Albumin   Date Value Ref Range Status   12/01/2022 3.8 3.5 - 5.2 g/dL Final     Total Bilirubin   Date Value Ref Range Status   12/01/2022 0.3 0.1 - 1.0 mg/dL Final     Comment:     For infants and newborns, interpretation of results should be based  on gestational age, weight and in agreement with clinical  observations.    Premature Infant recommended reference ranges:  Up to 24 hours.............<8.0 mg/dL  Up to 48 hours............<12.0 mg/dL  3-5 days..................<15.0 mg/dL  6-29  days.................<15.0 mg/dL       Alkaline Phosphatase   Date Value Ref Range Status   12/01/2022 102 55 - 135 U/L Final     AST   Date Value Ref Range Status   12/01/2022 16 10 - 40 U/L Final     ALT   Date Value Ref Range Status   12/01/2022 16 10 - 44 U/L Final     Anion Gap   Date Value Ref Range Status   12/01/2022 7 (L) 8 - 16 mmol/L Final     eGFR if    Date Value Ref Range Status   02/18/2022 >60.0 >60 mL/min/1.73 m^2 Final     eGFR if non    Date Value Ref Range Status   02/18/2022 >60.0 >60 mL/min/1.73 m^2 Final     Comment:     Calculation used to obtain the estimated glomerular filtration  rate (eGFR) is the CKD-EPI equation.         Lab Results   Component Value Date    WBC 5.38 12/01/2022    HGB 14.3 12/01/2022    HCT 45.2 12/01/2022    MCV 91 12/01/2022     12/01/2022        Lab Results   Component Value Date    CHOL 233 (H) 09/21/2022     Lab Results   Component Value Date    HDL 80 09/21/2022     Lab Results   Component Value Date    LDLCALC 144 (H) 09/21/2022     Lab Results   Component Value Date    TRIG 53 09/21/2022     No results found for: CHOLHDL  Lab Results   Component Value Date    LABA1C 5.6 08/06/2020    HGBA1C 5.9 (H) 09/21/2022     BP Readings from Last 1 Encounters:   12/01/22 109/74       Food History  Listed above    Exercise History:  listed above    Cultural/Spiritual/Personal Preferences:  None identified    Support System:   family/friends    State of Change:  Contemplation    Barriers to Change:  time management     Diagnosis    Other: obesity  related to excess energy intake  as evidenced by BMI 38.    Intervention    RMR (Method:  [place]. Jeor):  1516 kcal  Activity Factor:   1.2     ROMIE:  1819 - 500 = 1319 kcal    Goals:  1.  Plate method at meals, choose complex carbohydrate sources when available   2.  Snacks: complex carbohydrate + protein source  3.  Begin an exercise routine      Nutrition Education  The following  education was provided to the patient:  Discussed meal planning/[employer]'s My Plate design.  Discussed healthy snacking.   Discussed label reading.  Discussed weight management.  Suggested dietary modifications based on current dietary behaviors and individual food preferences.  Discussed importance of physical activity.  Discussed Dysmetabolic Syndrome X nutrition therapy.     Patient verbalized understanding of nutrition education and recommendations received.    Handouts Provided, emailed + mailed  Balanced Diabetes Plate  Snack List  Eat Fit Shopping List  Fueling Well On-The-Go    Monitoring/Evaluation    Monitor the following:  Weight  BMI  Caloric intake  Labs:  A1c    Follow Up Plan:  as needed

## 2023-01-18 ENCOUNTER — OFFICE VISIT (OUTPATIENT)
Dept: ORTHOPEDICS | Facility: CLINIC | Age: 53
End: 2023-01-18
Payer: COMMERCIAL

## 2023-01-18 VITALS — WEIGHT: 209 LBS | HEIGHT: 62 IN | BODY MASS INDEX: 38.46 KG/M2

## 2023-01-18 DIAGNOSIS — Z98.890 STATUS POST LEFT ROTATOR CUFF REPAIR: Primary | ICD-10-CM

## 2023-01-18 PROCEDURE — 1159F PR MEDICATION LIST DOCUMENTED IN MEDICAL RECORD: ICD-10-PCS | Mod: CPTII,S$GLB,, | Performed by: STUDENT IN AN ORGANIZED HEALTH CARE EDUCATION/TRAINING PROGRAM

## 2023-01-18 PROCEDURE — 99999 PR PBB SHADOW E&M-EST. PATIENT-LVL IV: CPT | Mod: PBBFAC,,, | Performed by: STUDENT IN AN ORGANIZED HEALTH CARE EDUCATION/TRAINING PROGRAM

## 2023-01-18 PROCEDURE — 99213 PR OFFICE/OUTPT VISIT, EST, LEVL III, 20-29 MIN: ICD-10-PCS | Mod: S$GLB,,, | Performed by: STUDENT IN AN ORGANIZED HEALTH CARE EDUCATION/TRAINING PROGRAM

## 2023-01-18 PROCEDURE — 1160F RVW MEDS BY RX/DR IN RCRD: CPT | Mod: CPTII,S$GLB,, | Performed by: STUDENT IN AN ORGANIZED HEALTH CARE EDUCATION/TRAINING PROGRAM

## 2023-01-18 PROCEDURE — 3044F HG A1C LEVEL LT 7.0%: CPT | Mod: CPTII,S$GLB,, | Performed by: STUDENT IN AN ORGANIZED HEALTH CARE EDUCATION/TRAINING PROGRAM

## 2023-01-18 PROCEDURE — 3008F BODY MASS INDEX DOCD: CPT | Mod: CPTII,S$GLB,, | Performed by: STUDENT IN AN ORGANIZED HEALTH CARE EDUCATION/TRAINING PROGRAM

## 2023-01-18 PROCEDURE — 3008F PR BODY MASS INDEX (BMI) DOCUMENTED: ICD-10-PCS | Mod: CPTII,S$GLB,, | Performed by: STUDENT IN AN ORGANIZED HEALTH CARE EDUCATION/TRAINING PROGRAM

## 2023-01-18 PROCEDURE — 1160F PR REVIEW ALL MEDS BY PRESCRIBER/CLIN PHARMACIST DOCUMENTED: ICD-10-PCS | Mod: CPTII,S$GLB,, | Performed by: STUDENT IN AN ORGANIZED HEALTH CARE EDUCATION/TRAINING PROGRAM

## 2023-01-18 PROCEDURE — 3044F PR MOST RECENT HEMOGLOBIN A1C LEVEL <7.0%: ICD-10-PCS | Mod: CPTII,S$GLB,, | Performed by: STUDENT IN AN ORGANIZED HEALTH CARE EDUCATION/TRAINING PROGRAM

## 2023-01-18 PROCEDURE — 1159F MED LIST DOCD IN RCRD: CPT | Mod: CPTII,S$GLB,, | Performed by: STUDENT IN AN ORGANIZED HEALTH CARE EDUCATION/TRAINING PROGRAM

## 2023-01-18 PROCEDURE — 99213 OFFICE O/P EST LOW 20 MIN: CPT | Mod: S$GLB,,, | Performed by: STUDENT IN AN ORGANIZED HEALTH CARE EDUCATION/TRAINING PROGRAM

## 2023-01-18 PROCEDURE — 99999 PR PBB SHADOW E&M-EST. PATIENT-LVL IV: ICD-10-PCS | Mod: PBBFAC,,, | Performed by: STUDENT IN AN ORGANIZED HEALTH CARE EDUCATION/TRAINING PROGRAM

## 2023-01-18 NOTE — PROGRESS NOTES
Orthopaedic Follow-Up Visit    Last Appointment: 12/7/22  Diagnosis: s/p left shoulder arthroscopic rotator cuff repair, biceps tenodesis, subacromial space debridement with new onset of pain and dysfunction suspicious for cervical radiculopathy  Prior Procedure: MRI    Gerda Obrien is a 52 y.o. female who is here for f/u evaluation of her left shoulder. The patient was last seen here by me on 12/7/22 at which point we decided to send her for an MRI to to rull out possible rotator cuff re-tear. The patient returns today reporting that the symptoms have improved and is interested in discussing further treatment options.     To review her history, she is now 10 months s/p left shoulder rotator cuff repair with biceps tenodesis and subacromial debridement. At prior visit she had new onset of significant pain in the shoulder and trap as well as very limited range of motion of the shoulder.  She had normal sensation in the left upper extremity. She reported no injury to her left shoulder, and it was suspected that her symptoms are result of cervical radiculopathy. An MRI was ordered for further evaluation.     Patient's medications, allergies, past medical, surgical, social and family histories were reviewed and updated as appropriate.    Review of Systems   All systems reviewed were negative.  Specifically, the patient denies fever, chills, weight loss, chest pain, shortness of breath, or dyspnea on exertion.      Past Medical History:   Diagnosis Date    Allergy     Anxiety     Biotin deficiency disease     Chlamydia     Diabetes mellitus     Fibroids     Herpes simplex     Knee pain, bilateral     Metabolic syndrome     Sleep apnea     Vitamin D deficiency        Past Surgical History:   Procedure Laterality Date    ARTHROSCOPIC REPAIR OF ROTATOR CUFF OF SHOULDER Left 03/07/2022    Procedure: REPAIR, ROTATOR CUFF, ARTHROSCOPIC;  Surgeon: Denilson Jackson MD;  [facility]: [facility];  Service:  Orthopedics;  Laterality: Left;    ARTHROSCOPY OF SHOULDER WITH DECOMPRESSION OF SUBACROMIAL SPACE Left 03/07/2022    Procedure: ARTHROSCOPY, SHOULDER, WITH SUBACROMIAL SPACE DECOMPRESSION;  Surgeon: Denilson Jackson MD;  Location: [facility] OR;  Service: Orthopedics;  Laterality: Left;    BREAST SURGERY      BUNIONECTOMY      CERVICAL BIOPSY  W/ LOOP ELECTRODE EXCISION      COLONOSCOPY N/A 10/15/2021    Procedure: COLONOSCOPY;  Surgeon: Alaina Ceja MD;  Location: [facility] ENDO;  Service: Endoscopy;  Laterality: N/A;    FIXATION OF TENDON Left 03/07/2022    Procedure: FIXATION, TENDON;  Surgeon: Denilson Jackson MD;  Location: [facility] OR;  Service: Orthopedics;  Laterality: Left;    FOOT SURGERY      hammer toe Bilateral     HYSTERECTOMY      Laparoscopic assisted vaginal hysterectomy with BSO  11/22/2021    Path--Leiomyomata/Adenomyosis    LASER ABLATION OF THE CERVIX      PLANTAR FASCIA SURGERY Left     TOTAL REDUCTION MAMMOPLASTY      TUBAL LIGATION         Patient's Medications   New Prescriptions    No medications on file   Previous Medications    ALBUTEROL (PROVENTIL/VENTOLIN HFA) 90 MCG/ACTUATION INHALER    Inhale 2 puffs into the lungs every 6 (six) hours as needed for Wheezing.    ATORVASTATIN (LIPITOR) 10 MG TABLET    Take 1 tablet (10 mg total) by mouth every evening.    AZELAIC ACID (FINACEA) 15 % GEL    AAA of eyelids 3 mornings/week    BIOTIN 10,000 MCG CAP    Take 1 capsule by mouth once daily.    BLOOD SUGAR DIAGNOSTIC (PRECISION XTRA TEST) STRP    USE TO CHECK BLOOD GLUCOSE LEVELS TWICE DAILY.    BLOOD-GLUCOSE SENSOR (DEXCOM G6 SENSOR) LEE ANN    Use as directed    CHOLECALCIFEROL, VITAMIN D3, (VITAMIN D3) 250 MCG (10,000 UNIT) CAP    Take 1 tablet by mouth. 1 tablet 3 times a week    CLONAZEPAM (KLONOPIN) 0.5 MG TABLET    Take 1 tablet (0.5 mg total) by mouth daily as needed for Anxiety.    CYCLOBENZAPRINE (FLEXERIL) 5 MG TABLET    Take 1 tablet (5 mg total) by mouth 3 (three) times daily  as needed for Muscle spasms.    DESLORATADINE (CLARINEX) 5 MG TABLET    TAKE 1 TABLET BY MOUTH EVERY DAY    DYMISTA 137-50 MCG/SPRAY SPRY NASSAL SPRAY    1 spray by Each Nare route 2 (two) times daily as needed.    ESTRADIOL (VIVELLE-DOT) 0.1 MG/24 HR PTSW    estradiol 0.1 mg/24 hr semiweekly transdermal patch   APPLY 1 PATCH TWICE A WEEK    FARXIGA 10 MG TABLET    TAKE 1 TABLET BY MOUTH EVERY DAY    FERROUS SULFATE (FEOSOL) 325 MG (65 MG IRON) TAB TABLET    TAKE 1 TABLET BY MOUTH EVERY DAY WITH BREAKFAST    FLUCONAZOLE (DIFLUCAN) 150 MG TAB    fluconazole 150 mg tablet   PLEASE SEE ATTACHED FOR DETAILED DIRECTIONS    FLUTICASONE PROPIONATE (FLONASE) 50 MCG/ACTUATION NASAL SPRAY    SPRAY 2 SPRAYS IN EACH NOSTRIL ONCE DAILY    FREESTYLE LANCETS 28 GAUGE LANCETS    USE TWICE DAILY BEFORE MEALS.    HYDROQUINONE 4 % CREA    Apply to dark spots once daily. Use with sunscreen if outdoors    KETOCONAZOLE (NIZORAL) 2 % CREAM    APPLY TO FEET ONCE DAILY    MAGNESIUM CHLORIDE (SLOW-MAG) 71.5 MG TBEC    Take 1 tablet by mouth once. One tablet 3 times a week    MELOXICAM (MOBIC) 15 MG TABLET    Take by mouth.    MULTIVITAMIN CAPSULE    Take by mouth.    OMEPRAZOLE (PRILOSEC) 40 MG CAPSULE    TAKE 1 CAPSULE BY MOUTH EVERY DAY IN THE MORNING    OXYCODONE (ROXICODONE) 5 MG IMMEDIATE RELEASE TABLET    Take by mouth.    SITAGLIPTIN (JANUVIA) 100 MG TAB    Take 1 tablet (100 mg total) by mouth once daily.    TRETINOIN (RETIN-A) 0.025 % CREAM    Apply to areas 3 times/week    VALACYCLOVIR (VALTREX) 500 MG TABLET    Take 500 mg by mouth 2 (two) times daily.   Modified Medications    No medications on file   Discontinued Medications    No medications on file       Family History   Problem Relation Age of Onset    Diabetes Paternal Grandfather     Epilepsy Maternal Grandmother     Cancer Father         Prostrate    Diabetes Father     Breast cancer Cousin     Heart disease Neg Hx     Hypertension Neg Hx        Review of patient's  allergies indicates:   Allergen Reactions    Doxycycline     Emtricitabine      Other reaction(s): Rash    Lexapro [escitalopram oxalate]      spasms    Sulfa (sulfonamide antibiotics)     Tenofovir      Other reaction(s): Rash    Trulicity [dulaglutide]     Bydureon [exenatide microspheres] Rash     Skin reaction at site of injection.         Objective:      Physical Exam  Patient is alert and oriented, no distress. Skin is intact. Neuro is normal with no focal motor or sensory findings.    Cervical exam is unremarkable. Intact cervical ROM. Negative Spurling's test    Physical Exam:  RIGHT    LEFT    Scap Dyskinesis/Winging (-)    (-)    Tenderness:          Greater Tuberosity  (-)    (-)  Bicipital Groove  (-)    (-)  AC joint   (-)    (-)  Other:     ROM:  Forward Elevation 180    160  Abduction  120    110  ER (at side)  80    80    Strength:   Supraspinatus  5/5    4+/5  Infraspinatus  5/5    5/5  Subscap / IR  5/5    5/5     Special Tests:   Neer:    (-)    (-)   Greenberg:   (-)    (-)   SS Stress:   (-)    (-)   Blaine's:   (-)    (-)   Resisted Thrower's:   (-)    (-)    Neurovascular examination  - Motor grossly intact bilaterally to shoulder abduction, elbow flexion and extension, wrist flexion and extension, and intrinsic hand musculature  - Sensation intact to light touch bilaterally in axillary, median, radial, and ulnar distributions  - Symmetrical radial pulses    Imaging:    XR Results:  Results for orders placed during the hospital encounter of 01/31/22    X-Ray Shoulder 2 or More Views Left    Narrative  EXAMINATION:  XR SHOULDER COMPLETE 2 OR MORE VIEWS LEFT    CLINICAL HISTORY:  Pain in left shoulder    TECHNIQUE:  Two or three views of the left shoulder were performed.    COMPARISON:  None    FINDINGS:  There is no radiographic evidence of acute osseous, articular, or soft tissue abnormality.  There is mild AC joint arthrosis.  Glenohumeral joint space appears preserved.    Impression  As  above.  No acute findings.      Electronically signed by: Cullen Meade MD  Date:    01/31/2022  Time:    15:11      MRI Results:  Results for orders placed during the hospital encounter of 01/04/23    MRI Shoulder Without Contrast Left    Narrative  EXAM: MRI SHOULDER WITHOUT CONTRAST LEFT    CLINICAL HISTORY:  Chronic right shoulder pain.  History of prior rotator cuff repair.    COMPARISON: None.    TECHNIQUE: Standard multiplanar pulse sequences obtained without IV or intra-articular contrast.    FINDINGS:    Postoperative changes compatible with prior rotator cuff repair.  Surgical anchors noted within the greater tuberosity.  The surgically.  Supraspinatus and infraspinatus tendons demonstrate moderate intrasubstance increased T2 signal.  Mild articular surface and irregularity and bursal surface fraying present.  However, no evidence of significant partial or full-thickness re-tear.  Normal subscapularis and teres minor tendons.  Subscapularis and teres minor tendons are also normal.  Normal rotator cuff muscle bulk.    Type I acromion with mild lateral downsloping.  Chondral thinning AC joint mild capsular hypertrophy.  No undersurface osteophytes.  Severe reactive marrow edema.  Small amount of fluid within the subacromial/subdeltoid bursa.  No significant fluid present within the subacromial-subdeltoid bursa.    Evidence of a biceps tenodesis.  Negative evidence of complicating process.  Degenerative fraying and blunting of the superior labrum.  Mild fraying of free edge of the anterior labrum.  Glenohumeral articular cartilage with mild chondral thinning.  Inferior glenohumeral ligaments are normal.    Normal, physiological joint fluid.  Negative for intra-articular loose body.  Supporting soft tissues and musculature are normal.  Remaining bony architecture marrow signal normal.    Impression  1.    Status post supraspinatus and infraspinatus tendon repair.  Negative for evidence of significant  partial thickness or full-thickness re-tear.    2.    Moderate osteoarthritis AC joint.    3.    Biceps tenodesis.  Negative for evidence of complicating process.    4.    Mild glenohumeral chondral thinning.    Finalized on: 1/4/2023 11:52 AM By:  Brodie Rodriguez MD  R# [id number]      2023-01-04 11:55:[id number]    [facility]      CT Results:  No results found for this or any previous visit.      Physician read: I agree with the above impression.    Assessment/Plan:   Gerda Obrien is a 52 y.o. female with  s/p left shoulder arthroscopic rotator cuff repair, biceps tenodesis, subacromial space debridement     Plan:    Reviewed MRI with the patient today. Reassured her that her MRI shows that she does not have a rotator cuff re-tear.   She has had some improvement in her symptoms since her last visit. I encouraged her to continue working on ROM and strengthening and resuming normal activities. Her MRI shows intact rotator cuff and I reassured her that the tendon is healing. It may take 1 year or longer for symptoms to resolve.  She is happy to hear the MRI results and will continue to progress activities.  Follow up as needed          Denilson Jackson MD    I, Marlene Felipe, acted as a scribe for Denilson Jackson MD for the duration of this office visit.

## 2023-01-30 PROBLEM — M54.50 LOW BACK PAIN: Status: ACTIVE | Noted: 2023-01-30

## 2023-06-26 ENCOUNTER — PATIENT MESSAGE (OUTPATIENT)
Dept: PHYSICAL MEDICINE AND REHAB | Facility: CLINIC | Age: 53
End: 2023-06-26
Payer: COMMERCIAL

## 2023-06-26 ENCOUNTER — PATIENT MESSAGE (OUTPATIENT)
Dept: ORTHOPEDICS | Facility: CLINIC | Age: 53
End: 2023-06-26
Payer: COMMERCIAL

## 2023-06-28 DIAGNOSIS — M54.9 DORSALGIA, UNSPECIFIED: Primary | ICD-10-CM

## 2023-06-28 DIAGNOSIS — M46.1 BILATERAL SACROILIITIS: ICD-10-CM

## 2023-06-28 DIAGNOSIS — M47.816 LUMBAR FACET ARTHROPATHY: ICD-10-CM

## 2023-07-05 ENCOUNTER — OFFICE VISIT (OUTPATIENT)
Dept: PAIN MEDICINE | Facility: CLINIC | Age: 53
End: 2023-07-05
Payer: COMMERCIAL

## 2023-07-05 ENCOUNTER — HOSPITAL ENCOUNTER (OUTPATIENT)
Dept: RADIOLOGY | Facility: HOSPITAL | Age: 53
Discharge: HOME | End: 2023-07-05
Attending: PHYSICAL MEDICINE & REHABILITATION
Payer: COMMERCIAL

## 2023-07-05 VITALS
SYSTOLIC BLOOD PRESSURE: 113 MMHG | HEART RATE: 62 BPM | WEIGHT: 207.44 LBS | DIASTOLIC BLOOD PRESSURE: 69 MMHG | HEIGHT: 62 IN | BODY MASS INDEX: 38.17 KG/M2

## 2023-07-05 DIAGNOSIS — M47.816 LUMBAR FACET ARTHROPATHY: ICD-10-CM

## 2023-07-05 DIAGNOSIS — M54.9 DORSALGIA, UNSPECIFIED: ICD-10-CM

## 2023-07-05 DIAGNOSIS — M54.16 LUMBAR RADICULOPATHY: Primary | ICD-10-CM

## 2023-07-05 DIAGNOSIS — M47.816 LUMBAR SPONDYLOSIS: ICD-10-CM

## 2023-07-05 DIAGNOSIS — M51.36 DDD (DEGENERATIVE DISC DISEASE), LUMBAR: ICD-10-CM

## 2023-07-05 DIAGNOSIS — M46.1 BILATERAL SACROILIITIS: ICD-10-CM

## 2023-07-05 PROCEDURE — 3074F PR MOST RECENT SYSTOLIC BLOOD PRESSURE < 130 MM HG: ICD-10-PCS | Mod: CPTII,S$GLB,, | Performed by: ANESTHESIOLOGY

## 2023-07-05 PROCEDURE — 99999 PR PBB SHADOW E&M-EST. PATIENT-LVL V: ICD-10-PCS | Mod: PBBFAC,,, | Performed by: ANESTHESIOLOGY

## 2023-07-05 PROCEDURE — 3008F BODY MASS INDEX DOCD: CPT | Mod: CPTII,S$GLB,, | Performed by: ANESTHESIOLOGY

## 2023-07-05 PROCEDURE — 3078F PR MOST RECENT DIASTOLIC BLOOD PRESSURE < 80 MM HG: ICD-10-PCS | Mod: CPTII,S$GLB,, | Performed by: ANESTHESIOLOGY

## 2023-07-05 PROCEDURE — 1159F PR MEDICATION LIST DOCUMENTED IN MEDICAL RECORD: ICD-10-PCS | Mod: CPTII,S$GLB,, | Performed by: ANESTHESIOLOGY

## 2023-07-05 PROCEDURE — 3074F SYST BP LT 130 MM HG: CPT | Mod: CPTII,S$GLB,, | Performed by: ANESTHESIOLOGY

## 2023-07-05 PROCEDURE — 1159F MED LIST DOCD IN RCRD: CPT | Mod: CPTII,S$GLB,, | Performed by: ANESTHESIOLOGY

## 2023-07-05 PROCEDURE — 72148 MRI LUMBAR SPINE W/O DYE: CPT | Mod: TC,PN

## 2023-07-05 PROCEDURE — 99999 PR PBB SHADOW E&M-EST. PATIENT-LVL V: CPT | Mod: PBBFAC,,, | Performed by: ANESTHESIOLOGY

## 2023-07-05 PROCEDURE — 72148 MRI LUMBAR SPINE W/O DYE: CPT | Mod: 26,,, | Performed by: STUDENT IN AN ORGANIZED HEALTH CARE EDUCATION/TRAINING PROGRAM

## 2023-07-05 PROCEDURE — 3061F NEG MICROALBUMINURIA REV: CPT | Mod: CPTII,S$GLB,, | Performed by: ANESTHESIOLOGY

## 2023-07-05 PROCEDURE — 3066F NEPHROPATHY DOC TX: CPT | Mod: CPTII,S$GLB,, | Performed by: ANESTHESIOLOGY

## 2023-07-05 PROCEDURE — 72148 MRI LUMBAR SPINE WITHOUT CONTRAST: ICD-10-PCS | Mod: 26,,, | Performed by: STUDENT IN AN ORGANIZED HEALTH CARE EDUCATION/TRAINING PROGRAM

## 2023-07-05 PROCEDURE — 3078F DIAST BP <80 MM HG: CPT | Mod: CPTII,S$GLB,, | Performed by: ANESTHESIOLOGY

## 2023-07-05 PROCEDURE — 99204 PR OFFICE/OUTPT VISIT, NEW, LEVL IV, 45-59 MIN: ICD-10-PCS | Mod: S$GLB,,, | Performed by: ANESTHESIOLOGY

## 2023-07-05 PROCEDURE — 99204 OFFICE O/P NEW MOD 45 MIN: CPT | Mod: S$GLB,,, | Performed by: ANESTHESIOLOGY

## 2023-07-05 PROCEDURE — 3061F PR NEG MICROALBUMINURIA RESULT DOCUMENTED/REVIEW: ICD-10-PCS | Mod: CPTII,S$GLB,, | Performed by: ANESTHESIOLOGY

## 2023-07-05 PROCEDURE — 3044F HG A1C LEVEL LT 7.0%: CPT | Mod: CPTII,S$GLB,, | Performed by: ANESTHESIOLOGY

## 2023-07-05 PROCEDURE — 3008F PR BODY MASS INDEX (BMI) DOCUMENTED: ICD-10-PCS | Mod: CPTII,S$GLB,, | Performed by: ANESTHESIOLOGY

## 2023-07-05 PROCEDURE — 3044F PR MOST RECENT HEMOGLOBIN A1C LEVEL <7.0%: ICD-10-PCS | Mod: CPTII,S$GLB,, | Performed by: ANESTHESIOLOGY

## 2023-07-05 PROCEDURE — 3066F PR DOCUMENTATION OF TREATMENT FOR NEPHROPATHY: ICD-10-PCS | Mod: CPTII,S$GLB,, | Performed by: ANESTHESIOLOGY

## 2023-07-05 RX ORDER — MELOXICAM 15 MG/1
15 TABLET ORAL DAILY PRN
Qty: 30 TABLET | Refills: 1 | Status: SHIPPED | OUTPATIENT
Start: 2023-07-05 | End: 2023-09-08

## 2023-07-05 RX ORDER — METHOCARBAMOL 750 MG/1
750 TABLET, FILM COATED ORAL 2 TIMES DAILY PRN
Qty: 60 TABLET | Refills: 1 | Status: SHIPPED | OUTPATIENT
Start: 2023-07-05 | End: 2023-09-08

## 2023-07-05 NOTE — PROGRESS NOTES
New Patient Interventional Pain Note (Initial Visit)    Referring Physician: Marie Zee MD    PCP: Meli Gutierrez MD    Chief Complaint:     Chief Complaint   Patient presents with    Low-back Pain        SUBJECTIVE:    Gerda Obrien is a 52 y.o. female who presents to the clinic for the evaluation of lower back pain.   Patient reports 5 month history of lower back pain.  Patient denies any inciting traumas to his lower back pain.  Patient denies any previous surgery to the lumbar spine.    Pain is described as a throbbing aching pain in a band across her lower back.  Patient reports associated radiation to her bilateral lower extremities primarily affecting the bilateral calves and feet in a numbness tingling pain and with associated muscle cramps.  Pain is typically worse at night and when getting up in the morning.  Patient reports morning stiffness with his pain.  Pain is typically worse with stretching and exercise as well as heat.  Pain is currently rated a 3/10, but can increase to an 8/10 with exacerbating activities. Denies any fevers, chills, changes in gait, saddle anesthesia, or bowel and bladder incontinence      Non-Pharmacologic Treatments:  Physical Therapy/Home Exercise: yes  Ice/Heat:yes  TENS: no  Acupuncture: no  Massage: yes  Chiropractic: no        Previous Pain Medications:  Tylenol, NSAIDs, topicals     report:  Reviewed and consistent with medication use as prescribed.    Pain Procedures:   None      Imaging:   Results for orders placed during the hospital encounter of 02/15/22    X-Ray Lumbar Complete Including Flex And Ext    Narrative  EXAMINATION:  XR LUMBAR SPINE 5 VIEW WITH FLEX AND EXT    CLINICAL HISTORY:  Spondylosis without myelopathy or radiculopathy, lumbar region    TECHNIQUE:  AP, lateral and spot images were performed of the lumbar spine.    COMPARISON:  None    FINDINGS:  AP, lateral (flexion and extension), bilateral oblique and coned down  radiographs of the lumbar spine demonstrate no evidence for acute fracture or dislocation.  Posterior facet hypertrophic changes are identified at L5/S1, to a lesser extent at L4/5.  Anterior osteophytes are identified at several lumbar levels.  Mild intervertebral disc space narrowing is also identified at L4/5 and L5/S1.  Remaining intervertebral disk spaces and vertebral body heights are well-preserved.  Visualized SI joints are intact.    Impression  Mild degenerative disease as described above      Electronically signed by: Jan Rowell MD  Date:    02/15/2022  Time:    09:17      Past Medical History:   Diagnosis Date    Allergy     Anxiety     Biotin deficiency disease     Chlamydia     Diabetes mellitus     Fibroids     Herpes simplex     Knee pain, bilateral     Metabolic syndrome     Sleep apnea     Vitamin D deficiency      Past Surgical History:   Procedure Laterality Date    ARTHROSCOPIC REPAIR OF ROTATOR CUFF OF SHOULDER Left 03/07/2022    Procedure: REPAIR, ROTATOR CUFF, ARTHROSCOPIC;  Surgeon: Denilson Jackson MD;  Location: [facility] OR;  Service: Orthopedics;  Laterality: Left;    ARTHROSCOPY OF SHOULDER WITH DECOMPRESSION OF SUBACROMIAL SPACE Left 03/07/2022    Procedure: ARTHROSCOPY, SHOULDER, WITH SUBACROMIAL SPACE DECOMPRESSION;  Surgeon: Denilson Jackson MD;  Location: [facility] OR;  Service: Orthopedics;  Laterality: Left;    BREAST SURGERY      BUNIONECTOMY      CERVICAL BIOPSY  W/ LOOP ELECTRODE EXCISION      COLONOSCOPY N/A 10/15/2021    Procedure: COLONOSCOPY;  Surgeon: Alaina Ceja MD;  Location: [facility] ENDO;  Service: Endoscopy;  Laterality: N/A;    FIXATION OF TENDON Left 03/07/2022    Procedure: FIXATION, TENDON;  Surgeon: Denilson Jackson MD;  Location: [facility] OR;  Service: Orthopedics;  Laterality: Left;    FOOT SURGERY      hammer toe Bilateral     HYSTERECTOMY      Laparoscopic assisted vaginal hysterectomy with BSO  11/22/2021    Path--Leiomyomata/Adenomyosis     LASER ABLATION OF THE CERVIX      PLANTAR FASCIA SURGERY Left     TOTAL REDUCTION MAMMOPLASTY      TUBAL LIGATION       Social History     Socioeconomic History    Marital status:    Tobacco Use    Smoking status: Never    Smokeless tobacco: Never   Substance and Sexual Activity    Alcohol use: Yes     Alcohol/week: 1.0 standard drink     Types: 1 Glasses of wine per week     Comment: ocassional     Drug use: No    Sexual activity: Yes     Partners: Male     Birth control/protection: Other-see comments     Comment: Hysterectomy     Family History   Problem Relation Age of Onset    Diabetes Paternal Grandfather     Epilepsy Maternal Grandmother     Cancer Father         Prostrate    Diabetes Father     Breast cancer Cousin     Heart disease Neg Hx     Hypertension Neg Hx        Review of patient's allergies indicates:   Allergen Reactions    Doxycycline     Emtricitabine      Other reaction(s): Rash    Lexapro [escitalopram oxalate]      spasms    Sulfa (sulfonamide antibiotics)     Tenofovir      Other reaction(s): Rash    Trulicity [dulaglutide]     Bydureon [exenatide microspheres] Rash     Skin reaction at site of injection.       Current Outpatient Medications   Medication Sig    albuterol (PROVENTIL/VENTOLIN HFA) 90 mcg/actuation inhaler Inhale 2 puffs into the lungs every 6 (six) hours as needed for Wheezing.    atorvastatin (LIPITOR) 10 MG tablet Take 1 tablet (10 mg total) by mouth every evening.    azelaic acid (FINACEA) 15 % gel AAA of eyelids 3 mornings/week    biotin 10,000 mcg Cap Take 1 capsule by mouth once daily.    blood sugar diagnostic (PRECISION XTRA TEST) Strp USE TO CHECK BLOOD GLUCOSE LEVELS TWICE DAILY.    blood-glucose sensor (DEXCOM G6 SENSOR) Tanja Use as directed    cholecalciferol, vitamin D3, (VITAMIN D3) 250 mcg (10,000 unit) Cap Take 1 tablet by mouth. 1 tablet 3 times a week    clonazePAM (KLONOPIN) 0.5 MG tablet Take 1 tablet (0.5 mg total) by mouth daily as needed for  Anxiety.    clotrimazole-betamethasone 1-0.05% (LOTRISONE) cream Apply topically 2 (two) times daily.    dapagliflozin (FARXIGA) 10 mg tablet Take 1 tablet (10 mg total) by mouth once daily.    desloratadine (CLARINEX) 5 mg tablet TAKE 1 TABLET BY MOUTH EVERY DAY    DYMISTA 137-50 mcg/spray Spry nassal spray 1 spray by Each Nare route 2 (two) times daily as needed.    estradioL (VIVELLE-DOT) 0.1 mg/24 hr PTSW Place 1 patch onto the skin twice a week. estradiol 0.1 mg/24 hr semiweekly transdermal patch   APPLY 1 PATCH TWICE A WEEK    ferrous sulfate (FEOSOL) 325 mg (65 mg iron) Tab tablet TAKE 1 TABLET BY MOUTH EVERY DAY WITH BREAKFAST    fluticasone propionate (FLONASE) 50 mcg/actuation nasal spray SPRAY 2 SPRAYS IN EACH NOSTRIL ONCE DAILY    FREESTYLE LANCETS 28 gauge lancets USE TWICE DAILY BEFORE MEALS.    hydroquinone 4 % Crea Apply to dark spots once daily. Use with sunscreen if outdoors    ketoconazole (NIZORAL) 2 % cream APPLY TO FEET ONCE DAILY    magnesium chloride (SLOW-MAG) 71.5 mg TbEC Take 1 tablet by mouth once. One tablet 3 times a week    multivitamin capsule Take by mouth.    omeprazole (PRILOSEC) 40 MG capsule TAKE 1 CAPSULE BY MOUTH EVERY DAY IN THE MORNING    oxyCODONE (ROXICODONE) 5 MG immediate release tablet Take by mouth.    tretinoin (RETIN-A) 0.025 % cream APPLY TO AFFECTED AREA THREE TIMES WEEKLY AS DIRECTED.    valACYclovir (VALTREX) 500 MG tablet TAKE 1 TABLET (500 MG) BY MOUTH 2 TIMES PER DAY FOR 5 DAYS    meloxicam (MOBIC) 15 MG tablet Take 1 tablet (15 mg total) by mouth daily as needed for Pain.    methocarbamoL (ROBAXIN) 750 MG Tab Take 1 tablet (750 mg total) by mouth 2 (two) times daily as needed (Muscle Spasm Pain).    SITagliptin phosphate (JANUVIA) 100 MG Tab Take 1 tablet (100 mg total) by mouth once daily. (Patient not taking: Reported on 7/5/2023)     No current facility-administered medications for this visit.         ROS  Review of Systems   Constitutional:  Negative for  "chills, diaphoresis, fatigue and fever.   HENT:  Negative for ear discharge, ear pain, rhinorrhea, trouble swallowing and voice change.    Eyes:  Negative for pain and redness.   Respiratory:  Negative for chest tightness, shortness of breath, wheezing and stridor.    Cardiovascular:  Negative for chest pain and leg swelling.   Gastrointestinal:  Negative for blood in stool, diarrhea, nausea and vomiting.   Endocrine: Negative for cold intolerance and heat intolerance.   Genitourinary:  Negative for dysuria, hematuria and urgency.   Musculoskeletal:  Positive for arthralgias, back pain and myalgias. Negative for gait problem, joint swelling, neck pain and neck stiffness.   Skin:  Negative for rash.   Neurological:  Positive for weakness and numbness. Negative for tremors, seizures, speech difficulty, light-headedness and headaches.   Hematological:  Does not bruise/bleed easily.   Psychiatric/Behavioral:  Negative for agitation, confusion and suicidal ideas.           OBJECTIVE:  /69   Pulse 62   Ht 5' 2" (1.575 m)   Wt 94.1 kg (207 lb 7.3 oz)   LMP 11/12/2017 (Approximate)   BMI 37.94 kg/m²         Physical Exam  Constitutional:       General: She is not in acute distress.     Appearance: Normal appearance. She is not ill-appearing.   HENT:      Head: Normocephalic and atraumatic.      Nose: No congestion or rhinorrhea.   Eyes:      Extraocular Movements: Extraocular movements intact.      Pupils: Pupils are equal, round, and reactive to light.   Cardiovascular:      Pulses: Normal pulses.   Pulmonary:      Effort: Pulmonary effort is normal.      Breath sounds: Normal breath sounds.   Abdominal:      General: Abdomen is flat. Bowel sounds are normal.      Palpations: Abdomen is soft.   Skin:     General: Skin is warm and dry.      Capillary Refill: Capillary refill takes less than 2 seconds.   Neurological:      General: No focal deficit present.      Mental Status: She is alert and oriented to person, " place, and time.      Sensory: No sensory deficit.      Motor: Weakness present. No abnormal muscle tone.      Gait: Gait normal.      Deep Tendon Reflexes:      Reflex Scores:       Patellar reflexes are 2+ on the right side and 2+ on the left side.       Achilles reflexes are 2+ on the right side and 2+ on the left side.     Comments: Four out of five strength in bilateral dorsiflexion   Psychiatric:         Mood and Affect: Mood normal.         Behavior: Behavior normal.         Thought Content: Thought content normal.         Musculoskeletal:        Lumbar Exam  Incision: no  Pain with Flexion: no  Pain with Extension: yes  ROM: Decreased  Paraspinous TTP:  Positive bilaterally  Facet TTP:  L5-S1  Facet Loading:  Positive bilaterally  SLR:  Positive on the right at 75°  SIJ TTP:  Positive bilaterally  LISSA:  Positive on the right      LABS:  Lab Results   Component Value Date    WBC 5.3 01/30/2023    HGB 14.2 01/30/2023    HCT 44.9 01/30/2023    MCV 90 01/30/2023     01/30/2023       CMP  Sodium   Date Value Ref Range Status   06/20/2023 140 134 - 144 mmol/L Final     Potassium   Date Value Ref Range Status   06/20/2023 4.4 3.5 - 5.2 mmol/L Final     Chloride   Date Value Ref Range Status   06/20/2023 102 96 - 106 mmol/L Final     CO2   Date Value Ref Range Status   06/20/2023 25 20 - 29 mmol/L Final     Glucose   Date Value Ref Range Status   06/20/2023 94 70 - 99 mg/dL Final     BUN   Date Value Ref Range Status   06/20/2023 20 6 - 24 mg/dL Final     Creatinine   Date Value Ref Range Status   06/20/2023 0.95 0.57 - 1.00 mg/dL Final     Calcium   Date Value Ref Range Status   06/20/2023 9.5 8.7 - 10.2 mg/dL Final     Total Protein   Date Value Ref Range Status   12/01/2022 7.5 6.0 - 8.4 g/dL Final     Albumin   Date Value Ref Range Status   06/20/2023 4.2 3.8 - 4.9 g/dL Final   12/01/2022 3.8 3.5 - 5.2 g/dL Final     Total Bilirubin   Date Value Ref Range Status   06/20/2023 0.3 0.0 - 1.2 mg/dL Final      Alkaline Phosphatase   Date Value Ref Range Status   12/01/2022 102 55 - 135 U/L Final     AST   Date Value Ref Range Status   06/20/2023 21 0 - 40 IU/L Final     ALT   Date Value Ref Range Status   06/20/2023 14 0 - 32 IU/L Final     Anion Gap   Date Value Ref Range Status   12/01/2022 7 (L) 8 - 16 mmol/L Final     eGFR if    Date Value Ref Range Status   02/18/2022 >60.0 >60 mL/min/1.73 m^2 Final     eGFR if non    Date Value Ref Range Status   02/18/2022 >60.0 >60 mL/min/1.73 m^2 Final     Comment:     Calculation used to obtain the estimated glomerular filtration  rate (eGFR) is the CKD-EPI equation.          Lab Results   Component Value Date    LABA1C 5.6 08/06/2020    HGBA1C 5.9 (H) 06/20/2023             ASSESSMENT:       52 y.o. year old female with lower back pain, consistent with     1. Lumbar radiculopathy  meloxicam (MOBIC) 15 MG tablet    methocarbamoL (ROBAXIN) 750 MG Tab    Ambulatory referral/consult to Physical/Occupational Therapy      2. Lumbar facet arthropathy  Ambulatory referral/consult to Pain Clinic    meloxicam (MOBIC) 15 MG tablet    methocarbamoL (ROBAXIN) 750 MG Tab      3. Bilateral sacroiliitis  Ambulatory referral/consult to Pain Clinic    meloxicam (MOBIC) 15 MG tablet    methocarbamoL (ROBAXIN) 750 MG Tab      4. Lumbar spondylosis  meloxicam (MOBIC) 15 MG tablet    methocarbamoL (ROBAXIN) 750 MG Tab      5. DDD (degenerative disc disease), lumbar  meloxicam (MOBIC) 15 MG tablet    methocarbamoL (ROBAXIN) 750 MG Tab        Lumbar radiculopathy  -     meloxicam (MOBIC) 15 MG tablet; Take 1 tablet (15 mg total) by mouth daily as needed for Pain.  Dispense: 30 tablet; Refill: 1  -     methocarbamoL (ROBAXIN) 750 MG Tab; Take 1 tablet (750 mg total) by mouth 2 (two) times daily as needed (Muscle Spasm Pain).  Dispense: 60 tablet; Refill: 1  -     Ambulatory referral/consult to Physical/Occupational Therapy; Future; Expected date: 07/12/2023    Lumbar  facet arthropathy  -     Ambulatory referral/consult to Pain Clinic  -     meloxicam (MOBIC) 15 MG tablet; Take 1 tablet (15 mg total) by mouth daily as needed for Pain.  Dispense: 30 tablet; Refill: 1  -     methocarbamoL (ROBAXIN) 750 MG Tab; Take 1 tablet (750 mg total) by mouth 2 (two) times daily as needed (Muscle Spasm Pain).  Dispense: 60 tablet; Refill: 1    Bilateral sacroiliitis  -     Ambulatory referral/consult to Pain Clinic  -     meloxicam (MOBIC) 15 MG tablet; Take 1 tablet (15 mg total) by mouth daily as needed for Pain.  Dispense: 30 tablet; Refill: 1  -     methocarbamoL (ROBAXIN) 750 MG Tab; Take 1 tablet (750 mg total) by mouth 2 (two) times daily as needed (Muscle Spasm Pain).  Dispense: 60 tablet; Refill: 1    Lumbar spondylosis  -     meloxicam (MOBIC) 15 MG tablet; Take 1 tablet (15 mg total) by mouth daily as needed for Pain.  Dispense: 30 tablet; Refill: 1  -     methocarbamoL (ROBAXIN) 750 MG Tab; Take 1 tablet (750 mg total) by mouth 2 (two) times daily as needed (Muscle Spasm Pain).  Dispense: 60 tablet; Refill: 1    DDD (degenerative disc disease), lumbar  -     meloxicam (MOBIC) 15 MG tablet; Take 1 tablet (15 mg total) by mouth daily as needed for Pain.  Dispense: 30 tablet; Refill: 1  -     methocarbamoL (ROBAXIN) 750 MG Tab; Take 1 tablet (750 mg total) by mouth 2 (two) times daily as needed (Muscle Spasm Pain).  Dispense: 60 tablet; Refill: 1             PLAN:   - Interventions:   None at this time.  We will obtain MRI of lumbar spine and discuss further interventions including lumbar ALYSSA versus lumbar medial branch block.  - Anticoagulation use:   No no anticoagulation    - Medications:   Start Mobic 15mg PO daily PRN   Start Robaxin 750mg PO BID PRN    - Therapy:    Patient has completed some physical therapy for lower back while completing physical therapy for shoulder.  We will refer to formal physical therapy for lower back pain.    - Imaging/Diagnostic:   X-rays of lumbar  spine reviewed and findings discussed with patient.  Significant for facet arthropathy at L4-5 and L5-S1 were noted loss of disc height at L5-S1.   Patient is currently scheduled for MRI lumbar spine for lower back pain with bilateral  lumbar radiculopathy despite over 8 weeks of conservative therapy    - Consults:   None at this time    - Counseled patient regarding the importance of physical therapy    - Patient Questions: Answered all of the patient's questions regarding diagnosis, therapy, and treatment     This condition does not require this patient to take time off of work, and the primary goal of our Pain Management services is to improve the patient's functional capacity.     - Follow up visit: return to clinic in 5 weeks        The above plan and management options were discussed at length with patient. Patient is in agreement with the above and verbalized understanding.    I discussed the goals of interventional chronic pain management with the patient on today's visit.  I explained the utility of injections for diagnostic and therapeutic purposes.  We discussed a multimodal approach to pain including treating the patient's given worst pain at any given time.  We will use a systematic approach to addressing pain.  We will also adopt a multimodal approach that includes injections, adjuvant medications, physical therapy, at times psychiatry.  There may be a limited role for opioid use intermittently in the treatment of pain, more particularly for acute pain although no one approach can be used as a sole treatment modality.    I emphasized the importance of regular exercise, core strengthening and stretching, diet and weight loss as a cornerstone of long-term pain management.      Beka Johnson MD  Interventional Pain Management  [facility]    Disclaimer:  This note was prepared using voice recognition system and is likely to have sound alike errors that may have been overlooked even after proof  reading.  Please call me with any questions]

## 2023-07-17 PROBLEM — E66.01 SEVERE OBESITY (BMI 35.0-39.9) WITH COMORBIDITY: Status: ACTIVE | Noted: 2023-07-17

## 2023-07-17 PROBLEM — E11.42 TYPE 2 DIABETES MELLITUS WITH DIABETIC POLYNEUROPATHY, WITHOUT LONG-TERM CURRENT USE OF INSULIN: Status: ACTIVE | Noted: 2023-07-17

## 2023-08-14 ENCOUNTER — OFFICE VISIT (OUTPATIENT)
Dept: PAIN MEDICINE | Facility: CLINIC | Age: 53
End: 2023-08-14
Payer: COMMERCIAL

## 2023-08-14 VITALS — RESPIRATION RATE: 17 BRPM

## 2023-08-14 DIAGNOSIS — M51.36 DDD (DEGENERATIVE DISC DISEASE), LUMBAR: ICD-10-CM

## 2023-08-14 DIAGNOSIS — G47.62 LEG CRAMPS, SLEEP RELATED: ICD-10-CM

## 2023-08-14 DIAGNOSIS — M47.816 LUMBAR SPONDYLOSIS: Primary | ICD-10-CM

## 2023-08-14 DIAGNOSIS — M62.838 MUSCLE SPASM OF BOTH LOWER LEGS: ICD-10-CM

## 2023-08-14 DIAGNOSIS — M54.59 LUMBAR FACET JOINT PAIN: ICD-10-CM

## 2023-08-14 PROCEDURE — 1160F RVW MEDS BY RX/DR IN RCRD: CPT | Mod: CPTII,95,, | Performed by: PHYSICIAN ASSISTANT

## 2023-08-14 PROCEDURE — 3061F PR NEG MICROALBUMINURIA RESULT DOCUMENTED/REVIEW: ICD-10-PCS | Mod: CPTII,95,, | Performed by: PHYSICIAN ASSISTANT

## 2023-08-14 PROCEDURE — 3066F NEPHROPATHY DOC TX: CPT | Mod: CPTII,95,, | Performed by: PHYSICIAN ASSISTANT

## 2023-08-14 PROCEDURE — 3044F PR MOST RECENT HEMOGLOBIN A1C LEVEL <7.0%: ICD-10-PCS | Mod: CPTII,95,, | Performed by: PHYSICIAN ASSISTANT

## 2023-08-14 PROCEDURE — 99214 OFFICE O/P EST MOD 30 MIN: CPT | Mod: 95,ICN,, | Performed by: PHYSICIAN ASSISTANT

## 2023-08-14 PROCEDURE — 3061F NEG MICROALBUMINURIA REV: CPT | Mod: CPTII,95,, | Performed by: PHYSICIAN ASSISTANT

## 2023-08-14 PROCEDURE — 1159F PR MEDICATION LIST DOCUMENTED IN MEDICAL RECORD: ICD-10-PCS | Mod: CPTII,95,, | Performed by: PHYSICIAN ASSISTANT

## 2023-08-14 PROCEDURE — 1159F MED LIST DOCD IN RCRD: CPT | Mod: CPTII,95,, | Performed by: PHYSICIAN ASSISTANT

## 2023-08-14 PROCEDURE — 3066F PR DOCUMENTATION OF TREATMENT FOR NEPHROPATHY: ICD-10-PCS | Mod: CPTII,95,, | Performed by: PHYSICIAN ASSISTANT

## 2023-08-14 PROCEDURE — 99214 PR OFFICE/OUTPT VISIT, EST, LEVL IV, 30-39 MIN: ICD-10-PCS | Mod: 95,ICN,, | Performed by: PHYSICIAN ASSISTANT

## 2023-08-14 PROCEDURE — 1160F PR REVIEW ALL MEDS BY PRESCRIBER/CLIN PHARMACIST DOCUMENTED: ICD-10-PCS | Mod: CPTII,95,, | Performed by: PHYSICIAN ASSISTANT

## 2023-08-14 PROCEDURE — 3044F HG A1C LEVEL LT 7.0%: CPT | Mod: CPTII,95,, | Performed by: PHYSICIAN ASSISTANT

## 2023-08-14 RX ORDER — ACETAMINOPHEN 500 MG
1000 TABLET ORAL EVERY 8 HOURS PRN
Refills: 0
Start: 2023-08-14 | End: 2023-09-18

## 2023-08-14 RX ORDER — PERPHENAZINE 16 MG
TABLET ORAL
Qty: 60 EACH | COMMUNITY
Start: 2023-08-14

## 2023-08-14 NOTE — PROGRESS NOTES
Established Patient - TeleHealth Visit    The patient location is: [place]  The chief complaint leading to consultation is: chronic pain     Visit type: audiovisual    Face to Face time with patient: 10-15 minutes  20 minutes of total time spent on the encounter, which includes face to face time and non-face to face time preparing to see the patient (eg, review of tests), Obtaining and/or reviewing separately obtained history, Documenting clinical information in the electronic or other health record, Independently interpreting results (not separately reported) and communicating results to the patient/family/caregiver, or Care coordination (not separately reported).     Each patient to whom he or she provides medical services by telemedicine is:  (1) informed of the relationship between the physician and patient and the respective role of any other health care provider with respect to management of the patient; and (2) notified that he or she may decline to receive medical services by telemedicine and may withdraw from such care at any time.      Chronic Pain -- Established Patient (Follow-up visit)    Referring Physician: Marie Zee MD    PCP: Meli Gutierrez MD    Chief Complaint:  Chief Complaint   Patient presents with    Pain      Low-back Pain   And c/o pain in bilateral lower extremities primarily affecting the bilateral calves and feet in a numbness tingling pain and with associated muscle cramps       Interval History (8/14/2023):  Gerda Obrien presents today for follow-up visit.  Patient was last seen on 7/5/2023. At that visit, the plan was to start Robaxin, but she reports that it causes drowsiness.  Her main complaint is leg cramping, worse at night.  Pain is better with movement.  She also complains of leg cramps described as Charley horses during sexual intercourse.  Overall, she does not feel that it is a sciatic nerve pain, but more of muscle cramps in her legs.  She takes a  multivitamin daily along with magnesium.  Patient reports pain as 2/10 today.  She has taken gabapentin in the past but does not remember why she quit taking.    Initial HPI (7/5/2023):  Gerda Obrien is a 52 y.o. female who presents to the clinic for the evaluation of lower back pain. who presents to the clinic for the evaluation of lower back pain.   Patient reports 5 month history of lower back pain.  Patient denies any inciting traumas to his lower back pain.  Patient denies any previous surgery to the lumbar spine.    Pain is described as a throbbing aching pain in a band across her lower back.  Patient reports associated radiation to her bilateral lower extremities primarily affecting the bilateral calves and feet in a numbness tingling pain and with associated muscle cramps.  Pain is typically worse at night and when getting up in the morning.  Patient reports morning stiffness with his pain.  Pain is typically worse with stretching and exercise as well as heat.  Pain is currently rated a 3/10, but can increase to an 8/10 with exacerbating activities. Denies any fevers, chills, changes in gait, saddle anesthesia, or bowel and bladder incontinence      Non-Pharmacologic Treatments:  Physical Therapy/Home Exercise: yes  Ice/Heat:yes  TENS: no  Acupuncture: no  Massage: yes  Chiropractic: no        Previous Pain Medications:  Tylenol, NSAIDs, topicals      Pain Procedures:   None      Imaging (Reviewed on 8/14/2023):     7/5/23 MRI Lumbar Spine Without Contrast  COMPARISON:  X-ray dated 02/15/2022    FINDINGS:  There are 5 non-rib-bearing lumbar vertebrae.  There is trace grade 1 degenerative anterolisthesis of L4 on L5.  Remaining alignment is unremarkable.  No acute fracture or compression deformity.  No aggressive focal signal abnormality.    Conus medullaris terminates at the L1-L2 level.  Conus medullaris is normal in size and signal.    T12-L1: No significant disc pathology.  No significant  spinal canal or neural foraminal stenosis.    L1-L2: No significant disc pathology.  No significant spinal canal or neural foraminal stenosis.    L2-L3: Trace annular disc bulge.  Mild bilateral facet arthropathy.  No significant spinal canal or neural foraminal stenosis.    L3-L4: Small circumferential disc bulge.  Mild bilateral facet arthropathy.  No significant spinal canal or neural foraminal stenosis.    L4-L5: Small circumferential disc bulge.  Mild bilateral facet arthropathy.  No significant spinal canal or neural foraminal stenosis.    L5-S1: Trace annular disc bulge.  Minimal bilateral facet arthropathy.  No significant spinal canal or neural foraminal stenosis.    Paraspinal soft tissues are unremarkable.  Visualized intra-abdominopelvic contents are also unremarkable.  Impression: Mild mid to lower lumbar level predominant degenerative changes as detailed above including grade 1 anterolisthesis of L4 on L5.  No significant spinal canal or neural foraminal stenosis.        2/15/22 X-Ray Lumbar Complete Including Flex And Ext  COMPARISON:  None  FINDINGS:  AP, lateral (flexion and extension), bilateral oblique and coned down radiographs of the lumbar spine demonstrate no evidence for acute fracture or dislocation.  Posterior facet hypertrophic changes are identified at L5/S1, to a lesser extent at L4/5.  Anterior osteophytes are identified at several lumbar levels.  Mild intervertebral disc space narrowing is also identified at L4/5 and L5/S1.  Remaining intervertebral disk spaces and vertebral body heights are well-preserved.  Visualized SI joints are intact.  Impression  Mild degenerative disease as described above        ROS  Review of Systems   Constitutional:  Negative for chills, diaphoresis, fatigue and fever.   HENT:  Negative for ear discharge, ear pain, rhinorrhea, trouble swallowing and voice change.    Eyes:  Negative for pain and redness.   Respiratory:  Negative for chest tightness,  shortness of breath, wheezing and stridor.    Cardiovascular:  Negative for chest pain and leg swelling.   Gastrointestinal:  Negative for blood in stool, diarrhea, nausea and vomiting.   Endocrine: Negative for cold intolerance and heat intolerance.   Genitourinary:  Negative for dysuria, hematuria and urgency.   Musculoskeletal:  Positive for arthralgias, back pain and myalgias. Negative for gait problem, joint swelling, neck pain and neck stiffness.   Skin:  Negative for rash.   Neurological:  Positive for weakness and numbness. Negative for tremors, seizures, speech difficulty, light-headedness and headaches.   Hematological:  Does not bruise/bleed easily.   Psychiatric/Behavioral:  Negative for agitation, confusion and suicidal ideas.           OBJECTIVE:  Telemedicine Exam  Vitals:    08/14/23 1128   Resp: 17     There is no height or weight on file to calculate BMI.   (reviewed on 8/14/2023)     GENERAL: Well appearing, in no acute distress, alert and oriented x3.  Cooperative.  PSYCH:  Mood and affect appropriate.  SKIN: Skin color & texture with no obvious abnormalities.    HEAD/FACE:  Normocephalic, atraumatic.    PULM:  No difficulty breathing. No nasal flaring. No obvious wheezing.  EXTREMITIES: No obvious deformities. Moving all extremities well, appears to have symmetric strength throughout.  MUSCULOSKELETAL: No obvious atrophy abnormalities are noted.   NEURO: No obvious neurologic deficit.   GAIT: sitting.     Physical Exam: last in clinic visit:  Constitutional:       General: She is not in acute distress.     Appearance: Normal appearance. She is not ill-appearing.   HENT:      Head: Normocephalic and atraumatic.      Nose: No congestion or rhinorrhea.   Eyes:      Extraocular Movements: Extraocular movements intact.      Pupils: Pupils are equal, round, and reactive to light.   Cardiovascular:      Pulses: Normal pulses.   Pulmonary:      Effort: Pulmonary effort is normal.      Breath sounds:  Normal breath sounds.   Abdominal:      General: Abdomen is flat. Bowel sounds are normal.      Palpations: Abdomen is soft.   Skin:     General: Skin is warm and dry.      Capillary Refill: Capillary refill takes less than 2 seconds.   Neurological:      General: No focal deficit present.      Mental Status: She is alert and oriented to person, place, and time.      Sensory: No sensory deficit.      Motor: Weakness present. No abnormal muscle tone.      Gait: Gait normal.      Deep Tendon Reflexes:      Reflex Scores:       Patellar reflexes are 2+ on the right side and 2+ on the left side.       Achilles reflexes are 2+ on the right side and 2+ on the left side.     Comments: Four out of five strength in bilateral dorsiflexion   Psychiatric:         Mood and Affect: Mood normal.         Behavior: Behavior normal.         Thought Content: Thought content normal.     Musculoskeletal:  Lumbar Exam  Incision: no  Pain with Flexion: no  Pain with Extension: yes  ROM: Decreased  Paraspinous TTP:  Positive bilaterally  Facet TTP:  L5-S1  Facet Loading:  Positive bilaterally  SLR:  Positive on the right at 75°  SIJ TTP:  Positive bilaterally  LISSA:  Positive on the right          ASSESSMENT:     52 y.o. year old female with lower back pain, consistent with     1. Lumbar spondylosis  acetaminophen (TYLENOL) 500 MG tablet      2. Lumbar facet joint pain        3. DDD (degenerative disc disease), lumbar        4. Leg cramps, sleep related  alpha lipoic acid 600 mg Cap      5. Muscle spasm of both lower legs            Lumbar spondylosis  -     acetaminophen (TYLENOL) 500 MG tablet; Take 2 tablets (1,000 mg total) by mouth every 8 (eight) hours as needed for Pain. Not actual Rx - Use for Recommended dose instructions; Refill: 0    Lumbar facet joint pain    DDD (degenerative disc disease), lumbar    Leg cramps, sleep related  -     alpha lipoic acid 600 mg Cap; Take 600 to 1200mg Per day. Over the counter medication  instructions - just for direction.  Dispense: 60 each    Muscle spasm of both lower legs         PLAN:   - Interventions:   - Consider lumbar MBB for axial low back pain.  Information provided to patient regarding MBB and RFA.  - Hold off on discussion of ALYSSA as pain does not seem to be radicular but more leg cramping complaints. Also, MRI doesn't support radicular sources.    - Anticoagulation use:   No no anticoagulation    - Medications:  - Continue Mobic 15mg daily PRN.  - Alternate with Tylenol (acetaminophen) 500mg up to 3000mg per day PRN.   - Restart Robaxin 750mg BID PRN.  Encouraged to take earlier in the day to see if it helps with nighttime leg cramping.  It has been causing drowsiness, so we can send in a lower dose if requested.  - Continue daily multivitamin.  Patient to discuss further supplements with PCP.  Currently taking magnesium but unsure of type or dosage.  Recommended to add in B12 and/or alpha lipoic acid.  Dosages given to patient on AVS.    - Therapy:    Patient has completed some physical therapy for lower back while completing physical therapy for shoulder.  We will refer to formal physical therapy for lower back pain.    - Imaging/Diagnostic:  -Lumbar MRI reviewed with patient, which shows mostly discogenic issues, lack of radicular sources present.  - X-rays of lumbar spine reviewed and findings discussed with patient previously: Significant for facet arthropathy at L4-5 and L5-S1 were noted loss of disc height at L5-S1.    - Consults: None at this time    - Follow up visit: PRN     - Patient Questions: Answered all of the patient's questions regarding diagnosis, therapy, and treatment.    - This condition does not require this patient to take time off of work, and the primary goal of our Pain Management services is to improve the patient's functional capacity.   - I discussed the risks, benefits, and alternatives to potential treatment options. All questions and concerns were fully  addressed today in clinic.         Estrella Thompson PA-C  Interventional Pain Management - [facility]    Disclaimer:  This note was prepared using voice recognition system and is likely to have sound alike errors that may have been overlooked even after proof reading.  Please call me with any questions.

## 2023-08-14 NOTE — PATIENT INSTRUCTIONS
Medications:    Reasonable to try any of these:  Magnesium sulfate - for muscle soreness  Magnesium chloride - to relax muscles  Magnesium malate - for muscle pain     Add in B12 for muscle cramps    Alternate Tylenol 1000mg (two tablets of 500mg) 3x per day as needed for pain (to take up to max dose of 3000mg per day) with mobic as needed for pain. Take with food.     Retry Robaxin (methocarbamol) 750mg PRN. We can send in lower dose if it causes drowsiness.    Let's consider retrying gabapentin or trying Lyrica for leg pain.     Start (over-the-counter supplement) alpha lipoic acid 600 to 1200mg per day.     Discuss with PCP supplements we discussed, along with additional B12 that could help with leg cramps.    ______________________________________________________________________      Diagnostic Medial Branch Block - Patient Information    What are facet joints?  Bones called vertebrae make up your spine. Each vertebra has facets (flat surfaces) that touch where the vertebrae fit together. These form a structure called a facet joint on each side of the vertebrae.Back or neck pain may be caused by a problem with your facet joints. If these joints are blocked or numbed, they will not be able to transfer the painful sensation to the brain.   Therefore, this procedure is completed to see if your back pain is (solely or in part) caused by the facet joints.        How is the procedure performed?  The patient lies on his/her stomach. The skin of the back or neck is cleansed with antiseptic solution and a local anesthetic in injected to numb the area. A small needle is then guided using an X-ray to the targeted facet joints which are then numbed. An anesthetic is then injected into the joint.   The injection takes about 15 minutes to complete.    Where will your procedure be performed?  The treatment is done in a hospital or surgery center. Youll be asked to fill out some forms, including a consent form. You may also be  "examined prior to.         Will the injection hurt?  There is some discomfort with needle insertion which we minimize by numbing the skin over the joint with a   local anesthetic. You may elect to have a small amount of sedating medication to help with discomfort and to   help you relax.     How long does the effect last?  The pain relief will only last a few hours/ less than 12 hours. Pain relief in the first few hours after   the injection is the most important as this tells us our diagnosis of facet joint mediated pain is correct. If the "test injection" provides pain relief, we can discuss other options available for extended pain relief, such a radiofrequency ablation (RFA).    What is the next step after the injection?  Our staff will call you the next day to document pain relief obtained after the procedure. If the pain relief is significant, our final plan would be to move forward with next part of the treatment: RFA (radiofrequency ablation), which can provide an extended pain relief from 6 months to 18 months. To note: many insurances require 2 diagnostic medial branch blocks prior to moving forward with RFA for the first time.    What are the risks and side effects?  Serious side effects and complications are rare. The most common problem after the injection is having pain in   the area of the injection for a few days. The other complications are infection, bleeding and nerve injury. These complications are minimized by stopping blood thinners, using sterile technique, and fluoroscopy for xray needle guidance.    After the Procedure:  Most often, you can go home in about an hour. Our procedure center requires you to have an adult friend or relative drive you to and from the facility. The anesthetic wears off in a few hours. When it does, your back or neck may feel more sore than usual. This is normal. Take it easy for the rest of the day.    "   ______________________________________________________________________    If lumbar medial branch block is successful in pain relief (at least 80% pain relief short-term); for longer last relief, we can try:    Lumbar Radiofrequency Ablation (RFA)    What is a facet joint pain?  The spine is made of vertebrae, which makes up the spine. The vertebrae are connected to each other with facet joints, which allows the bending and rotational spine movements. As the joints become inflamed and irritated, there is a small medial branch nerve that transmits the pain signal from the joint to the brain. Furthermore, spine pain may worsen during the extension of spine.       How does lumbar RFA bring pain relief?  The pain is produced due to inflammation thoracic facet joint which is transmitted via medial branch nerve to the central nervous system. By ablating the medial branch nerve via radiofrequency waves, this results in decreased mid-back pain caused by the facet joints.     How is the lumbar RFA performed?  After sterile preparation of the lumbar region, the injection site is localized under X-ray. Following the local anesthetic applied to the injection site, which can help decrease the injection site pain, and then the special radiofrequency needle is guided toward the target lumbar facet joint area with the help of Xray. After the target is reached, the area is stimulated with to rule out any lumbar nerve root involvement. Thereafter, small amount of local anesthetic with steroid is injected for to minimize postablation procedure pain, and then the radiofrequency ablation is performed. Lastly, the needle is taken out at the end of the procedure.      What to expect after the lumbar RFA?  This is an outpatient procedure. Patient should expect to receive full relief over 3-8 weeks. In some patients, pain may worsen slightly before improvement.    How long the relief from the lumbar RFA would last for?  It varies from  patient to patient. Usually, the relief can last from 6 months, up to 18 months.    Do the medial branch nerves ever grow back? And can the lumbar RFA procedure repeated?  Yes, the medial branch nerves do grow back. Yes, the RFA can be repeated in order to achieve the previous pain relief.      ______________________________________________________________________

## 2023-08-16 ENCOUNTER — PATIENT MESSAGE (OUTPATIENT)
Dept: PAIN MEDICINE | Facility: CLINIC | Age: 53
End: 2023-08-16
Payer: COMMERCIAL

## 2023-09-08 DIAGNOSIS — M47.816 LUMBAR FACET ARTHROPATHY: ICD-10-CM

## 2023-09-08 DIAGNOSIS — M46.1 BILATERAL SACROILIITIS: ICD-10-CM

## 2023-09-08 DIAGNOSIS — M47.816 LUMBAR SPONDYLOSIS: ICD-10-CM

## 2023-09-08 DIAGNOSIS — M51.36 DDD (DEGENERATIVE DISC DISEASE), LUMBAR: ICD-10-CM

## 2023-09-08 DIAGNOSIS — M54.16 LUMBAR RADICULOPATHY: ICD-10-CM

## 2023-09-08 RX ORDER — MELOXICAM 15 MG/1
15 TABLET ORAL DAILY PRN
Qty: 30 TABLET | Refills: 1 | Status: SHIPPED | OUTPATIENT
Start: 2023-09-08 | End: 2024-03-06

## 2023-09-08 RX ORDER — METHOCARBAMOL 750 MG/1
TABLET, FILM COATED ORAL
Qty: 60 TABLET | Refills: 1 | Status: SHIPPED | OUTPATIENT
Start: 2023-09-08 | End: 2023-11-01

## 2023-10-10 ENCOUNTER — PATIENT MESSAGE (OUTPATIENT)
Dept: PAIN MEDICINE | Facility: CLINIC | Age: 53
End: 2023-10-10
Payer: COMMERCIAL

## 2023-10-12 ENCOUNTER — PATIENT MESSAGE (OUTPATIENT)
Dept: PAIN MEDICINE | Facility: CLINIC | Age: 53
End: 2023-10-12
Payer: COMMERCIAL

## 2023-10-12 DIAGNOSIS — M47.816 LUMBAR SPONDYLOSIS: Primary | ICD-10-CM

## 2023-10-12 NOTE — TELEPHONE ENCOUNTER
Called pt to schedule procedure. Procedure scheduled for 10/26. Procedure instructions reviewed and sent via [employer]. Pt verbalized understanding. All questions answered.

## 2023-10-17 NOTE — PRE-PROCEDURE INSTRUCTIONS
Spoke with patient regarding procedure scheduled on 10.26     Arrival time 1030     Has patient been sick with fever or on antibiotics within the last 7 days? No     Does the patient have any open wounds, sores or rashes? No     Does the patient have any recent fractures? no     Has patient received a vaccination within the last 7 days? No     Received the COVID vaccination?      Has the patient stopped all medications as directed? na     Does patient have a pacemaker, defibrillator, or implantable stimulator? No     Does the patient have a ride to and from procedure and someone reliable to remain with patient? santhosh     Is the patient diabetic? yes     Does the patient have sleep apnea? Or use O2 at home? alesia cpap     Is the patient receiving sedation?      Is the patient instructed to remain NPO beginning at midnight the night before their procedure? yes     Procedure location confirmed with patient? Yes     Covid- Denies signs/symptoms. Instructed to notify PAT/MD if any changes.

## 2023-10-26 ENCOUNTER — HOSPITAL ENCOUNTER (OUTPATIENT)
Facility: HOSPITAL | Age: 53
Discharge: HOME | End: 2023-10-26
Attending: ANESTHESIOLOGY | Admitting: ANESTHESIOLOGY
Payer: COMMERCIAL

## 2023-10-26 ENCOUNTER — PATIENT MESSAGE (OUTPATIENT)
Dept: PAIN MEDICINE | Facility: HOSPITAL | Age: 53
End: 2023-10-26

## 2023-10-26 VITALS
OXYGEN SATURATION: 98 % | TEMPERATURE: 98 F | DIASTOLIC BLOOD PRESSURE: 61 MMHG | HEIGHT: 62 IN | SYSTOLIC BLOOD PRESSURE: 121 MMHG | RESPIRATION RATE: 16 BRPM | BODY MASS INDEX: 37.98 KG/M2 | WEIGHT: 206.38 LBS | HEART RATE: 66 BPM

## 2023-10-26 DIAGNOSIS — M47.816 LUMBAR SPONDYLOSIS: Primary | ICD-10-CM

## 2023-10-26 PROCEDURE — 64493 INJ PARAVERT F JNT L/S 1 LEV: CPT | Mod: 50 | Performed by: ANESTHESIOLOGY

## 2023-10-26 PROCEDURE — 64494 PR INJ DX/THER AGNT PARAVERT FACET JOINT,IMG GUIDE,LUMBAR/SAC, 2ND LEVEL: ICD-10-PCS | Mod: 50,,, | Performed by: ANESTHESIOLOGY

## 2023-10-26 PROCEDURE — 64493 PR INJ DX/THER AGNT PARAVERT FACET JOINT,IMG GUIDE,LUMBAR/SAC,1ST LVL: ICD-10-PCS | Mod: 50,,, | Performed by: ANESTHESIOLOGY

## 2023-10-26 PROCEDURE — 63600175 PHARM REV CODE 636 W HCPCS: Performed by: ANESTHESIOLOGY

## 2023-10-26 PROCEDURE — 64494 INJ PARAVERT F JNT L/S 2 LEV: CPT | Mod: 50,,, | Performed by: ANESTHESIOLOGY

## 2023-10-26 PROCEDURE — 64494 INJ PARAVERT F JNT L/S 2 LEV: CPT | Mod: 50 | Performed by: ANESTHESIOLOGY

## 2023-10-26 PROCEDURE — 64493 INJ PARAVERT F JNT L/S 1 LEV: CPT | Mod: 50,,, | Performed by: ANESTHESIOLOGY

## 2023-10-26 RX ORDER — MIDAZOLAM HYDROCHLORIDE 1 MG/ML
INJECTION, SOLUTION INTRAMUSCULAR; INTRAVENOUS
Status: DISCONTINUED | OUTPATIENT
Start: 2023-10-26 | End: 2023-10-26 | Stop reason: HOSPADM

## 2023-10-26 RX ORDER — FENTANYL CITRATE 50 UG/ML
INJECTION, SOLUTION INTRAMUSCULAR; INTRAVENOUS
Status: DISCONTINUED | OUTPATIENT
Start: 2023-10-26 | End: 2023-10-26 | Stop reason: HOSPADM

## 2023-10-26 RX ORDER — BUPIVACAINE HYDROCHLORIDE 5 MG/ML
INJECTION, SOLUTION EPIDURAL; INTRACAUDAL
Status: DISCONTINUED | OUTPATIENT
Start: 2023-10-26 | End: 2023-10-26 | Stop reason: HOSPADM

## 2023-10-26 RX ORDER — ONDANSETRON 2 MG/ML
4 INJECTION INTRAMUSCULAR; INTRAVENOUS ONCE AS NEEDED
Status: DISCONTINUED | OUTPATIENT
Start: 2023-10-26 | End: 2023-10-26 | Stop reason: HOSPADM

## 2023-10-26 NOTE — OP NOTE
LUMBAR Medial Branch Block Under Fluoroscopy,  Bilateral L4/5 and L5/S1    INFORMED CONSENT: The procedure, risks, benefits and options were discussed with patient. There are no contraindications to the procedure. The patient expressed understanding and agreed to proceed. The personnel performing the procedure was discussed.    Date of procedure 10/26/2023    Time-out taken to identify patient and procedure side prior to starting the procedure.                                                                PROCEDURE:  Bilateral medial branch block at the transverse processes at the level of L4, L5, and the sacral ala    Pre Procedure diagnosis:    Lumbar spondylosis [M47.816]  1. Lumbar spondylosis        Post-Procedure diagnosis:   same    PHYSICIAN: Beka Johnson MD  ASSISTANTS: None    MEDICATIONS INJECTED: 0.5% bupivicane, 1mL at each level    LOCAL ANESTHETIC USED: Lidocaine, 1%, 1ml at each level    ESTIMATED BLOOD LOSS:  None.    COMPLICATIONS:  None.    Sedation: Conscious sedation provided by M.D    SEDATION MEDICATIONS: local/IV sedation: Versed 1 mg and fentanyl 50 mcg IV.  Conscious sedation ordered by MD.  Patient reevaluated and sedation administered by MD and monitored by RN.  Total sedation time was less than 15 min.    Total sedation time was >10 but <20 min      TECHNIQUE: Laying in a prone position, the patient was prepped and draped in the usual sterile fashion using ChloraPrep and fenestrated drape.  The level was determined under fluoroscopic guidance.  Local anesthetic was given by going down to the hub of the 27-gauge 1.25in needle and raising a wheel.  A 25-gauge 3.5inch needle was introduced to the anatomic local of the medial branch at each of the above levels using fluoroscopy in the AP, oblique, and lateral views.  After negative aspiration, medication was injected slowly. The patient tolerated the procedure well.       The patient was monitored after the procedure.  Patient was  given post procedure and discharge instructions to follow at home.  We will see the patient back in two weeks or the patient may call to inform of status. The patient was discharged in a stable condition

## 2023-10-26 NOTE — LETTER
October 26, 2023         [id number] [address] [facility] [place] [address]  Phone: [phone]  Fax: [phone]       Patient: Gerda Obrien   YOB: 1970  Date of Visit: 10/26/2023    To Whom It May Concern:    Mahesh Obrien  was at [facility] on 10/26/2023. The patient may return to work/school on 10/28/23 with no restrictions. If you have any questions or concerns, or if I can be of further assistance, please do not hesitate to contact me.    Sincerely,    Dr. Johnson     
21:24

## 2023-10-26 NOTE — DISCHARGE SUMMARY
Discharge Note  Short Stay      SUMMARY     Admit Date: 10/26/2023    Attending Physician: Beka Johnson MD        Discharge Physician: Beka Johnson MD        Discharge Date: 10/26/2023 12:48 PM    Procedure(s) (LRB):  Diagnostic Bilateral L4/5 & L5/S1 MBB #1 (Bilateral)    Final Diagnosis: Lumbar spondylosis [M47.816]    Disposition: Home or self care    Patient Instructions:   Current Discharge Medication List        CONTINUE these medications which have NOT CHANGED    Details   alpha lipoic acid 600 mg Cap Take 600 to 1200mg Per day. Over the counter medication instructions - just for direction.  Qty: 60 each    Associated Diagnoses: Leg cramps, sleep related      atorvastatin (LIPITOR) 10 MG tablet Take 1 tablet (10 mg total) by mouth every evening.  Qty: 90 tablet, Refills: 1    Associated Diagnoses: Hyperlipidemia, unspecified hyperlipidemia type      biotin 10,000 mcg Cap Take 1 capsule by mouth once daily.      blood sugar diagnostic (PRECISION XTRA TEST) Strp USE TO CHECK BLOOD GLUCOSE LEVELS TWICE DAILY.  Qty: 100 strip, Refills: 0      blood-glucose sensor (DEXCOM G6 SENSOR) Tanja Use as directed  Qty: 10 each, Refills: 2      cholecalciferol, vitamin D3, (VITAMIN D3) 250 mcg (10,000 unit) Cap Take 1 tablet by mouth. 1 tablet 3 times a week      clonazePAM (KLONOPIN) 0.5 MG tablet TAKE 1 TABLET BY MOUTH EVERY DAY AS NEEDED FOR ANXIETY  Qty: 20 tablet, Refills: 0    Comments: Not to exceed 5 additional fills before 10/09/2023      clotrimazole-betamethasone 1-0.05% (LOTRISONE) cream Apply topically 2 (two) times daily.  Qty: 1 each, Refills: 2      cyclobenzaprine (FLEXERIL) 5 MG tablet TAKE 1 TABLET BY MOUTH THREE TIMES A DAY AS NEEDED FOR MUSCLE SPASMS      dapagliflozin propanediol (FARXIGA) 10 mg tablet Take 1 tablet (10 mg total) by mouth once daily.  Qty: 90 tablet, Refills: 1      desloratadine (CLARINEX) 5 mg tablet TAKE 1 TABLET BY MOUTH EVERY DAY  Qty: 90 tablet, Refills: 3       diclofenac sodium (VOLTAREN) 1 % Gel USE 2 GRAMS (TOPICAL) 4 TIMES PER DAY AS NEEDED FOR PAIN      DYMISTA 137-50 mcg/spray Spry nassal spray 1 spray by Each Nare route 2 (two) times daily as needed.  Qty: 23 g, Refills: 5      estradioL (VIVELLE-DOT) 0.1 mg/24 hr PTSW APPLY 1 PATCH TOPICALLY TWICE WEEKLY AS DIRECTED.  Qty: 24 patch, Refills: 4    Comments: Brand name please, pt has trouble with generic  Associated Diagnoses: Hormone replacement therapy, postmenopausal      ferrous sulfate (FEOSOL) 325 mg (65 mg iron) Tab tablet TAKE 1 TABLET BY MOUTH EVERY DAY WITH BREAKFAST  Qty: 90 tablet, Refills: 1      fluticasone propionate (FLONASE) 50 mcg/actuation nasal spray SPRAY 2 SPRAYS IN EACH NOSTRIL ONCE DAILY      FREESTYLE LANCETS 28 gauge lancets USE TWICE DAILY BEFORE MEALS.  Qty: 100 each, Refills: 5      magnesium chloride (SLOW-MAG) 71.5 mg TbEC Take 1 tablet by mouth once. One tablet 3 times a week      meloxicam (MOBIC) 15 MG tablet TAKE 1 TABLET BY MOUTH EVERY DAY AS NEEDED FOR PAIN  Qty: 30 tablet, Refills: 1    Associated Diagnoses: Lumbar facet arthropathy; Bilateral sacroiliitis; Lumbar spondylosis; DDD (degenerative disc disease), lumbar; Lumbar radiculopathy      methocarbamoL (ROBAXIN) 750 MG Tab TAKE 1 TABLET BY MOUTH TWICE A DAY AS NEEDED FOR MUSCLE SPASM PAIN.  Qty: 60 tablet, Refills: 1    Associated Diagnoses: Lumbar facet arthropathy; Bilateral sacroiliitis; Lumbar spondylosis; DDD (degenerative disc disease), lumbar; Lumbar radiculopathy      multivitamin capsule Take by mouth.      omeprazole (PRILOSEC) 40 MG capsule TAKE 1 CAPSULE BY MOUTH EVERY DAY IN THE MORNING  Qty: 90 capsule, Refills: 1      semaglutide (OZEMPIC) 1 mg/dose (4 mg/3 mL) Inject 1 mg into the skin every 7 days.  Qty: 9 mL, Refills: 1      tretinoin (RETIN-A) 0.025 % cream APPLY TO AFFECTED AREA THREE TIMES WEEKLY AS DIRECTED.  Qty: 20 g, Refills: 6    Associated Diagnoses: Acne vulgaris      valACYclovir (VALTREX) 500  MG tablet TAKE 1 TABLET (500 MG) BY MOUTH 2 TIMES PER DAY FOR 5 DAYS  Qty: 10 tablet, Refills: 3    Associated Diagnoses: HSV (herpes simplex virus) infection                 Discharge Diagnosis: Lumbar spondylosis [M47.816]  Condition on Discharge: Stable with no complications to procedure   Diet on Discharge: Same as before.  Activity: as per instruction sheet.  Discharge to: Home with a responsible adult.  Follow up: 2-4 weeks       Please call the office at [phone] if you experience any weakness or loss of sensation, fever > 101.5, pain uncontrolled with oral medications, persistent nausea/vomiting/or diarrhea, redness or drainage from the incisions, or any other worrisome concerns. If physician on call was not reached or could not communicate with our office for any reason please go to the nearest emergency department

## 2023-10-26 NOTE — DISCHARGE INSTRUCTIONS

## 2023-10-26 NOTE — H&P
HPI  Patient presenting for Procedure(s) (LRB):  Diagnostic Bilateral L4/5 & L5/S1 MBB #1 (Bilateral)     Patient on Anti-coagulation No    No health changes since previous encounter    Past Medical History:   Diagnosis Date    Allergy     Anxiety     Biotin deficiency disease     Chlamydia     Diabetes mellitus     Fibroids     Herpes simplex     Knee pain, bilateral     Metabolic syndrome     Sleep apnea     Vitamin D deficiency      Past Surgical History:   Procedure Laterality Date    ARTHROSCOPIC REPAIR OF ROTATOR CUFF OF SHOULDER Left 03/07/2022    Procedure: REPAIR, ROTATOR CUFF, ARTHROSCOPIC;  Surgeon: Denilson Jackson MD;  Location: [facility] OR;  Service: Orthopedics;  Laterality: Left;    ARTHROSCOPY OF SHOULDER WITH DECOMPRESSION OF SUBACROMIAL SPACE Left 03/07/2022    Procedure: ARTHROSCOPY, SHOULDER, WITH SUBACROMIAL SPACE DECOMPRESSION;  Surgeon: Denilson Jackson MD;  Location: [facility] OR;  Service: Orthopedics;  Laterality: Left;    BREAST SURGERY      BUNIONECTOMY      CERVICAL BIOPSY  W/ LOOP ELECTRODE EXCISION      COLONOSCOPY N/A 10/15/2021    Procedure: COLONOSCOPY;  Surgeon: Alaina Ceja MD;  Location: [facility] ENDO;  Service: Endoscopy;  Laterality: N/A;    FIXATION OF TENDON Left 03/07/2022    Procedure: FIXATION, TENDON;  Surgeon: Denilson Jackson MD;  Location: [facility] OR;  Service: Orthopedics;  Laterality: Left;    FOOT SURGERY      hammer toe Bilateral     HYSTERECTOMY  11/2021    Laparoscopic assisted vaginal hysterectomy with BSO  11/22/2021    Path--Leiomyomata/Adenomyosis    LASER ABLATION OF THE CERVIX      PLANTAR FASCIA SURGERY Left     TOTAL REDUCTION MAMMOPLASTY  03/2007    TUBAL LIGATION       Review of patient's allergies indicates:   Allergen Reactions    Doxycycline     Emtricitabine      Other reaction(s): Rash    Lexapro [escitalopram oxalate]      spasms    Sulfa (sulfonamide antibiotics)     Tenofovir      Other reaction(s): Rash    Trulicity  [dulaglutide]     Bydureon [exenatide microspheres] Rash     Skin reaction at site of injection.        No current facility-administered medications on file prior to encounter.     Current Outpatient Medications on File Prior to Encounter   Medication Sig Dispense Refill    alpha lipoic acid 600 mg Cap Take 600 to 1200mg Per day. Over the counter medication instructions - just for direction. 60 each     biotin 10,000 mcg Cap Take 1 capsule by mouth once daily.      blood sugar diagnostic (PRECISION XTRA TEST) Strp USE TO CHECK BLOOD GLUCOSE LEVELS TWICE DAILY. 100 strip 0    blood-glucose sensor (DEXCOM G6 SENSOR) Tanja Use as directed 10 each 2    cholecalciferol, vitamin D3, (VITAMIN D3) 250 mcg (10,000 unit) Cap Take 1 tablet by mouth. 1 tablet 3 times a week      clonazePAM (KLONOPIN) 0.5 MG tablet TAKE 1 TABLET BY MOUTH EVERY DAY AS NEEDED FOR ANXIETY 20 tablet 0    clotrimazole-betamethasone 1-0.05% (LOTRISONE) cream Apply topically 2 (two) times daily. 1 each 2    cyclobenzaprine (FLEXERIL) 5 MG tablet TAKE 1 TABLET BY MOUTH THREE TIMES A DAY AS NEEDED FOR MUSCLE SPASMS      dapagliflozin propanediol (FARXIGA) 10 mg tablet Take 1 tablet (10 mg total) by mouth once daily. 90 tablet 1    desloratadine (CLARINEX) 5 mg tablet TAKE 1 TABLET BY MOUTH EVERY DAY 90 tablet 3    diclofenac sodium (VOLTAREN) 1 % Gel USE 2 GRAMS (TOPICAL) 4 TIMES PER DAY AS NEEDED FOR PAIN      DYMISTA 137-50 mcg/spray Spry nassal spray 1 spray by Each Nare route 2 (two) times daily as needed. 23 g 5    ferrous sulfate (FEOSOL) 325 mg (65 mg iron) Tab tablet TAKE 1 TABLET BY MOUTH EVERY DAY WITH BREAKFAST 90 tablet 1    fluticasone propionate (FLONASE) 50 mcg/actuation nasal spray SPRAY 2 SPRAYS IN EACH NOSTRIL ONCE DAILY      FREESTYLE LANCETS 28 gauge lancets USE TWICE DAILY BEFORE MEALS. 100 each 5    magnesium chloride (SLOW-MAG) 71.5 mg TbEC Take 1 tablet by mouth once. One tablet 3 times a week      meloxicam (MOBIC) 15 MG tablet  "TAKE 1 TABLET BY MOUTH EVERY DAY AS NEEDED FOR PAIN 30 tablet 1    methocarbamoL (ROBAXIN) 750 MG Tab TAKE 1 TABLET BY MOUTH TWICE A DAY AS NEEDED FOR MUSCLE SPASM PAIN. 60 tablet 1    multivitamin capsule Take by mouth.      omeprazole (PRILOSEC) 40 MG capsule TAKE 1 CAPSULE BY MOUTH EVERY DAY IN THE MORNING 90 capsule 1    tretinoin (RETIN-A) 0.025 % cream APPLY TO AFFECTED AREA THREE TIMES WEEKLY AS DIRECTED. 20 g 6    valACYclovir (VALTREX) 500 MG tablet TAKE 1 TABLET (500 MG) BY MOUTH 2 TIMES PER DAY FOR 5 DAYS 10 tablet 3        PMHx, PSHx, Allergies, Medications reviewed in epic    ROS negative except pain complaints in HPI    OBJECTIVE:    /68   Pulse 71   Temp 97.8 °F (36.6 °C)   Resp 15   Ht 5' 2" (1.575 m)   Wt 93.6 kg (206 lb 5.6 oz)   LMP 11/12/2017 (Approximate)   SpO2 99%   Breastfeeding No   BMI 37.74 kg/m²     PHYSICAL EXAMINATION:    GENERAL: Well appearing, in no acute distress, alert and oriented x3.  PSYCH:  Mood and affect appropriate.  SKIN: Skin color, texture, turgor normal, no rashes or lesions which will impact the procedure.  CV: RRR with palpation of the radial artery.  PULM: No evidence of respiratory difficulty, symmetric chest rise. Clear to auscultation.  NEURO: Cranial nerves grossly intact.    Plan:    Proceed with procedure as planned Procedure(s) (LRB):  Diagnostic Bilateral L4/5 & L5/S1 MBB #1 (Bilateral)    Beka Johnson MD  10/26/2023            "

## 2023-10-27 ENCOUNTER — TELEPHONE (OUTPATIENT)
Dept: PAIN MEDICINE | Facility: CLINIC | Age: 53
End: 2023-10-27
Payer: COMMERCIAL

## 2023-10-27 DIAGNOSIS — M47.816 LUMBAR SPONDYLOSIS: Primary | ICD-10-CM

## 2023-10-27 LAB — POCT GLUCOSE: 74 MG/DL (ref 70–110)

## 2023-10-27 NOTE — TELEPHONE ENCOUNTER
Patient reports 90% relief in lower back pain for approximately 10 hours after bilateral L4-5 and L5-S1 medial branch block on 10/26/2023.  We will schedule patient for 2nd diagnostic medial branch block.

## 2023-10-28 ENCOUNTER — PATIENT MESSAGE (OUTPATIENT)
Dept: PAIN MEDICINE | Facility: CLINIC | Age: 53
End: 2023-10-28
Payer: COMMERCIAL

## 2023-10-30 ENCOUNTER — TELEPHONE (OUTPATIENT)
Dept: PAIN MEDICINE | Facility: CLINIC | Age: 53
End: 2023-10-30
Payer: COMMERCIAL

## 2023-10-30 ENCOUNTER — PATIENT MESSAGE (OUTPATIENT)
Dept: PAIN MEDICINE | Facility: CLINIC | Age: 53
End: 2023-10-30
Payer: COMMERCIAL

## 2023-10-30 NOTE — TELEPHONE ENCOUNTER
----- Message from Hannah Benson sent at 10/30/2023 11:02 AM CDT -----  Contact: Gerda Green called to ask if she dropped her anklet when she was at her appt on 10/26. Please call her back at [phone].    Thanks  TS

## 2023-10-30 NOTE — TELEPHONE ENCOUNTER
Spoke with patient, she wore anklets to procedure and can't find them now.  I informed her to call the surgery center.

## 2023-10-30 NOTE — TELEPHONE ENCOUNTER
----- Message from Lily Soriano LPN sent at 10/12/2023 11:31 AM CDT -----  Call patient to get for % relief to determine potential RFA eligibility.

## 2023-10-30 NOTE — TELEPHONE ENCOUNTER
Called pt to schedule procedure. Procedure scheduled for 11/20. Procedure instructions reviewed and sent via [employer]. Pt verbalized understanding. All questions answered.

## 2023-10-31 DIAGNOSIS — M47.816 LUMBAR SPONDYLOSIS: ICD-10-CM

## 2023-10-31 DIAGNOSIS — M54.16 LUMBAR RADICULOPATHY: ICD-10-CM

## 2023-10-31 DIAGNOSIS — M51.36 DDD (DEGENERATIVE DISC DISEASE), LUMBAR: ICD-10-CM

## 2023-10-31 DIAGNOSIS — M46.1 BILATERAL SACROILIITIS: ICD-10-CM

## 2023-10-31 DIAGNOSIS — M47.816 LUMBAR FACET ARTHROPATHY: ICD-10-CM

## 2023-11-01 RX ORDER — METHOCARBAMOL 750 MG/1
TABLET, FILM COATED ORAL
Qty: 60 TABLET | Refills: 1 | Status: SHIPPED | OUTPATIENT
Start: 2023-11-01 | End: 2024-02-09

## 2023-11-09 NOTE — PRE-PROCEDURE INSTRUCTIONS
Spoke with patient regarding procedure scheduled on 11.20     Arrival time 0615    Has patient been sick with fever or on antibiotics within the last 7 days? No     Does the patient have any open wounds, sores or rashes? No     Does the patient have any recent fractures? no     Has patient received a vaccination within the last 7 days? No     Received the COVID vaccination?      Has the patient stopped all medications as directed? na     Does patient have a pacemaker and or defibrillator? no     Does the patient have a ride to and from procedure and someone reliable to remain with patient? santhosh     Is the patient diabetic? pre - on meds     Does the patient have sleep apnea? Or use O2 at home? alesia on cpap     Is the patient receiving sedation?      Is the patient instructed to remain NPO beginning at midnight the night before their procedure? yes     Procedure location confirmed with patient? Yes     Covid- Denies signs/symptoms. Instructed to notify PAT/MD if any changes.

## 2023-11-20 ENCOUNTER — HOSPITAL ENCOUNTER (OUTPATIENT)
Facility: HOSPITAL | Age: 53
Discharge: HOME | End: 2023-11-20
Attending: ANESTHESIOLOGY | Admitting: ANESTHESIOLOGY
Payer: COMMERCIAL

## 2023-11-20 VITALS
TEMPERATURE: 97 F | DIASTOLIC BLOOD PRESSURE: 58 MMHG | HEART RATE: 65 BPM | RESPIRATION RATE: 17 BRPM | HEIGHT: 62 IN | WEIGHT: 208 LBS | OXYGEN SATURATION: 98 % | BODY MASS INDEX: 38.28 KG/M2 | SYSTOLIC BLOOD PRESSURE: 119 MMHG

## 2023-11-20 DIAGNOSIS — M47.816 LUMBAR SPONDYLOSIS: Primary | ICD-10-CM

## 2023-11-20 LAB — POCT GLUCOSE: 95 MG/DL (ref 70–110)

## 2023-11-20 PROCEDURE — 64493 PR INJ DX/THER AGNT PARAVERT FACET JOINT,IMG GUIDE,LUMBAR/SAC,1ST LVL: ICD-10-PCS | Mod: 50,,, | Performed by: ANESTHESIOLOGY

## 2023-11-20 PROCEDURE — 82962 GLUCOSE BLOOD TEST: CPT | Performed by: ANESTHESIOLOGY

## 2023-11-20 PROCEDURE — 63600175 PHARM REV CODE 636 W HCPCS: Performed by: ANESTHESIOLOGY

## 2023-11-20 PROCEDURE — 64494 INJ PARAVERT F JNT L/S 2 LEV: CPT | Mod: 50 | Performed by: ANESTHESIOLOGY

## 2023-11-20 PROCEDURE — 64494 INJ PARAVERT F JNT L/S 2 LEV: CPT | Mod: 50,,, | Performed by: ANESTHESIOLOGY

## 2023-11-20 PROCEDURE — 64493 INJ PARAVERT F JNT L/S 1 LEV: CPT | Mod: 50,,, | Performed by: ANESTHESIOLOGY

## 2023-11-20 PROCEDURE — 64494 PR INJ DX/THER AGNT PARAVERT FACET JOINT,IMG GUIDE,LUMBAR/SAC, 2ND LEVEL: ICD-10-PCS | Mod: 50,,, | Performed by: ANESTHESIOLOGY

## 2023-11-20 PROCEDURE — 64493 INJ PARAVERT F JNT L/S 1 LEV: CPT | Mod: 50 | Performed by: ANESTHESIOLOGY

## 2023-11-20 RX ORDER — ONDANSETRON 2 MG/ML
4 INJECTION INTRAMUSCULAR; INTRAVENOUS ONCE AS NEEDED
Status: DISCONTINUED | OUTPATIENT
Start: 2023-11-20 | End: 2023-11-20 | Stop reason: HOSPADM

## 2023-11-20 RX ORDER — BUPIVACAINE HYDROCHLORIDE 5 MG/ML
INJECTION, SOLUTION EPIDURAL; INTRACAUDAL
Status: DISCONTINUED | OUTPATIENT
Start: 2023-11-20 | End: 2023-11-20 | Stop reason: HOSPADM

## 2023-11-20 RX ORDER — FENTANYL CITRATE 50 UG/ML
INJECTION, SOLUTION INTRAMUSCULAR; INTRAVENOUS
Status: DISCONTINUED | OUTPATIENT
Start: 2023-11-20 | End: 2023-11-20 | Stop reason: HOSPADM

## 2023-11-20 RX ORDER — MIDAZOLAM HYDROCHLORIDE 1 MG/ML
INJECTION, SOLUTION INTRAMUSCULAR; INTRAVENOUS
Status: DISCONTINUED | OUTPATIENT
Start: 2023-11-20 | End: 2023-11-20 | Stop reason: HOSPADM

## 2023-11-20 NOTE — OP NOTE
LUMBAR Medial Branch Block Under Fluoroscopy,  Bilateral L4/5 and L5/S1    INFORMED CONSENT: The procedure, risks, benefits and options were discussed with patient. There are no contraindications to the procedure. The patient expressed understanding and agreed to proceed. The personnel performing the procedure was discussed.    Date of procedure 11/20/2023    Time-out taken to identify patient and procedure side prior to starting the procedure.                                                                PROCEDURE:  Bilateral medial branch block at the transverse processes at the level of L4, L5, and the sacral ala    Pre Procedure diagnosis:    Lumbar spondylosis [M47.816]  1. Lumbar spondylosis        Post-Procedure diagnosis:   same    PHYSICIAN: Beka Johnson MD  ASSISTANTS: None    MEDICATIONS INJECTED: 0.5% bupivicane, 1mL at each level    LOCAL ANESTHETIC USED: Lidocaine, 1%, 1ml at each level    ESTIMATED BLOOD LOSS:  None.    COMPLICATIONS:  None.    Sedation: Conscious sedation provided by M.D    SEDATION MEDICATIONS: local/IV sedation: Versed 2 mg and fentanyl 50 mcg IV.  Conscious sedation ordered by MD.  Patient reevaluated and sedation administered by MD and monitored by RN.  Total sedation time was less than 10 min.    Total sedation time was <10 min      TECHNIQUE: Laying in a prone position, the patient was prepped and draped in the usual sterile fashion using ChloraPrep and fenestrated drape.  The level was determined under fluoroscopic guidance.  Local anesthetic was given by going down to the hub of the 27-gauge 1.25in needle and raising a wheel.  A 25-gauge 3.5inch needle was introduced to the anatomic local of the medial branch at each of the above levels using fluoroscopy in the AP, oblique, and lateral views.  After negative aspiration, medication was injected slowly. The patient tolerated the procedure well.       The patient was monitored after the procedure.  Patient was given post  procedure and discharge instructions to follow at home.  We will see the patient back in two weeks or the patient may call to inform of status. The patient was discharged in a stable condition

## 2023-11-20 NOTE — H&P
HPI  Patient presenting for Procedure(s) (LRB):  Bilateral L4/L5 and L5/S1 MBB (Bilateral)     Patient on Anti-coagulation No    No health changes since previous encounter    Past Medical History:   Diagnosis Date    Allergy     Anxiety     Biotin deficiency disease     Chlamydia     Diabetes mellitus     Fibroids     Herpes simplex     Knee pain, bilateral     Metabolic syndrome     Sleep apnea     Vitamin D deficiency      Past Surgical History:   Procedure Laterality Date    ARTHROSCOPIC REPAIR OF ROTATOR CUFF OF SHOULDER Left 03/07/2022    Procedure: REPAIR, ROTATOR CUFF, ARTHROSCOPIC;  Surgeon: Denilson Jackson MD;  Location: [facility] OR;  Service: Orthopedics;  Laterality: Left;    ARTHROSCOPY OF SHOULDER WITH DECOMPRESSION OF SUBACROMIAL SPACE Left 03/07/2022    Procedure: ARTHROSCOPY, SHOULDER, WITH SUBACROMIAL SPACE DECOMPRESSION;  Surgeon: Denilson Jackson MD;  Location: [facility] OR;  Service: Orthopedics;  Laterality: Left;    BREAST SURGERY      BUNIONECTOMY      CERVICAL BIOPSY  W/ LOOP ELECTRODE EXCISION      COLONOSCOPY N/A 10/15/2021    Procedure: COLONOSCOPY;  Surgeon: Alaina Ceja MD;  Location: [facility] ENDO;  Service: Endoscopy;  Laterality: N/A;    FIXATION OF TENDON Left 03/07/2022    Procedure: FIXATION, TENDON;  Surgeon: Denilson Jacksno MD;  Location: [facility] OR;  Service: Orthopedics;  Laterality: Left;    FOOT SURGERY      hammer toe Bilateral     HYSTERECTOMY  11/2021    INJECTION OF ANESTHETIC AGENT AROUND MEDIAL BRANCH NERVES INNERVATING LUMBAR FACET JOINT Bilateral 10/26/2023    Procedure: Diagnostic Bilateral L4/5 & L5/S1 MBB #1;  Surgeon: Beka Johnson MD;  Location: [facility] PAIN MGT;  Service: Pain Management;  Laterality: Bilateral;    Laparoscopic assisted vaginal hysterectomy with BSO  11/22/2021    Path--Leiomyomata/Adenomyosis    LASER ABLATION OF THE CERVIX      PLANTAR FASCIA SURGERY Left     TOTAL REDUCTION MAMMOPLASTY  03/2007    TUBAL LIGATION        Review of patient's allergies indicates:   Allergen Reactions    Doxycycline     Emtricitabine      Other reaction(s): Rash    Lexapro [escitalopram oxalate]      spasms    Sulfa (sulfonamide antibiotics)     Tenofovir      Other reaction(s): Rash    Trulicity [dulaglutide]     Bydureon [exenatide microspheres] Rash     Skin reaction at site of injection.        No current facility-administered medications on file prior to encounter.     Current Outpatient Medications on File Prior to Encounter   Medication Sig Dispense Refill    alpha lipoic acid 600 mg Cap Take 600 to 1200mg Per day. Over the counter medication instructions - just for direction. 60 each     atorvastatin (LIPITOR) 10 MG tablet Take 1 tablet (10 mg total) by mouth every evening. 90 tablet 1    biotin 10,000 mcg Cap Take 1 capsule by mouth once daily.      blood sugar diagnostic (PRECISION XTRA TEST) Strp USE TO CHECK BLOOD GLUCOSE LEVELS TWICE DAILY. 100 strip 0    blood-glucose sensor (DEXCOM G6 SENSOR) Tanja Use as directed 10 each 2    cholecalciferol, vitamin D3, (VITAMIN D3) 250 mcg (10,000 unit) Cap Take 1 tablet by mouth. 1 tablet 3 times a week      clonazePAM (KLONOPIN) 0.5 MG tablet TAKE 1 TABLET BY MOUTH EVERY DAY AS NEEDED FOR ANXIETY 20 tablet 0    clotrimazole-betamethasone 1-0.05% (LOTRISONE) cream Apply topically 2 (two) times daily. 1 each 2    cyclobenzaprine (FLEXERIL) 5 MG tablet TAKE 1 TABLET BY MOUTH THREE TIMES A DAY AS NEEDED FOR MUSCLE SPASMS      desloratadine (CLARINEX) 5 mg tablet TAKE 1 TABLET BY MOUTH EVERY DAY 90 tablet 3    diclofenac sodium (VOLTAREN) 1 % Gel USE 2 GRAMS (TOPICAL) 4 TIMES PER DAY AS NEEDED FOR PAIN      DYMISTA 137-50 mcg/spray Spry nassal spray 1 spray by Each Nare route 2 (two) times daily as needed. 23 g 5    estradioL (VIVELLE-DOT) 0.1 mg/24 hr PTSW APPLY 1 PATCH TOPICALLY TWICE WEEKLY AS DIRECTED. 24 patch 4    ferrous sulfate (FEOSOL) 325 mg (65 mg iron) Tab tablet TAKE 1 TABLET BY MOUTH  "EVERY DAY WITH BREAKFAST 90 tablet 1    fluticasone propionate (FLONASE) 50 mcg/actuation nasal spray SPRAY 2 SPRAYS IN EACH NOSTRIL ONCE DAILY      FREESTYLE LANCETS 28 gauge lancets USE TWICE DAILY BEFORE MEALS. 100 each 5    magnesium chloride (SLOW-MAG) 71.5 mg TbEC Take 1 tablet by mouth once. One tablet 3 times a week      meloxicam (MOBIC) 15 MG tablet TAKE 1 TABLET BY MOUTH EVERY DAY AS NEEDED FOR PAIN 30 tablet 1    multivitamin capsule Take by mouth.      semaglutide (OZEMPIC) 1 mg/dose (4 mg/3 mL) Inject 1 mg into the skin every 7 days. 9 mL 1    tretinoin (RETIN-A) 0.025 % cream APPLY TO AFFECTED AREA THREE TIMES WEEKLY AS DIRECTED. 20 g 6    valACYclovir (VALTREX) 500 MG tablet TAKE 1 TABLET (500 MG) BY MOUTH 2 TIMES PER DAY FOR 5 DAYS 10 tablet 3        PMHx, PSHx, Allergies, Medications reviewed in epic    ROS negative except pain complaints in HPI    OBJECTIVE:    /67 (BP Location: Right arm, Patient Position: Sitting)   Pulse 66   Temp 96.8 °F (36 °C) (Temporal)   Resp 15   Ht 5' 2" (1.575 m)   Wt 94.3 kg (208 lb 0.1 oz)   LMP 11/12/2017 (Approximate)   SpO2 100%   Breastfeeding No   BMI 38.04 kg/m²     PHYSICAL EXAMINATION:    GENERAL: Well appearing, in no acute distress, alert and oriented x3.  PSYCH:  Mood and affect appropriate.  SKIN: Skin color, texture, turgor normal, no rashes or lesions which will impact the procedure.  CV: RRR with palpation of the radial artery.  PULM: No evidence of respiratory difficulty, symmetric chest rise. Clear to auscultation.  NEURO: Cranial nerves grossly intact.    Plan:    Proceed with procedure as planned Procedure(s) (LRB):  Bilateral L4/L5 and L5/S1 MBB (Bilateral)    Beka Johnson MD  11/20/2023            "

## 2023-11-20 NOTE — DISCHARGE SUMMARY
Discharge Note  Short Stay      SUMMARY     Admit Date: 11/20/2023    Attending Physician: Beka Johnson MD        Discharge Physician: Beka Johnson MD        Discharge Date: 11/20/2023 8:52 AM    Procedure(s) (LRB):  Bilateral L4/L5 and L5/S1 MBB (Bilateral)    Final Diagnosis: Lumbar spondylosis [M47.816]    Disposition: Home or self care    Patient Instructions:   Discharge Medication List as of 11/20/2023  6:32 AM        CONTINUE these medications which have NOT CHANGED    Details   alpha lipoic acid 600 mg Cap Take 600 to 1200mg Per day. Over the counter medication instructions - just for direction., OTC      atorvastatin (LIPITOR) 10 MG tablet Take 1 tablet (10 mg total) by mouth every evening., Starting Tue 10/24/2023, Normal      biotin 10,000 mcg Cap Take 1 capsule by mouth once daily., Historical Med      blood sugar diagnostic (PRECISION XTRA TEST) Strp USE TO CHECK BLOOD GLUCOSE LEVELS TWICE DAILY., Normal      blood-glucose sensor (DEXCOM G6 SENSOR) Tanja Use as directed, Normal      cholecalciferol, vitamin D3, (VITAMIN D3) 250 mcg (10,000 unit) Cap Take 1 tablet by mouth. 1 tablet 3 times a week, Historical Med      clonazePAM (KLONOPIN) 0.5 MG tablet TAKE 1 TABLET BY MOUTH EVERY DAY AS NEEDED FOR ANXIETY, Normal      clotrimazole-betamethasone 1-0.05% (LOTRISONE) cream Apply topically 2 (two) times daily., Starting Mon 5/8/2023, Normal      cyclobenzaprine (FLEXERIL) 5 MG tablet TAKE 1 TABLET BY MOUTH THREE TIMES A DAY AS NEEDED FOR MUSCLE SPASMS, Historical Med      dapagliflozin propanediol (FARXIGA) 10 mg tablet Take 1 tablet (10 mg total) by mouth once daily., Starting Wed 11/8/2023, Normal      desloratadine (CLARINEX) 5 mg tablet TAKE 1 TABLET BY MOUTH EVERY DAY, Normal      diclofenac sodium (VOLTAREN) 1 % Gel USE 2 GRAMS (TOPICAL) 4 TIMES PER DAY AS NEEDED FOR PAIN, Historical Med      DYMISTA 137-50 mcg/spray Spry nassal spray 1 spray by Each Nare route 2 (two) times daily as  needed., Starting Wed 9/12/2018, Normal      estradioL (VIVELLE-DOT) 0.1 mg/24 hr PTSW APPLY 1 PATCH TOPICALLY TWICE WEEKLY AS DIRECTED., Normal      ferrous sulfate (FEOSOL) 325 mg (65 mg iron) Tab tablet TAKE 1 TABLET BY MOUTH EVERY DAY WITH BREAKFAST, Normal      fluticasone propionate (FLONASE) 50 mcg/actuation nasal spray SPRAY 2 SPRAYS IN EACH NOSTRIL ONCE DAILY, Historical Med      FREESTYLE LANCETS 28 gauge lancets USE TWICE DAILY BEFORE MEALS., Normal      magnesium chloride (SLOW-MAG) 71.5 mg TbEC Take 1 tablet by mouth once. One tablet 3 times a week, Historical Med      meloxicam (MOBIC) 15 MG tablet TAKE 1 TABLET BY MOUTH EVERY DAY AS NEEDED FOR PAIN, Starting Fri 9/8/2023, Normal      methocarbamoL (ROBAXIN) 750 MG Tab TAKE 1 TABLET BY MOUTH TWICE A DAY AS NEEDED FOR MUSCLE SPASM PAIN., Normal      multivitamin capsule Take by mouth., Historical Med      omeprazole (PRILOSEC) 40 MG capsule Take 1 capsule (40 mg total) by mouth every morning., Starting Thu 11/2/2023, Normal      semaglutide (OZEMPIC) 1 mg/dose (4 mg/3 mL) Inject 1 mg into the skin every 7 days., Starting Tue 10/24/2023, Until Mon 1/22/2024, Normal      tretinoin (RETIN-A) 0.025 % cream APPLY TO AFFECTED AREA THREE TIMES WEEKLY AS DIRECTED., Normal      valACYclovir (VALTREX) 500 MG tablet TAKE 1 TABLET (500 MG) BY MOUTH 2 TIMES PER DAY FOR 5 DAYS, Normal                 Discharge Diagnosis: Lumbar spondylosis [M47.816]  Condition on Discharge: Stable with no complications to procedure   Diet on Discharge: Same as before.  Activity: as per instruction sheet.  Discharge to: Home with a responsible adult.  Follow up: 2-4 weeks       Please call the office at [phone] if you experience any weakness or loss of sensation, fever > 101.5, pain uncontrolled with oral medications, persistent nausea/vomiting/or diarrhea, redness or drainage from the incisions, or any other worrisome concerns. If physician on call was not reached or could not  communicate with our office for any reason please go to the nearest emergency department

## 2023-11-20 NOTE — DISCHARGE INSTRUCTIONS

## 2023-11-21 ENCOUNTER — PATIENT MESSAGE (OUTPATIENT)
Dept: PAIN MEDICINE | Facility: CLINIC | Age: 53
End: 2023-11-21
Payer: COMMERCIAL

## 2023-11-21 ENCOUNTER — TELEPHONE (OUTPATIENT)
Dept: PAIN MEDICINE | Facility: CLINIC | Age: 53
End: 2023-11-21
Payer: COMMERCIAL

## 2023-11-21 DIAGNOSIS — M47.816 LUMBAR SPONDYLOSIS: Primary | ICD-10-CM

## 2023-11-21 NOTE — PROGRESS NOTES
Patient reports 90% relief and lower back pain for approximately 10 hours after bilateral L4-5 and L5-S1 medial branch block on 11/20/2023.  This is patient's 2nd positive medial branch block.  We will proceed with RFA.

## 2023-11-21 NOTE — TELEPHONE ENCOUNTER
----- Message from Beka Johnson MD sent at 11/21/2023  8:06 AM CST -----  Pain Provider: Elizabeth  Patient Name: Gerda Obrien  MRN: [id number]  Case: LUMBAR RFA  Level: L4/5 and L5/S1  Laterality: bilateral      Patient can follow up 5 weeks after procedure

## 2023-12-01 ENCOUNTER — TELEPHONE (OUTPATIENT)
Dept: PAIN MEDICINE | Facility: CLINIC | Age: 53
End: 2023-12-01
Payer: COMMERCIAL

## 2023-12-01 NOTE — TELEPHONE ENCOUNTER
----- Message from Hannah Benson sent at 12/1/2023 10:31 AM CST -----  Contact: Gerda Green called to gibran hayes her procedure. Please call her back at [phone].    Thanks  TS

## 2023-12-08 NOTE — PRE-PROCEDURE INSTRUCTIONS
Spoke with patient regarding procedure scheduled on 12.21     Arrival time 1115     Has patient been sick with fever or on antibiotics within the last 7 days? No     Does the patient have any open wounds, sores or rashes? No     Does the patient have any recent fractures? no     Has patient received a vaccination within the last 7 days? No     Received the COVID vaccination? yes     Has the patient stopped all medications as directed? na     Does patient have a pacemaker, defibrillator, or implantable stimulator? No     Does the patient have a ride to and from procedure and someone reliable to remain with patient?  santhosh      Is the patient diabetic? yes     Does the patient have sleep apnea? Or use O2 at home? alesia on cpap     Is the patient receiving sedation? yes     Is the patient instructed to remain NPO beginning at midnight the night before their procedure? yes     Procedure location confirmed with patient? Yes     Covid- Denies signs/symptoms. Instructed to notify PAT/MD if any changes.

## 2023-12-15 ENCOUNTER — HOSPITAL ENCOUNTER (OUTPATIENT)
Dept: RADIOLOGY | Facility: HOSPITAL | Age: 53
Discharge: HOME | End: 2023-12-15
Attending: INTERNAL MEDICINE
Payer: COMMERCIAL

## 2023-12-15 DIAGNOSIS — S09.90XA TRAUMATIC INJURY OF HEAD, INITIAL ENCOUNTER: ICD-10-CM

## 2023-12-15 PROCEDURE — 70450 CT HEAD WITHOUT CONTRAST: ICD-10-PCS | Mod: 26,,, | Performed by: RADIOLOGY

## 2023-12-15 PROCEDURE — 70450 CT HEAD/BRAIN W/O DYE: CPT | Mod: TC

## 2023-12-15 PROCEDURE — 70450 CT HEAD/BRAIN W/O DYE: CPT | Mod: 26,,, | Performed by: RADIOLOGY

## 2023-12-21 ENCOUNTER — HOSPITAL ENCOUNTER (OUTPATIENT)
Facility: HOSPITAL | Age: 53
Discharge: HOME | End: 2023-12-21
Attending: ANESTHESIOLOGY | Admitting: ANESTHESIOLOGY
Payer: COMMERCIAL

## 2023-12-21 VITALS
OXYGEN SATURATION: 99 % | TEMPERATURE: 97 F | BODY MASS INDEX: 38.99 KG/M2 | RESPIRATION RATE: 16 BRPM | WEIGHT: 213.19 LBS | HEART RATE: 77 BPM | SYSTOLIC BLOOD PRESSURE: 115 MMHG | DIASTOLIC BLOOD PRESSURE: 66 MMHG

## 2023-12-21 DIAGNOSIS — M47.816 LUMBAR SPONDYLOSIS: Primary | ICD-10-CM

## 2023-12-21 LAB — POCT GLUCOSE: 86 MG/DL (ref 70–110)

## 2023-12-21 PROCEDURE — 64636 PR DESTROY L/S FACET JNT ADDL: ICD-10-PCS | Mod: 50,,, | Performed by: ANESTHESIOLOGY

## 2023-12-21 PROCEDURE — 64635 PR DESTROY LUMB/SAC FACET JNT: ICD-10-PCS | Mod: 50,,, | Performed by: ANESTHESIOLOGY

## 2023-12-21 PROCEDURE — 64635 DESTROY LUMB/SAC FACET JNT: CPT | Mod: 50,,, | Performed by: ANESTHESIOLOGY

## 2023-12-21 PROCEDURE — 25000003 PHARM REV CODE 250: Performed by: ANESTHESIOLOGY

## 2023-12-21 PROCEDURE — 99152 MOD SED SAME PHYS/QHP 5/>YRS: CPT | Performed by: ANESTHESIOLOGY

## 2023-12-21 PROCEDURE — 82962 GLUCOSE BLOOD TEST: CPT | Performed by: ANESTHESIOLOGY

## 2023-12-21 PROCEDURE — 64635 DESTROY LUMB/SAC FACET JNT: CPT | Mod: 50 | Performed by: ANESTHESIOLOGY

## 2023-12-21 PROCEDURE — 64636 DESTROY L/S FACET JNT ADDL: CPT | Mod: 50,,, | Performed by: ANESTHESIOLOGY

## 2023-12-21 PROCEDURE — 64636 DESTROY L/S FACET JNT ADDL: CPT | Mod: 50 | Performed by: ANESTHESIOLOGY

## 2023-12-21 PROCEDURE — 63600175 PHARM REV CODE 636 W HCPCS: Performed by: ANESTHESIOLOGY

## 2023-12-21 RX ORDER — BUPIVACAINE HYDROCHLORIDE 2.5 MG/ML
INJECTION, SOLUTION EPIDURAL; INFILTRATION; INTRACAUDAL
Status: DISCONTINUED | OUTPATIENT
Start: 2023-12-21 | End: 2023-12-21 | Stop reason: HOSPADM

## 2023-12-21 RX ORDER — FENTANYL CITRATE 50 UG/ML
INJECTION, SOLUTION INTRAMUSCULAR; INTRAVENOUS
Status: DISCONTINUED | OUTPATIENT
Start: 2023-12-21 | End: 2023-12-21 | Stop reason: HOSPADM

## 2023-12-21 RX ORDER — ONDANSETRON 2 MG/ML
4 INJECTION INTRAMUSCULAR; INTRAVENOUS ONCE AS NEEDED
Status: DISCONTINUED | OUTPATIENT
Start: 2023-12-21 | End: 2023-12-21 | Stop reason: HOSPADM

## 2023-12-21 RX ORDER — MIDAZOLAM HYDROCHLORIDE 1 MG/ML
INJECTION, SOLUTION INTRAMUSCULAR; INTRAVENOUS
Status: DISCONTINUED | OUTPATIENT
Start: 2023-12-21 | End: 2023-12-21 | Stop reason: HOSPADM

## 2023-12-21 RX ORDER — METHYLPREDNISOLONE ACETATE 40 MG/ML
INJECTION, SUSPENSION INTRA-ARTICULAR; INTRALESIONAL; INTRAMUSCULAR; SOFT TISSUE
Status: DISCONTINUED | OUTPATIENT
Start: 2023-12-21 | End: 2023-12-21 | Stop reason: HOSPADM

## 2023-12-21 RX ORDER — LIDOCAINE HYDROCHLORIDE 10 MG/ML
INJECTION, SOLUTION EPIDURAL; INFILTRATION; INTRACAUDAL; PERINEURAL
Status: DISCONTINUED | OUTPATIENT
Start: 2023-12-21 | End: 2023-12-21 | Stop reason: HOSPADM

## 2023-12-21 NOTE — OP NOTE
Lumbar Medial nerve branch block radiofrequency ablation Under Fluoroscopy  Bilateral L4/5 and L5/S1    INFORMED CONSENT: The procedure, risks, benefits and options were discussed with patient. There are no contraindications to the procedure. The patient expressed understanding and agreed to proceed. The personnel performing the procedure was discussed.    Date of procedure 12/21/2023    Time-out taken to identify patient and procedure side prior to starting the procedure.                     PROCEDURE: Bilateral radiofrequency ablation of the the medial branch nerves at the   transverse process of  L4, L5, and the sacral ala    Pre Procedure diagnosis:    Lumbar spondylosis [M47.816]  1. Lumbar spondylosis        Post-Procedure diagnosis:   same      PHYSICIAN: Beka Johnson MD    ASSISTANTS: None     LOCAL ANESTHETIC USED: 1ml of Lidocaine 1%, at each level    Sedation: Conscious sedation provided by M.D    SEDATION MEDICATIONS: local/IV sedation: Versed 3 mg and fentanyl 150 mcg IV.  Conscious sedation ordered by MD.  Patient reevaluated and sedation administered by MD and monitored by RN.  Total sedation time was less than 25 min.    Total sedation time was >20 min but <30min    ESTIMATED BLOOD LOSS: None.     COMPLICATIONS: None.       TECHNIQUE: Laying in a prone position, the patient was prepped and draped in the usual sterile fashion using ChloraPrep and fenestrated drape. The level was determined under fluoroscopic guidance. Local anesthetic was given by going down to the hub of the 27-gauge 1.25in needle and raising a wheel. A 18-gauge 10mm curved active tip needle was introduced to the anatomic local of the medial branch at each of the above levels using fluoroscopy. Then sensory and motor testing was performed to confirm that the needle tips were in the correct location. Then after negative aspiration, 1ml of lidocaine PF 1% was injected at each level. This was followed by thermal lesioning at 80  degrees celsius for 90 seconds. After lesioning, 0.5ml of bupivacaine PF 0.25% and 5mg of DepoMedrol was injected at each level.    The patient tolerated the procedure well. Did not have any procedure related motor deficit at the conclusion of the procedure      The patient was monitored for approximately 30 minutes after the procedure.  Patient was given post procedure and discharge instructions to follow at home.  The patient was discharged in a stable condition

## 2023-12-21 NOTE — DISCHARGE SUMMARY
Discharge Note  Short Stay      SUMMARY     Admit Date: 12/21/2023    Attending Physician: Beka Johnson MD        Discharge Physician: Beka Johnson MD        Discharge Date: 12/21/2023 12:36 PM    Procedure(s) (LRB):  Bilateral L4/L5 and L5/S1 Lumbar RFA (Bilateral)    Final Diagnosis: Lumbar spondylosis [M47.816]    Disposition: Home or self care    Patient Instructions:   Current Discharge Medication List        CONTINUE these medications which have NOT CHANGED    Details   alpha lipoic acid 600 mg Cap Take 600 to 1200mg Per day. Over the counter medication instructions - just for direction.  Qty: 60 each    Associated Diagnoses: Leg cramps, sleep related      biotin 10,000 mcg Cap Take 1 capsule by mouth once daily.      cholecalciferol, vitamin D3, (VITAMIN D3) 250 mcg (10,000 unit) Cap Take 1 tablet by mouth. 1 tablet 3 times a week      desloratadine (CLARINEX) 5 mg tablet TAKE 1 TABLET BY MOUTH EVERY DAY  Qty: 90 tablet, Refills: 3      valACYclovir (VALTREX) 500 MG tablet TAKE 1 TABLET BY MOUTH TWICE A DAY FOR 5 DAYS  Qty: 10 tablet, Refills: 3    Associated Diagnoses: HSV (herpes simplex virus) infection      atorvastatin (LIPITOR) 10 MG tablet Take 1 tablet (10 mg total) by mouth every evening.  Qty: 90 tablet, Refills: 1    Associated Diagnoses: Hyperlipidemia, unspecified hyperlipidemia type      blood sugar diagnostic (PRECISION XTRA TEST) Strp USE TO CHECK BLOOD GLUCOSE LEVELS TWICE DAILY.  Qty: 100 strip, Refills: 0      clonazePAM (KLONOPIN) 0.5 MG tablet TAKE 1 TABLET BY MOUTH EVERY DAY AS NEEDED FOR ANXIETY  Qty: 20 tablet, Refills: 0    Comments: Not to exceed 5 additional fills before 10/09/2023      clotrimazole-betamethasone 1-0.05% (LOTRISONE) cream Apply topically 2 (two) times daily.  Qty: 1 each, Refills: 2      cyclobenzaprine (FLEXERIL) 5 MG tablet TAKE 1 TABLET BY MOUTH THREE TIMES A DAY AS NEEDED FOR MUSCLE SPASMS      dapagliflozin propanediol (FARXIGA) 10 mg tablet Take 1  tablet (10 mg total) by mouth once daily.  Qty: 90 tablet, Refills: 1      diclofenac sodium (VOLTAREN) 1 % Gel USE 2 GRAMS (TOPICAL) 4 TIMES PER DAY AS NEEDED FOR PAIN      DYMISTA 137-50 mcg/spray Spry nassal spray 1 spray by Each Nare route 2 (two) times daily as needed.  Qty: 23 g, Refills: 5      estradioL (VIVELLE-DOT) 0.1 mg/24 hr PTSW APPLY 1 PATCH TOPICALLY TWICE WEEKLY AS DIRECTED.  Qty: 24 patch, Refills: 4    Comments: Brand name please, pt has trouble with generic  Associated Diagnoses: Hormone replacement therapy, postmenopausal      ferrous sulfate (FEOSOL) 325 mg (65 mg iron) Tab tablet TAKE 1 TABLET BY MOUTH EVERY DAY WITH BREAKFAST  Qty: 90 tablet, Refills: 1      fluticasone propionate (FLONASE) 50 mcg/actuation nasal spray SPRAY 2 SPRAYS IN EACH NOSTRIL ONCE DAILY      FREESTYLE LANCETS 28 gauge lancets USE TWICE DAILY BEFORE MEALS.  Qty: 100 each, Refills: 5      magnesium chloride (SLOW-MAG) 71.5 mg TbEC Take 1 tablet by mouth once. One tablet 3 times a week      meloxicam (MOBIC) 15 MG tablet TAKE 1 TABLET BY MOUTH EVERY DAY AS NEEDED FOR PAIN  Qty: 30 tablet, Refills: 1    Associated Diagnoses: Lumbar facet arthropathy; Bilateral sacroiliitis; Lumbar spondylosis; DDD (degenerative disc disease), lumbar; Lumbar radiculopathy      methocarbamoL (ROBAXIN) 750 MG Tab TAKE 1 TABLET BY MOUTH TWICE A DAY AS NEEDED FOR MUSCLE SPASM PAIN.  Qty: 60 tablet, Refills: 1    Associated Diagnoses: Lumbar facet arthropathy; Bilateral sacroiliitis; Lumbar spondylosis; DDD (degenerative disc disease), lumbar; Lumbar radiculopathy      multivitamin capsule Take by mouth.      omeprazole (PRILOSEC) 40 MG capsule Take 1 capsule (40 mg total) by mouth every morning.  Qty: 90 capsule, Refills: 1      semaglutide (OZEMPIC) 2 mg/dose (8 mg/3 mL) PnIj Inject 2 mg into the skin every 7 days.  Qty: 9 mL, Refills: 1      tretinoin (RETIN-A) 0.025 % cream APPLY TO AFFECTED AREA THREE TIMES WEEKLY AS DIRECTED.  Qty: 20 g,  Refills: 6    Associated Diagnoses: Acne vulgaris                 Discharge Diagnosis: Lumbar spondylosis [M47.816]  Condition on Discharge: Stable with no complications to procedure   Diet on Discharge: Same as before.  Activity: as per instruction sheet.  Discharge to: Home with a responsible adult.  Follow up: 2-4 weeks       Please call the office at [phone] if you experience any weakness or loss of sensation, fever > 101.5, pain uncontrolled with oral medications, persistent nausea/vomiting/or diarrhea, redness or drainage from the incisions, or any other worrisome concerns. If physician on call was not reached or could not communicate with our office for any reason please go to the nearest emergency department

## 2023-12-21 NOTE — DISCHARGE INSTRUCTIONS

## 2023-12-21 NOTE — H&P
HPI  Patient presenting for Procedure(s) (LRB):  Bilateral L4/L5 and L5/S1 Lumbar RFA (Bilateral)     Patient on Anti-coagulation No    No health changes since previous encounter    Past Medical History:   Diagnosis Date    Allergy     Anxiety     Biotin deficiency disease     Chlamydia     Diabetes mellitus     Fibroids     Herpes simplex     Knee pain, bilateral     Metabolic syndrome     Sleep apnea     Vitamin D deficiency      Past Surgical History:   Procedure Laterality Date    ARTHROSCOPIC REPAIR OF ROTATOR CUFF OF SHOULDER Left 03/07/2022    Procedure: REPAIR, ROTATOR CUFF, ARTHROSCOPIC;  Surgeon: Denilson Jackson MD;  Location: [facility] OR;  Service: Orthopedics;  Laterality: Left;    ARTHROSCOPY OF SHOULDER WITH DECOMPRESSION OF SUBACROMIAL SPACE Left 03/07/2022    Procedure: ARTHROSCOPY, SHOULDER, WITH SUBACROMIAL SPACE DECOMPRESSION;  Surgeon: Denilson Jackson MD;  Location: [facility] OR;  Service: Orthopedics;  Laterality: Left;    BREAST SURGERY      BUNIONECTOMY      CERVICAL BIOPSY  W/ LOOP ELECTRODE EXCISION      COLONOSCOPY N/A 10/15/2021    Procedure: COLONOSCOPY;  Surgeon: Alaina Ceja MD;  Location: [facility] ENDO;  Service: Endoscopy;  Laterality: N/A;    FIXATION OF TENDON Left 03/07/2022    Procedure: FIXATION, TENDON;  Surgeon: Denilson Jackson MD;  Location: [facility] OR;  Service: Orthopedics;  Laterality: Left;    FOOT SURGERY      hammer toe Bilateral     HYSTERECTOMY  11/2021    INJECTION OF ANESTHETIC AGENT AROUND MEDIAL BRANCH NERVES INNERVATING LUMBAR FACET JOINT Bilateral 10/26/2023    Procedure: Diagnostic Bilateral L4/5 & L5/S1 MBB #1;  Surgeon: Beka Johnson MD;  Location: [facility] PAIN MGT;  Service: Pain Management;  Laterality: Bilateral;    INJECTION OF ANESTHETIC AGENT AROUND MEDIAL BRANCH NERVES INNERVATING LUMBAR FACET JOINT Bilateral 11/20/2023    Procedure: Bilateral L4/L5 and L5/S1 MBB;  Surgeon: Beka Johnson MD;  Location: [facility] PAIN MGT;  Service:  Pain Management;  Laterality: Bilateral;    Laparoscopic assisted vaginal hysterectomy with BSO  11/22/2021    Path--Leiomyomata/Adenomyosis    LASER ABLATION OF THE CERVIX      PLANTAR FASCIA SURGERY Left     TOTAL REDUCTION MAMMOPLASTY  03/2007    TUBAL LIGATION       Review of patient's allergies indicates:   Allergen Reactions    Doxycycline     Dulaglutide Other (See Comments)    Emtricitabine      Other reaction(s): Rash    Lexapro [escitalopram oxalate]      spasms    Sulfa (sulfonamide antibiotics) Other (See Comments)    Tenofovir      Other reaction(s): Rash    Bydureon [exenatide microspheres] Rash     Skin reaction at site of injection.        No current facility-administered medications on file prior to encounter.     Current Outpatient Medications on File Prior to Encounter   Medication Sig Dispense Refill    alpha lipoic acid 600 mg Cap Take 600 to 1200mg Per day. Over the counter medication instructions - just for direction. 60 each     biotin 10,000 mcg Cap Take 1 capsule by mouth once daily.      cholecalciferol, vitamin D3, (VITAMIN D3) 250 mcg (10,000 unit) Cap Take 1 tablet by mouth. 1 tablet 3 times a week      desloratadine (CLARINEX) 5 mg tablet TAKE 1 TABLET BY MOUTH EVERY DAY 90 tablet 3    atorvastatin (LIPITOR) 10 MG tablet Take 1 tablet (10 mg total) by mouth every evening. 90 tablet 1    blood sugar diagnostic (PRECISION XTRA TEST) Strp USE TO CHECK BLOOD GLUCOSE LEVELS TWICE DAILY. 100 strip 0    clonazePAM (KLONOPIN) 0.5 MG tablet TAKE 1 TABLET BY MOUTH EVERY DAY AS NEEDED FOR ANXIETY 20 tablet 0    clotrimazole-betamethasone 1-0.05% (LOTRISONE) cream Apply topically 2 (two) times daily. 1 each 2    cyclobenzaprine (FLEXERIL) 5 MG tablet TAKE 1 TABLET BY MOUTH THREE TIMES A DAY AS NEEDED FOR MUSCLE SPASMS      dapagliflozin propanediol (FARXIGA) 10 mg tablet Take 1 tablet (10 mg total) by mouth once daily. (Patient not taking: Reported on 12/11/2023) 90 tablet 1    diclofenac sodium  (VOLTAREN) 1 % Gel USE 2 GRAMS (TOPICAL) 4 TIMES PER DAY AS NEEDED FOR PAIN      DYMISTA 137-50 mcg/spray Spry nassal spray 1 spray by Each Nare route 2 (two) times daily as needed. 23 g 5    estradioL (VIVELLE-DOT) 0.1 mg/24 hr PTSW APPLY 1 PATCH TOPICALLY TWICE WEEKLY AS DIRECTED. 24 patch 4    ferrous sulfate (FEOSOL) 325 mg (65 mg iron) Tab tablet TAKE 1 TABLET BY MOUTH EVERY DAY WITH BREAKFAST 90 tablet 1    fluticasone propionate (FLONASE) 50 mcg/actuation nasal spray SPRAY 2 SPRAYS IN EACH NOSTRIL ONCE DAILY      FREESTYLE LANCETS 28 gauge lancets USE TWICE DAILY BEFORE MEALS. 100 each 5    magnesium chloride (SLOW-MAG) 71.5 mg TbEC Take 1 tablet by mouth once. One tablet 3 times a week      meloxicam (MOBIC) 15 MG tablet TAKE 1 TABLET BY MOUTH EVERY DAY AS NEEDED FOR PAIN 30 tablet 1    methocarbamoL (ROBAXIN) 750 MG Tab TAKE 1 TABLET BY MOUTH TWICE A DAY AS NEEDED FOR MUSCLE SPASM PAIN. (Patient not taking: Reported on 12/11/2023) 60 tablet 1    multivitamin capsule Take by mouth.      omeprazole (PRILOSEC) 40 MG capsule Take 1 capsule (40 mg total) by mouth every morning. 90 capsule 1    tretinoin (RETIN-A) 0.025 % cream APPLY TO AFFECTED AREA THREE TIMES WEEKLY AS DIRECTED. 20 g 6        PMHx, PSHx, Allergies, Medications reviewed in epic    ROS negative except pain complaints in HPI    OBJECTIVE:    /67 (BP Location: Right arm, Patient Position: Sitting)   Pulse 77   Temp 97.4 °F (36.3 °C) (Temporal)   Resp 16   Wt 96.7 kg (213 lb 3 oz)   LMP 11/12/2017 (Approximate)   Breastfeeding No   BMI 38.99 kg/m²     PHYSICAL EXAMINATION:    GENERAL: Well appearing, in no acute distress, alert and oriented x3.  PSYCH:  Mood and affect appropriate.  SKIN: Skin color, texture, turgor normal, no rashes or lesions which will impact the procedure.  CV: RRR with palpation of the radial artery.  PULM: No evidence of respiratory difficulty, symmetric chest rise. Clear to auscultation.  NEURO: Cranial nerves  grossly intact.    Plan:    Proceed with procedure as planned Procedure(s) (LRB):  Bilateral L4/L5 and L5/S1 Lumbar RFA (Bilateral)    Beka Johnson MD  12/21/2023

## 2023-12-21 NOTE — PLAN OF CARE
Pt prepped for procedure, family at bedside visiting with pt. No complaints at this time will continue to monitor.

## 2024-02-09 ENCOUNTER — PATIENT MESSAGE (OUTPATIENT)
Dept: PAIN MEDICINE | Facility: CLINIC | Age: 54
End: 2024-02-09
Payer: COMMERCIAL

## 2024-02-09 PROBLEM — M46.1 BILATERAL SACROILIITIS: Status: RESOLVED | Noted: 2022-02-15 | Resolved: 2024-02-09

## 2024-02-19 ENCOUNTER — TELEPHONE (OUTPATIENT)
Dept: DERMATOLOGY | Facility: CLINIC | Age: 54
End: 2024-02-19
Payer: COMMERCIAL

## 2024-02-19 NOTE — TELEPHONE ENCOUNTER
Called patient regarding scheduling a later appointment. Current appointment switched other to virtual. Patient verbalized understanding.     ----- Message from Ines Harris sent at 2/19/2024 10:57 AM CST -----  Regarding: pt call back  Name of Who is Calling:Pt         What is the request in detail: Inquiring next available appt           Can the clinic reply by [facility]: yes         What Number to Call Back if not [facility]:Telephone Information:  Mobile          [phone]

## 2024-02-20 ENCOUNTER — OFFICE VISIT (OUTPATIENT)
Dept: DERMATOLOGY | Facility: CLINIC | Age: 54
End: 2024-02-20
Payer: COMMERCIAL

## 2024-02-20 DIAGNOSIS — L21.9 SEBORRHEIC DERMATITIS: ICD-10-CM

## 2024-02-20 DIAGNOSIS — L81.8 PERIORBITAL HYPERPIGMENTATION: Primary | ICD-10-CM

## 2024-02-20 PROCEDURE — 3061F NEG MICROALBUMINURIA REV: CPT | Mod: CPTII,95,, | Performed by: DERMATOLOGY

## 2024-02-20 PROCEDURE — 99214 OFFICE O/P EST MOD 30 MIN: CPT | Mod: 95,,, | Performed by: DERMATOLOGY

## 2024-02-20 PROCEDURE — 3044F HG A1C LEVEL LT 7.0%: CPT | Mod: CPTII,95,, | Performed by: DERMATOLOGY

## 2024-02-20 PROCEDURE — 3066F NEPHROPATHY DOC TX: CPT | Mod: CPTII,95,, | Performed by: DERMATOLOGY

## 2024-02-20 PROCEDURE — 1160F RVW MEDS BY RX/DR IN RCRD: CPT | Mod: CPTII,95,, | Performed by: DERMATOLOGY

## 2024-02-20 PROCEDURE — 1159F MED LIST DOCD IN RCRD: CPT | Mod: CPTII,95,, | Performed by: DERMATOLOGY

## 2024-02-20 RX ORDER — FLUOCINOLONE ACETONIDE, HYDROQUINONE, AND TRETINOIN .1; 40; .5 MG/G; MG/G; MG/G
CREAM TOPICAL
Qty: 30 G | Refills: 5 | Status: SHIPPED | OUTPATIENT
Start: 2024-02-20

## 2024-02-20 RX ORDER — TRIAMCINOLONE ACETONIDE 0.25 MG/G
CREAM TOPICAL 2 TIMES DAILY PRN
Qty: 80 G | Refills: 1 | Status: SHIPPED | OUTPATIENT
Start: 2024-02-20

## 2024-02-20 NOTE — PROGRESS NOTES
Subjective:       Patient ID:  Gerda Obrien is a 53 y.o. female who presents for   No chief complaint on file.    The patient location is: home  The chief complaint leading to consultation is: periorbital hyperpigmentation    Visit type: audiovisual    Face to Face time with patient: 15 min    20 minutes of total time spent on the encounter, which includes face to face time and non-face to face time preparing to see the patient (eg, review of tests), Obtaining and/or reviewing separately obtained history, Documenting clinical information in the electronic or other health record, Independently interpreting results (not separately reported) and communicating results to the patient/family/caregiver, or Care coordination (not separately reported).         Each patient to whom he or she provides medical services by telemedicine is:  (1) informed of the relationship between the physician and patient and the respective role of any other health care provider with respect to management of the patient; and (2) notified that he or she may decline to receive medical services by telemedicine and may withdraw from such care at any time.    Notes:     Hx of periorbital hyperpigmentation, last seen on 7/5/22.  She c/o hyperpigmentation of the nasal bridge.  She also c/o scaling of the eyebrows.       Prior tx:  tretinoin 0.025% cream, hydroquinone 8% qAM, azelaic acid (FINACEA) 15 % gel        Review of Systems   Constitutional:  Negative for fever and chills.   Gastrointestinal:  Negative for nausea and vomiting.   Skin:  Positive for activity-related sunscreen use. Negative for daily sunscreen use and recent sunburn.   Hematologic/Lymphatic: Does not bruise/bleed easily.        Objective:    Physical Exam   Constitutional: She appears well-developed and well-nourished. No distress.   Neurological: She is alert and oriented to person, place, and time. She is not disoriented.   Psychiatric: She has a normal mood and  affect.   Skin:   Areas Examined (abnormalities noted in diagram):   Head / Face Inspection Performed  Neck Inspection Performed  RUE Inspected  LUE Inspection Performed  Nails and Digits Inspection Performed             Assessment / Plan:        Periorbital hyperpigmentation  -     fluocinolone-hydroq.-tretinoin (TRI-THOMAS) 0.01-4-0.05 % Crea; Apply to dark spots once daily  Dispense: 30 g; Refill: 5  -     will start above med with daily sunscreen.      Seborrheic dermatitis  -     triamcinolone acetonide 0.025% (KENALOG) 0.025 % cream; Apply topically 2 (two) times daily as needed (for dry patches on face). Mild steroid. Use sparingly for 1-2 weeks if needed then stop.  Dispense: 80 g; Refill: 1  -     will start above med for scaling of the eyebrows.              Follow up if symptoms worsen or fail to improve.

## 2024-02-27 ENCOUNTER — TELEPHONE (OUTPATIENT)
Dept: PAIN MEDICINE | Facility: CLINIC | Age: 54
End: 2024-02-27
Payer: COMMERCIAL

## 2024-02-27 NOTE — TELEPHONE ENCOUNTER
Spoke with pt and reschedule virtual appt for 3/6/24 with Dr. Johnson.  Patient agreed with date and time.

## 2024-02-27 NOTE — TELEPHONE ENCOUNTER
----- Message from Arminda Ashraf sent at 2/27/2024 11:28 AM CST -----  Contact: Patient  Patient is calling to speak with the nurse regarding not getting a response for her virtual at  10am this morning. Please call patient at [phone]

## 2024-03-06 ENCOUNTER — OFFICE VISIT (OUTPATIENT)
Dept: PAIN MEDICINE | Facility: CLINIC | Age: 54
End: 2024-03-06
Payer: COMMERCIAL

## 2024-03-06 DIAGNOSIS — M70.61 GREATER TROCHANTERIC BURSITIS OF BOTH HIPS: Primary | ICD-10-CM

## 2024-03-06 DIAGNOSIS — M54.16 LUMBAR RADICULOPATHY: ICD-10-CM

## 2024-03-06 DIAGNOSIS — M70.62 GREATER TROCHANTERIC BURSITIS OF BOTH HIPS: Primary | ICD-10-CM

## 2024-03-06 DIAGNOSIS — M47.816 LUMBAR SPONDYLOSIS: ICD-10-CM

## 2024-03-06 DIAGNOSIS — M51.36 DDD (DEGENERATIVE DISC DISEASE), LUMBAR: ICD-10-CM

## 2024-03-06 PROCEDURE — 99213 OFFICE O/P EST LOW 20 MIN: CPT | Mod: 95,,, | Performed by: ANESTHESIOLOGY

## 2024-03-06 PROCEDURE — 3044F HG A1C LEVEL LT 7.0%: CPT | Mod: CPTII,95,, | Performed by: ANESTHESIOLOGY

## 2024-03-06 PROCEDURE — 3061F NEG MICROALBUMINURIA REV: CPT | Mod: CPTII,95,, | Performed by: ANESTHESIOLOGY

## 2024-03-06 PROCEDURE — 3066F NEPHROPATHY DOC TX: CPT | Mod: CPTII,95,, | Performed by: ANESTHESIOLOGY

## 2024-03-06 RX ORDER — NABUMETONE 750 MG/1
750 TABLET, FILM COATED ORAL DAILY
Qty: 14 TABLET | Refills: 0 | Status: SHIPPED | OUTPATIENT
Start: 2024-03-06 | End: 2024-03-20

## 2024-03-06 NOTE — PROGRESS NOTES
Established Patient - TeleHealth Visit    The patient location is: [place]  The chief complaint leading to consultation is: chronic pain     Visit type: audiovisual    Face to Face time with patient: 10-15 minutes  20 minutes of total time spent on the encounter, which includes face to face time and non-face to face time preparing to see the patient (eg, review of tests), Obtaining and/or reviewing separately obtained history, Documenting clinical information in the electronic or other health record, Independently interpreting results (not separately reported) and communicating results to the patient/family/caregiver, or Care coordination (not separately reported).     Each patient to whom he or she provides medical services by telemedicine is:  (1) informed of the relationship between the physician and patient and the respective role of any other health care provider with respect to management of the patient; and (2) notified that he or she may decline to receive medical services by telemedicine and may withdraw from such care at any time.      Chronic Pain -- Established Patient (Follow-up visit)    Referring Physician: Marie Zee MD    PCP: Meli Gutierrez MD    Chief Complaint:  Lower Back Pain   Low-back Pain       Interval History (03/06/2024):  Patient returns to clinic after procedure.  Patient reports 70% relief and lower back pain after bilateral L4-5 and L5-S1 radiofrequency ablation on 12/21/2023.  Patient reports that stabbing lower back pain has significantly improved.  Primary pain today is bilateral hip pain.  Pain described as a throbbing aching pain over the lateral aspects of her bilateral hips, right-greater-than-left.  Patient denies any significant radiation of this pain.  Pain is worse with lying on the affected side and with prolonged walking, better with elevation and anti-inflammatories.  Pain is currently rated a 3 out 10. Denies any fevers, chills, changes in gait, saddle  anesthesia, or bowel and bladder incontinence        Interval History (8/14/2023):  Gerda Obrien presents today for follow-up visit.  Patient was last seen on 7/5/2023. At that visit, the plan was to start Robaxin, but she reports that it causes drowsiness.  Her main complaint is leg cramping, worse at night.  Pain is better with movement.  She also complains of leg cramps described as Charley horses during sexual intercourse.  Overall, she does not feel that it is a sciatic nerve pain, but more of muscle cramps in her legs.  She takes a multivitamin daily along with magnesium.  Patient reports pain as 2/10 today.  She has taken gabapentin in the past but does not remember why she quit taking.    Initial HPI (7/5/2023):  Gerda Obrien is a 52 y.o. female who presents to the clinic for the evaluation of lower back pain. who presents to the clinic for the evaluation of lower back pain.   Patient reports 5 month history of lower back pain.  Patient denies any inciting traumas to his lower back pain.  Patient denies any previous surgery to the lumbar spine.    Pain is described as a throbbing aching pain in a band across her lower back.  Patient reports associated radiation to her bilateral lower extremities primarily affecting the bilateral calves and feet in a numbness tingling pain and with associated muscle cramps.  Pain is typically worse at night and when getting up in the morning.  Patient reports morning stiffness with his pain.  Pain is typically worse with stretching and exercise as well as heat.  Pain is currently rated a 3/10, but can increase to an 8/10 with exacerbating activities. Denies any fevers, chills, changes in gait, saddle anesthesia, or bowel and bladder incontinence      Non-Pharmacologic Treatments:  Physical Therapy/Home Exercise: yes  Ice/Heat:yes  TENS: no  Acupuncture: no  Massage: yes  Chiropractic: no        Previous Pain Medications:  Tylenol, NSAIDs,  topicals      Pain Procedures:   -12/21/2023:  Bilateral L4-5 and L5-S1 radiofrequency ablation, 80% relief      Imaging (Reviewed on 3/6/2024):     7/5/23 MRI Lumbar Spine Without Contrast  COMPARISON:  X-ray dated 02/15/2022    FINDINGS:  There are 5 non-rib-bearing lumbar vertebrae.  There is trace grade 1 degenerative anterolisthesis of L4 on L5.  Remaining alignment is unremarkable.  No acute fracture or compression deformity.  No aggressive focal signal abnormality.    Conus medullaris terminates at the L1-L2 level.  Conus medullaris is normal in size and signal.    T12-L1: No significant disc pathology.  No significant spinal canal or neural foraminal stenosis.    L1-L2: No significant disc pathology.  No significant spinal canal or neural foraminal stenosis.    L2-L3: Trace annular disc bulge.  Mild bilateral facet arthropathy.  No significant spinal canal or neural foraminal stenosis.    L3-L4: Small circumferential disc bulge.  Mild bilateral facet arthropathy.  No significant spinal canal or neural foraminal stenosis.    L4-L5: Small circumferential disc bulge.  Mild bilateral facet arthropathy.  No significant spinal canal or neural foraminal stenosis.    L5-S1: Trace annular disc bulge.  Minimal bilateral facet arthropathy.  No significant spinal canal or neural foraminal stenosis.    Paraspinal soft tissues are unremarkable.  Visualized intra-abdominopelvic contents are also unremarkable.  Impression: Mild mid to lower lumbar level predominant degenerative changes as detailed above including grade 1 anterolisthesis of L4 on L5.  No significant spinal canal or neural foraminal stenosis.        2/15/22 X-Ray Lumbar Complete Including Flex And Ext  COMPARISON:  None  FINDINGS:  AP, lateral (flexion and extension), bilateral oblique and coned down radiographs of the lumbar spine demonstrate no evidence for acute fracture or dislocation.  Posterior facet hypertrophic changes are identified at L5/S1, to a  lesser extent at L4/5.  Anterior osteophytes are identified at several lumbar levels.  Mild intervertebral disc space narrowing is also identified at L4/5 and L5/S1.  Remaining intervertebral disk spaces and vertebral body heights are well-preserved.  Visualized SI joints are intact.  Impression  Mild degenerative disease as described above        ROS  Review of Systems   Constitutional:  Negative for chills, diaphoresis, fatigue and fever.   HENT:  Negative for ear discharge, ear pain, rhinorrhea, trouble swallowing and voice change.    Eyes:  Negative for pain and redness.   Respiratory:  Negative for chest tightness, shortness of breath, wheezing and stridor.    Cardiovascular:  Negative for chest pain and leg swelling.   Gastrointestinal:  Negative for blood in stool, diarrhea, nausea and vomiting.   Endocrine: Negative for cold intolerance and heat intolerance.   Genitourinary:  Negative for dysuria, hematuria and urgency.   Musculoskeletal:  Positive for arthralgias, back pain and myalgias. Negative for gait problem, joint swelling, neck pain and neck stiffness.   Skin:  Negative for rash.   Neurological:  Positive for weakness and numbness. Negative for tremors, seizures, speech difficulty, light-headedness and headaches.   Hematological:  Does not bruise/bleed easily.   Psychiatric/Behavioral:  Negative for agitation, confusion and suicidal ideas.           OBJECTIVE:  Telemedicine Exam  There were no vitals filed for this visit.    There is no height or weight on file to calculate BMI.   (reviewed on 3/6/2024)     Virtual exam, physical exam from previous clinic visit       Physical Exam: last in clinic visit:  Constitutional:       General: She is not in acute distress.     Appearance: Normal appearance. She is not ill-appearing.   HENT:      Head: Normocephalic and atraumatic.      Nose: No congestion or rhinorrhea.   Eyes:      Extraocular Movements: Extraocular movements intact.      Pupils: Pupils are  equal, round, and reactive to light.   Cardiovascular:      Pulses: Normal pulses.   Pulmonary:      Effort: Pulmonary effort is normal.      Breath sounds: Normal breath sounds.   Abdominal:      General: Abdomen is flat. Bowel sounds are normal.      Palpations: Abdomen is soft.   Skin:     General: Skin is warm and dry.      Capillary Refill: Capillary refill takes less than 2 seconds.   Neurological:      General: No focal deficit present.      Mental Status: She is alert and oriented to person, place, and time.      Sensory: No sensory deficit.      Motor: Weakness present. No abnormal muscle tone.      Gait: Gait normal.      Deep Tendon Reflexes:      Reflex Scores:       Patellar reflexes are 2+ on the right side and 2+ on the left side.       Achilles reflexes are 2+ on the right side and 2+ on the left side.     Comments: Four out of five strength in bilateral dorsiflexion   Psychiatric:         Mood and Affect: Mood normal.         Behavior: Behavior normal.         Thought Content: Thought content normal.     Musculoskeletal:  Lumbar Exam  Incision: no  Pain with Flexion: no  Pain with Extension: yes  ROM: Decreased  Paraspinous TTP:  Positive bilaterally  Facet TTP:  L5-S1  Facet Loading:  Positive bilaterally  SLR:  Positive on the right at 75°  SIJ TTP:  Positive bilaterally  LISSA:  Positive on the right          ASSESSMENT:     53 y.o. year old female with lower back pain, consistent with     1. Greater trochanteric bursitis of both hips  X-Ray Hips Bilateral 2 View Inc AP Pelvis    nabumetone (RELAFEN) 750 MG tablet      2. Lumbar spondylosis  nabumetone (RELAFEN) 750 MG tablet      3. DDD (degenerative disc disease), lumbar  nabumetone (RELAFEN) 750 MG tablet      4. Lumbar radiculopathy  nabumetone (RELAFEN) 750 MG tablet          Greater trochanteric bursitis of both hips  -     X-Ray Hips Bilateral 2 View Inc AP Pelvis; Future; Expected date: 03/06/2024  -     nabumetone (RELAFEN) 750 MG  tablet; Take 1 tablet (750 mg total) by mouth once daily. for 14 days  Dispense: 14 tablet; Refill: 0    Lumbar spondylosis  -     nabumetone (RELAFEN) 750 MG tablet; Take 1 tablet (750 mg total) by mouth once daily. for 14 days  Dispense: 14 tablet; Refill: 0    DDD (degenerative disc disease), lumbar  -     nabumetone (RELAFEN) 750 MG tablet; Take 1 tablet (750 mg total) by mouth once daily. for 14 days  Dispense: 14 tablet; Refill: 0    Lumbar radiculopathy  -     nabumetone (RELAFEN) 750 MG tablet; Take 1 tablet (750 mg total) by mouth once daily. for 14 days  Dispense: 14 tablet; Refill: 0         PLAN:   - Interventions:    None at this time.    -12/21/2023:  Bilateral L4-5 and L5-S1 radiofrequency ablation, 80% relief    - Anticoagulation use:   No no anticoagulation    - Medications:  - start Relafen 750 mg daily for 14 days for bilateral hip pain that appears to be consistent with bilateral greater trochanter bursitis  - Alternate with Tylenol (acetaminophen) 500mg up to 3000mg per day PRN.       - Therapy:    Patient has completed some physical therapy for lower back while completing physical therapy for shoulder.  We will refer to formal physical therapy for lower back pain.    - Imaging/Diagnostic:   We will obtain updated images of bilateral hips to further evaluate etiology of bilateral hip pain  -Lumbar MRI reviewed   - X-rays of lumbar spine reviewed and findings discussed with patient previously: Significant for facet arthropathy at L4-5 and L5-S1 were noted loss of disc height at L5-S1.    - Consults: None at this time    - Follow up visit: PRN     - Patient Questions: Answered all of the patient's questions regarding diagnosis, therapy, and treatment.    - This condition does not require this patient to take time off of work, and the primary goal of our Pain Management services is to improve the patient's functional capacity.   - I discussed the risks, benefits, and alternatives to potential  treatment options. All questions and concerns were fully addressed today in clinic.         Beka Johnson MD  Interventional Pain Management - [facility]    Disclaimer:  This note was prepared using voice recognition system and is likely to have sound alike errors that may have been overlooked even after proof reading.  Please call me with any questions.

## 2024-03-11 DIAGNOSIS — M47.816 LUMBAR SPONDYLOSIS: ICD-10-CM

## 2024-03-11 DIAGNOSIS — M54.16 LUMBAR RADICULOPATHY: ICD-10-CM

## 2024-03-11 DIAGNOSIS — M51.36 DDD (DEGENERATIVE DISC DISEASE), LUMBAR: ICD-10-CM

## 2024-03-11 DIAGNOSIS — M47.816 LUMBAR FACET ARTHROPATHY: ICD-10-CM

## 2024-03-11 DIAGNOSIS — M46.1 BILATERAL SACROILIITIS: ICD-10-CM

## 2024-03-11 RX ORDER — METHOCARBAMOL 750 MG/1
TABLET, FILM COATED ORAL
Qty: 60 TABLET | Refills: 1 | Status: SHIPPED | OUTPATIENT
Start: 2024-03-11

## 2024-03-21 ENCOUNTER — HOSPITAL ENCOUNTER (OUTPATIENT)
Dept: RADIOLOGY | Facility: HOSPITAL | Age: 54
Discharge: HOME | End: 2024-03-21
Attending: ANESTHESIOLOGY
Payer: COMMERCIAL

## 2024-03-21 DIAGNOSIS — M70.62 GREATER TROCHANTERIC BURSITIS OF BOTH HIPS: ICD-10-CM

## 2024-03-21 DIAGNOSIS — M70.61 GREATER TROCHANTERIC BURSITIS OF BOTH HIPS: ICD-10-CM

## 2024-03-21 PROCEDURE — 73521 X-RAY EXAM HIPS BI 2 VIEWS: CPT | Mod: 26,,, | Performed by: RADIOLOGY

## 2024-03-21 PROCEDURE — 73521 X-RAY EXAM HIPS BI 2 VIEWS: CPT | Mod: TC

## 2024-03-23 NOTE — OP NOTE
DATE OF SERVICE: 3/7/2022    ATTENDING: Denilson Jackson MD    DATE OF SURGERY: 3/7/2022    PATIENT'S NAME: Gerda Obrien    MEDICAL RECORD NUMBER: [id number]     PREOPERATIVE DIAGNOSES:   1. Left shoulder rotator cuff tear  2. Left shoulder biceps tendinitis, SLAP tear  3. Left shoulder impingement    POSTOPERATIVE DIAGNOSES:   1. Left shoulder rotator cuff tear  2. Left shoulder biceps tendinitis, SLAP tear  3. Left shoulder impingement    PROCEDURE PERFORMED:   1. Left shoulder arthroscopic rotator cuff repair  2. Left shoulder arthroscopic biceps tenodesis  3. Left shoulder subacromial space debridement    ANESTHESIA: General plus regional.     IMPLANTS USED:   Implant Name Type Inv. Item Serial No.  Lot No. LRB No. Used Action   ANCHOR FIBERTAK 2.6 SOFT DL - [id number]  ANCHOR FIBERTAK 2.6 SOFT DL  [employer] [id number] Left 1 Implanted   ANCHOR FIBERTAK 2.6 SOFT DL - [id number]  ANCHOR FIBERTAK 2.6 SOFT DL  [employer] [id number] Left 1 Implanted   ANCHOR SWIVELOCK KNTLS BLUE #2 - [id number]  ANCHOR SWIVELOCK KNTLS BLUE #2  [employer] [id number] Left 1 Implanted   ANCHOR SWIVELOCK KNTLS BLUE #2 - [id number]  ANCHOR SWIVELOCK KNTLS BLUE #2  [employer] [id number] Left 1 Implanted   4.75 Loop N' Tack Tenodesis Implant System     [id number] Left 1 Implanted       COMPLICATIONS: None.     POSITION: Beachchair.     BRIEF INDICATIONS: This is a 51 y.o. female who presents with a symptomatic LEFT rotator cuff tear and associated biceps tendonopathy. she has failed conservative treatment measures. We discussed surgical treatment options including risks and benefits. After a detailed explanation of the technical aspects of the procedure, the patient elected to proceed and signed consent.    OPERATIVE FINDINGS:   Biceps/Labrum: Inflamed tendon with evidence of longitudinal tearing, Type 2 SLAP tear  Glenohumeral joint: Glenoid and humeral head with grade 1-2 changes, no focal lesions  Rotator Cuff: Full thickness  tear of the supraspinatus  Subacromial space: inflamed bursal tissue without anterolateral spur    DESCRIPTION OF PROCEDURE:   The patient was identified in the preoperative holding area. The operative upper extremity was marked.  Consent was verified. The patient was then taken for preoperative block. Following this, the patient was taken to the main operating room where she was laid supine on the operative table. General anesthetic was induced. Preoperative time-out was performed verifying the patient, procedure, and preoperative antibiotics. The patient was then positioned in the beachchair position with all bony prominences well padded. The operative upper extremity was then prepped and draped in the usual sterile fashion.     A posterior portal was established with an #11-blade. The arthroscope was inserted into the glenohumeral joint atraumatically. We then made an anterior portal using an outside-in technique with an #18-gauge spinal needle into the rotator interval. We then used an #11-blade for stab incision and then followed this with a straight hemostat to open our anterior portal. We then inserted our arthroscopic probe to probe the joint. We were able to visualize the biceps tendon which was inflamed and deformed with evidence longitudinal tearing. The labrum was probed and demonstrated frayed edges and a type 2 SLAP tear. The shaver was introduced to debride the frayed edges of labrum.    The biceps was tagged with a spinal needle and PDS suture before being cut adjacent to its anchor.     The subscapularis was viewed and probed and found to be intact.     Through the anterior portal we placed a passport cannula.  We then passed a FiberWire suture in a luggage tag configuration through and around the biceps tendon.  A Passer/grasper was then used to villegas the biceps tendon distal to the luggage tag and grab a tail again to lock it into place.  This was then loaded into a 4.75mm SwiveLock anchor  outside the shoulder joint.  The biceps tendon was then transected proximal to the stitch.     From the glenohumeral space we were also able to visualize a full thickness rotator cuff tear.  We then localized a lateral portal under direct visualization with an #18-gauge spinal needle into the cuff tear. We then made a #11-blade stab incision in the lateral shoulder and then through this brought our arthroscopic shaver. We debrided the torn cuff tissue back to a stable rim and also began preparing the tuberosity for anchor placement. We then removed our instruments from the glenohumeral joint and placed the arthroscope from the posterior portal into the subacromial space. We debrided a significant amount of bursal tissue in the subacromial space. We identified the undersurface of the acromion. We used a VAPR to debride the undersurface of the acromion up to the anterior and lateral margins. We identified the CA ligament and we were careful not to remove this. We continued debridement of the bursal tissue, identified the rotator cuff tear, and worked to define the edges more clearly. The remnants of rotator cuff tissue at the greater tuberosity was debrided and the greater tuberosity preparation was completed by debriding down to bleeding bone.     Once the rotator cuff was prepared, we then placed our medial row anchors.  We used Arthrex 2.6mm FiberTak anchors preloaded with SutureTape for the medial row.  We then passed the sutures using the scorpion device from anterior to posterior.  We tied these in a mattress fashion using alternating half-hitches. The tails were left long for incorporation into a lateral row.    Next, we began debridement of the lateral humerus with a VAPR probe. We debrided down to bony base. We then placed our lateral row anchors, which consisted of Arthrex 4.75mm SwiveLock anchors.  We brought sutures from each of the medial row anchors and tensioned them into the anterior lateral row anchor  and then advanced the anchor.  This was repeated for the posterior lateral row anchor. Once we completed this, we took the remaining final arthroscopic pictures.     The portal sites were closed with 3-0 nylon sutures.  A light sterile dressing and sling shoulder immobilizer were applied.  The patient was then woken from anesthesia and brought to PACU in stable condition.    Plan:  Rotator Cuff Protocol  NWB LUE in sling  Ok to be out of sling for pendulums, elbow, wrist ROM  ASA 81mg BID x 2wks for DVT ppx  f/u 10-14 days for suture removal      Denilson Jcakson MD     I personally spent

## 2024-04-04 NOTE — PROGRESS NOTES
[facility]   Physical Therapy Treatment Note     [facility]  [facility] Number: [id number]    Therapy Diagnosis:   Encounter Diagnoses   Name Primary?    Decreased functional mobility and endurance Yes    Decreased range of motion of left shoulder     Proximal muscle weakness      Physician: Denilson Jackson    Visit Date: 3/23/2022    Physician Orders: PT Eval and Treat  Medical Diagnosis from Referral: Nontraumatic incomplete tear of left rotator cuff  Evaluation Date: 3/9/2022  Authorization Period Expiration: 12/1/2022  Plan of Care Expiration: 6/9/2022  Progress Note Due: 4/9/2022  Visit # / Visits authorized: 1/20 (+1 for evaluation)    FOTO: 1/ 3      Precautions: Standard        Surgical Procedure: L shoulder rotator cuff repair, biceps tenodesis, subacromial decompression Date of Surgery: 3/7/2022     Time In: 1300  Time Out: 1345  Total Billable Time: 43 minutes     SUBJECTIVE     Patient reports: she has been having more shoulder pain now that she is no longer taking her pain medication but states that pain is tolerable. She has been sleeping in her recliner.    He/She was compliant with home exercise program.  Response to previous treatment: felt fine with no pain   Functional change: PROM goals accomplished     Pain: 4/10     Location: L shoulder     OBJECTIVE     Objective Measures updated at progress report unless specified.       TREATMENT     Gerda received the treatments listed below:       MANUAL THERAPY TECHNIQUES were applied for (10) minutes, including:    Manual Intervention Performed Today    Soft Tissue Mobilization x Superficial soft tissue mobilization to left upper trapezius, periscapular musculature and deltoid   Joint Mobilizations     Mobilization with movement          Functional Dry Needling        Plan for Next Visit: PRN         THERAPEUTIC EXERCISES to develop strength, endurance, ROM, flexibility, posture and core  stabilization for (33) minutes including:    Intervention Performed Today    Shoulder and elbow PROM  x 15 minutes   Shoulder internal rotation and external rotation to 30*  Shoulder flexion to 90*  Shoulder abduction to 90*  Full range elbow flexion and extension    submax shoulder isometrics  x 30x each by PT. abduction, adduction and extension    Shoulder protraction and retraction  x 20x    Shrugs and depressions  x 20x    pendulums x 1 minute forward, 1 minute lateral                     Plan for Next Visit: wrist AROM            PATIENT EDUCATION AND HOME EXERCISES     Home Exercises Provided and Patient Education Provided     Education provided: (time included with therex) minutes   Patient educated on biomechanical justification for therapeutic exercise and importance of compliance with HEP in order to improve overall impairments and QOL    Patient was educated on all the above exercise prior/during/after for proper posture, positioning, and execution for safe performance with home exercise program.    Patient educated on the importance of improved core and upper extremity strength in order to improve alignment of the spine and upper extremities with static positions and dynamic movement.    Patient educated on the importance of strong core and lower extremity musculature in order to improve both static and dynamic balance, improve gait mechanics, reduce fall risk and improve household and community mobility.       Written Home Exercises Provided: yes.  Exercises were reviewed and Gerda was able to demonstrate them prior to the end of the session.  Gerda demonstrated good  understanding of the education provided. See EMR under Patient Instructions for exercises provided during therapy sessions.      ASSESSMENT     Pt tolerated session well today. All PROM tolerated well with minimal discomfort and goals met for this post-operative stage, per protocol. Initiated submax shoulder isometrics into  abduction, adduction and extension.     Gerda is progressing well towards her goals.   Pt prognosis is Excellent.     Pt will continue to benefit from skilled outpatient physical therapy to address the deficits listed in the problem list box on initial evaluation, provide pt/family education and to maximize pt's level of independence in the home and community environment.     Pt's spiritual, cultural and educational needs considered and pt agreeable to plan of care and goals.     Anticipated Barriers for therapy: co-morbidities and adherence to treatment plan    GOALS:     Short Term Goals:  6 weeks Progress   3/9/2022   1. Pain: Pt will demonstrate improved pain by reports of less than or equal to 6/10 worst pain on the verbal rating scale in order to progress toward maximal functional ability and improve QOL. PC   2. Function: Patient will demonstrate improved function as indicated by a functional limitation score of less than or equal to 80 out of 100 on FOTO. PC   3. Mobility: Patient will improve AROM to 50% of stated goals, listed in objective measures above, in order to progress towards independence with functional activities.  PC   4. Strength: Patient will improve strength to 50% of stated goals, listed in objective measures above, in order to progress towards independence with functional activities.  PC   5. HEP: Patient will demonstrate independence with HEP in order to progress toward functional independence. PC      Long Term Goals:  12 weeks Progress  3/9/2022   1. Pain: Pt will demonstrate improved pain by reports of less than or equal to 3/10 worst pain on the verbal rating scale in order to progress toward maximal functional ability and improve QOL.   PC   2. Function: Patient will demonstrate improved function as indicated by a functional limitation score of less than or equal to 66 out of 100 on FOTO. PC   3. Mobility: Patient will improve AROM to stated goals, listed in objective measures  above, in order to return to maximal functional potential and improve quality of life. PC   4. Strength: Patient will improve strength to stated goals, listed in objective measures above, in order to improve functional independence and quality of life. PC   5. Patient will return to normal ADL's, IADL's, community involvement, recreational activities, and work-related activities with less than or equal to 3/10 pain and maximal function.         Goals Key:  PC= progressing/continue; PM= partially met;        DC= discontinue    PLAN     Continue Plan of Care (POC) and progress per patient tolerance. See treatment section for details on planned progressions next session.      Analilia Son, PT     Never smoker

## 2024-05-09 ENCOUNTER — PATIENT MESSAGE (OUTPATIENT)
Dept: ORTHOPEDICS | Facility: CLINIC | Age: 54
End: 2024-05-09
Payer: COMMERCIAL

## 2024-05-09 ENCOUNTER — TELEPHONE (OUTPATIENT)
Dept: SPORTS MEDICINE | Facility: CLINIC | Age: 54
End: 2024-05-09
Payer: COMMERCIAL

## 2024-05-09 NOTE — TELEPHONE ENCOUNTER
[facility] paperwork completed, signed, and successfully faxed to [phone] on 5/9/24 . YEFRI

## 2024-10-21 ENCOUNTER — HOSPITAL ENCOUNTER (OUTPATIENT)
Dept: RADIOLOGY | Facility: HOSPITAL | Age: 54
Discharge: HOME | End: 2024-10-21
Attending: STUDENT IN AN ORGANIZED HEALTH CARE EDUCATION/TRAINING PROGRAM
Payer: COMMERCIAL

## 2024-10-21 ENCOUNTER — NURSE TRIAGE (OUTPATIENT)
Dept: ADMINISTRATIVE | Facility: CLINIC | Age: 54
End: 2024-10-21
Payer: COMMERCIAL

## 2024-10-21 ENCOUNTER — OFFICE VISIT (OUTPATIENT)
Dept: SPORTS MEDICINE | Facility: CLINIC | Age: 54
End: 2024-10-21
Payer: COMMERCIAL

## 2024-10-21 DIAGNOSIS — M25.572 LEFT ANKLE PAIN, UNSPECIFIED CHRONICITY: ICD-10-CM

## 2024-10-21 DIAGNOSIS — S93.492A HIGH ANKLE SPRAIN OF LEFT LOWER EXTREMITY, INITIAL ENCOUNTER: ICD-10-CM

## 2024-10-21 DIAGNOSIS — M25.572 LEFT ANKLE PAIN, UNSPECIFIED CHRONICITY: Primary | ICD-10-CM

## 2024-10-21 PROCEDURE — 3044F HG A1C LEVEL LT 7.0%: CPT | Mod: CPTII,S$GLB,, | Performed by: STUDENT IN AN ORGANIZED HEALTH CARE EDUCATION/TRAINING PROGRAM

## 2024-10-21 PROCEDURE — 97760 ORTHOTIC MGMT&TRAING 1ST ENC: CPT | Mod: S$GLB,,, | Performed by: STUDENT IN AN ORGANIZED HEALTH CARE EDUCATION/TRAINING PROGRAM

## 2024-10-21 PROCEDURE — 1159F MED LIST DOCD IN RCRD: CPT | Mod: CPTII,S$GLB,, | Performed by: STUDENT IN AN ORGANIZED HEALTH CARE EDUCATION/TRAINING PROGRAM

## 2024-10-21 PROCEDURE — 97110 THERAPEUTIC EXERCISES: CPT | Mod: 59,S$GLB,, | Performed by: STUDENT IN AN ORGANIZED HEALTH CARE EDUCATION/TRAINING PROGRAM

## 2024-10-21 PROCEDURE — 73610 X-RAY EXAM OF ANKLE: CPT | Mod: 26,LT,, | Performed by: RADIOLOGY

## 2024-10-21 PROCEDURE — 73610 X-RAY EXAM OF ANKLE: CPT | Mod: TC,LT

## 2024-10-21 PROCEDURE — 99999 PR PBB SHADOW E&M-EST. PATIENT-LVL III: CPT | Mod: PBBFAC,,, | Performed by: STUDENT IN AN ORGANIZED HEALTH CARE EDUCATION/TRAINING PROGRAM

## 2024-10-21 PROCEDURE — 3066F NEPHROPATHY DOC TX: CPT | Mod: CPTII,S$GLB,, | Performed by: STUDENT IN AN ORGANIZED HEALTH CARE EDUCATION/TRAINING PROGRAM

## 2024-10-21 PROCEDURE — 99214 OFFICE O/P EST MOD 30 MIN: CPT | Mod: 25,S$GLB,, | Performed by: STUDENT IN AN ORGANIZED HEALTH CARE EDUCATION/TRAINING PROGRAM

## 2024-10-21 PROCEDURE — 3061F NEG MICROALBUMINURIA REV: CPT | Mod: CPTII,S$GLB,, | Performed by: STUDENT IN AN ORGANIZED HEALTH CARE EDUCATION/TRAINING PROGRAM

## 2024-10-21 RX ORDER — MELOXICAM 15 MG/1
15 TABLET ORAL DAILY
Qty: 30 TABLET | Refills: 1 | Status: SHIPPED | OUTPATIENT
Start: 2024-10-21

## 2024-10-21 NOTE — LETTER
Patient: Gerda Obrien   YOB: 1970   Clinic Number: [id number]   Today's Date: October 21, 2024        Certificate to Return to Work     Gerda Boateng was seen by Jeff Mcdonnell MD on 10/21/2024.    Gerda Boateng can return to work on 10/22/2024 with full duty with walking boot.    If you have any questions or concerns, please feel free to contact the office at [phone].    Thank you.    Jeff Mcdonnell MD        Signature:

## 2024-10-21 NOTE — PATIENT INSTRUCTIONS
Assessment:  Gerda Obrien is a 54 y.o. female   Chief Complaint   Patient presents with    Left Ankle - Pain, Injury       Encounter Diagnoses   Name Primary?    Left ankle pain, unspecified chronicity Yes    High ankle sprain of left lower extremity, initial encounter         Plan:  Walking boot for daily use. At least 15 minutes were spent sizing, fitting, and educating regarding durable medical equipment today and all questions answered. This service was performed under direction of Jeff Mcdonnell MD today.  CPT 61680.  Ok for work if in boot  Recheck in two weeks           At least 10 minutes were spent teaching the patient a therapeutic home exercise program and they were provided this plan in writing.  CPT 96394    Follow-up: 2 weeks or sooner if there are any problems between now and then.    Thank you for choosing [facility] and Dr. Jeff Mcdonnell for your orthopedic & sports medicine care. It is our goal to provide you with exceptional care that will help keep you healthy, active, and get you back in the game.    Please do not hesitate to reach out to us via email, phone, or [employer] with any questions, concerns, or feedback.    If you felt that you received exemplary care today, please consider leaving us feedback on Healthgrades at:  [email or web address]    If you are experiencing pain/discomfort ,or have questions and would like to be connected to the [facility] on-call team, please call this number and specify which Sports Medicine provider is treating you: [phone]

## 2024-10-21 NOTE — PROGRESS NOTES
Patient ID: Gerda Obrien  YOB: 1970  MRN: [id number]    Chief Complaint: Pain and Injury of the Left Ankle    Referred By: Self for ankle pain    History of Present Illness: Gerda Obrien is a 54 y.o. female who presents today with left ankle pain. Gerda Obrien states it is Acute in nature and there was a specific mechanism. Fell down steps yesterday with an inversion moment of left ankle.  Gerda Obrien describes the pain as a continuous anterior-lateral ankle. Associated symptoms include: Swelling Yes, Instability No, Pain that affects your sleep No, Mechanical Yes, locking/catching No, Neurological No, limited range of motion Yes. Aggravating activities include walking, weight bearing, stairs, direct touch. They have tried  ice so far for this. They believe that they are unchanged with this treatment.     Hemoglobin A1C   Date Value Ref Range Status   02/18/2022 5.4 4.0 - 5.6 % Final     Comment:     ADA Screening Guidelines:  5.7-6.4%  Consistent with prediabetes  >or=6.5%  Consistent with diabetes    High levels of fetal hemoglobin interfere with the HbA1C  assay. Heterozygous hemoglobin variants (HbS, HgC, etc)do  not significantly interfere with this assay.   However, presence of multiple variants may affect accuracy.     04/29/2021 5.9 (H) 4.8 - 5.6 % Final     Comment:              Prediabetes: 5.7 - 6.4           Diabetes: >6.4           Glycemic control for adults with diabetes: <7.0   12/01/2020 6.0 (H) 4.8 - 5.6 % Final     Comment:              Prediabetes: 5.7 - 6.4           Diabetes: >6.4           Glycemic control for adults with diabetes: <7.0   09/18/2017 6.2 (H) 4.8 - 5.6 % Final     Comment:              Pre-diabetes: 5.7 - 6.4           Diabetes: >6.4           Glycemic control for adults with diabetes: <7.0       Hemoglobin A1c   Date Value Ref Range Status   07/25/2024 6.0 (H) 4.8 - 5.6 % Final     Comment:               Prediabetes: 5.7 - 6.4           Diabetes: >6.4           Glycemic control for adults with diabetes: <7.0     05/17/2024 5.6 4.8 - 5.6 % Final     Comment:              Prediabetes: 5.7 - 6.4           Diabetes: >6.4           Glycemic control for adults with diabetes: <7.0     03/25/2024 5.8 (H) 4.8 - 5.6 % Final     Comment:              Prediabetes: 5.7 - 6.4           Diabetes: >6.4           Glycemic control for adults with diabetes: <7.0       Occupation:  [employer], medical records    Past Medical History:   Past Medical History:   Diagnosis Date    Allergy     Anxiety     Biotin deficiency disease     Chlamydia     Diabetes mellitus     Fibroids     Herpes simplex     Knee pain, bilateral     Metabolic syndrome     Sleep apnea     Vitamin D deficiency      Past Surgical History:   Procedure Laterality Date    ARTHROSCOPIC REPAIR OF ROTATOR CUFF OF SHOULDER Left 03/07/2022    Procedure: REPAIR, ROTATOR CUFF, ARTHROSCOPIC;  Surgeon: Denilson Jackson MD;  Location: [facility] OR;  Service: Orthopedics;  Laterality: Left;    ARTHROSCOPY OF SHOULDER WITH DECOMPRESSION OF SUBACROMIAL SPACE Left 03/07/2022    Procedure: ARTHROSCOPY, SHOULDER, WITH SUBACROMIAL SPACE DECOMPRESSION;  Surgeon: Denilson Jackson MD;  Location: [facility] OR;  Service: Orthopedics;  Laterality: Left;    BREAST SURGERY      BUNIONECTOMY      CERVICAL BIOPSY  W/ LOOP ELECTRODE EXCISION      COLONOSCOPY N/A 10/15/2021    Procedure: COLONOSCOPY;  Surgeon: Alaina Ceja MD;  Location: [facility];  Service: Endoscopy;  Laterality: N/A;    FIXATION OF TENDON Left 03/07/2022    Procedure: FIXATION, TENDON;  Surgeon: Denilson Jackson MD;  Location: [facility] OR;  Service: Orthopedics;  Laterality: Left;    FOOT SURGERY      hammer toe Bilateral     HYSTERECTOMY  11/2021    INJECTION OF ANESTHETIC AGENT AROUND MEDIAL BRANCH NERVES INNERVATING LUMBAR FACET JOINT Bilateral 10/26/2023    Procedure: Diagnostic Bilateral L4/5 & L5/S1 MBB  #1;  Surgeon: Beka Johnson MD;  Location: [facility];  Service: Pain Management;  Laterality: Bilateral;    INJECTION OF ANESTHETIC AGENT AROUND MEDIAL BRANCH NERVES INNERVATING LUMBAR FACET JOINT Bilateral 11/20/2023    Procedure: Bilateral L4/L5 and L5/S1 MBB;  Surgeon: Beka Johnson MD;  Location: [facility];  Service: Pain Management;  Laterality: Bilateral;    Laparoscopic assisted vaginal hysterectomy with BSO  11/22/2021    Path--Leiomyomata/Adenomyosis    LASER ABLATION OF THE CERVIX      PLANTAR FASCIA SURGERY Left     RADIOFREQUENCY THERMOCOAGULATION Bilateral 12/21/2023    Procedure: Bilateral L4/L5 and L5/S1 Lumbar RFA;  Surgeon: Beka Johnson MD;  [facility];  Service: Pain Management;  Laterality: Bilateral;    TOTAL REDUCTION MAMMOPLASTY  03/2007    TUBAL LIGATION       Family History   Problem Relation Name Age of Onset    Diabetes Paternal Grandfather Cezar Wilson     Epilepsy Maternal Grandmother [place]     Seizures Maternal Grandmother Elissa Cuate     Cancer Father Gordon Wilson         Prostrate    Diabetes Father Gordon Wilson     Breast cancer Cousin Paternal     Hypertension Mother Ping Vargasvaishali Brown     Heart disease Neg Hx       Social History     Socioeconomic History    Marital status:    Tobacco Use    Smoking status: Never    Smokeless tobacco: Never   Substance and Sexual Activity    Alcohol use: Yes     Alcohol/week: 1.0 standard drink of alcohol     Types: 1 Glasses of wine per week     Comment: ocassional     Drug use: No    Sexual activity: Yes     Partners: Male     Birth control/protection: Other-see comments     Comment: Hysterectomy     Medication List with Changes/Refills   Current Medications    BIOTIN 10,000 MCG CAP    Take 1 capsule by mouth once daily.    BLOOD SUGAR DIAGNOSTIC (ACCU-CHEK GUIDE TEST STRIPS) STRP    Use bid with glucometer    CEFDINIR (OMNICEF) 300 MG CAPSULE    1 capsule.    CLONAZEPAM  (KLONOPIN) 0.5 MG TABLET    TAKE 1 TABLET BY MOUTH EVERY DAY AS NEEDED FOR ANXIETY    CLOTRIMAZOLE-BETAMETHASONE 1-0.05% (LOTRISONE) CREAM    Apply topically 2 (two) times daily.    DESLORATADINE (CLARINEX) 5 MG TABLET    TAKE 1 TABLET BY MOUTH EVERY DAY    DICLOFENAC SODIUM (VOLTAREN) 1 % GEL    USE 2 GRAMS (TOPICAL) 4 TIMES PER DAY AS NEEDED FOR PAIN    DIFLUCAN 150 MG TAB    1 tablet Orally Once a day for 2 days    ESTRADIOL (LYLLANA) 0.1 MG/24 HR PTSW    Place 1 patch onto the skin twice a week.    FERROUS SULFATE (FEOSOL) 325 MG (65 MG IRON) TAB TABLET    TAKE 1 TABLET BY MOUTH EVERY DAY WITH BREAKFAST    FLUTICASONE PROPIONATE (FLONASE) 50 MCG/ACTUATION NASAL SPRAY    SPRAY 2 SPRAYS IN EACH NOSTRIL ONCE DAILY    MULTIVITAMIN CAPSULE    Take by mouth.    OMEPRAZOLE (PRILOSEC) 40 MG CAPSULE    TAKE 1 CAPSULE (40 MG TOTAL) BY MOUTH EVERY MORNING.    ROSUVASTATIN (CRESTOR) 5 MG TABLET    TAKE 1 TABLET BY MOUTH EVERY DAY    SEMAGLUTIDE (OZEMPIC) 2 MG/DOSE (8 MG/3 ML) PNIJ    Inject 2 mg into the skin every 7 days.    TIRZEPATIDE 10 MG/0.5 ML PNIJ    Inject 10 mg into the skin every 7 days.    TRIAMCINOLONE ACETONIDE 0.025% (KENALOG) 0.025 % CREAM    Apply topically 2 (two) times daily as needed (for dry patches on face). Mild steroid. Use sparingly for 1-2 weeks if needed then stop.    VALACYCLOVIR (VALTREX) 500 MG TABLET    Take 1 tablet (500 mg total) by mouth once daily.     Review of patient's allergies indicates:   Allergen Reactions    Doxycycline     Dulaglutide Other (See Comments)    Emtricitabine      Other reaction(s): Rash    Lexapro [escitalopram oxalate]      spasms    Lipitor [atorvastatin]      Decreased sex drive    Metformin hcl      Other Reaction(s): Unknown    Sulfa (sulfonamide antibiotics) Other (See Comments)    Tenofovir      Other reaction(s): Rash    Bydureon [exenatide microspheres] Rash     Skin reaction at site of injection.       Physical Exam:   There is no height or weight on file  to calculate BMI.    GENERAL: Well appearing, in no acute distress.  HEAD: Normocephalic and atraumatic.  ENT: External ears and nose grossly normal.  EYES: EOMI bilaterally  PULMONARY: Respirations are grossly even and non-labored.  NEURO: Awake, alert, and oriented x 3.  SKIN: No obvious rashes appreciated.  PSYCH: Mood & affect are appropriate.    Detailed MSK exam:     Left ankle:  Antalgic, favoring gait. No obvious deformities, no ecchymosis, no erythema. No effusion.  Large edema alex-lateral ankle. Limited ankle ROM in all motion active and passive. Tenderness at fibular head No. Tenderness at proximal fibular shaft No . Syndesmotic tenderness positive, 8cm proximal to talar dome. Tender at ATFL, lateral malleolus, CFL, anterior talus.  Squeeze negative. Bump negative. Talar tilt at neutral positive, at plantar flexion positive, at dorsiflexion negative. Kleiger positive. Feet together, deep squat positive. Anterior Drawer positive +1. Chen negative.       Imaging:    Soft tissue swelling over the mediolateral ankle without any gross bony abnormalities appreciated.  Talar joint intact.      Relevant imaging results were reviewed and interpreted by me and per my read as above.  This was discussed with the patient and / or family today.     Assessment:  Gerda Obrien is a 54 y.o. female presents today for left ankle injury concerning for high ankle sprain with significant tenderness over the syndesmosis as well as the ATFL.  Discussed her exam as well as x-rays today in need for boot immobilization at least for the next 2 weeks.  No given for work and follow-up in 2 weeks for repeat evaluation.  If having persistent symptoms could consider stress view and possible formal MRI at that time as well.  Continue with home exercises.  Ibuprofen Tylenol over-the-counter as needed.  Follow-up 2 weeks for repeat exam    Left ankle pain, unspecified chronicity  -     X-Ray Ankle Complete Left; Future;  Expected date: 10/21/2024  -     meloxicam (MOBIC) 15 MG tablet; Take 1 tablet (15 mg total) by mouth once daily.  Dispense: 30 tablet; Refill: 1    High ankle sprain of left lower extremity, initial encounter  -     meloxicam (MOBIC) 15 MG tablet; Take 1 tablet (15 mg total) by mouth once daily.  Dispense: 30 tablet; Refill: 1         A copy of today's visit note has been sent to the referring provider.       Jeff Mcdonnell MD    Disclaimer: This note was prepared using a voice recognition system and is likely to have sound alike errors within the text.

## 2024-10-21 NOTE — TELEPHONE ENCOUNTER
Pt calling with c/o ankle pain that she fell sown stairs yesterday. Pt triaged and care advice to go to office today and pt told no appt's available today but it would be UC or I can transfer to  to see if any ortho appt available for today. Pt to call back if any other questions or concerns worsening.                    Reason for Disposition   SEVERE pain (e.g., excruciating)    Additional Information   Negative: Major bleeding (e.g., actively dripping or spurting) and can't be stopped   Negative: Amputation or bone sticking through the skin   Negative: Looks like a dislocated joint (very crooked or deformed)   Negative: Serious injury with multiple fractures (broken bones)   Negative: Sounds like a life-threatening emergency to the triager   Negative: Bullet wound, stabbed by knife, or other serious penetrating wound   Negative: Can't stand (bear weight) or walk   Negative: Skin is split open or gaping (or length > 1/2 inch or 12 mm)   Negative: Bleeding and won't stop after 10 minutes of direct pressure (using correct technique)   Negative: Dirt in the wound and not removed with 15 minutes of scrubbing   Negative: New numbness (loss of sensation) of foot or toe(s) and present now   Negative: Sounds like a serious injury to the triager    Protocols used: Ankle Injury-A-OH

## 2024-10-22 ENCOUNTER — PATIENT MESSAGE (OUTPATIENT)
Dept: SPORTS MEDICINE | Facility: CLINIC | Age: 54
End: 2024-10-22
Payer: COMMERCIAL

## 2024-10-23 ENCOUNTER — PATIENT MESSAGE (OUTPATIENT)
Dept: SPORTS MEDICINE | Facility: CLINIC | Age: 54
End: 2024-10-23
Payer: COMMERCIAL

## 2024-11-04 ENCOUNTER — CLINICAL SUPPORT (OUTPATIENT)
Dept: REHABILITATION | Facility: HOSPITAL | Age: 54
End: 2024-11-04
Attending: STUDENT IN AN ORGANIZED HEALTH CARE EDUCATION/TRAINING PROGRAM
Payer: COMMERCIAL

## 2024-11-04 ENCOUNTER — OFFICE VISIT (OUTPATIENT)
Dept: SPORTS MEDICINE | Facility: CLINIC | Age: 54
End: 2024-11-04
Payer: COMMERCIAL

## 2024-11-04 VITALS — WEIGHT: 203.06 LBS | HEIGHT: 62 IN | BODY MASS INDEX: 37.37 KG/M2

## 2024-11-04 DIAGNOSIS — M25.572 LEFT ANKLE PAIN, UNSPECIFIED CHRONICITY: Primary | ICD-10-CM

## 2024-11-04 DIAGNOSIS — M25.572 LEFT ANKLE PAIN, UNSPECIFIED CHRONICITY: ICD-10-CM

## 2024-11-04 DIAGNOSIS — R26.89 POOR BALANCE: ICD-10-CM

## 2024-11-04 DIAGNOSIS — R26.9 GAIT ABNORMALITY: ICD-10-CM

## 2024-11-04 DIAGNOSIS — Z74.09 DECREASED FUNCTIONAL MOBILITY AND ENDURANCE: ICD-10-CM

## 2024-11-04 DIAGNOSIS — S93.492D SPRAIN OF ANTERIOR TALOFIBULAR LIGAMENT OF LEFT ANKLE, SUBSEQUENT ENCOUNTER: ICD-10-CM

## 2024-11-04 DIAGNOSIS — M25.671 DECREASED RANGE OF MOTION OF RIGHT ANKLE: Primary | ICD-10-CM

## 2024-11-04 PROCEDURE — 97760 ORTHOTIC MGMT&TRAING 1ST ENC: CPT | Mod: S$GLB,,, | Performed by: STUDENT IN AN ORGANIZED HEALTH CARE EDUCATION/TRAINING PROGRAM

## 2024-11-04 PROCEDURE — 99214 OFFICE O/P EST MOD 30 MIN: CPT | Mod: 25,S$GLB,, | Performed by: STUDENT IN AN ORGANIZED HEALTH CARE EDUCATION/TRAINING PROGRAM

## 2024-11-04 PROCEDURE — 3061F NEG MICROALBUMINURIA REV: CPT | Mod: CPTII,S$GLB,, | Performed by: STUDENT IN AN ORGANIZED HEALTH CARE EDUCATION/TRAINING PROGRAM

## 2024-11-04 PROCEDURE — 97112 NEUROMUSCULAR REEDUCATION: CPT

## 2024-11-04 PROCEDURE — 97162 PT EVAL MOD COMPLEX 30 MIN: CPT

## 2024-11-04 PROCEDURE — 99999 PR PBB SHADOW E&M-EST. PATIENT-LVL IV: CPT | Mod: PBBFAC,,, | Performed by: STUDENT IN AN ORGANIZED HEALTH CARE EDUCATION/TRAINING PROGRAM

## 2024-11-04 PROCEDURE — 97110 THERAPEUTIC EXERCISES: CPT

## 2024-11-04 PROCEDURE — 1159F MED LIST DOCD IN RCRD: CPT | Mod: CPTII,S$GLB,, | Performed by: STUDENT IN AN ORGANIZED HEALTH CARE EDUCATION/TRAINING PROGRAM

## 2024-11-04 PROCEDURE — 3066F NEPHROPATHY DOC TX: CPT | Mod: CPTII,S$GLB,, | Performed by: STUDENT IN AN ORGANIZED HEALTH CARE EDUCATION/TRAINING PROGRAM

## 2024-11-04 PROCEDURE — 3044F HG A1C LEVEL LT 7.0%: CPT | Mod: CPTII,S$GLB,, | Performed by: STUDENT IN AN ORGANIZED HEALTH CARE EDUCATION/TRAINING PROGRAM

## 2024-11-04 PROCEDURE — 3008F BODY MASS INDEX DOCD: CPT | Mod: CPTII,S$GLB,, | Performed by: STUDENT IN AN ORGANIZED HEALTH CARE EDUCATION/TRAINING PROGRAM

## 2024-11-04 NOTE — PROGRESS NOTES
Patient ID: Gerda Obrien  YOB: 1970  MRN: [id number]    Chief Complaint: Pain and Follow-up of the Left Ankle      History of Present Illness: Gerda Obrien is a 54-year-old female presenting today for follow-up left ankle sprain.  Has been in a boot immobilization for the last 2 weeks has seen improvements overall with symptoms as eager to get back to work.  Not able return to work while in a boot.  She has not had any physical therapy yet.  Feels like it has improved still pain over the lateral aspect of her ankle.  New injuries falls traumas.    Past Medical History:   Past Medical History:   Diagnosis Date    Allergy     Anxiety     Biotin deficiency disease     Chlamydia     Diabetes mellitus     Fibroids     Herpes simplex     Knee pain, bilateral     Metabolic syndrome     Sleep apnea     Vitamin D deficiency      Past Surgical History:   Procedure Laterality Date    ARTHROSCOPIC REPAIR OF ROTATOR CUFF OF SHOULDER Left 03/07/2022    Procedure: REPAIR, ROTATOR CUFF, ARTHROSCOPIC;  Surgeon: Denilson Jackson MD;  Location: [facility] OR;  Service: Orthopedics;  Laterality: Left;    ARTHROSCOPY OF SHOULDER WITH DECOMPRESSION OF SUBACROMIAL SPACE Left 03/07/2022    Procedure: ARTHROSCOPY, SHOULDER, WITH SUBACROMIAL SPACE DECOMPRESSION;  Surgeon: Denilson Jackson MD;  Location: [facility] OR;  Service: Orthopedics;  Laterality: Left;    BREAST SURGERY      BUNIONECTOMY      CERVICAL BIOPSY  W/ LOOP ELECTRODE EXCISION      COLONOSCOPY N/A 10/15/2021    Procedure: COLONOSCOPY;  Surgeon: Alaina Ceja MD;  Location: [facility] ENDO;  Service: Endoscopy;  Laterality: N/A;    FIXATION OF TENDON Left 03/07/2022    Procedure: FIXATION, TENDON;  Surgeon: Denilson Jackson MD;  Location: [facility] OR;  Service: Orthopedics;  Laterality: Left;    FOOT SURGERY      hammer toe Bilateral     HYSTERECTOMY  11/2021    INJECTION OF ANESTHETIC AGENT AROUND MEDIAL BRANCH NERVES  INNERVATING LUMBAR FACET JOINT Bilateral 10/26/2023    Procedure: Diagnostic Bilateral L4/5 & L5/S1 MBB #1;  Surgeon: Beka Johnson MD;  Location: [facility];  Service: Pain Management;  Laterality: Bilateral;    INJECTION OF ANESTHETIC AGENT AROUND MEDIAL BRANCH NERVES INNERVATING LUMBAR FACET JOINT Bilateral 11/20/2023    Procedure: Bilateral L4/L5 and L5/S1 MBB;  Surgeon: Beka Johnson MD;  Location: [facility];  Service: Pain Management;  Laterality: Bilateral;    Laparoscopic assisted vaginal hysterectomy with BSO  11/22/2021    Path--Leiomyomata/Adenomyosis    LASER ABLATION OF THE CERVIX      PLANTAR FASCIA SURGERY Left     RADIOFREQUENCY THERMOCOAGULATION Bilateral 12/21/2023    Procedure: Bilateral L4/L5 and L5/S1 Lumbar RFA;  Surgeon: Beka Johnson MD;  Location: [facility];  Service: Pain Management;  Laterality: Bilateral;    TOTAL REDUCTION MAMMOPLASTY  03/2007    TUBAL LIGATION       Family History   Problem Relation Name Age of Onset    Diabetes Paternal Grandfather Cezar Wilson     Epilepsy Maternal Grandmother Elissa Arroyocapo     Seizures Maternal Grandmother Elissa Arroyocapo     Cancer Father Gordon Wilson         Prostrate    Diabetes Father Gordon Wilson     Breast cancer Cousin Paternal     Hypertension Mother Ping Cuellarag Brown     Heart disease Neg Hx       Social History     Socioeconomic History    Marital status:    Tobacco Use    Smoking status: Never    Smokeless tobacco: Never   Substance and Sexual Activity    Alcohol use: Yes     Alcohol/week: 1.0 standard drink of alcohol     Types: 1 Glasses of wine per week     Comment: ocassional     Drug use: No    Sexual activity: Yes     Partners: Male     Birth control/protection: Other-see comments     Comment: Hysterectomy     Social Drivers of Health     Food Insecurity: No Food Insecurity (10/30/2024)    Received from [facility]    Hunger Vital Sign     Worried About Running  Out of Food in the Last Year: Never true     Ran Out of Food in the Last Year: Never true     Medication List with Changes/Refills   Current Medications    BIOTIN 10,000 MCG CAP    Take 1 capsule by mouth once daily.    BLOOD SUGAR DIAGNOSTIC (ACCU-CHEK GUIDE TEST STRIPS) STRP    Use bid with glucometer    CEFDINIR (OMNICEF) 300 MG CAPSULE    1 capsule.    CLONAZEPAM (KLONOPIN) 0.5 MG TABLET    TAKE 1 TABLET BY MOUTH EVERY DAY AS NEEDED FOR ANXIETY    CLOTRIMAZOLE-BETAMETHASONE 1-0.05% (LOTRISONE) CREAM    Apply topically 2 (two) times daily.    DESLORATADINE (CLARINEX) 5 MG TABLET    TAKE 1 TABLET BY MOUTH EVERY DAY    DICLOFENAC SODIUM (VOLTAREN) 1 % GEL    USE 2 GRAMS (TOPICAL) 4 TIMES PER DAY AS NEEDED FOR PAIN    DIFLUCAN 150 MG TAB    1 tablet Orally Once a day for 2 days    ESTRADIOL (LYLLANA) 0.1 MG/24 HR PTSW    Place 1 patch onto the skin twice a week.    FERROUS SULFATE (FEOSOL) 325 MG (65 MG IRON) TAB TABLET    TAKE 1 TABLET BY MOUTH EVERY DAY WITH BREAKFAST    FLUTICASONE PROPIONATE (FLONASE) 50 MCG/ACTUATION NASAL SPRAY    SPRAY 2 SPRAYS IN EACH NOSTRIL ONCE DAILY    MELOXICAM (MOBIC) 15 MG TABLET    Take 1 tablet (15 mg total) by mouth once daily.    MULTIVITAMIN CAPSULE    Take by mouth.    OMEPRAZOLE (PRILOSEC) 40 MG CAPSULE    TAKE 1 CAPSULE (40 MG TOTAL) BY MOUTH EVERY MORNING.    ROSUVASTATIN (CRESTOR) 5 MG TABLET    TAKE 1 TABLET BY MOUTH EVERY DAY    SEMAGLUTIDE (OZEMPIC) 2 MG/DOSE (8 MG/3 ML) PNIJ    Inject 2 mg into the skin every 7 days.    TIRZEPATIDE 10 MG/0.5 ML PNIJ    Inject 10 mg into the skin every 7 days.    TRIAMCINOLONE ACETONIDE 0.025% (KENALOG) 0.025 % CREAM    Apply topically 2 (two) times daily as needed (for dry patches on face). Mild steroid. Use sparingly for 1-2 weeks if needed then stop.    VALACYCLOVIR (VALTREX) 500 MG TABLET    Take 1 tablet (500 mg total) by mouth once daily.     Review of patient's allergies indicates:   Allergen Reactions    Doxycycline      Dulaglutide Other (See Comments)    Emtricitabine      Other reaction(s): Rash    Lexapro [escitalopram oxalate]      spasms    Lipitor [atorvastatin]      Decreased sex drive    Metformin hcl      Other Reaction(s): Unknown    Sulfa (sulfonamide antibiotics) Other (See Comments)    Tenofovir      Other reaction(s): Rash    Bydureon [exenatide microspheres] Rash     Skin reaction at site of injection.       Physical Exam:   Body mass index is 37.14 kg/m².    GENERAL: Well appearing, in no acute distress.  HEAD: Normocephalic and atraumatic.  ENT: External ears and nose grossly normal.  EYES: EOMI bilaterally  PULMONARY: Respirations are grossly even and non-labored.  NEURO: Awake, alert, and oriented x 3.  SKIN: No obvious rashes appreciated.  PSYCH: Mood & affect are appropriate.    Detailed MSK exam:     Left ankle exam pain with ankle dorsiflexion eversion but otherwise good range of motion good strength in all ranges of motion as well.  Neurovascular intact distally.  Tenderness over the ATFL negative anterior drawer positive talar tilt for pain negative external stress no tenderness over the syndesmosis.    Imaging:  X-Ray Ankle Complete Left  Narrative: EXAM: XR ANKLE COMPLETE 3 VIEW LEFT    HISTORY: [Left] ankle pain    TECHNIQUE:  [[3] view ankle series]    FINDINGS: [There is no fracture or dislocation.] [No significant srthritic change.]  Impression:  Normal study.    Finalized on: 10/21/2024 7:09 PM By:  Cezar Nicolas MD  [facility]# [id number]      2024-10-21 19:12:[id number]    [facility]        Relevant imaging results were reviewed and interpreted by me and per my read as above.  This was discussed with the patient and / or family today.     Assessment:  Gerda Obrien is a 54 y.o. female presents today 2 weeks status post lateral ankle sprain most consistent with ATFL and CFL sprain.   over these areas again today with improvement in stability.  Discussed discontinue boot was given a lace-up ankle  brace today and started physical therapy with [facility] due to decreased range of motion.  Paperwork was filled out for her to return to work as well.  Follow-up with me in 3-4 weeks or sooner if indicated.    Left ankle pain, unspecified chronicity  -     Ambulatory referral/consult to Physical/Occupational Therapy; Future; Expected date: 11/11/2024    Sprain of anterior talofibular ligament of left ankle, subsequent encounter       At least 10 minutes were spent sizing, fitting, and educating for durable medical equipment application today.  CPT 82017.      Jeff Mcdonnell MD    Disclaimer: This note was prepared using a voice recognition system and is likely to have sound alike errors within the text.

## 2024-11-04 NOTE — PLAN OF CARE
[facility]   Physical Therapy Initial Evaluation      Date: 11/4/2024   Name: Gerda Obrien  Clinic Number: [id number]    Therapy Diagnosis:    Encounter Diagnosis   Name Primary?    Left ankle pain, unspecified chronicity       Physician: Jeff Mcdonnell MD     Physician Orders: PT Eval and Treat  Medical Diagnosis from Referral: Left ankle pain, unspecified chronicity [M25.572]   Evaluation Date: 11/4/2024  Authorization Period Expiration: 11/4/2025   Plan of Care Expiration: 1/4/2025  Progress Note Due: 12/4/2025  Visit # / Visits authorized: 1/1  FOTO: 1/3 (last performed on 11/4/2024)    Precautions: Standard    Time In: 1355  Time Out: 1428  Total Billable Time (timed & untimed codes): 30 minutes    Subjective     Date of onset: ~2 weeks ago     History of current condition - Gerda reports she fell down a flight of stairs 2 weeks ago and sprained her ankle. States she has been wearing a walking boot for the last two weeks. Will only walk short distances without it and it is very uncomfortable. Was provided an ankle brace at her MD appointment but has not started walking in it yet       Imaging: [x] Xray [] MRI [] CT: Performed on: ankle 10/21/2024    Pain:  Current 3/10, worst 6/10, best 1/10   Location: [] Right   [x] Left: lateral ankle   Description: sore   Aggravating Factors: weight bearing, ankle ROM, walking   Easing Factors: activity avoidance, rest, walking boot     Prior Therapy:   [x] N/A    [] Yes:   Social History: Pt lives with their family  Occupation: Pt is works in medical records at the residential  Prior Level of Function: Independent and pain free with all ADL, IADL, community mobility and functional activities.   Current Level of Function: ambulating full time with a walking boot. Has been     Dominant Extremity:    [x] Right    [] Left    Pts goals: Pt reported goals are to decrease overall pain levels in order to return to prior functional level.      Medical History:   Past Medical History:   Diagnosis Date    Allergy     Anxiety     Biotin deficiency disease     Chlamydia     Diabetes mellitus     Fibroids     Herpes simplex     Knee pain, bilateral     Metabolic syndrome     Sleep apnea     Vitamin D deficiency        Surgical History:   Gerda Obrien  has a past surgical history that includes Tubal ligation; Bunionectomy; Breast surgery; Cervical biopsy w/ loop electrode excision; Plantar fascia surgery (Left); Foot surgery; hammer toe (Bilateral); Laser ablation of the cervix; Colonoscopy (N/A, 10/15/2021); Laparoscopic assisted vaginal hysterectomy with BSO (11/22/2021); Hysterectomy (11/2021); Arthroscopic repair of rotator cuff of shoulder (Left, 03/07/2022); Arthroscopy of shoulder with decompression of subacromial space (Left, 03/07/2022); Fixation of tendon (Left, 03/07/2022); Total Reduction Mammoplasty (03/2007); Injection of anesthetic agent around medial branch nerves innervating lumbar facet joint (Bilateral, 10/26/2023); Injection of anesthetic agent around medial branch nerves innervating lumbar facet joint (Bilateral, 11/20/2023); and Radiofrequency thermocoagulation (Bilateral, 12/21/2023).    Medications:   Gerda has a current medication list which includes the following prescription(s): biotin, blood sugar diagnostic, cefdinir, clonazepam, clotrimazole-betamethasone 1-0.05%, desloratadine, diclofenac sodium, diflucan, estradiol, ferrous sulfate, fluticasone propionate, meloxicam, multivitamin, omeprazole, rosuvastatin, ozempic, tirzepatide, triamcinolone acetonide 0.025%, and valacyclovir.    Allergies:   Review of patient's allergies indicates:   Allergen Reactions    Doxycycline     Dulaglutide Other (See Comments)    Emtricitabine      Other reaction(s): Rash    Lexapro [escitalopram oxalate]      spasms    Lipitor [atorvastatin]      Decreased sex drive    Metformin hcl      Other Reaction(s): Unknown    Sulfa  (sulfonamide antibiotics) Other (See Comments)    Tenofovir      Other reaction(s): Rash    Bydureon [exenatide microspheres] Rash     Skin reaction at site of injection.        Objective        RANGE OF MOTION:   Ankle/Foot AROM/PROM Right Left Pain/Dysfunction with Movement Goal   Dorsiflexion (20º) 20 15  20   Plantarflexion (50º) 50 25 Pain  50   Inversion (35º) 35 20 Pain  35   Eversion (15º) 15 5 Pain  15           STRENGTH: (* denotes pain)  L/E MMT Right  (spine) Left Dysfunction with Movement Goal   Modified (90/90) Abdominal Strength   ---     Hip Flexion        Hip Extension        Hip Abduction        Knee Extension       Knee Flexion       Hip IR       Hip ER       Ankle DF 5/5 4/5  5/5 B   Ankle PF 5/5 4/5  5/5 B   Ankle Inversion 5/5 3+/5  5/5 B   Ankle Eversion 5/5 3+/5  5/5 B                          SENSATION:  Intact to Light Touch        PALPATION:  Increased tenderness to palpation of: left , LOWER STRUCTURES : lateral malleolus, ATFL, CF ligaments      POSTURE:  Pt presents with postural abnormalities which include: forward head and rounded shoulders         GAIT ANALYSIS The patient ambulated with the following assistive device:  CAM boot ; the pt presents with the following gait abnormalities: decreased step length on right, decreased stance time on right, and decreased push off on right    FUNCTIONAL MOVEMENT PATTERNS  Movement Analysis Notes   Bed Mobility      Transfers     Sit to Stand     Squat     Single Leg Squat      Step Down      Single leg calf raises   Unable        BALANCE:   Right   (seconds) Left  (seconds) Pain/dysfunction Noted Goal   Narrow Base of Support  ---  60+ seconds   Tandem Stance 0 0  60+ seconds   Single Leg Stance 0 0  60+ seconds                Function:     Intake Outcome Measure for FOTO NA Survey    Therapist reviewed FOTO scores for Gerda on 11/4/2024.   FOTO report - see Media section or FOTO account for episode details    Intake Score: NT%          Treatment     Total Treatment time (time-based codes) separate from Evaluation: (20) minutes     Gedra received the treatments listed below:      CPT Intervention Performed   Today Duration / Intensity   MT                   TE           Ankle pumps  x 30x      Ankle inversion and eversion AROM  x 30x     Ankle circles  x 30x clockwise and counterclockwise    NMR Ankle 4 way x Yellow band, 30x each direction      Seated heel raises  x 30x with 10# dumbbell on knee      Seated toe raises  x 30x      Weight shifts  X  x Lateral: 30x   Heel to mid-stance: 30x              TA                                                                     PLAN Bike   Gastrocnemius stretch   Shuttle or leg press   MOBO taps - seated or B stance   Single leg stance progression   Standing heel raises - as tolerated   Soleus raises on Matrix machine              CPT Codes available for Billing:   (00) minutes of Manual therapy (MT) to improve pain and ROM.  (08) minutes of Therapeutic Exercise (TE) to develop strength, endurance, range of motion, and flexibility.  (12) minutes of Neuromuscular Re-Education (NMR)  to improve: Balance, Coordination, Kinesthetic, Sense, Proprioception, and Posture.  (00) minutes of Therapeutic Activities (TA) to improve functional performance.  Vasopneumatic Device Therapy () for management of swelling/edema. (17965)  Unattended Electrical Stimulation (ES) for muscle performance or pain modulation.  BFR: Blood flow restriction applied during exercise        Patient Education and Home Exercises     Education provided: (time included with treatment) minutes  PURPOSE: Patient educated on the impairments noted above and the effects of physical therapy intervention to improve overall condition and QOL.   EXERCISE: Patient was educated on all the above exercise prior/during/after for proper posture, positioning, and execution for safe performance with home exercise program.   STRENGTH: Patient  educated on the importance of improved core and extremity strength in order to improve alignment of the spine and extremities with static positions and dynamic movement.   GAIT & BALANCE: Patient educated on the importance of strong core and lower extremity musculature in order to improve both static and dynamic balance, improve gait mechanics, reduce fall risk and improve household and community mobility.     Written Home Exercises Provided: yes.  Exercises were reviewed and Gerda was able to demonstrate them prior to the end of the session.  Gerda demonstrated good  understanding of the education provided. See EMR under Patient Instructions for exercises provided during therapy sessions.    Assessment     Gerda is a 54 y.o. female referred to outpatient Physical Therapy with a medical diagnosis of L ankle pain. Pt presents with impairments in the following categories: IMPAIRMENTS: ROM, strength, balance, and functional movement patterns    Pt prognosis is Excellent  Pt will benefit from skilled outpatient Physical Therapy to address the deficits stated above and in the chart below, provide pt/family education, and to maximize pt's level of independence.     Plan of care discussed with patient: Yes  Pt's spiritual, cultural and educational needs considered and patient is agreeable to the plan of care and goals as stated below:     Anticipated Barriers for therapy: none    Medical Necessity is demonstrated by the following  History  Co-morbidities and personal factors that may impact the plan of care [] LOW: no personal factors / co-morbidities  [x] MODERATE: 1-2 personal factors / co-morbidities  [] HIGH: 3+ personal factors / co-morbidities    Moderate / High Support Documentation:   Past Medical History:   Diagnosis Date    Allergy     Anxiety     Biotin deficiency disease     Chlamydia     Diabetes mellitus     Fibroids     Herpes simplex     Knee pain, bilateral     Metabolic syndrome     Sleep apnea   "   Vitamin D deficiency         Examination  Body Structures and Functions, activity limitations and participation restrictions that may impact the plan of care [] LOW: addressing 1-2 elements  [x] MODERATE: 3+ elements  [] HIGH: 4+ elements (please support below)    Moderate / High Support Documentation: See above in "Current Level of Function"      Clinical Presentation [] LOW: stable  [x] MODERATE: Evolving  [] HIGH: Unstable     Decision Making/ Complexity Score: moderate         Short Term Goals:  6 weeks Status  Date Met   PAIN: Pt will report worst pain of 3/10 in order to progress toward max functional ability and improve quality of life. [x] Progressing  [] Met  [] Not Met    FUNCTION: Patient will demonstrate improved function as indicated by a score of greater than or equal to NT out of 100 on FOTO. [x] Progressing  [] Met  [] Not Met    MOBILITY: Patient will improve AROM to 50% of stated goals, listed in objective measures above, in order to progress towards independence with functional activities.  [x] Progressing  [] Met  [] Not Met    STRENGTH: Patient will improve strength to 50% of stated goals, listed in objective measures above, in order to progress towards independence with functional activities. [x] Progressing  [] Met  [] Not Met    GAIT: Patient will demonstrate improved gait mechanics including discontinuation of walking boot in order to improve functional mobility, improve quality of life, and decrease risk of further injury or fall.  [x] Progressing  [] Met  [] Not Met    HEP: Patient will demonstrate independence with HEP in order to progress toward functional independence. [x] Progressing  [] Met  [] Not Met      Long Term Goals:  12 weeks Status Date Met   PAIN: Pt will report worst pain of 1/10 in order to progress toward max functional ability and improve quality of life [x] Progressing  [] Met  [] Not Met    FUNCTION: Patient will demonstrate improved function as indicated by a score " of greater than or equal to NT out of 100 on FOTO. [x] Progressing  [] Met  [] Not Met    MOBILITY: Patient will improve AROM to stated goals, listed in objective measures above, in order to return to maximal functional potential and improve quality of life.  [x] Progressing  [] Met  [] Not Met    STRENGTH: Patient will improve strength to stated goals, listed in objective measures above, in order to improve functional independence and quality of life.  [x] Progressing  [] Met  [] Not Met    GAIT: Patient will demonstrate normalized gait mechanics with minimal compensation in order to return to PLOF. [x] Progressing  [] Met  [] Not Met    Patient will return to normal ADL's, IADL's, community involvement, recreational activities, and work-related activities with less than or equal to 1/10 pain and maximal function.  [x] Progressing  [] Met  [] Not Met      Plan     Plan of care Certification: 11/4/2024 to 1/4/2025.    Outpatient Physical Therapy 2 times weekly for 12 weeks to include any combination of the following interventions: virtual visits, dry needling, modalities, electrical stimulation (IFC, Pre-Mod, Attended with Functional Dry Needling), Cervical/Lumbar Traction, Gait Training, Manual Therapy, Neuromuscular Re-ed, Patient Education, Self Care, Therapeutic Exercise, and Therapeutic Activites     Analilia Son, PT, DPT

## 2024-11-11 ENCOUNTER — CLINICAL SUPPORT (OUTPATIENT)
Dept: REHABILITATION | Facility: HOSPITAL | Age: 54
End: 2024-11-11
Payer: COMMERCIAL

## 2024-11-11 DIAGNOSIS — R26.9 GAIT ABNORMALITY: ICD-10-CM

## 2024-11-11 DIAGNOSIS — R26.89 POOR BALANCE: ICD-10-CM

## 2024-11-11 DIAGNOSIS — Z74.09 DECREASED FUNCTIONAL MOBILITY AND ENDURANCE: Primary | ICD-10-CM

## 2024-11-11 DIAGNOSIS — M25.671 DECREASED RANGE OF MOTION OF RIGHT ANKLE: ICD-10-CM

## 2024-11-11 PROCEDURE — 97110 THERAPEUTIC EXERCISES: CPT

## 2024-11-11 PROCEDURE — 97530 THERAPEUTIC ACTIVITIES: CPT

## 2024-11-11 PROCEDURE — 97112 NEUROMUSCULAR REEDUCATION: CPT

## 2024-11-11 PROCEDURE — 97140 MANUAL THERAPY 1/> REGIONS: CPT

## 2024-11-11 NOTE — PROGRESS NOTES
"[facility]   Physical Therapy Treatment Note      [facility]  [facility] Number[id number]    Therapy Diagnosis:   Encounter Diagnoses   Name Primary?    Decreased functional mobility and endurance Yes    Decreased range of motion of right ankle     Gait abnormality     Poor balance      Physician: Jeff Mcdonnell MD    Visit Date: 11/11/2024    Physician Orders: PT Eval and Treat  Medical Diagnosis from Referral: Left ankle pain, unspecified chronicity [M25.572]   Evaluation Date: 11/4/2024  Authorization Period Expiration: 11/4/2025   Plan of Care Expiration: 1/4/2025  Progress Note Due: 12/4/2025  Visit # / Visits authorized: 1/25 (+1 evaluation)  FOTO: 1/3 (last performed on 11/4/2024)     Precautions: Standard  PTA Visit #: 0/5     Time In: 1620  Time Out: 1719  Total Billable Time: 57 minutes (Billing reflects 1 on 1 treatment time spent with patient)    Subjective     Patient reports:     He/She was compliant with home exercise program.  Response to previous treatment: initial evaluation  Functional change: performing home exercise plan    Pain: 4/10     Location: left ankle     Objective      Objective Measures updated at progress report or POC update only unless otherwise noted.       Treatment     Gerda received the treatments listed below:     CPT Intervention Performed   Today Duration / Intensity   MT Joint Mobilizations x TC A/P's, TC distraction     Soft tissue massage  x Intrinsics, soleus, gastroc   TE Bike x 6 miutes     Ankle PROM x All planes     Ankle inversion and eversion AROM  x  x 45x YTB eversion  45x YTB inversion     Ankle ABC's  x x2    Gastroc Stretch x 5 x 30" slant board    Seated Dorsiflexion x 3 x 10 with 3 sec holds         NMR        Seated heel raises  x 45x with 10# dumbbell on knee      Toe Yoga x 3 minutes, alternating      Weight shifts     Lateral: 30x   Heel to mid-stance: 30x     Tandem Stance x 3 x 30" each way            "      TA Sit to Stands x 3 x 10     Step Ups x 3 x 10 8 inch step                 PLAN Bike   Gastrocnemius stretch   Shuttle or leg press   MOBO taps - seated or B stance   Single leg stance progression   Standing heel raises - as tolerated   Soleus raises on Matrix machine           CPT Codes available for Billing:   (10) minutes of Manual therapy (MT) to improve pain and ROM.  (25) minutes of Therapeutic Exercise (TE) to develop strength, endurance, range of motion, and flexibility.  (12) minutes of Neuromuscular Re-Education (NMR)  to improve: Balance, Coordination, Kinesthetic, Sense, Proprioception, and Posture.  (10) minutes of Therapeutic Activities (TA) to improve functional performance.  Vasopneumatic Device Therapy () for management of swelling/edema. (62891)  Unattended Electrical Stimulation (ES) for muscle performance or pain modulation.  BFR: Blood flow restriction applied during exercise    Patient Education and Home Exercises       Home Exercises Provided and Patient Education Provided     Education provided: time included in treatment time.   PURPOSE: Patient educated on the impairments noted above and the effects of physical therapy intervention to improve overall condition and QOL.   EXERCISE: Patient was educated on all the above exercise prior/during/after for proper posture, positioning, and execution for safe performance with home exercise program.   STRENGTH: Patient educated on the importance of improved core and extremity strength in order to improve alignment of the spine and extremities with static positions and dynamic movement.   GAIT & BALANCE: Patient educated on the importance of strong core and lower extremity musculature in order to improve both static and dynamic balance, improve gait mechanics, reduce fall risk and improve household and community mobility.   POSTURE: Patient educated on postural awareness to reduce stress and maintain optimal alignment of the spine with static  positions and dynamic movement   TRANSFERS & TRANSITIONS: Patient educated on proper technique for bed mobility, transitions and transfers to improve body mechanics and decrease risk of injury.   BRACE: patient educated on proper fit, positioning, and technique for donning and doffing brace in order to maintain optimal alignment of the extremity and promote healing     Written Home Exercises Provided: yes.  Exercises were reviewed and Gerda was able to demonstrate them prior to the end of the session.  Gerda demonstrated good  understanding of the education provided. See EMR under Patient Instructions for exercises provided during therapy sessions.    Assessment     Patient tolerated session well. Patient able to demonstrate or verbalize home exercise plan well with mild cueing for form. Patient progressed functionally with step ups and sit to stands for triple extension functional strengthening.     Gerda is progressing well towards her goals.   Patient prognosis is Good.     Patient will continue to benefit from skilled outpatient physical therapy to address the deficits listed in the problem list box on initial evaluation, provide pt/family education and to maximize patient's level of independence in the home and community environment.     Patient's spiritual, cultural and educational needs considered and pt agreeable to plan of care and goals.    Anticipated Barriers for therapy: none    Short Term Goals:  6 weeks Status  Date Met   PAIN: Pt will report worst pain of 3/10 in order to progress toward max functional ability and improve quality of life. [x] Progressing  [] Met  [] Not Met     FUNCTION: Patient will demonstrate improved function as indicated by a score of greater than or equal to NT out of 100 on FOTO. [x] Progressing  [] Met  [] Not Met     MOBILITY: Patient will improve AROM to 50% of stated goals, listed in objective measures above, in order to progress towards independence with  functional activities.  [x] Progressing  [] Met  [] Not Met     STRENGTH: Patient will improve strength to 50% of stated goals, listed in objective measures above, in order to progress towards independence with functional activities. [x] Progressing  [] Met  [] Not Met     GAIT: Patient will demonstrate improved gait mechanics including discontinuation of walking boot in order to improve functional mobility, improve quality of life, and decrease risk of further injury or fall.  [x] Progressing  [] Met  [] Not Met     HEP: Patient will demonstrate independence with HEP in order to progress toward functional independence. [x] Progressing  [] Met  [] Not Met        Long Term Goals:  12 weeks Status Date Met   PAIN: Pt will report worst pain of 1/10 in order to progress toward max functional ability and improve quality of life [x] Progressing  [] Met  [] Not Met     FUNCTION: Patient will demonstrate improved function as indicated by a score of greater than or equal to NT out of 100 on FOTO. [x] Progressing  [] Met  [] Not Met     MOBILITY: Patient will improve AROM to stated goals, listed in objective measures above, in order to return to maximal functional potential and improve quality of life.  [x] Progressing  [] Met  [] Not Met     STRENGTH: Patient will improve strength to stated goals, listed in objective measures above, in order to improve functional independence and quality of life.  [x] Progressing  [] Met  [] Not Met     GAIT: Patient will demonstrate normalized gait mechanics with minimal compensation in order to return to PLOF. [x] Progressing  [] Met  [] Not Met     Patient will return to normal ADL's, IADL's, community involvement, recreational activities, and work-related activities with less than or equal to 1/10 pain and maximal function.  [x] Progressing  [] Met  [] Not Met       Plan     Continue Plan of Care (POC) and progress per patient tolerance. See treatment section for details on planned  progressions next session.      Sherwin Yao, PT

## 2024-11-22 ENCOUNTER — HOSPITAL ENCOUNTER (OUTPATIENT)
Dept: RADIOLOGY | Facility: HOSPITAL | Age: 54
Discharge: HOME | End: 2024-11-22
Attending: STUDENT IN AN ORGANIZED HEALTH CARE EDUCATION/TRAINING PROGRAM
Payer: COMMERCIAL

## 2024-11-22 ENCOUNTER — OFFICE VISIT (OUTPATIENT)
Dept: SPORTS MEDICINE | Facility: CLINIC | Age: 54
End: 2024-11-22
Payer: COMMERCIAL

## 2024-11-22 DIAGNOSIS — M25.571 RIGHT ANKLE PAIN, UNSPECIFIED CHRONICITY: ICD-10-CM

## 2024-11-22 DIAGNOSIS — S93.401A SPRAIN OF RIGHT ANKLE, UNSPECIFIED LIGAMENT, INITIAL ENCOUNTER: Primary | ICD-10-CM

## 2024-11-22 DIAGNOSIS — M25.571 RIGHT ANKLE PAIN, UNSPECIFIED CHRONICITY: Primary | ICD-10-CM

## 2024-11-22 PROCEDURE — 73610 X-RAY EXAM OF ANKLE: CPT | Mod: TC,PN,RT

## 2024-11-22 PROCEDURE — 73610 X-RAY EXAM OF ANKLE: CPT | Mod: 26,RT,, | Performed by: RADIOLOGY

## 2024-11-22 PROCEDURE — 1159F MED LIST DOCD IN RCRD: CPT | Mod: CPTII,S$GLB,, | Performed by: STUDENT IN AN ORGANIZED HEALTH CARE EDUCATION/TRAINING PROGRAM

## 2024-11-22 PROCEDURE — 1160F RVW MEDS BY RX/DR IN RCRD: CPT | Mod: CPTII,S$GLB,, | Performed by: STUDENT IN AN ORGANIZED HEALTH CARE EDUCATION/TRAINING PROGRAM

## 2024-11-22 PROCEDURE — 3066F NEPHROPATHY DOC TX: CPT | Mod: CPTII,S$GLB,, | Performed by: STUDENT IN AN ORGANIZED HEALTH CARE EDUCATION/TRAINING PROGRAM

## 2024-11-22 PROCEDURE — 3044F HG A1C LEVEL LT 7.0%: CPT | Mod: CPTII,S$GLB,, | Performed by: STUDENT IN AN ORGANIZED HEALTH CARE EDUCATION/TRAINING PROGRAM

## 2024-11-22 PROCEDURE — 3061F NEG MICROALBUMINURIA REV: CPT | Mod: CPTII,S$GLB,, | Performed by: STUDENT IN AN ORGANIZED HEALTH CARE EDUCATION/TRAINING PROGRAM

## 2024-11-22 PROCEDURE — 99214 OFFICE O/P EST MOD 30 MIN: CPT | Mod: S$GLB,,, | Performed by: STUDENT IN AN ORGANIZED HEALTH CARE EDUCATION/TRAINING PROGRAM

## 2024-11-22 PROCEDURE — 99999 PR PBB SHADOW E&M-EST. PATIENT-LVL III: CPT | Mod: PBBFAC,,, | Performed by: STUDENT IN AN ORGANIZED HEALTH CARE EDUCATION/TRAINING PROGRAM

## 2024-11-22 PROCEDURE — 97760 ORTHOTIC MGMT&TRAING 1ST ENC: CPT | Mod: S$GLB,,, | Performed by: STUDENT IN AN ORGANIZED HEALTH CARE EDUCATION/TRAINING PROGRAM

## 2024-11-22 NOTE — PROGRESS NOTES
Orthopaedics Sports Medicine     Ankle Initial Visit         11/22/2024    Referring MD: No ref. provider found    Chief Complaint   Patient presents with    Right Ankle - Pain       HPI:  Gerda presents for evaluation of a right ankle injury sustained from a fall while going down steps, in the same manner and location as a previous fall that injured her left ankle approximately one month ago. She indicates pain and swelling on the side of her right ankle, which is visibly puffy upon exam.    Gerda has a history of tarsal tunnel syndrome in her left foot, for which she underwent a procedure instead of tarsal tunnel surgery, as recommended by her long-time doctor. This less invasive procedure was meant to provide an arch in her foot and allow for quicker recovery. She also had surgery on her left foot in the 1990s to correct an issue with her great toe crossing over.    Currently, the patient is undergoing rehabilitation for her left ankle injury and has been using a boot, which she hopes to replace with a brace to facilitate her ability to work and drive. She expresses concern about having sprained her ankle again.    Gerda denies any new issues with her left ankle or shoulder.      WORK STATUS:  - Gerda expresses concern about driving to work with two injured ankles  - Gerda indicates it would be difficult to go to work with current ankle injuries           Past Medical History:   Past Medical History:   Diagnosis Date    Allergy     Anxiety     Biotin deficiency disease     Chlamydia     Diabetes mellitus     Fibroids     Herpes simplex     Knee pain, bilateral     Metabolic syndrome     Sleep apnea     Vitamin D deficiency        Past Surgical History:   Past Surgical History:   Procedure Laterality Date    ARTHROSCOPIC REPAIR OF ROTATOR CUFF OF SHOULDER Left 03/07/2022    Procedure: REPAIR, ROTATOR CUFF, ARTHROSCOPIC;  Surgeon: Denilson Jackson MD;  Location: [facility];  Service:  Orthopedics;  Laterality: Left;    ARTHROSCOPY OF SHOULDER WITH DECOMPRESSION OF SUBACROMIAL SPACE Left 03/07/2022    Procedure: ARTHROSCOPY, SHOULDER, WITH SUBACROMIAL SPACE DECOMPRESSION;  Surgeon: Denilson Jackson MD;  Location: [facility] OR;  Service: Orthopedics;  Laterality: Left;    BREAST SURGERY      BUNIONECTOMY      CERVICAL BIOPSY  W/ LOOP ELECTRODE EXCISION      COLONOSCOPY N/A 10/15/2021    Procedure: COLONOSCOPY;  Surgeon: Alaina Ceja MD;  Location: [facility];  Service: Endoscopy;  Laterality: N/A;    FIXATION OF TENDON Left 03/07/2022    Procedure: FIXATION, TENDON;  Surgeon: Denilson Jackson MD;  Location: [facility] OR;  Service: Orthopedics;  Laterality: Left;    FOOT SURGERY      hammer toe Bilateral     HYSTERECTOMY  11/2021    INJECTION OF ANESTHETIC AGENT AROUND MEDIAL BRANCH NERVES INNERVATING LUMBAR FACET JOINT Bilateral 10/26/2023    Procedure: Diagnostic Bilateral L4/5 & L5/S1 MBB #1;  Surgeon: Beka Johnson MD;  Location: [facility];  Service: Pain Management;  Laterality: Bilateral;    INJECTION OF ANESTHETIC AGENT AROUND MEDIAL BRANCH NERVES INNERVATING LUMBAR FACET JOINT Bilateral 11/20/2023    Procedure: Bilateral L4/L5 and L5/S1 MBB;  Surgeon: Beka Johnson MD;  Location: [facility];  Service: Pain Management;  Laterality: Bilateral;    Laparoscopic assisted vaginal hysterectomy with BSO  11/22/2021    Path--Leiomyomata/Adenomyosis    LASER ABLATION OF THE CERVIX      PLANTAR FASCIA SURGERY Left     RADIOFREQUENCY THERMOCOAGULATION Bilateral 12/21/2023    Procedure: Bilateral L4/L5 and L5/S1 Lumbar RFA;  Surgeon: Beka Johnson MD;  Location: [facility];  Service: Pain Management;  Laterality: Bilateral;    TOTAL REDUCTION MAMMOPLASTY  03/2007    TUBAL LIGATION         Medications:  Patient's Medications   New Prescriptions    No medications on file   Previous Medications    BIOTIN 10,000 MCG CAP    Take 1 capsule by mouth once daily.    BLOOD  SUGAR DIAGNOSTIC (ACCU-CHEK GUIDE TEST STRIPS) STRP    Use bid with glucometer    CLONAZEPAM (KLONOPIN) 0.5 MG TABLET    TAKE 1 TABLET BY MOUTH EVERY DAY AS NEEDED FOR ANXIETY    CLOTRIMAZOLE-BETAMETHASONE 1-0.05% (LOTRISONE) CREAM    Apply topically 2 (two) times daily.    DESLORATADINE (CLARINEX) 5 MG TABLET    TAKE 1 TABLET BY MOUTH EVERY DAY    DICLOFENAC SODIUM (VOLTAREN) 1 % GEL    USE 2 GRAMS (TOPICAL) 4 TIMES PER DAY AS NEEDED FOR PAIN    ESTRADIOL (LYLLANA) 0.1 MG/24 HR PTSW    Place 1 patch onto the skin twice a week.    FERROUS SULFATE (FEOSOL) 325 MG (65 MG IRON) TAB TABLET    TAKE 1 TABLET BY MOUTH EVERY DAY WITH BREAKFAST    FLUTICASONE PROPIONATE (FLONASE) 50 MCG/ACTUATION NASAL SPRAY    SPRAY 2 SPRAYS IN EACH NOSTRIL ONCE DAILY    MELOXICAM (MOBIC) 15 MG TABLET    Take 1 tablet (15 mg total) by mouth once daily.    MULTIVITAMIN CAPSULE    Take by mouth.    OMEPRAZOLE (PRILOSEC) 40 MG CAPSULE    TAKE 1 CAPSULE (40 MG TOTAL) BY MOUTH EVERY MORNING.    ROSUVASTATIN (CRESTOR) 5 MG TABLET    TAKE 1 TABLET BY MOUTH EVERY DAY    SEMAGLUTIDE (OZEMPIC) 2 MG/DOSE (8 MG/3 ML) PNIJ    Inject 2 mg into the skin every 7 days.    TIRZEPATIDE 12.5 MG/0.5 ML PNIJ    Inject 12.5 mg into the skin every 7 days.    TRIAMCINOLONE ACETONIDE 0.025% (KENALOG) 0.025 % CREAM    Apply topically 2 (two) times daily as needed (for dry patches on face). Mild steroid. Use sparingly for 1-2 weeks if needed then stop.    VALACYCLOVIR (VALTREX) 500 MG TABLET    Take 1 tablet (500 mg total) by mouth once daily.   Modified Medications    No medications on file   Discontinued Medications    No medications on file       Allergies:   Review of patient's allergies indicates:   Allergen Reactions    Doxycycline     Dulaglutide Other (See Comments)    Emtricitabine      Other reaction(s): Rash    Lexapro [escitalopram oxalate]      spasms    Lipitor [atorvastatin]      Decreased sex drive    Metformin hcl      Other Reaction(s): Unknown     "Sulfa (sulfonamide antibiotics) Other (See Comments)    Tenofovir      Other reaction(s): Rash    Bydureon [exenatide microspheres] Rash     Skin reaction at site of injection.       Social History:   Home town: [place]  Alcohol use: She reports current alcohol use of about 1.0 standard drink of alcohol per week.  Tobacco use: She reports that she has never smoked. She has never used smokeless tobacco.    Review of systems:  History of recent illness, fevers, shakes, or chills: no  History of cardiac problems or chest pain: no  History of pulmonary problems or asthma: no  History of diabetes: no  History of prior dvt or clotting problems: no  History of sleep apnea: no      Physical Examination:  Estimated body mass index is 36.09 kg/m² as calculated from the following:    Height as of 11/4/24: 5' 2" (1.575 m).    Weight as of 11/11/24: 89.5 kg (197 lb 4.8 oz).    General  Healthy appearing female in no acute distress  Alert and oriented, normal mood, appropriate affect    Ortho Exam    Right Ankle:    Inspection:   Normal    Palpation tenderness: Lateral just distal to fibula    Range of motion:  20 deg DF         40 deg PF              Strength:        5/5 DF          5/5 PF          5/5 Inversion          5/5 Eversion    Stability:  neg Talar Tilt     neg Anterior Drawer     neg Kleiger/External Rotator Stress    Neurovascular exam  - motor function grossly intact bilaterally to hip flexion, knee extension and flexion, ankle dorsiflexion and plantarflexion  - sensation intact to light touch bilaterally to femoral, tibial, tibial and peroneal distributions  - normal pedal pulses, warm and well perfused with capillary refill < 2 sec   - Calf soft and compressible      Imaging:  X-Ray Ankle Complete Right  EXAMINATION:  XR ANKLE COMPLETE 3 VIEW RIGHT    CLINICAL HISTORY:  Pain in right ankle and joints of right foot    TECHNIQUE:  AP, lateral, and oblique images of the right ankle were " performed.    COMPARISON:  None    FINDINGS:  No acute fracture or dislocation.  There appears to be very minimal spurring of the medial malleolus.  A screw is seen across the talus.  There is also a screw seen across the distal 1st metatarsal.  Dorsal and plantar calcaneal enthesophytes are present.    Electronically signed by: Wiliam Coronado DO  Date:    11/22/2024  Time:    09:45       Physician Read: I agree with the above impression.      Impression:  54 y.o. female with right ankle sprain      Plan:  Discussed diagnosis and treatment options with the patient today.  Her history, physical exam, and imaging findings are most consistent with right lateral ankle sprain.  She had a similar injury to her left side about a month ago.  I recommend proceeding with lace up ankle brace along with physical therapy.  She is currently doing physical therapy of her left ankle, and so may continue with those exercises.  She may be weight-bearing as tolerated.    Under the direction of Denilson Jackson MD, 15 minutes were spent sizing, fitting, and educating for durable medical equipment application today.  CPT 25468.  Follow up with me as needed.             Denilson Jackson MD    This note was generated with the assistance of ambient listening technology. Verbal consent was obtained by the patient and accompanying visitor(s) for the recording of patient appointment to facilitate this note. I attest to having reviewed and edited the generated note for accuracy, though some syntax or spelling errors may persist. Please contact the author of this note for any clarification.

## 2024-11-25 ENCOUNTER — CLINICAL SUPPORT (OUTPATIENT)
Dept: REHABILITATION | Facility: HOSPITAL | Age: 54
End: 2024-11-25
Payer: COMMERCIAL

## 2024-11-25 DIAGNOSIS — R26.89 POOR BALANCE: ICD-10-CM

## 2024-11-25 DIAGNOSIS — R26.9 GAIT ABNORMALITY: ICD-10-CM

## 2024-11-25 DIAGNOSIS — Z74.09 DECREASED FUNCTIONAL MOBILITY AND ENDURANCE: Primary | ICD-10-CM

## 2024-11-25 DIAGNOSIS — M25.671 DECREASED RANGE OF MOTION OF RIGHT ANKLE: ICD-10-CM

## 2024-11-25 PROCEDURE — 97112 NEUROMUSCULAR REEDUCATION: CPT

## 2024-11-25 PROCEDURE — 97110 THERAPEUTIC EXERCISES: CPT

## 2024-12-01 NOTE — PROGRESS NOTES
[facility]   Physical Therapy Treatment Note / Re-Evaluation to include R Ankle     Name: Gerda Obrien  Clinic Number: [id number]    Therapy Diagnosis:   Encounter Diagnoses   Name Primary?    Decreased functional mobility and endurance Yes    Decreased range of motion of right ankle     Gait abnormality     Poor balance      Physician: Jeff Mcdonnell MD    Visit Date: 11/25/2024    Physician Orders: PT Eval and Treat  Medical Diagnosis from Referral: Left ankle pain, unspecified chronicity [M25.572]   Evaluation Date: 11/4/2024  Authorization Period Expiration: 11/4/2025   Plan of Care Expiration: 1/4/2025  Progress Note Due: 12/4/2025  Visit # / Visits authorized: 2/25 (+1 evaluation)  FOTO: 1/3 (last performed on 11/4/2024)     Precautions: Standard  PTA Visit #: 0/5     Time In: 1628  Time Out: 1734  Total Billable Time: 55 minutes (Billing reflects 1 on 1 treatment time spent with patient)    Subjective     Patient reports: She recently fell and tripped down the stairs again, causing her to injure her right ankle in the same way that she injured the left one. States she got an appointment with Dr. Jackson the next day. She has been walking around with B ankle braces. Notes that her left ankle has still been more painful than the recent R sprain.     He/She was compliant with home exercise program.  Response to previous treatment: initial evaluation  Functional change: performing home exercise plan    Pain: 4/10     Location: left ankle     Objective      Objective Measures updated at progress report or POC update only unless otherwise noted.     RANGE OF MOTION:   Ankle/Foot AROM/PROM Right Left Pain/Dysfunction with Movement Goal   Dorsiflexion (20º) 5 5  pain  20   Plantarflexion (50º) 35 25 Pain  50   Inversion (35º) 20 20 Pain  35   Eversion (15º) 5 5 Pain  15            STRENGTH: (* denotes pain)  L/E MMT Right  (spine) Left Dysfunction with Movement Goal    Modified (90/90) Abdominal Strength    ---       Hip Flexion            Hip Extension            Hip Abduction            Knee Extension           Knee Flexion           Hip IR           Hip ER           Ankle DF 4-/5 4/5   5/5 B   Ankle PF 4/5 4/5   5/5 B   Ankle Inversion 3+/5 3+/5   5/5 B   Ankle Eversion 3+/5 3+/5   5/5 B          SENSATION:  Intact to Light Touch           PALPATION:  Increased tenderness to palpation of: B lateral malleolus, ATFL, CF ligaments        POSTURE:  Pt presents with postural abnormalities which include: forward head and rounded shoulders                    GAIT ANALYSIS The patient ambulated with the following assistive device:  B lace-up ankle braces ; the pt presents with the following gait abnormalities: decreased step length B, decreased stance time B, and decreased push off B      FUNCTIONAL MOVEMENT PATTERNS  Movement Analysis Notes   Bed Mobility        Transfers       Sit to Stand  dysfunctional, painful     Squat  dysfunctional, painful     Single Leg Squat       Step Down       Single leg calf raises   dysfunctional, painful Unable B          BALANCE:    Right   (seconds) Left  (seconds) Pain/dysfunction Noted Goal   Narrow Base of Support   ---   60+ seconds   Tandem Stance 0 0   60+ seconds   Single Leg Stance 0 0   60+ seconds                       Treatment     Gerda received the treatments listed below:        CPT Intervention Performed   Today Duration / Intensity   MT Joint Mobilizations   TC A/P's, TC distraction     Soft tissue massage    Intrinsics, soleus, gastroc   TE Nu-step  x 8 miutes     Ankle pumps  x 30x b      Ankle inversion and eversion AROM  x 30x b      Ankle circles  x 15x clockwise and counterclockwise b      Gastroc Stretch x 3 mins x 10s holds      Seated Dorsiflexion x 3 x 10 with 3 sec holds   NMR Ankle 4 way  x   x Plantarflexion and dorsiflexion: red band 30x each b  Inversion and eversion: yellow band, 30x each b       Seated heel  "raises  x 30x b  with 15# dumbbell on knee      Toe Yoga   3 minutes, alternating      Weight shifts   x Lateral: 3 mins x 3s holds      Tandem Stance   3 x 30" each way     MOBO taps  x 1 min each b, odds and evens      Leg press x Double le# 3x10    TA Sit to Stands   3 x 10     Step Ups   3 x 10 8 inch step   PLAN Bike   Gastrocnemius stretch   Shuttle or leg press   MOBO taps - seated or B stance   Single leg stance progression   Standing heel raises - as tolerated   Soleus raises on Matrix machine           CPT Codes available for Billing:   (00) minutes of Manual therapy (MT) to improve pain and ROM.  (30) minutes of Therapeutic Exercise (TE) to develop strength, endurance, range of motion, and flexibility.  (25) minutes of Neuromuscular Re-Education (NMR)  to improve: Balance, Coordination, Kinesthetic, Sense, Proprioception, and Posture.  (00) minutes of Therapeutic Activities (TA) to improve functional performance.  Vasopneumatic Device Therapy () for management of swelling/edema. (46262)  Unattended Electrical Stimulation (ES) for muscle performance or pain modulation.  BFR: Blood flow restriction applied during exercise      Patient Education and Home Exercises       Home Exercises Provided and Patient Education Provided     Education provided: time included in treatment time.   PURPOSE: Patient educated on the impairments noted above and the effects of physical therapy intervention to improve overall condition and QOL.   EXERCISE: Patient was educated on all the above exercise prior/during/after for proper posture, positioning, and execution for safe performance with home exercise program.   STRENGTH: Patient educated on the importance of improved core and extremity strength in order to improve alignment of the spine and extremities with static positions and dynamic movement.   GAIT & BALANCE: Patient educated on the importance of strong core and lower extremity musculature in order to improve " both static and dynamic balance, improve gait mechanics, reduce fall risk and improve household and community mobility.   POSTURE: Patient educated on postural awareness to reduce stress and maintain optimal alignment of the spine with static positions and dynamic movement   TRANSFERS & TRANSITIONS: Patient educated on proper technique for bed mobility, transitions and transfers to improve body mechanics and decrease risk of injury.   BRACE: patient educated on proper fit, positioning, and technique for donning and doffing brace in order to maintain optimal alignment of the extremity and promote healing     Written Home Exercises Provided: yes.  Exercises were reviewed and Gerda was able to demonstrate them prior to the end of the session.  Gerda demonstrated good  understanding of the education provided. See EMR under Patient Instructions for exercises provided during therapy sessions.    Assessment     Patient demonstrates decreased tolerance for session today following recent fall and subsequent right ankle injury. Continue to focus on ankle ROM and neuromuscular control for b ankles. Incorporated ankle 4 way to improve ankle strength; pt continues to be significantly limited with ankle inversion and eversion. An assessment was completed today to evaluate the right ankle with significant limitations noted in ankle ROM, ankle strength, gait, balance and functional movement patterns following recent fall. The above impairments and functional deficits will continue to be addressed over the course of this plan of care. Patient was educated on continued progression of PT, expectations for the duration of care, updates on goals set at initial evaluation, and home exercise program.  Patient will continue to benefit from physical therapy in order to further address goals, maximize function and quality of life.     Gerda is progressing well towards her goals.   Patient prognosis is Good.     Patient will continue  to benefit from skilled outpatient physical therapy to address the deficits listed in the problem list box on initial evaluation, provide pt/family education and to maximize patient's level of independence in the home and community environment.     Patient's spiritual, cultural and educational needs considered and pt agreeable to plan of care and goals.    Anticipated Barriers for therapy: none    Short Term Goals:  6 weeks Status  Date Met   PAIN: Pt will report worst pain of 3/10 in order to progress toward max functional ability and improve quality of life. [x] Progressing  [] Met  [] Not Met     FUNCTION: Patient will demonstrate improved function as indicated by a score of greater than or equal to NT out of 100 on FOTO. [x] Progressing  [] Met  [] Not Met     MOBILITY: Patient will improve AROM to 50% of stated goals, listed in objective measures above, in order to progress towards independence with functional activities.  [x] Progressing  [] Met  [] Not Met     STRENGTH: Patient will improve strength to 50% of stated goals, listed in objective measures above, in order to progress towards independence with functional activities. [x] Progressing  [] Met  [] Not Met     GAIT: Patient will demonstrate improved gait mechanics including discontinuation of walking boot in order to improve functional mobility, improve quality of life, and decrease risk of further injury or fall.  [x] Progressing  [] Met  [] Not Met     HEP: Patient will demonstrate independence with HEP in order to progress toward functional independence. [x] Progressing  [] Met  [] Not Met        Long Term Goals:  12 weeks Status Date Met   PAIN: Pt will report worst pain of 1/10 in order to progress toward max functional ability and improve quality of life [x] Progressing  [] Met  [] Not Met     FUNCTION: Patient will demonstrate improved function as indicated by a score of greater than or equal to NT out of 100 on FOTO. [x] Progressing  [] Met  []  Not Met     MOBILITY: Patient will improve AROM to stated goals, listed in objective measures above, in order to return to maximal functional potential and improve quality of life.  [x] Progressing  [] Met  [] Not Met     STRENGTH: Patient will improve strength to stated goals, listed in objective measures above, in order to improve functional independence and quality of life.  [x] Progressing  [] Met  [] Not Met     GAIT: Patient will demonstrate normalized gait mechanics with minimal compensation in order to return to PLOF. [x] Progressing  [] Met  [] Not Met     Patient will return to normal ADL's, IADL's, community involvement, recreational activities, and work-related activities with less than or equal to 1/10 pain and maximal function.  [x] Progressing  [] Met  [] Not Met       Plan     Continue Plan of Care (POC) and progress per patient tolerance. See treatment section for details on planned progressions next session.      Analilia Son, PT

## 2024-12-02 ENCOUNTER — CLINICAL SUPPORT (OUTPATIENT)
Dept: REHABILITATION | Facility: HOSPITAL | Age: 54
End: 2024-12-02
Payer: COMMERCIAL

## 2024-12-02 DIAGNOSIS — R26.9 GAIT ABNORMALITY: ICD-10-CM

## 2024-12-02 DIAGNOSIS — Z74.09 DECREASED FUNCTIONAL MOBILITY AND ENDURANCE: Primary | ICD-10-CM

## 2024-12-02 DIAGNOSIS — M25.671 DECREASED RANGE OF MOTION OF RIGHT ANKLE: ICD-10-CM

## 2024-12-02 DIAGNOSIS — R26.89 POOR BALANCE: ICD-10-CM

## 2024-12-02 PROCEDURE — 97140 MANUAL THERAPY 1/> REGIONS: CPT

## 2024-12-02 PROCEDURE — 97110 THERAPEUTIC EXERCISES: CPT

## 2024-12-02 PROCEDURE — 97112 NEUROMUSCULAR REEDUCATION: CPT

## 2024-12-02 NOTE — PROGRESS NOTES
[facility] [place] [facility]   Physical Therapy Treatment Note     Name: Gerda Obrien  Clinic Number: [id number]    Therapy Diagnosis:   Encounter Diagnoses   Name Primary?    Decreased functional mobility and endurance Yes    Decreased range of motion of right ankle     Gait abnormality     Poor balance      Physician: Jeff Mcdonnell MD    Visit Date: 12/2/2024    Physician Orders: PT Eval and Treat  Medical Diagnosis from Referral: Left ankle pain, unspecified chronicity [M25.572]   Evaluation Date: 11/4/2024  Authorization Period Expiration: 11/4/2025   Plan of Care Expiration: 1/4/2025  Progress Note Due: 12/4/2025  Visit # / Visits authorized: 3/25 (+1 evaluation)  FOTO: 1/3 (last performed on 11/4/2024)     Precautions: Standard  PTA Visit #: 0/5     Time In: 1630  Time Out: 1730  Total Billable Time: 56 minutes (Billing reflects 1 on 1 treatment time spent with patient)    Subjective     Patient reports: ankles hurting equally. At times the right may hurt worse than the left or vice versa.     He/She was compliant with home exercise program.  Response to previous treatment: beginning home exercise plan for bilateral ankles  Functional change: performing home exercise plan    Pain: 4/10     Location: left ankle     Objective      Objective Measures updated at progress report or POC update only unless otherwise noted.     Treatment     Gerda received the treatments listed below:        CPT Intervention Performed   Today Duration / Intensity   MT Joint Mobilizations x TC A/P's, TC distraction     Soft tissue massage  x Intrinsics, soleus, gastroc   TE Nu-step  x 8 miutes     Ankle pumps  x 30x b      Ankle inversion and eversion AROM  x 30x b      Ankle circles  x 15x clockwise and counterclockwise b      Gastroc Stretch x 3 mins x 10s holds      Seated Dorsiflexion x 3 x 10 with 3 sec holds   NMR Ankle 4 way  x  x  x Plantarflexion and dorsiflexion: red band 30x each b  Inversion  "and eversion: yellow band, 30x each b       Seated heel raises  x 30x b  with 15# dumbbell on knee      Toe Yoga   3 minutes, alternating      Weight shifts   x Lateral: 3 mins x 3s holds      Tandem Stance   3 x 30" each way     MOBO taps   1 min each b, odds and evens      Leg press  Double le# 3x10    TA Sit to Stands   3 x 10     Step Ups   3 x 10 8 inch step   PLAN Bike   Gastrocnemius stretch   Shuttle or leg press   MOBO taps - seated or B stance   Single leg stance progression   Standing heel raises - as tolerated   Soleus raises on Matrix machine           CPT Codes available for Billing:   (10) minutes of Manual therapy (MT) to improve pain and ROM.  (27) minutes of Therapeutic Exercise (TE) to develop strength, endurance, range of motion, and flexibility.  (18) minutes of Neuromuscular Re-Education (NMR)  to improve: Balance, Coordination, Kinesthetic, Sense, Proprioception, and Posture.  (00) minutes of Therapeutic Activities (TA) to improve functional performance.  Vasopneumatic Device Therapy () for management of swelling/edema. (26612)  Unattended Electrical Stimulation (ES) for muscle performance or pain modulation.  BFR: Blood flow restriction applied during exercise      Patient Education and Home Exercises       Home Exercises Provided and Patient Education Provided     Education provided: time included in treatment time.   PURPOSE: Patient educated on the impairments noted above and the effects of physical therapy intervention to improve overall condition and QOL.   EXERCISE: Patient was educated on all the above exercise prior/during/after for proper posture, positioning, and execution for safe performance with home exercise program.   STRENGTH: Patient educated on the importance of improved core and extremity strength in order to improve alignment of the spine and extremities with static positions and dynamic movement.   GAIT & BALANCE: Patient educated on the importance of strong core " and lower extremity musculature in order to improve both static and dynamic balance, improve gait mechanics, reduce fall risk and improve household and community mobility.   POSTURE: Patient educated on postural awareness to reduce stress and maintain optimal alignment of the spine with static positions and dynamic movement   TRANSFERS & TRANSITIONS: Patient educated on proper technique for bed mobility, transitions and transfers to improve body mechanics and decrease risk of injury.   BRACE: patient educated on proper fit, positioning, and technique for donning and doffing brace in order to maintain optimal alignment of the extremity and promote healing     Written Home Exercises Provided: yes.  Exercises were reviewed and Gerda was able to demonstrate them prior to the end of the session.  Gerda demonstrated good  understanding of the education provided. See EMR under Patient Instructions for exercises provided during therapy sessions.    Assessment     Patient tolerated session well. Able to demonstrate exercises performed bilaterally with mild pain. Ankle mobilizations performed for improved ankle dorsiflexion bilaterally.     Gerda is progressing well towards her goals.   Patient prognosis is Good.     Patient will continue to benefit from skilled outpatient physical therapy to address the deficits listed in the problem list box on initial evaluation, provide pt/family education and to maximize patient's level of independence in the home and community environment.     Patient's spiritual, cultural and educational needs considered and pt agreeable to plan of care and goals.    Anticipated Barriers for therapy: none    Short Term Goals:  6 weeks Status  Date Met   PAIN: Pt will report worst pain of 3/10 in order to progress toward max functional ability and improve quality of life. [x] Progressing  [] Met  [] Not Met     FUNCTION: Patient will demonstrate improved function as indicated by a score of  greater than or equal to NT out of 100 on FOTO. [x] Progressing  [] Met  [] Not Met     MOBILITY: Patient will improve AROM to 50% of stated goals, listed in objective measures above, in order to progress towards independence with functional activities.  [x] Progressing  [] Met  [] Not Met     STRENGTH: Patient will improve strength to 50% of stated goals, listed in objective measures above, in order to progress towards independence with functional activities. [x] Progressing  [] Met  [] Not Met     GAIT: Patient will demonstrate improved gait mechanics including discontinuation of walking boot in order to improve functional mobility, improve quality of life, and decrease risk of further injury or fall.  [x] Progressing  [] Met  [] Not Met     HEP: Patient will demonstrate independence with HEP in order to progress toward functional independence. [x] Progressing  [] Met  [] Not Met        Long Term Goals:  12 weeks Status Date Met   PAIN: Pt will report worst pain of 1/10 in order to progress toward max functional ability and improve quality of life [x] Progressing  [] Met  [] Not Met     FUNCTION: Patient will demonstrate improved function as indicated by a score of greater than or equal to NT out of 100 on FOTO. [x] Progressing  [] Met  [] Not Met     MOBILITY: Patient will improve AROM to stated goals, listed in objective measures above, in order to return to maximal functional potential and improve quality of life.  [x] Progressing  [] Met  [] Not Met     STRENGTH: Patient will improve strength to stated goals, listed in objective measures above, in order to improve functional independence and quality of life.  [x] Progressing  [] Met  [] Not Met     GAIT: Patient will demonstrate normalized gait mechanics with minimal compensation in order to return to PLOF. [x] Progressing  [] Met  [] Not Met     Patient will return to normal ADL's, IADL's, community involvement, recreational activities, and work-related  activities with less than or equal to 1/10 pain and maximal function.  [x] Progressing  [] Met  [] Not Met       Plan     Continue Plan of Care (POC) and progress per patient tolerance. See treatment section for details on planned progressions next session.    Sherwin Yao, PT

## 2024-12-09 ENCOUNTER — CLINICAL SUPPORT (OUTPATIENT)
Dept: REHABILITATION | Facility: HOSPITAL | Age: 54
End: 2024-12-09
Payer: COMMERCIAL

## 2024-12-09 ENCOUNTER — OFFICE VISIT (OUTPATIENT)
Dept: SPORTS MEDICINE | Facility: CLINIC | Age: 54
End: 2024-12-09
Payer: COMMERCIAL

## 2024-12-09 DIAGNOSIS — Z74.09 DECREASED FUNCTIONAL MOBILITY AND ENDURANCE: Primary | ICD-10-CM

## 2024-12-09 DIAGNOSIS — S93.402D SPRAIN OF LEFT ANKLE, UNSPECIFIED LIGAMENT, SUBSEQUENT ENCOUNTER: Primary | ICD-10-CM

## 2024-12-09 DIAGNOSIS — R26.89 POOR BALANCE: ICD-10-CM

## 2024-12-09 DIAGNOSIS — M25.671 DECREASED RANGE OF MOTION OF RIGHT ANKLE: ICD-10-CM

## 2024-12-09 DIAGNOSIS — R26.9 GAIT ABNORMALITY: ICD-10-CM

## 2024-12-09 PROCEDURE — 97110 THERAPEUTIC EXERCISES: CPT

## 2024-12-09 PROCEDURE — 99214 OFFICE O/P EST MOD 30 MIN: CPT | Mod: S$GLB,,, | Performed by: STUDENT IN AN ORGANIZED HEALTH CARE EDUCATION/TRAINING PROGRAM

## 2024-12-09 PROCEDURE — 1159F MED LIST DOCD IN RCRD: CPT | Mod: CPTII,S$GLB,, | Performed by: STUDENT IN AN ORGANIZED HEALTH CARE EDUCATION/TRAINING PROGRAM

## 2024-12-09 PROCEDURE — 97140 MANUAL THERAPY 1/> REGIONS: CPT

## 2024-12-09 PROCEDURE — 97530 THERAPEUTIC ACTIVITIES: CPT

## 2024-12-09 PROCEDURE — 99999 PR PBB SHADOW E&M-EST. PATIENT-LVL III: CPT | Mod: PBBFAC,,, | Performed by: STUDENT IN AN ORGANIZED HEALTH CARE EDUCATION/TRAINING PROGRAM

## 2024-12-09 PROCEDURE — 97112 NEUROMUSCULAR REEDUCATION: CPT

## 2024-12-09 PROCEDURE — 3066F NEPHROPATHY DOC TX: CPT | Mod: CPTII,S$GLB,, | Performed by: STUDENT IN AN ORGANIZED HEALTH CARE EDUCATION/TRAINING PROGRAM

## 2024-12-09 PROCEDURE — 3061F NEG MICROALBUMINURIA REV: CPT | Mod: CPTII,S$GLB,, | Performed by: STUDENT IN AN ORGANIZED HEALTH CARE EDUCATION/TRAINING PROGRAM

## 2024-12-09 PROCEDURE — 3044F HG A1C LEVEL LT 7.0%: CPT | Mod: CPTII,S$GLB,, | Performed by: STUDENT IN AN ORGANIZED HEALTH CARE EDUCATION/TRAINING PROGRAM

## 2024-12-09 NOTE — PROGRESS NOTES
"      Patient ID: Gerda Obrien  YOB: 1970  MRN: [id number]    History of Present Illness:     History of Present Illness    CHIEF COMPLAINT:  - Gerda presents today for follow-up evaluation of bilateral ankle pain, with the left ankle being the primary concern.    HPI:  Gerda is following up after previous visits for bilateral ankle issues. The right ankle, which was seen by Dr. Jackson, is reported to be "fine" but "still a little swollen." The left ankle, while improved, is still causing pain, particularly when walking. She reports that the left ankle is better but still painful.    She describes pain in the front of the left ankle when walking, expressing a desire to "pop" it. She is currently undergoing physical therapy with [facility]. When asked about medication, she mentions meloxicam but is unsure if it's helping. The right ankle appears more swollen, but the left ankle is more painful. She reports pain on the side of the left ankle during certain movements and when squatting down, specifically on the left side in the same spot in the front. She indicates that therapy for the left foot does not seem to be making much difference, stating that therapy is not going well and she does not feel much different.    She denies pain in the right ankle when walking. She denies swelling in the left ankle.    ROS:  ROS as indicated in HPI.         Past Medical History:   Past Medical History:   Diagnosis Date    Allergy     Anxiety     Biotin deficiency disease     Chlamydia     Diabetes mellitus     Fibroids     Herpes simplex     Knee pain, bilateral     Metabolic syndrome     Sleep apnea     Vitamin D deficiency      Past Surgical History:   Procedure Laterality Date    ARTHROSCOPIC REPAIR OF ROTATOR CUFF OF SHOULDER Left 03/07/2022    Procedure: REPAIR, ROTATOR CUFF, ARTHROSCOPIC;  Surgeon: Denilson Jackson MD;  Location: [facility];  Service: Orthopedics;  Laterality: Left;    " ARTHROSCOPY OF SHOULDER WITH DECOMPRESSION OF SUBACROMIAL SPACE Left 03/07/2022    Procedure: ARTHROSCOPY, SHOULDER, WITH SUBACROMIAL SPACE DECOMPRESSION;  Surgeon: Denilson Jackson MD;  Location: [facility] OR;  Service: Orthopedics;  Laterality: Left;    BREAST SURGERY      BUNIONECTOMY      CERVICAL BIOPSY  W/ LOOP ELECTRODE EXCISION      COLONOSCOPY N/A 10/15/2021    Procedure: COLONOSCOPY;  Surgeon: Alaina Ceja MD;  Location: [facility] ENDO;  Service: Endoscopy;  Laterality: N/A;    FIXATION OF TENDON Left 03/07/2022    Procedure: FIXATION, TENDON;  Surgeon: Denilson Jackson MD;  Location: [facility] OR;  Service: Orthopedics;  Laterality: Left;    FOOT SURGERY      hammer toe Bilateral     HYSTERECTOMY  11/2021    INJECTION OF ANESTHETIC AGENT AROUND MEDIAL BRANCH NERVES INNERVATING LUMBAR FACET JOINT Bilateral 10/26/2023    Procedure: Diagnostic Bilateral L4/5 & L5/S1 MBB #1;  Surgeon: Beka Johnson MD;  Location: [facility] PAIN MGT;  Service: Pain Management;  Laterality: Bilateral;    INJECTION OF ANESTHETIC AGENT AROUND MEDIAL BRANCH NERVES INNERVATING LUMBAR FACET JOINT Bilateral 11/20/2023    Procedure: Bilateral L4/L5 and L5/S1 MBB;  Surgeon: Beka Johnson MD;  Location: [facility] PAIN MGT;  Service: Pain Management;  Laterality: Bilateral;    Laparoscopic assisted vaginal hysterectomy with BSO  11/22/2021    Path--Leiomyomata/Adenomyosis    LASER ABLATION OF THE CERVIX      PLANTAR FASCIA SURGERY Left     RADIOFREQUENCY THERMOCOAGULATION Bilateral 12/21/2023    Procedure: Bilateral L4/L5 and L5/S1 Lumbar RFA;  Surgeon: Beka Johnson MD;  Location: [facility] PAIN MGT;  Service: Pain Management;  Laterality: Bilateral;    TOTAL REDUCTION MAMMOPLASTY  03/2007    TUBAL LIGATION       Family History   Problem Relation Name Age of Onset    Diabetes Paternal Grandfather Cezar Alisaeavaishali     Epilepsy Maternal Grandmother Elissa Cuello     Seizures Maternal Grandmother Elissa Cuello     Cancer Father  Gordon Wilson         Prostrate    Diabetes Father Gordon Wilson     Breast cancer Cousin Paternal     Hypertension Mother Ping Wilson Brown     Heart disease Neg Hx       Social History     Socioeconomic History    Marital status:    Tobacco Use    Smoking status: Never    Smokeless tobacco: Never   Substance and Sexual Activity    Alcohol use: Yes     Alcohol/week: 1.0 standard drink of alcohol     Types: 1 Glasses of wine per week     Comment: ocassional     Drug use: No    Sexual activity: Yes     Partners: Male     Birth control/protection: Other-see comments     Comment: Hysterectomy     Social Drivers of Health     Financial Resource Strain: Low Risk  (11/22/2024)    Overall Financial Resource Strain (CARDIA)     Difficulty of Paying Living Expenses: Not very hard   Food Insecurity: No Food Insecurity (11/22/2024)    Hunger Vital Sign     Worried About Running Out of Food in the Last Year: Never true     Ran Out of Food in the Last Year: Never true   Physical Activity: Inactive (11/22/2024)    Exercise Vital Sign     Days of Exercise per Week: 0 days     Minutes of Exercise per Session: 0 min   Stress: No Stress Concern Present (11/22/2024)    [employer] - Occupational Stress Questionnaire     Feeling of Stress : Only a little   Housing Stability: Unknown (11/22/2024)    Housing Stability Vital Sign     Unable to Pay for Housing in the Last Year: No     Medication List with Changes/Refills   Current Medications    BIOTIN 10,000 MCG CAP    Take 1 capsule by mouth once daily.    BLOOD SUGAR DIAGNOSTIC (ACCU-CHEK GUIDE TEST STRIPS) STRP    Use bid with glucometer    CLONAZEPAM (KLONOPIN) 0.5 MG TABLET    TAKE 1 TABLET BY MOUTH EVERY DAY AS NEEDED FOR ANXIETY    CLOTRIMAZOLE-BETAMETHASONE 1-0.05% (LOTRISONE) CREAM    Apply topically 2 (two) times daily.    DESLORATADINE (CLARINEX) 5 MG TABLET    TAKE 1 TABLET BY MOUTH EVERY DAY    DICLOFENAC SODIUM (VOLTAREN) 1 %  GEL    USE 2 GRAMS (TOPICAL) 4 TIMES PER DAY AS NEEDED FOR PAIN    ESTRADIOL (LYLLANA) 0.1 MG/24 HR PTSW    Place 1 patch onto the skin twice a week.    FERROUS SULFATE (FEOSOL) 325 MG (65 MG IRON) TAB TABLET    TAKE 1 TABLET BY MOUTH EVERY DAY WITH BREAKFAST    FLUTICASONE PROPIONATE (FLONASE) 50 MCG/ACTUATION NASAL SPRAY    SPRAY 2 SPRAYS IN EACH NOSTRIL ONCE DAILY    MELOXICAM (MOBIC) 15 MG TABLET    Take 1 tablet (15 mg total) by mouth once daily.    MULTIVITAMIN CAPSULE    Take by mouth.    OMEPRAZOLE (PRILOSEC) 40 MG CAPSULE    TAKE 1 CAPSULE (40 MG TOTAL) BY MOUTH EVERY MORNING.    ROSUVASTATIN (CRESTOR) 5 MG TABLET    TAKE 1 TABLET BY MOUTH EVERY DAY    SEMAGLUTIDE (OZEMPIC) 2 MG/DOSE (8 MG/3 ML) PNIJ    Inject 2 mg into the skin every 7 days.    TIRZEPATIDE 12.5 MG/0.5 ML PNIJ    Inject 12.5 mg into the skin every 7 days.    TRIAMCINOLONE ACETONIDE 0.025% (KENALOG) 0.025 % CREAM    Apply topically 2 (two) times daily as needed (for dry patches on face). Mild steroid. Use sparingly for 1-2 weeks if needed then stop.    VALACYCLOVIR (VALTREX) 500 MG TABLET    Take 1 tablet (500 mg total) by mouth once daily.     Review of patient's allergies indicates:   Allergen Reactions    Doxycycline     Dulaglutide Other (See Comments)    Emtricitabine      Other reaction(s): Rash    Lexapro [escitalopram oxalate]      spasms    Lipitor [atorvastatin]      Decreased sex drive    Metformin hcl      Other Reaction(s): Unknown    Sulfa (sulfonamide antibiotics) Other (See Comments)    Tenofovir      Other reaction(s): Rash    Bydureon [exenatide microspheres] Rash     Skin reaction at site of injection.       Physical Exam:   There is no height or weight on file to calculate BMI.    GENERAL: Well appearing, in no acute distress.  HEAD: Normocephalic and atraumatic.  ENT: External ears and nose grossly normal.  EYES: EOMI bilaterally  PULMONARY: Respirations are grossly even and non-labored.  NEURO: Awake, alert, and  oriented x 3.  SKIN: No obvious rashes appreciated.  PSYCH: Mood & affect are appropriate.    Detailed MSK exam:   Left ankle: No effusion. tender anterior ankle, good ROM. Pain with squatting. Anterior drawer negative, inversion talar tilt positive.   Right ankle: No effusion. Tender ATFL. Good ROM. Anterior drawer negative, inversion negative.     Imaging:    No new images.     Assessment:  Gerda Obrien is a 54 y.o. female presents today for continued left anterior lateral ankle pain following an ankle sprain approximately 6 weeks ago.  She has only done 4 sessions of physical therapy and continues to feel symptomatic.  I discussed the plan with her PT [facility] and notes that she would benefit from continued PT prior to getting an MRI or anything else at this time.  She feels stable on exam and otherwise has full range of motion and good strength.  Continue PT for 1 more month follow-up with my partner Dr. Bartlett for further management consider MRI at follow-up if not significantly clinically improved.    Sprain of left ankle, unspecified ligament, subsequent encounter         This note was generated with the assistance of ambient listening technology. Verbal consent was obtained by the patient and accompanying visitor(s) for the recording of patient appointment to facilitate this note. I attest to having reviewed and edited the generated note for accuracy, though some syntax or spelling errors may persist. Please contact the author of this note for any clarification.          Jeff Mcdonnell MD    Disclaimer: This note was prepared using a voice recognition system and is likely to have sound alike errors within the text.

## 2024-12-10 NOTE — PROGRESS NOTES
[facility]   Physical Therapy Treatment Note     Name: Gerda Obrien  Clinic Number: [id number]    Therapy Diagnosis:   Encounter Diagnoses   Name Primary?    Decreased functional mobility and endurance Yes    Decreased range of motion of right ankle     Gait abnormality     Poor balance      Physician: Jeff Mcdonnell MD    Visit Date: 12/9/2024    Physician Orders: PT Eval and Treat  Medical Diagnosis from Referral: Left ankle pain, unspecified chronicity [M25.572]   Evaluation Date: 11/4/2024  Authorization Period Expiration: 11/4/2025   Plan of Care Expiration: 1/4/2025  Progress Note Due: 12/25/2025  Visit # / Visits authorized: 3/25 (+1 evaluation)  FOTO: 1/3 (last performed on 11/4/2024)     Precautions: Standard  PTA Visit #: 0/5     Time In: 1545  Time Out: 1650  Total Billable Time: 59 minutes (Billing reflects 1 on 1 treatment time spent with patient)    Subjective     Patient reports: She continues to have soreness in the L ankle and notes popping and feeling like something is getting stuck in the anterior ankle with ambulation     He/She was compliant with home exercise program.  Response to previous treatment: beginning home exercise plan for bilateral ankles  Functional change: performing home exercise plan    Pain: 4/10     Location: left ankle     Objective      Objective Measures updated at progress report or POC update only unless otherwise noted.     Treatment     Gerda received the treatments listed below:         CPT Intervention Performed   Today Duration / Intensity   MT Joint Mobilizations   TC A/P's, TC distraction     Soft tissue massage  x L tibialis anterior and peroneals    TE Recumbent bike  x 5 minutes     Gastroc Stretch x 3 x30s holds    NMR Ankle 4 way  x  x Plantarflexion and dorsiflexion: black band 30x each b  Inversion and eversion: green band, 30x each b       Toe Yoga x 3 minutes x 5s holds, alternating      Weight shifts     "Lateral: 3 mins x 3s holds      Tandem Stance   3 x 30" each way     MOBO taps - single leg stance  x 1 min each b, odds and evens      Single leg stance  x 3x30s b    TA Box squat  x 15# 3 x 10     Step Ups   3 x 10 8 inch step     Step downs  x 4 inch, 3x10 b      Leg press   Double le# 3x10      Eccentric calf raises  x 2x10 b      Side stepping  x 4 inch, 3x10 b      Seated soleus raises  x Single le# 3x10 b    PLAN Single leg stance progression              CPT Codes available for Billing:   (08) minutes of Manual therapy (MT) to improve pain and ROM.  (08) minutes of Therapeutic Exercise (TE) to develop strength, endurance, range of motion, and flexibility.  (18) minutes of Neuromuscular Re-Education (NMR)  to improve: Balance, Coordination, Kinesthetic, Sense, Proprioception, and Posture.  (25) minutes of Therapeutic Activities (TA) to improve functional performance.  Vasopneumatic Device Therapy () for management of swelling/edema. ([id number])  Unattended Electrical Stimulation (ES) for muscle performance or pain modulation.  BFR: Blood flow restriction applied during exercise      Patient Education and Home Exercises       Home Exercises Provided and Patient Education Provided     Education provided: time included in treatment time.   PURPOSE: Patient educated on the impairments noted above and the effects of physical therapy intervention to improve overall condition and QOL.   EXERCISE: Patient was educated on all the above exercise prior/during/after for proper posture, positioning, and execution for safe performance with home exercise program.   STRENGTH: Patient educated on the importance of improved core and extremity strength in order to improve alignment of the spine and extremities with static positions and dynamic movement.   GAIT & BALANCE: Patient educated on the importance of strong core and lower extremity musculature in order to improve both static and dynamic balance, improve gait " mechanics, reduce fall risk and improve household and community mobility.   POSTURE: Patient educated on postural awareness to reduce stress and maintain optimal alignment of the spine with static positions and dynamic movement   TRANSFERS & TRANSITIONS: Patient educated on proper technique for bed mobility, transitions and transfers to improve body mechanics and decrease risk of injury.   BRACE: patient educated on proper fit, positioning, and technique for donning and doffing brace in order to maintain optimal alignment of the extremity and promote healing     Written Home Exercises Provided: yes.  Exercises were reviewed and Gerda was able to demonstrate them prior to the end of the session.  Gerda demonstrated good  understanding of the education provided. See EMR under Patient Instructions for exercises provided during therapy sessions.    Assessment     Patient tolerated session well today. Incorporated soft tissue mobilization today with reports of decreased pain noted with ambulation following. Pt demonstrates significant progression of ankle ROM and strength with increased resistance. Added box squats, step downs and side stepping while focusing on arch control in order to improve ankle stability with functional movements.     Gerda is progressing well towards her goals.   Patient prognosis is Good.     Patient will continue to benefit from skilled outpatient physical therapy to address the deficits listed in the problem list box on initial evaluation, provide pt/family education and to maximize patient's level of independence in the home and community environment.     Patient's spiritual, cultural and educational needs considered and pt agreeable to plan of care and goals.    Anticipated Barriers for therapy: none    Short Term Goals:  6 weeks Status  Date Met   PAIN: Pt will report worst pain of 3/10 in order to progress toward max functional ability and improve quality of life. [x]  Progressing  [] Met  [] Not Met     FUNCTION: Patient will demonstrate improved function as indicated by a score of greater than or equal to NT out of 100 on FOTO. [x] Progressing  [] Met  [] Not Met     MOBILITY: Patient will improve AROM to 50% of stated goals, listed in objective measures above, in order to progress towards independence with functional activities.  [x] Progressing  [] Met  [] Not Met     STRENGTH: Patient will improve strength to 50% of stated goals, listed in objective measures above, in order to progress towards independence with functional activities. [x] Progressing  [] Met  [] Not Met     GAIT: Patient will demonstrate improved gait mechanics including discontinuation of walking boot in order to improve functional mobility, improve quality of life, and decrease risk of further injury or fall.  [x] Progressing  [] Met  [] Not Met     HEP: Patient will demonstrate independence with HEP in order to progress toward functional independence. [x] Progressing  [] Met  [] Not Met        Long Term Goals:  12 weeks Status Date Met   PAIN: Pt will report worst pain of 1/10 in order to progress toward max functional ability and improve quality of life [x] Progressing  [] Met  [] Not Met     FUNCTION: Patient will demonstrate improved function as indicated by a score of greater than or equal to NT out of 100 on FOTO. [x] Progressing  [] Met  [] Not Met     MOBILITY: Patient will improve AROM to stated goals, listed in objective measures above, in order to return to maximal functional potential and improve quality of life.  [x] Progressing  [] Met  [] Not Met     STRENGTH: Patient will improve strength to stated goals, listed in objective measures above, in order to improve functional independence and quality of life.  [x] Progressing  [] Met  [] Not Met     GAIT: Patient will demonstrate normalized gait mechanics with minimal compensation in order to return to PLOF. [x] Progressing  [] Met  [] Not Met      Patient will return to normal ADL's, IADL's, community involvement, recreational activities, and work-related activities with less than or equal to 1/10 pain and maximal function.  [x] Progressing  [] Met  [] Not Met       Plan     Continue Plan of Care (POC) and progress per patient tolerance. See treatment section for details on planned progressions next session.    Analilia Son, PT

## 2024-12-18 NOTE — TELEPHONE ENCOUNTER
Returned patients call for MRI and couldn't leave .-AM          ----- Message from Marla Womack sent at 12/16/2022 12:22 PM CST -----  Contact: Camille Green is calling in regards to her still needing the MRI scheduled.Please call back at [phone]        Thanks  MILADIS    
32-year-old female with past medical history of hypertension intellectual disability coming from group home presents to ED complaining of lip swelling.  Patient takes lisinopril at home for hypertension.  Took last dose yesterday.  Sent to [facility] for observation.  Patient clinically improved lips are less swollen patient here with group home staff.  No difficulty breathing airway patent.  Cleared for discharge home.  Spoke with group home nurse Blanca and advised to follow-up with Ortho for cervical changes on CT, ENT follow-up for parotid gland findings, allergy follow-up, and PMD follow-up to help manage hypertension given patient is now off of Lisinopril. Patient's blood pressure has been stable in [facility] systolic 120s. RN expressed concern blood pressure usually runs high and won't have blood pressure medication to take now stopping Lisinopril. I called PMD Dr. Rapp will start pt on Losartan 50mg daily and have pt follow up in office.

## 2024-12-22 ENCOUNTER — PATIENT MESSAGE (OUTPATIENT)
Dept: SPORTS MEDICINE | Facility: CLINIC | Age: 54
End: 2024-12-22
Payer: COMMERCIAL

## 2024-12-22 DIAGNOSIS — M25.572 PAIN OF JOINT OF LEFT ANKLE AND FOOT: Primary | ICD-10-CM

## 2024-12-23 ENCOUNTER — CLINICAL SUPPORT (OUTPATIENT)
Dept: REHABILITATION | Facility: HOSPITAL | Age: 54
End: 2024-12-23
Payer: COMMERCIAL

## 2024-12-23 DIAGNOSIS — R26.9 GAIT ABNORMALITY: ICD-10-CM

## 2024-12-23 DIAGNOSIS — M25.671 DECREASED RANGE OF MOTION OF RIGHT ANKLE: ICD-10-CM

## 2024-12-23 DIAGNOSIS — Z74.09 DECREASED FUNCTIONAL MOBILITY AND ENDURANCE: Primary | ICD-10-CM

## 2024-12-23 DIAGNOSIS — R26.89 POOR BALANCE: ICD-10-CM

## 2024-12-23 PROCEDURE — 97112 NEUROMUSCULAR REEDUCATION: CPT

## 2024-12-23 PROCEDURE — 97140 MANUAL THERAPY 1/> REGIONS: CPT

## 2024-12-23 PROCEDURE — 97110 THERAPEUTIC EXERCISES: CPT

## 2024-12-23 PROCEDURE — 97530 THERAPEUTIC ACTIVITIES: CPT

## 2024-12-23 NOTE — PROGRESS NOTES
[facility]   Physical Therapy Treatment Note     Name: Gerda Obrien  Clinic Number: [id number]    Therapy Diagnosis:   Encounter Diagnoses   Name Primary?    Decreased functional mobility and endurance Yes    Decreased range of motion of right ankle     Gait abnormality     Poor balance        Physician: Jeff Mcdonnell MD    Visit Date: 12/23/2024    Physician Orders: PT Eval and Treat  Medical Diagnosis from Referral: Left ankle pain, unspecified chronicity [M25.572]   Evaluation Date: 11/4/2024  Authorization Period Expiration: 11/4/2025   Plan of Care Expiration: 1/4/2025  Progress Note Due: 12/25/2025  Visit # / Visits authorized: 5/25 (+1 evaluation)  FOTO: 1/3 (last performed on 11/4/2024)     Precautions: Standard  PTA Visit #: 0/5     Time In: 1345  Time Out: 1450  Total Billable Time: 59 minutes (Billing reflects 1 on 1 treatment time spent with patient)    Subjective     Patient reports: her right ankle is feeling great but left ankle continues to be symptomatic posterior and slightly superior to lateral malleoli.     He/She was compliant with home exercise program.  Response to previous treatment: beginning home exercise plan for bilateral ankles  Functional change: performing home exercise plan    Pain: 4/10     Location: left ankle     Objective      Objective Measures updated at progress report or POC update only unless otherwise noted.     Treatment     Gerda received the treatments listed below:         CPT Intervention Performed   Today Duration / Intensity   MT Joint Mobilizations  x TC A/P's, TC distraction     Soft tissue massage  x L peroneals   TE Recumbent bike  x 6 minutes     Gastroc Stretch x 3 x30s holds    NMR Ankle 4 way  x    x Plantarflexion and dorsiflexion: black band 30x each b  Inversion and eversion: green band, 30x each b       Toe Yoga x 3 minutes x 5s holds, alternating      Weight shifts    Lateral: 3 mins x 3s holds      Tandem  "Stance  3 x 30" each way     MOBO taps - single leg stance  x 1 min each b, odds and evens      Single leg stance  x 3x30s b    TA Box squat   15# 3 x 10     Step Ups   3 x 10 8 inch step     Step downs  x 4 inch, 3x10 b      Leg press   Double le# 3x10      Eccentric calf raises  x 2x10 b      Side stepping   4 inch, 3x10 b      Seated soleus raises  x Single le# 3x10 b    PLAN Single leg stance progression              CPT Codes available for Billing:   (11) minutes of Manual therapy (MT) to improve pain and ROM.  (10) minutes of Therapeutic Exercise (TE) to develop strength, endurance, range of motion, and flexibility.  (20) minutes of Neuromuscular Re-Education (NMR)  to improve: Balance, Coordination, Kinesthetic, Sense, Proprioception, and Posture.  (15) minutes of Therapeutic Activities (TA) to improve functional performance.  Vasopneumatic Device Therapy () for management of swelling/edema. (59891)  Unattended Electrical Stimulation (ES) for muscle performance or pain modulation.  BFR: Blood flow restriction applied during exercise      Patient Education and Home Exercises       Home Exercises Provided and Patient Education Provided     Education provided: time included in treatment time.   PURPOSE: Patient educated on the impairments noted above and the effects of physical therapy intervention to improve overall condition and QOL.   EXERCISE: Patient was educated on all the above exercise prior/during/after for proper posture, positioning, and execution for safe performance with home exercise program.   STRENGTH: Patient educated on the importance of improved core and extremity strength in order to improve alignment of the spine and extremities with static positions and dynamic movement.   GAIT & BALANCE: Patient educated on the importance of strong core and lower extremity musculature in order to improve both static and dynamic balance, improve gait mechanics, reduce fall risk and improve " household and community mobility.   POSTURE: Patient educated on postural awareness to reduce stress and maintain optimal alignment of the spine with static positions and dynamic movement   TRANSFERS & TRANSITIONS: Patient educated on proper technique for bed mobility, transitions and transfers to improve body mechanics and decrease risk of injury.   BRACE: patient educated on proper fit, positioning, and technique for donning and doffing brace in order to maintain optimal alignment of the extremity and promote healing     Written Home Exercises Provided: yes.  Exercises were reviewed and Gerda was able to demonstrate them prior to the end of the session.  Gerda demonstrated good  understanding of the education provided. See EMR under Patient Instructions for exercises provided during therapy sessions.    Assessment     Patient tolerated session well today. Tenderness to palpation to peroneus longus and brevis. Reduced with soft tissue massage to left peroneals.     Gerda is progressing well towards her goals.   Patient prognosis is Good.     Patient will continue to benefit from skilled outpatient physical therapy to address the deficits listed in the problem list box on initial evaluation, provide pt/family education and to maximize patient's level of independence in the home and community environment.     Patient's spiritual, cultural and educational needs considered and pt agreeable to plan of care and goals.    Anticipated Barriers for therapy: none    Short Term Goals:  6 weeks Status  Date Met   PAIN: Pt will report worst pain of 3/10 in order to progress toward max functional ability and improve quality of life. [x] Progressing  [] Met  [] Not Met     FUNCTION: Patient will demonstrate improved function as indicated by a score of greater than or equal to NT out of 100 on FOTO. [x] Progressing  [] Met  [] Not Met     MOBILITY: Patient will improve AROM to 50% of stated goals, listed in objective  measures above, in order to progress towards independence with functional activities.  [x] Progressing  [] Met  [] Not Met     STRENGTH: Patient will improve strength to 50% of stated goals, listed in objective measures above, in order to progress towards independence with functional activities. [x] Progressing  [] Met  [] Not Met     GAIT: Patient will demonstrate improved gait mechanics including discontinuation of walking boot in order to improve functional mobility, improve quality of life, and decrease risk of further injury or fall.  [x] Progressing  [] Met  [] Not Met     HEP: Patient will demonstrate independence with HEP in order to progress toward functional independence. [x] Progressing  [] Met  [] Not Met        Long Term Goals:  12 weeks Status Date Met   PAIN: Pt will report worst pain of 1/10 in order to progress toward max functional ability and improve quality of life [x] Progressing  [] Met  [] Not Met     FUNCTION: Patient will demonstrate improved function as indicated by a score of greater than or equal to NT out of 100 on FOTO. [x] Progressing  [] Met  [] Not Met     MOBILITY: Patient will improve AROM to stated goals, listed in objective measures above, in order to return to maximal functional potential and improve quality of life.  [x] Progressing  [] Met  [] Not Met     STRENGTH: Patient will improve strength to stated goals, listed in objective measures above, in order to improve functional independence and quality of life.  [x] Progressing  [] Met  [] Not Met     GAIT: Patient will demonstrate normalized gait mechanics with minimal compensation in order to return to PLOF. [x] Progressing  [] Met  [] Not Met     Patient will return to normal ADL's, IADL's, community involvement, recreational activities, and work-related activities with less than or equal to 1/10 pain and maximal function.  [x] Progressing  [] Met  [] Not Met       Plan     Continue Plan of Care (POC) and progress per  patient tolerance. See treatment section for details on planned progressions next session.    Sherwin Yao, PT

## 2024-12-30 ENCOUNTER — HOSPITAL ENCOUNTER (OUTPATIENT)
Dept: RADIOLOGY | Facility: HOSPITAL | Age: 54
Discharge: HOME | End: 2024-12-30
Attending: STUDENT IN AN ORGANIZED HEALTH CARE EDUCATION/TRAINING PROGRAM
Payer: COMMERCIAL

## 2024-12-30 DIAGNOSIS — M25.572 PAIN OF JOINT OF LEFT ANKLE AND FOOT: ICD-10-CM

## 2024-12-30 PROCEDURE — 73721 MRI JNT OF LWR EXTRE W/O DYE: CPT | Mod: TC,LT

## 2024-12-30 PROCEDURE — 73721 MRI JNT OF LWR EXTRE W/O DYE: CPT | Mod: 26,LT,, | Performed by: STUDENT IN AN ORGANIZED HEALTH CARE EDUCATION/TRAINING PROGRAM

## 2024-12-31 NOTE — PROGRESS NOTES
Patient ID: Gerda Obrien  YOB: 1970  MRN: [id number]    Referred By: Dr. Mcdonnell patient    Occupation:        History of Present Illness: Gerda Obrien is a 54 y.o. female who presents today with Pain of the Left Ankle and Pain of the Right Ankle     History of Present Illness    HPI:  Gerda presents for follow-up regarding left ankle pain. The pain originated from two separate fall incidents - one in October when she fell down steps and sprained the left ankle, followed by another fall in November resulting in a sprain of the same ankle. She reports the ankle is still painful and is continuing with physical therapy with [facility].    She localizes the pain primarily to the side of the ankle, specifically pointing to the area around the anterior inferior talofibular ligament (AITFL) during exam. She denies pain in the back of the ankle or in the front of the ankle.    She has been taking Vloxacan for pain management but reports inconsistent relief. She also mentions using a gel, likely diclofenac, prescribed by her regular podiatrist. A previous steroid injection administered by the podiatrist provided only brief relief, lasting about a few days.    An MRI was previously ordered by Dr. Mcdonnell, revealing joint effusion and some ligament thickening but no major tears or abnormalities.    MEDICATIONS:  - Vloxacan  - Diclofenac gel for ankle pain         Past Medical History:   Past Medical History:   Diagnosis Date    Allergy     Anxiety     Biotin deficiency disease     Chlamydia     Diabetes mellitus     Fibroids     Herpes simplex     Knee pain, bilateral     Metabolic syndrome     Sleep apnea     Vitamin D deficiency      Past Surgical History:   Procedure Laterality Date    ARTHROSCOPIC REPAIR OF ROTATOR CUFF OF SHOULDER Left 03/07/2022    Procedure: REPAIR, ROTATOR CUFF, ARTHROSCOPIC;  Surgeon: Denilson Jackson MD;   Location: [facility] OR;  Service: Orthopedics;  Laterality: Left;    ARTHROSCOPY OF SHOULDER WITH DECOMPRESSION OF SUBACROMIAL SPACE Left 03/07/2022    Procedure: ARTHROSCOPY, SHOULDER, WITH SUBACROMIAL SPACE DECOMPRESSION;  Surgeon: Denilson Jackson MD;  Location: [facility] OR;  Service: Orthopedics;  Laterality: Left;    BREAST SURGERY      BUNIONECTOMY      CERVICAL BIOPSY  W/ LOOP ELECTRODE EXCISION      COLONOSCOPY N/A 10/15/2021    Procedure: COLONOSCOPY;  Surgeon: Alaina eCja MD;  Location: [facility] ENDO;  Service: Endoscopy;  Laterality: N/A;    FIXATION OF TENDON Left 03/07/2022    Procedure: FIXATION, TENDON;  Surgeon: Denilson Jackson MD;  Location: [facility] OR;  Service: Orthopedics;  Laterality: Left;    FOOT SURGERY      hammer toe Bilateral     HYSTERECTOMY  11/2021    INJECTION OF ANESTHETIC AGENT AROUND MEDIAL BRANCH NERVES INNERVATING LUMBAR FACET JOINT Bilateral 10/26/2023    Procedure: Diagnostic Bilateral L4/5 & L5/S1 MBB #1;  Surgeon: Beka Johnson MD;  Location: [facility] PAIN MGT;  Service: Pain Management;  Laterality: Bilateral;    INJECTION OF ANESTHETIC AGENT AROUND MEDIAL BRANCH NERVES INNERVATING LUMBAR FACET JOINT Bilateral 11/20/2023    Procedure: Bilateral L4/L5 and L5/S1 MBB;  Surgeon: Beka Johnson MD;  Location: [facility] PAIN MGT;  Service: Pain Management;  Laterality: Bilateral;    Laparoscopic assisted vaginal hysterectomy with BSO  11/22/2021    Path--Leiomyomata/Adenomyosis    LASER ABLATION OF THE CERVIX      PLANTAR FASCIA SURGERY Left     RADIOFREQUENCY THERMOCOAGULATION Bilateral 12/21/2023    Procedure: Bilateral L4/L5 and L5/S1 Lumbar RFA;  Surgeon: Beka Johnson MD;  Location: [facility] PAIN MGT;  Service: Pain Management;  Laterality: Bilateral;    TOTAL REDUCTION MAMMOPLASTY  03/2007    TUBAL LIGATION       Family History   Problem Relation Name Age of Onset    Diabetes Paternal Grandfather Cezar Wilson     Epilepsy Maternal Grandmother [place] Cuate      Seizures Maternal Grandmother Elissa Cuello     Cancer Father Gordon Wilson         Prostrate    Diabetes Father Gordon Wilson     Breast cancer Cousin Paternal     Hypertension Mother Ping Wilson Brown     Heart disease Neg Hx       Social History     Socioeconomic History    Marital status:    Tobacco Use    Smoking status: Never    Smokeless tobacco: Never   Substance and Sexual Activity    Alcohol use: Yes     Alcohol/week: 1.0 standard drink of alcohol     Types: 1 Glasses of wine per week     Comment: ocassional     Drug use: No    Sexual activity: Yes     Partners: Male     Birth control/protection: Other-see comments     Comment: Hysterectomy     Social Drivers of Health     Financial Resource Strain: Low Risk  (11/22/2024)    Overall Financial Resource Strain (CARDIA)     Difficulty of Paying Living Expenses: Not very hard   Food Insecurity: No Food Insecurity (11/22/2024)    Hunger Vital Sign     Worried About Running Out of Food in the Last Year: Never true     Ran Out of Food in the Last Year: Never true   Physical Activity: Inactive (11/22/2024)    Exercise Vital Sign     Days of Exercise per Week: 0 days     Minutes of Exercise per Session: 0 min   Stress: No Stress Concern Present (11/22/2024)    [employer] [place] [employer] - Occupational Stress Questionnaire     Feeling of Stress : Only a little   Housing Stability: Unknown (11/22/2024)    Housing Stability Vital Sign     Unable to Pay for Housing in the Last Year: No     Medication List with Changes/Refills   Current Medications    BIOTIN 10,000 MCG CAP    Take 1 capsule by mouth once daily.    BLOOD SUGAR DIAGNOSTIC (ACCU-CHEK GUIDE TEST STRIPS) STRP    Use bid with glucometer    CLONAZEPAM (KLONOPIN) 0.5 MG TABLET    TAKE 1 TABLET BY MOUTH EVERY DAY AS NEEDED FOR ANXIETY    CLOTRIMAZOLE-BETAMETHASONE 1-0.05% (LOTRISONE) CREAM    Apply topically 2 (two) times daily.    DESLORATADINE (CLARINEX) 5 MG TABLET    TAKE 1  TABLET BY MOUTH EVERY DAY    DICLOFENAC SODIUM (VOLTAREN) 1 % GEL    USE 2 GRAMS (TOPICAL) 4 TIMES PER DAY AS NEEDED FOR PAIN    ESTRADIOL (LYLLANA) 0.1 MG/24 HR PTSW    Place 1 patch onto the skin twice a week.    FERROUS SULFATE (FEOSOL) 325 MG (65 MG IRON) TAB TABLET    TAKE 1 TABLET BY MOUTH EVERY DAY WITH BREAKFAST    FLUTICASONE PROPIONATE (FLONASE) 50 MCG/ACTUATION NASAL SPRAY    SPRAY 2 SPRAYS IN EACH NOSTRIL ONCE DAILY    MELOXICAM (MOBIC) 15 MG TABLET    Take 1 tablet (15 mg total) by mouth once daily.    MULTIVITAMIN CAPSULE    Take by mouth.    OMEPRAZOLE (PRILOSEC) 40 MG CAPSULE    TAKE 1 CAPSULE (40 MG TOTAL) BY MOUTH EVERY MORNING.    ROSUVASTATIN (CRESTOR) 5 MG TABLET    TAKE 1 TABLET BY MOUTH EVERY DAY    SEMAGLUTIDE (OZEMPIC) 2 MG/DOSE (8 MG/3 ML) PNIJ    Inject 2 mg into the skin every 7 days.    TIRZEPATIDE 12.5 MG/0.5 ML PNIJ    Inject 12.5 mg into the skin every 7 days.    TRIAMCINOLONE ACETONIDE 0.025% (KENALOG) 0.025 % CREAM    Apply topically 2 (two) times daily as needed (for dry patches on face). Mild steroid. Use sparingly for 1-2 weeks if needed then stop.    VALACYCLOVIR (VALTREX) 500 MG TABLET    Take 1 tablet (500 mg total) by mouth once daily.     Review of patient's allergies indicates:   Allergen Reactions    Doxycycline     Dulaglutide Other (See Comments)    Emtricitabine      Other reaction(s): Rash    Lexapro [escitalopram oxalate]      spasms    Lipitor [atorvastatin]      Decreased sex drive    Metformin hcl      Other Reaction(s): Unknown    Sulfa (sulfonamide antibiotics) Other (See Comments)    Tenofovir      Other reaction(s): Rash    Bydureon [exenatide microspheres] Rash     Skin reaction at site of injection.       Physical Exam:   Body mass index is 34.75 kg/m².    Detailed MSK exam:   Ortho/SPM Exam       Left Ankle:  Inspection:  Effusion, swelling at sinus tarsi & ATFL  Palpation tenderness: ATFL, sinus tarsi  Range of motion:  30 deg DF       60 deg PF        normal Inversion       normal Eversion  Strength:  5/5 DF    5/5 PF    5/5 Inversion    5/5 Eversion  Tests:  Stable anterior drawer  N/V Exam:  Tibial:    Normal sensory (plantar foot)  Normal motor (FHL)    Sup Peroneal:   Normal sensory (dorsal foot)  Normal motor (Peroneals)            Deep Peroneal:   Normal sensory (1st web space)  Normal motor (EHL)    Sural:   Normal sensory (lateral foot)   Saphenous:   Normal sensory (medial lower leg)   Normal pedal pulses, warm and well perfused with capillary refill < 2 sec   Calf soft and compressible      Imaging:  MRI Ankle Without Contrast Left  Narrative: EXAMINATION:  MRI ANKLE WITHOUT CONTRAST LEFT    CLINICAL HISTORY:  Ankle pain, instability suspected, neg xray;Pain in left ankle and joints of left foot    TECHNIQUE:  Multiplanar, multisequence MR images of the left ankle were obtained on a 1.5 Peggy magnet.    COMPARISON:  X-ray from 10/21/2024.    FINDINGS:  Alignment: Normal.    Fluid:    Tibiotalar joint: Large joint effusion.    Posterior subtalar joint: Small effusion.    Talonavicular joint: Small to moderate effusion..    Tendons:    Tibialis posterior: Normal.    Flexor digitorum longus: Normal.    Flexor hallucis longus: Normal.    Peroneus longus: Normal.    Peroneus brevis: Normal.    Tibialis anterior: Normal.    Extensor hallucis longus: Normal.    Extensor digitorum longus: Normal.    Achilles: Normal.    Ligaments:    Deltoid ligament complex : Intact.    Spring (plantar calcaneonavicular) ligament: Intact.    Interosseous (syndesmosis): Intact.    Anterior inferior tibiofibular (syndesmosis): Mildly thickened with increased T2 signal..    Posterior inferior tibiofibular (syndesmosis): Intact.    Anterior talofibular ligament: Periligamentous edema.  Otherwise intact.    Calcaneofibular ligament: Intact.    Posterior talofibular ligament: Intact.    Muscles: There is marked atrophy of the abductor digiti minimi..    Sinus tarsi:  Normal.    Articular cartilage and bones: There is hypertrophy of the plantar aspect of the calcaneus.    Plantar fascia: There is a small amount of fluid in the plantar fascia at the calcaneal attachment.  .  Impression: 1. Left ankle joint effusion.  2. Thickening and T increased T2 signal intensity of the anterior syndesmosis concerning for sprain.  3. 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    Electronically signed by: Yung Chambers  Date:    12/30/2024  Time:    08:58    Relevant imaging results were reviewed and interpreted by me and per my read:  Thickening of the anterior syndesmosis, prior sprain; no ligamentous or tendinous tears; minimal degenerative findings; prominent effusion    This was discussed with the patient and / or family today.     Patient Instructions   Assessment:  Gerda Obrien is a 54 y.o. female with a chief complaint of Pain of the Left Ankle and Pain of the Right Ankle    Encounter Diagnoses   Name Primary?    Pain of joint of left ankle and foot Yes    Sprain of left ankle, unspecified ligament, subsequent encounter     Left ankle effusion       Plan:  MRI reviewed  No ligamentous or tendinous injury  Moderate effusion about the tibiotalar and subtalar joints, and sinus tarsi.  No fractures  History and clinical exam is consistent with chronic left ankle pain after sprain, without any significant tendinous or ligamentous injury.  Discussed if she is having effusion, which may be either causing her pain, or as a result of the underlying sprain.  Continue physical therapy  Discussed the possibility of an ankle injection, although patient defers at this time  Continue meloxicam 15 mg daily    Follow-up:  As needed or sooner if there are any problems between now and then.    Thank you for choosing [facility] and Dr. Jatin Bartlett for your orthopedic & sports medicine care. It is our goal to provide you with  exceptional care that will help keep you healthy, active, and get you back in the game.    Please do not hesitate to reach out to us via email, phone, or MyChart with any questions, concerns, or feedback.    If you are experiencing pain/discomfort ,or have questions after 5pm and would like to be connected to the [facility] [place] on-call team, please call this number and specify which Sports Medicine provider is treating you: [phone]       A copy of today's visit note has been sent to the referring provider.           Jatin Bartlett MD  Primary Care Sports Medicine    Disclaimer: This note was prepared using a voice recognition system and is likely to have sound alike errors within the text.     This note was generated with the assistance of ambient listening technology. Verbal consent was obtained by the patient and accompanying visitor(s) for the recording of patient appointment to facilitate this note. I attest to having reviewed and edited the generated note for accuracy, though some syntax or spelling errors may persist. Please contact the author of this note for any clarification.

## 2025-01-08 ENCOUNTER — TELEPHONE (OUTPATIENT)
Dept: ORTHOPEDICS | Facility: CLINIC | Age: 55
End: 2025-01-08
Payer: COMMERCIAL

## 2025-01-08 NOTE — TELEPHONE ENCOUNTER
----- Message from [employer] sent at 1/8/2025  2:33 PM CST -----  Contact: self  .Type:  Sooner Apoointment Request    Caller is requesting a sooner appointment.  Caller declined first available appointment listed below.  Caller will not accept being placed on the waitlist and is requesting a message be sent to doctor.  Name of Caller:.Gerda Obrien  When is the first available appointment?tomorrow   Symptoms:f/u   Would the patient rather a call back or a response via [facility]? Call back   Best Call Back Number:.[phone]   Additional Information: pt has an apt on tomorrow with Dr. Bartlett and it conflicts with another apt and she is needing to discuss getting an apt with Sathya.

## 2025-01-09 ENCOUNTER — OFFICE VISIT (OUTPATIENT)
Dept: SPORTS MEDICINE | Facility: CLINIC | Age: 55
End: 2025-01-09
Payer: COMMERCIAL

## 2025-01-09 VITALS — HEIGHT: 62 IN | BODY MASS INDEX: 34.96 KG/M2 | WEIGHT: 190 LBS

## 2025-01-09 DIAGNOSIS — S93.402D SPRAIN OF LEFT ANKLE, UNSPECIFIED LIGAMENT, SUBSEQUENT ENCOUNTER: ICD-10-CM

## 2025-01-09 DIAGNOSIS — M25.472 LEFT ANKLE EFFUSION: ICD-10-CM

## 2025-01-09 DIAGNOSIS — M25.572 PAIN OF JOINT OF LEFT ANKLE AND FOOT: Primary | ICD-10-CM

## 2025-01-09 PROCEDURE — 1160F RVW MEDS BY RX/DR IN RCRD: CPT | Mod: CPTII,S$GLB,, | Performed by: STUDENT IN AN ORGANIZED HEALTH CARE EDUCATION/TRAINING PROGRAM

## 2025-01-09 PROCEDURE — 3008F BODY MASS INDEX DOCD: CPT | Mod: CPTII,S$GLB,, | Performed by: STUDENT IN AN ORGANIZED HEALTH CARE EDUCATION/TRAINING PROGRAM

## 2025-01-09 PROCEDURE — 1159F MED LIST DOCD IN RCRD: CPT | Mod: CPTII,S$GLB,, | Performed by: STUDENT IN AN ORGANIZED HEALTH CARE EDUCATION/TRAINING PROGRAM

## 2025-01-09 PROCEDURE — 99214 OFFICE O/P EST MOD 30 MIN: CPT | Mod: S$GLB,,, | Performed by: STUDENT IN AN ORGANIZED HEALTH CARE EDUCATION/TRAINING PROGRAM

## 2025-01-09 PROCEDURE — 99999 PR PBB SHADOW E&M-EST. PATIENT-LVL IV: CPT | Mod: PBBFAC,,, | Performed by: STUDENT IN AN ORGANIZED HEALTH CARE EDUCATION/TRAINING PROGRAM

## 2025-01-09 NOTE — PATIENT INSTRUCTIONS
Assessment:  Gerda Obrien is a 54 y.o. female with a chief complaint of Pain of the Left Ankle and Pain of the Right Ankle    Encounter Diagnoses   Name Primary?    Pain of joint of left ankle and foot Yes    Sprain of left ankle, unspecified ligament, subsequent encounter     Left ankle effusion       Plan:  MRI reviewed  No ligamentous or tendinous injury  Moderate effusion about the tibiotalar and subtalar joints, and sinus tarsi.  No fractures  History and clinical exam is consistent with chronic left ankle pain after sprain, without any significant tendinous or ligamentous injury.  Discussed if she is having effusion, which may be either causing her pain, or as a result of the underlying sprain.  Continue physical therapy  Discussed the possibility of an ankle injection, although patient defers at this time  Continue meloxicam 15 mg daily    Follow-up:  As needed or sooner if there are any problems between now and then.    Thank you for choosing [facility] and Dr. Jatin Bartlett for your orthopedic & sports medicine care. It is our goal to provide you with exceptional care that will help keep you healthy, active, and get you back in the game.    Please do not hesitate to reach out to us via email, phone, or MyChart with any questions, concerns, or feedback.    If you are experiencing pain/discomfort ,or have questions after 5pm and would like to be connected to the [facility] [place] on-call team, please call this number and specify which Sports Medicine provider is treating you: [phone]

## 2025-01-13 ENCOUNTER — CLINICAL SUPPORT (OUTPATIENT)
Dept: REHABILITATION | Facility: HOSPITAL | Age: 55
End: 2025-01-13
Payer: COMMERCIAL

## 2025-01-13 DIAGNOSIS — M25.671 DECREASED RANGE OF MOTION OF RIGHT ANKLE: ICD-10-CM

## 2025-01-13 DIAGNOSIS — R26.89 POOR BALANCE: ICD-10-CM

## 2025-01-13 DIAGNOSIS — Z74.09 DECREASED FUNCTIONAL MOBILITY AND ENDURANCE: Primary | ICD-10-CM

## 2025-01-13 DIAGNOSIS — R26.9 GAIT ABNORMALITY: ICD-10-CM

## 2025-01-13 PROCEDURE — 97530 THERAPEUTIC ACTIVITIES: CPT

## 2025-01-13 PROCEDURE — 97110 THERAPEUTIC EXERCISES: CPT

## 2025-01-13 PROCEDURE — 97112 NEUROMUSCULAR REEDUCATION: CPT

## 2025-01-15 NOTE — PROGRESS NOTES
[facility]   Physical Therapy Treatment Note/ POC Update     Name: Gerda Obrien  Clinic Number: [id number]    Therapy Diagnosis:   Encounter Diagnoses   Name Primary?    Decreased functional mobility and endurance Yes    Decreased range of motion of right ankle     Gait abnormality     Poor balance        Physician: Jeff Mcdonnell MD    Visit Date: 1/13/2025    Physician Orders: PT Eval and Treat  Medical Diagnosis from Referral: Left ankle pain, unspecified chronicity [M25.572]   Evaluation Date: 11/4/2024  Authorization Period Expiration: 11/4/2025   Plan of Care Expiration: 1/4/2025  Progress Note Due: 12/25/2025  Visit # / Visits authorized: 5/25 (+1 evaluation)  FOTO: 1/3 (last performed on 11/4/2024)     Precautions: Standard  PTA Visit #: 0/5     Time In: 1630  Time Out: 1734  Total Billable Time: 53 minutes (Billing reflects 1 on 1 treatment time spent with patient)    Subjective     Patient reports: She continues to have significant pain in the L ankle which does not seem to be improving. States she usually feels pretty good following her sessions     He/She was compliant with home exercise program.  Response to previous treatment: beginning home exercise plan for bilateral ankles  Functional change: performing home exercise plan    Pain: 4/10     Location: left ankle     Objective      Objective Measures updated at progress report or POC update only unless otherwise noted.     RANGE OF MOTION:   Ankle/Foot AROM/PROM Right Left Pain/Dysfunction with Movement Goal   Dorsiflexion (20º) 10 10  pain  20   Plantarflexion (50º) 35 35 Pain  50   Inversion (35º) 25 25 Pain  35   Eversion (15º) 10 10 Pain  15            STRENGTH: (* denotes pain)  L/E MMT Right  (spine) Left Dysfunction with Movement Goal   Modified (90/90) Abdominal Strength    ---       Hip Flexion            Hip Extension            Hip Abduction            Knee Extension           Knee Flexion          "  Hip IR           Hip ER           Ankle DF 4/5 4/5   5/5 B   Ankle PF 4+/5 4+/5   5/5 B   Ankle Inversion 4/5 4/5   5/5 B   Ankle Eversion 4/5 4/5   5/5 B         PALPATION: Increased tenderness to palpation of: B lateral malleolus, ATFL, CF ligaments        GAIT ANALYSIS The patient ambulated with the following assistive device: B lace-up ankle braces ; the pt presents with the following gait abnormalities: decreased step length B, decreased stance time B, and decreased push off B. *Although these abnormalities are still present, pt demonstrates improvement in all areas      FUNCTIONAL MOVEMENT PATTERNS  Movement Analysis Notes   Bed Mobility        Transfers       Sit to Stand  Functional, painful     Squat  Functional, painful     Single Leg Squat        Step Down   dysfunctional, painful     Single leg calf raises   dysfunctional, painful Limited vertical displacement         BALANCE:    Right   (seconds) Left  (seconds) Pain/dysfunction Noted Goal   Narrow Base of Support   ---   60+ seconds   Tandem Stance 30+ 30+   60+ seconds   Single Leg Stance 10 10  requires upper extremity support 60+ seconds       Treatment     Gerda received the treatments listed below:          CPT Intervention Performed   Today Duration / Intensity   MT Joint Mobilizations   TC A/P's, TC distraction     Soft tissue massage    L tibialis anterior and peroneals    TE Recumbent bike  x 5 minutes     Gastroc Stretch x 3 x30s holds    NMR Ankle 4 way  x  x Plantarflexion and dorsiflexion: black band 30x each b  Inversion and eversion: blue band, 30x each b       Toe Yoga   3 minutes x 5s holds, alternating      Weight shifts    Lateral: 3 mins x 3s holds      Tandem Stance x 3 x 30" each way     MOBO   X  X new  Taps: 1 min each b, odds and evens   Holds: 1 min each b, odds and evens      Single leg stance  x 3x30s b    TA Box squat  x 15# 3 x 10     Step Ups   3 x 10 8 inch step     Step downs  x 4 inch, 3x10 b      Leg press   " Double le# 3x10      Eccentric calf raises  x 2x10 b      Side stepping  x Red band 4 laps along mirror      Seated soleus raises  x Single le# 3x10 b    PLAN Single leg stance progression              CPT Codes available for Billing:   (00) minutes of Manual therapy (MT) to improve pain and ROM.  (08) minutes of Therapeutic Exercise (TE) to develop strength, endurance, range of motion, and flexibility.  (20) minutes of Neuromuscular Re-Education (NMR)  to improve: Balance, Coordination, Kinesthetic, Sense, Proprioception, and Posture.  (25) minutes of Therapeutic Activities (TA) to improve functional performance.  Vasopneumatic Device Therapy () for management of swelling/edema. (08443)  Unattended Electrical Stimulation (ES) for muscle performance or pain modulation.  BFR: Blood flow restriction applied during exercise        Patient Education and Home Exercises       Home Exercises Provided and Patient Education Provided     Education provided: time included in treatment time.   PURPOSE: Patient educated on the impairments noted above and the effects of physical therapy intervention to improve overall condition and QOL.   EXERCISE: Patient was educated on all the above exercise prior/during/after for proper posture, positioning, and execution for safe performance with home exercise program.   STRENGTH: Patient educated on the importance of improved core and extremity strength in order to improve alignment of the spine and extremities with static positions and dynamic movement.   GAIT & BALANCE: Patient educated on the importance of strong core and lower extremity musculature in order to improve both static and dynamic balance, improve gait mechanics, reduce fall risk and improve household and community mobility.   POSTURE: Patient educated on postural awareness to reduce stress and maintain optimal alignment of the spine with static positions and dynamic movement   TRANSFERS & TRANSITIONS: Patient  educated on proper technique for bed mobility, transitions and transfers to improve body mechanics and decrease risk of injury.   BRACE: patient educated on proper fit, positioning, and technique for donning and doffing brace in order to maintain optimal alignment of the extremity and promote healing     Written Home Exercises Provided: yes.  Exercises were reviewed and Gerda was able to demonstrate them prior to the end of the session.  Gerda demonstrated good  understanding of the education provided. See EMR under Patient Instructions for exercises provided during therapy sessions.    Assessment     Patient tolerated session well today.  Pt and I discussed increasing compliance with HEP or increasing frequency of PT visits in order to accelerate healing. Pt is agreeable to increasing frequency of visits. Added holds on the MOBO board in order to improve ankle stability. Pt notes increased fatigue and soreness in the ankle following session. An assessment was completed today with improvements noted in b ankle ROM, gross lower extremity strength and functional movement patterns compared to prior assessment; please see exam for details. He/she continues to have difficulty with b ankle pain due to continued limitation in ROM and strength. Pt has not been compliant with HEP outside of therapy. Pt agrees to increase frequency of PT visits per week as well as improve HEP compliance in order to maximize progress. The above impairments and functional deficits will continue to be addressed over the course of this plan of care. patient was educated on continued progression of PT, expectations for the duration of care, updates on goals set at initial evaluation, and home exercise program. Patient will continue to benefit from physical therapy in order to further address goals, maximize function and quality of life.     Gerda is progressing well towards her goals.   Patient prognosis is Good.     Patient will continue  to benefit from skilled outpatient physical therapy to address the deficits listed in the problem list box on initial evaluation, provide pt/family education and to maximize patient's level of independence in the home and community environment.     Patient's spiritual, cultural and educational needs considered and pt agreeable to plan of care and goals.    Anticipated Barriers for therapy: none    Short Term Goals:  6 weeks Status  Date Met   PAIN: Pt will report worst pain of 3/10 in order to progress toward max functional ability and improve quality of life. [x] Progressing  [] Met  [] Not Met     FUNCTION: Patient will demonstrate improved function as indicated by a score of greater than or equal to NT out of 100 on FOTO. [x] Progressing  [] Met  [] Not Met     MOBILITY: Patient will improve AROM to 50% of stated goals, listed in objective measures above, in order to progress towards independence with functional activities.  [x] Progressing  [] Met  [] Not Met     STRENGTH: Patient will improve strength to 50% of stated goals, listed in objective measures above, in order to progress towards independence with functional activities. [x] Progressing  [] Met  [] Not Met     GAIT: Patient will demonstrate improved gait mechanics including discontinuation of walking boot in order to improve functional mobility, improve quality of life, and decrease risk of further injury or fall.  [x] Progressing  [] Met  [] Not Met     HEP: Patient will demonstrate independence with HEP in order to progress toward functional independence. [x] Progressing  [] Met  [] Not Met        Long Term Goals:  12 weeks Status Date Met   PAIN: Pt will report worst pain of 1/10 in order to progress toward max functional ability and improve quality of life [x] Progressing  [] Met  [] Not Met     FUNCTION: Patient will demonstrate improved function as indicated by a score of greater than or equal to NT out of 100 on FOTO. [x] Progressing  [] Met  []  Not Met     MOBILITY: Patient will improve AROM to stated goals, listed in objective measures above, in order to return to maximal functional potential and improve quality of life.  [x] Progressing  [] Met  [] Not Met     STRENGTH: Patient will improve strength to stated goals, listed in objective measures above, in order to improve functional independence and quality of life.  [x] Progressing  [] Met  [] Not Met     GAIT: Patient will demonstrate normalized gait mechanics with minimal compensation in order to return to PLOF. [x] Progressing  [] Met  [] Not Met     Patient will return to normal ADL's, IADL's, community involvement, recreational activities, and work-related activities with less than or equal to 1/10 pain and maximal function.  [x] Progressing  [] Met  [] Not Met       Plan     Continue Plan of Care (POC) and progress per patient tolerance. See treatment section for details on planned progressions next session.    Analilia Son, PT

## 2025-01-22 DIAGNOSIS — S93.492A HIGH ANKLE SPRAIN OF LEFT LOWER EXTREMITY, INITIAL ENCOUNTER: ICD-10-CM

## 2025-01-22 DIAGNOSIS — M25.572 LEFT ANKLE PAIN, UNSPECIFIED CHRONICITY: ICD-10-CM

## 2025-01-24 RX ORDER — MELOXICAM 15 MG/1
15 TABLET ORAL
Qty: 30 TABLET | Refills: 1 | Status: SHIPPED | OUTPATIENT
Start: 2025-01-24

## 2025-01-25 ENCOUNTER — CLINICAL SUPPORT (OUTPATIENT)
Dept: REHABILITATION | Facility: HOSPITAL | Age: 55
End: 2025-01-25
Attending: PHYSICAL THERAPIST
Payer: COMMERCIAL

## 2025-01-25 DIAGNOSIS — R26.9 GAIT ABNORMALITY: ICD-10-CM

## 2025-01-25 DIAGNOSIS — M25.671 DECREASED RANGE OF MOTION OF RIGHT ANKLE: ICD-10-CM

## 2025-01-25 DIAGNOSIS — Z74.09 DECREASED FUNCTIONAL MOBILITY AND ENDURANCE: Primary | ICD-10-CM

## 2025-01-25 DIAGNOSIS — R26.89 POOR BALANCE: ICD-10-CM

## 2025-01-25 PROCEDURE — 97110 THERAPEUTIC EXERCISES: CPT | Performed by: PHYSICAL THERAPIST

## 2025-01-25 PROCEDURE — 97530 THERAPEUTIC ACTIVITIES: CPT | Performed by: PHYSICAL THERAPIST

## 2025-01-25 PROCEDURE — 97140 MANUAL THERAPY 1/> REGIONS: CPT | Performed by: PHYSICAL THERAPIST

## 2025-01-25 PROCEDURE — 97112 NEUROMUSCULAR REEDUCATION: CPT | Performed by: PHYSICAL THERAPIST

## 2025-01-25 NOTE — PROGRESS NOTES
[facility]   Physical Therapy Treatment Note     Name: Gerda Obrien  Clinic Number: [id number]    Therapy Diagnosis:   Encounter Diagnoses   Name Primary?    Decreased functional mobility and endurance Yes    Decreased range of motion of right ankle     Gait abnormality     Poor balance        Physician: Jeff Mcdonnell MD    Visit Date: 1/25/2025    Physician Orders: PT Eval and Treat  Medical Diagnosis from Referral: Left ankle pain, unspecified chronicity [M25.572]   Evaluation Date: 11/4/2024  Authorization Period Expiration: 11/4/2025   Plan of Care Expiration: 1/4/2025  Progress Note Due: 12/25/2025  Visit # / Visits authorized: 5/25 (+1 evaluation)  FOTO: 1/3 (last performed on 11/4/2024)     Precautions: Standard  PTA Visit #: 0/5     Time In: 9:22 am   Time Out: ***  Total Billable Time: *** minutes (Billing reflects 1 on 1 treatment time spent with patient)    Subjective     Patient reports: She continues to have significant pain in the L ankle which does not seem to be improving. States she usually feels pretty good following her sessions ***    He/She was compliant with home exercise program.  Response to previous treatment: beginning home exercise plan for bilateral ankles  Functional change: performing home exercise plan    Pain: 4/10     Location: left ankle     Objective      Objective Measures updated at progress report or POC update only unless otherwise noted.     Treatment     Gerda received the treatments listed below:          CPT Intervention Performed   Today Duration / Intensity   MT Joint Mobilizations   TC A/P's, TC distraction     Soft tissue massage    L tibialis anterior and peroneals    TE Recumbent bike  xx 5 minutes     Gastroc Stretch xx 3 x30s holds    NMR Ankle 4 way  xx  xx Plantarflexion and dorsiflexion: black band 30x each b  Inversion and eversion: blue band, 30x each b       Toe Yoga   3 minutes x 5s holds, alternating       "Weight shifts    Lateral: 3 mins x 3s holds      Tandem Stance x 3 x 30" each way     MOBO   X  X new  Taps: 1 min each b, odds and evens   Holds: 1 min each b, odds and evens      Single leg stance  x 3x30s b    TA Box squat  x 15# 3 x 10     Step Ups   3 x 10 8 inch step     Step downs  x 4 inch, 3x10 b      Leg press   Double le# 3x10      Eccentric calf raises  x 2x10 b      Side stepping  x Red band 4 laps along mirror      Seated soleus raises  x Single le# 3x10 b    PLAN Single leg stance progression              CPT Codes available for Billing:   (00) minutes of Manual therapy (MT) to improve pain and ROM.  (08) minutes of Therapeutic Exercise (TE) to develop strength, endurance, range of motion, and flexibility.  (20) minutes of Neuromuscular Re-Education (NMR)  to improve: Balance, Coordination, Kinesthetic, Sense, Proprioception, and Posture.  (25) minutes of Therapeutic Activities (TA) to improve functional performance.  Vasopneumatic Device Therapy () for management of swelling/edema. (11394)  Unattended Electrical Stimulation (ES) for muscle performance or pain modulation.  BFR: Blood flow restriction applied during exercise        Patient Education and Home Exercises       Home Exercises Provided and Patient Education Provided     Education provided: time included in treatment time.   PURPOSE: Patient educated on the impairments noted above and the effects of physical therapy intervention to improve overall condition and QOL.   EXERCISE: Patient was educated on all the above exercise prior/during/after for proper posture, positioning, and execution for safe performance with home exercise program.   STRENGTH: Patient educated on the importance of improved core and extremity strength in order to improve alignment of the spine and extremities with static positions and dynamic movement.   GAIT & BALANCE: Patient educated on the importance of strong core and lower extremity musculature in " order to improve both static and dynamic balance, improve gait mechanics, reduce fall risk and improve household and community mobility.   POSTURE: Patient educated on postural awareness to reduce stress and maintain optimal alignment of the spine with static positions and dynamic movement   TRANSFERS & TRANSITIONS: Patient educated on proper technique for bed mobility, transitions and transfers to improve body mechanics and decrease risk of injury.   BRACE: patient educated on proper fit, positioning, and technique for donning and doffing brace in order to maintain optimal alignment of the extremity and promote healing     Written Home Exercises Provided: yes.  Exercises were reviewed and Gerda was able to demonstrate them prior to the end of the session.  Gerda demonstrated good  understanding of the education provided. See EMR under Patient Instructions for exercises provided during therapy sessions.    Assessment     Patient tolerated session well today.  Pt and I discussed increasing compliance with HEP or increasing frequency of PT visits in order to accelerate healing. Pt is agreeable to increasing frequency of visits. Added holds on the MOBO board in order to improve ankle stability. Pt notes increased fatigue and soreness in the ankle following session. An assessment was completed today with improvements noted in b ankle ROM, gross lower extremity strength and functional movement patterns compared to prior assessment; please see exam for details. He/she continues to have difficulty with b ankle pain due to continued limitation in ROM and strength. Pt has not been compliant with HEP outside of therapy. Pt agrees to increase frequency of PT visits per week as well as improve HEP compliance in order to maximize progress. The above impairments and functional deficits will continue to be addressed over the course of this plan of care. patient was educated on continued progression of PT, expectations for  the duration of care, updates on goals set at initial evaluation, and home exercise program. Patient will continue to benefit from physical therapy in order to further address goals, maximize function and quality of life.     Gerda is progressing well towards her goals.   Patient prognosis is Good.     Patient will continue to benefit from skilled outpatient physical therapy to address the deficits listed in the problem list box on initial evaluation, provide pt/family education and to maximize patient's level of independence in the home and community environment.     Patient's spiritual, cultural and educational needs considered and pt agreeable to plan of care and goals.    Anticipated Barriers for therapy: none    Short Term Goals:  6 weeks Status  Date Met   PAIN: Pt will report worst pain of 3/10 in order to progress toward max functional ability and improve quality of life. [x] Progressing  [] Met  [] Not Met     FUNCTION: Patient will demonstrate improved function as indicated by a score of greater than or equal to NT out of 100 on FOTO. [x] Progressing  [] Met  [] Not Met     MOBILITY: Patient will improve AROM to 50% of stated goals, listed in objective measures above, in order to progress towards independence with functional activities.  [x] Progressing  [] Met  [] Not Met     STRENGTH: Patient will improve strength to 50% of stated goals, listed in objective measures above, in order to progress towards independence with functional activities. [x] Progressing  [] Met  [] Not Met     GAIT: Patient will demonstrate improved gait mechanics including discontinuation of walking boot in order to improve functional mobility, improve quality of life, and decrease risk of further injury or fall.  [x] Progressing  [] Met  [] Not Met     HEP: Patient will demonstrate independence with HEP in order to progress toward functional independence. [x] Progressing  [] Met  [] Not Met        Long Term Goals:  12 weeks  Status Date Met   PAIN: Pt will report worst pain of 1/10 in order to progress toward max functional ability and improve quality of life [x] Progressing  [] Met  [] Not Met     FUNCTION: Patient will demonstrate improved function as indicated by a score of greater than or equal to NT out of 100 on FOTO. [x] Progressing  [] Met  [] Not Met     MOBILITY: Patient will improve AROM to stated goals, listed in objective measures above, in order to return to maximal functional potential and improve quality of life.  [x] Progressing  [] Met  [] Not Met     STRENGTH: Patient will improve strength to stated goals, listed in objective measures above, in order to improve functional independence and quality of life.  [x] Progressing  [] Met  [] Not Met     GAIT: Patient will demonstrate normalized gait mechanics with minimal compensation in order to return to PLOF. [x] Progressing  [] Met  [] Not Met     Patient will return to normal ADL's, IADL's, community involvement, recreational activities, and work-related activities with less than or equal to 1/10 pain and maximal function.  [x] Progressing  [] Met  [] Not Met       Plan     Continue Plan of Care (POC) and progress per patient tolerance. See treatment section for details on planned progressions next session.    Alysia Marina, PT, DPT

## 2025-01-27 NOTE — PROGRESS NOTES
"[facility]   Physical Therapy Treatment Note     [facility]  [facility] Number[facility] [id number]    Therapy Diagnosis:   Encounter Diagnoses   Name Primary?    Decreased functional mobility and endurance Yes    Decreased range of motion of right ankle     Gait abnormality     Poor balance        Physician: Jeff Mcdonnell MD    Visit Date: 1/25/2025    Physician Orders: PT Eval and Treat  Medical Diagnosis from Referral: Left ankle pain, unspecified chronicity [M25.572]   Evaluation Date: 11/4/2024  Authorization Period Expiration: 11/4/2025   Plan of Care Expiration: 1/4/2025  Progress Note Due: 12/25/2025  Visit # / Visits authorized: 5/25 (+1 evaluation)  FOTO: 1/3 (last performed on 11/4/2024)     Precautions: Standard  PTA Visit #: 0/5     Time In: 9:22 am   Time Out: 10:25 am   Total Billable Time: 55 minutes (Billing reflects 1 on 1 treatment time spent with patient)    Subjective     Patient reports: she is still hurting in the left ankle.     He/She was compliant with home exercise program.  Response to previous treatment: beginning home exercise plan for bilateral ankles  Functional change: performing home exercise plan    Pain: 4/10     Location: left ankle     Objective      Objective Measures updated at progress report or POC update only unless otherwise noted.     Treatment     Gerda received the treatments listed below:          CPT Intervention Performed   Today Duration / Intensity   MT Joint Mobilizations  x TC A/P's, TC distraction     Soft tissue massage   x L tibialis anterior and peroneals    TE Recumbent bike  x 5 minutes     Gastroc Stretch x 3 x30s holds    NMR Ankle 4 way  X    x Plantarflexion and dorsiflexion: black band 30x each b  Inversion and eversion: blue band, 30x each b       Toe Yoga   3 minutes x 5s holds, alternating      Weight shifts    Lateral: 3 mins x 3s holds      Tandem Stance x 3 x 30" each way     MOBO   X  X new  Taps: 1 min " each b, odds and evens   Holds: 1 min each b, odds and evens      Single leg stance  x 3x30s b    TA Box squat  x 15# 3 x 10     Step Ups   3 x 10 8 inch step     Step downs  x 4 inch, 3x10 b      Leg press   Double le# 3x10      Eccentric calf raises  x 2x10 b      Side stepping  x Red band 4 laps along mirror      Seated soleus raises  x Single le# 3x10 b    PLAN Single leg stance progression              CPT Codes available for Billing:   (08) minutes of Manual therapy (MT) to improve pain and ROM.  (08) minutes of Therapeutic Exercise (TE) to develop strength, endurance, range of motion, and flexibility.  (15) minutes of Neuromuscular Re-Education (NMR)  to improve: Balance, Coordination, Kinesthetic, Sense, Proprioception, and Posture.  (24) minutes of Therapeutic Activities (TA) to improve functional performance.  Vasopneumatic Device Therapy () for management of swelling/edema. (89492)  Unattended Electrical Stimulation (ES) for muscle performance or pain modulation.  BFR: Blood flow restriction applied during exercise        Patient Education and Home Exercises       Home Exercises Provided and Patient Education Provided     Education provided: time included in treatment time.   PURPOSE: Patient educated on the impairments noted above and the effects of physical therapy intervention to improve overall condition and QOL.   EXERCISE: Patient was educated on all the above exercise prior/during/after for proper posture, positioning, and execution for safe performance with home exercise program.   STRENGTH: Patient educated on the importance of improved core and extremity strength in order to improve alignment of the spine and extremities with static positions and dynamic movement.   GAIT & BALANCE: Patient educated on the importance of strong core and lower extremity musculature in order to improve both static and dynamic balance, improve gait mechanics, reduce fall risk and improve household and  community mobility.   POSTURE: Patient educated on postural awareness to reduce stress and maintain optimal alignment of the spine with static positions and dynamic movement   TRANSFERS & TRANSITIONS: Patient educated on proper technique for bed mobility, transitions and transfers to improve body mechanics and decrease risk of injury.   BRACE: patient educated on proper fit, positioning, and technique for donning and doffing brace in order to maintain optimal alignment of the extremity and promote healing     Written Home Exercises Provided: yes.  Exercises were reviewed and Gerda was able to demonstrate them prior to the end of the session.  Gerda demonstrated good  understanding of the education provided. See EMR under Patient Instructions for exercises provided during therapy sessions.    Assessment     Patient tolerated session well today with no increase in pain.  Continued with tenderness to palpation in the ankle during manual tissue work.     Gerda is progressing well towards her goals.   Patient prognosis is Good.     Patient will continue to benefit from skilled outpatient physical therapy to address the deficits listed in the problem list box on initial evaluation, provide pt/family education and to maximize patient's level of independence in the home and community environment.     Patient's spiritual, cultural and educational needs considered and pt agreeable to plan of care and goals.    Anticipated Barriers for therapy: none    Short Term Goals:  6 weeks Status  Date Met   PAIN: Pt will report worst pain of 3/10 in order to progress toward max functional ability and improve quality of life. [x] Progressing  [] Met  [] Not Met     FUNCTION: Patient will demonstrate improved function as indicated by a score of greater than or equal to NT out of 100 on FOTO. [x] Progressing  [] Met  [] Not Met     MOBILITY: Patient will improve AROM to 50% of stated goals, listed in objective measures above, in  order to progress towards independence with functional activities.  [x] Progressing  [] Met  [] Not Met     STRENGTH: Patient will improve strength to 50% of stated goals, listed in objective measures above, in order to progress towards independence with functional activities. [x] Progressing  [] Met  [] Not Met     GAIT: Patient will demonstrate improved gait mechanics including discontinuation of walking boot in order to improve functional mobility, improve quality of life, and decrease risk of further injury or fall.  [x] Progressing  [] Met  [] Not Met     HEP: Patient will demonstrate independence with HEP in order to progress toward functional independence. [x] Progressing  [] Met  [] Not Met        Long Term Goals:  12 weeks Status Date Met   PAIN: Pt will report worst pain of 1/10 in order to progress toward max functional ability and improve quality of life [x] Progressing  [] Met  [] Not Met     FUNCTION: Patient will demonstrate improved function as indicated by a score of greater than or equal to NT out of 100 on FOTO. [x] Progressing  [] Met  [] Not Met     MOBILITY: Patient will improve AROM to stated goals, listed in objective measures above, in order to return to maximal functional potential and improve quality of life.  [x] Progressing  [] Met  [] Not Met     STRENGTH: Patient will improve strength to stated goals, listed in objective measures above, in order to improve functional independence and quality of life.  [x] Progressing  [] Met  [] Not Met     GAIT: Patient will demonstrate normalized gait mechanics with minimal compensation in order to return to PLOF. [x] Progressing  [] Met  [] Not Met     Patient will return to normal ADL's, IADL's, community involvement, recreational activities, and work-related activities with less than or equal to 1/10 pain and maximal function.  [x] Progressing  [] Met  [] Not Met       Plan     Continue Plan of Care (POC) and progress per patient tolerance. See  treatment section for details on planned progressions next session.    Alysia Marina, PT, DPT

## 2025-01-29 ENCOUNTER — CLINICAL SUPPORT (OUTPATIENT)
Dept: REHABILITATION | Facility: HOSPITAL | Age: 55
End: 2025-01-29
Payer: COMMERCIAL

## 2025-01-29 DIAGNOSIS — R26.9 GAIT ABNORMALITY: ICD-10-CM

## 2025-01-29 DIAGNOSIS — Z74.09 DECREASED FUNCTIONAL MOBILITY AND ENDURANCE: Primary | ICD-10-CM

## 2025-01-29 DIAGNOSIS — M25.671 DECREASED RANGE OF MOTION OF RIGHT ANKLE: ICD-10-CM

## 2025-01-29 DIAGNOSIS — R26.89 POOR BALANCE: ICD-10-CM

## 2025-01-29 PROCEDURE — 97530 THERAPEUTIC ACTIVITIES: CPT

## 2025-01-29 PROCEDURE — 97112 NEUROMUSCULAR REEDUCATION: CPT

## 2025-01-29 PROCEDURE — 97110 THERAPEUTIC EXERCISES: CPT

## 2025-01-30 ENCOUNTER — PATIENT MESSAGE (OUTPATIENT)
Dept: SPORTS MEDICINE | Facility: CLINIC | Age: 55
End: 2025-01-30
Payer: COMMERCIAL

## 2025-01-31 NOTE — PROGRESS NOTES
[facility]   Physical Therapy Treatment Note / POC Update     Name: Gerda Obrien  Clinic Number: [id number]    Therapy Diagnosis:   Encounter Diagnoses   Name Primary?    Decreased functional mobility and endurance Yes    Decreased range of motion of right ankle     Gait abnormality     Poor balance          Physician: Jeff Mcdonnell MD    Visit Date: 1/29/2025    Physician Orders: PT Eval and Treat  Medical Diagnosis from Referral: Left ankle pain, unspecified chronicity [M25.572]   Evaluation Date: 11/4/2024  Authorization Period Expiration: 11/4/2025   Plan of Care Expiration: 3/29/2025  Progress Note Due: 2/29/2025  Visit # / Visits authorized: 3/16 (+6 from prior authorization)   FOTO: 1/3 (last performed on 11/4/2024)     Precautions: Standard  PTA Visit #: 0/5     Time In: 1630  Time Out: 1732   Total Billable Time: 53 minutes (Billing reflects 1 on 1 treatment time spent with patient)    Subjective     Patient reports: No significant changes in overall ankle pain     He/She was compliant with home exercise program.  Response to previous treatment: beginning home exercise plan for bilateral ankles  Functional change: performing home exercise plan    Pain: 4/10     Location: left ankle     Objective      Objective Measures updated at progress report or POC update only unless otherwise noted.   RANGE OF MOTION:   Ankle/Foot AROM/PROM Right Left Pain/Dysfunction with Movement Goal   Dorsiflexion (20º) 10 10  pain  20   Plantarflexion (50º) 35 35 Pain  50   Inversion (35º) 25 25 Pain  35   Eversion (15º) 10 10 Pain  15            STRENGTH: (* denotes pain)  L/E MMT Right  (spine) Left Dysfunction with Movement Goal   Modified (90/90) Abdominal Strength    ---       Hip Flexion            Hip Extension            Hip Abduction            Knee Extension           Knee Flexion           Hip IR           Hip ER           Ankle DF 4+/5 4+/5   5/5 B   Ankle PF 4/5 4/5   5/5 B    Ankle Inversion 4+/5 4+/5   5/5 B   Ankle Eversion 4+/5 4+/5   5/5 B         PALPATION: Increased tenderness to palpation of: B lateral malleolus, ATFL, CF ligaments        GAIT ANALYSIS The patient ambulated with the following assistive device: B lace-up ankle braces ; the pt presents with the following gait abnormalities: decreased step length B, decreased stance time B, and decreased push off B. *Although these abnormalities are still present, pt demonstrates improvement in all areas      FUNCTIONAL MOVEMENT PATTERNS  Movement Analysis Notes   Bed Mobility        Transfers       Sit to Stand  Functional, non-painful     Squat  Functional, painful     Single Leg Squat        Step Down   dysfunctional, painful     Single leg calf raises   dysfunctional, painful Limited vertical displacement         BALANCE:    Right   (seconds) Left  (seconds) Pain/dysfunction Noted Goal   Narrow Base of Support   ---   60+ seconds   Tandem Stance 30+ 30+   60+ seconds   Single Leg Stance 10 10  requires upper extremity support 60+ seconds       Treatment     Gerda received the treatments listed below:          CPT Intervention Performed   Today Duration / Intensity   MT Joint Mobilizations   TC A/P's, TC distraction     Soft tissue massage    L tibialis anterior and peroneals    TE Recumbent bike  x 5 minutes     Gastroc Stretch x 3 x30s holds    NMR Ankle 4 way       Plantarflexion and dorsiflexion: black band 30x each b  Inversion and eversion: blue band, 30x each b       Toe Yoga   3 minutes x 5s holds, alternating      Weight shifts    Lateral: 3 mins x 3s holds      MOBO   X  X Taps: 1 min each b, odds and evens   Holds: 1 min each b, odds and evens      Single leg stance    3x30s b      Tandem stance kettle pass  x 5# 3x10 passes each b    TA Box squat  x 15# 3 x 10     Step Ups   3 x 10 8 inch step     Step downs  x 4 inch, 3x10 b      Leg press    X Double le# 3x10   Single leg (foot low) 45# 3x10 b      Calf  raises - stairs  x 3x10     Side stepping on toes  x Red band at mid-foot, 4 laps along mirror      Seated soleus raises  x Single le# 3x12 b    PLAN Single leg stance progression              CPT Codes available for Billing:   (00) minutes of Manual therapy (MT) to improve pain and ROM.  (08) minutes of Therapeutic Exercise (TE) to develop strength, endurance, range of motion, and flexibility.  (15) minutes of Neuromuscular Re-Education (NMR)  to improve: Balance, Coordination, Kinesthetic, Sense, Proprioception, and Posture.  (30) minutes of Therapeutic Activities (TA) to improve functional performance.  Vasopneumatic Device Therapy () for management of swelling/edema. (28788)  Unattended Electrical Stimulation (ES) for muscle performance or pain modulation.  BFR: Blood flow restriction applied during exercise        Patient Education and Home Exercises       Home Exercises Provided and Patient Education Provided     Education provided: time included in treatment time.   PURPOSE: Patient educated on the impairments noted above and the effects of physical therapy intervention to improve overall condition and QOL.   EXERCISE: Patient was educated on all the above exercise prior/during/after for proper posture, positioning, and execution for safe performance with home exercise program.   STRENGTH: Patient educated on the importance of improved core and extremity strength in order to improve alignment of the spine and extremities with static positions and dynamic movement.   GAIT & BALANCE: Patient educated on the importance of strong core and lower extremity musculature in order to improve both static and dynamic balance, improve gait mechanics, reduce fall risk and improve household and community mobility.   POSTURE: Patient educated on postural awareness to reduce stress and maintain optimal alignment of the spine with static positions and dynamic movement   TRANSFERS & TRANSITIONS: Patient educated on  proper technique for bed mobility, transitions and transfers to improve body mechanics and decrease risk of injury.   BRACE: patient educated on proper fit, positioning, and technique for donning and doffing brace in order to maintain optimal alignment of the extremity and promote healing     Written Home Exercises Provided: yes.  Exercises were reviewed and Gerda was able to demonstrate them prior to the end of the session.  Gerda demonstrated good  understanding of the education provided. See EMR under Patient Instructions for exercises provided during therapy sessions.    Assessment     Patient tolerated session well today.  Progressed resistance with soleus raises and incorporated single leg press to improve functional lower extremity strength. Decreased upper extremity support required with MOBO balance, indicating improved ankle stability. An assessment was completed today with improvements noted in ankle ROM and gross ankle strength compared to prior assessment; please see exam for details. He/she continues to have difficulty with gait and functional movements due to continued strength deficits and non-compliance with HEP as pt typically notes decreased pain and demonstrates improved gait mechanics following each of her sessions.  The above impairments and functional deficits will continue to be addressed over the course of this plan of care. patient was educated on continued progression of PT, expectations for the duration of care, updates on goals set at initial evaluation, and home exercise program.  Patient will continue to benefit from physical therapy in order to further address goals, maximize function and quality of life.     Gerda is progressing well towards her goals.   Patient prognosis is Good.     Patient will continue to benefit from skilled outpatient physical therapy to address the deficits listed in the problem list box on initial evaluation, provide pt/family education and to  maximize patient's level of independence in the home and community environment.     Patient's spiritual, cultural and educational needs considered and pt agreeable to plan of care and goals.    Anticipated Barriers for therapy: none    Short Term Goals:  6 weeks Status  Date Met   PAIN: Pt will report worst pain of 3/10 in order to progress toward max functional ability and improve quality of life. [x] Progressing  [] Met  [] Not Met     FUNCTION: Patient will demonstrate improved function as indicated by a score of greater than or equal to NT out of 100 on FOTO. [x] Progressing  [] Met  [] Not Met     MOBILITY: Patient will improve AROM to 50% of stated goals, listed in objective measures above, in order to progress towards independence with functional activities.  [x] Progressing  [] Met  [] Not Met     STRENGTH: Patient will improve strength to 50% of stated goals, listed in objective measures above, in order to progress towards independence with functional activities. [x] Progressing  [] Met  [] Not Met     GAIT: Patient will demonstrate improved gait mechanics including discontinuation of walking boot in order to improve functional mobility, improve quality of life, and decrease risk of further injury or fall.  [x] Progressing  [] Met  [] Not Met     HEP: Patient will demonstrate independence with HEP in order to progress toward functional independence. [x] Progressing  [] Met  [] Not Met        Long Term Goals:  12 weeks Status Date Met   PAIN: Pt will report worst pain of 1/10 in order to progress toward max functional ability and improve quality of life [x] Progressing  [] Met  [] Not Met     FUNCTION: Patient will demonstrate improved function as indicated by a score of greater than or equal to NT out of 100 on FOTO. [x] Progressing  [] Met  [] Not Met     MOBILITY: Patient will improve AROM to stated goals, listed in objective measures above, in order to return to maximal functional potential and improve  quality of life.  [x] Progressing  [] Met  [] Not Met     STRENGTH: Patient will improve strength to stated goals, listed in objective measures above, in order to improve functional independence and quality of life.  [x] Progressing  [] Met  [] Not Met     GAIT: Patient will demonstrate normalized gait mechanics with minimal compensation in order to return to PLOF. [x] Progressing  [] Met  [] Not Met     Patient will return to normal ADL's, IADL's, community involvement, recreational activities, and work-related activities with less than or equal to 1/10 pain and maximal function.  [x] Progressing  [] Met  [] Not Met       Plan       Plan of care Certification: 1/29/2025 to 3/29/2025.    Outpatient Physical Therapy 2 times weekly for 8 weeks to include any combination of the following interventions: virtual visits, dry needling, modalities, electrical stimulation (IFC, Pre-Mod, Attended with Functional Dry Needling), Cervical/Lumbar Traction, Gait Training, Manual Therapy, Neuromuscular Re-ed, Patient Education, Self Care, Therapeutic Exercise, and Therapeutic Activites     Analilia Son, PT

## 2025-02-07 ENCOUNTER — CLINICAL SUPPORT (OUTPATIENT)
Dept: REHABILITATION | Facility: HOSPITAL | Age: 55
End: 2025-02-07
Attending: PHYSICAL THERAPIST
Payer: COMMERCIAL

## 2025-02-07 DIAGNOSIS — Z74.09 DECREASED FUNCTIONAL MOBILITY AND ENDURANCE: Primary | ICD-10-CM

## 2025-02-07 DIAGNOSIS — M25.671 DECREASED RANGE OF MOTION OF RIGHT ANKLE: ICD-10-CM

## 2025-02-07 DIAGNOSIS — R26.9 GAIT ABNORMALITY: ICD-10-CM

## 2025-02-07 DIAGNOSIS — R26.89 POOR BALANCE: ICD-10-CM

## 2025-02-07 PROCEDURE — 97110 THERAPEUTIC EXERCISES: CPT

## 2025-02-07 PROCEDURE — 97530 THERAPEUTIC ACTIVITIES: CPT

## 2025-02-07 PROCEDURE — 97112 NEUROMUSCULAR REEDUCATION: CPT

## 2025-02-07 NOTE — PROGRESS NOTES
[facility]   Physical Therapy Treatment Note      Name: Gerda Obrien  Clinic Number: [id number]    Therapy Diagnosis:   Encounter Diagnoses   Name Primary?    Decreased functional mobility and endurance Yes    Decreased range of motion of right ankle     Gait abnormality     Poor balance            Physician: Jeff Mcdonnell MD    Visit Date: 2/7/2025    Physician Orders: PT Eval and Treat  Medical Diagnosis from Referral: Left ankle pain, unspecified chronicity [M25.572]   Evaluation Date: 11/4/2024  Authorization Period Expiration: 11/4/2025   Plan of Care Expiration: 3/29/2025  Progress Note Due: 2/29/2025  Visit # / Visits authorized: 3/16 (+6 from prior authorization)   FOTO: 1/3 (last performed on 11/4/2024)     Precautions: Standard  PTA Visit #: 0/5     Time In:  3:45 PM  Time Out: 4:45 PM  Total Billable Time: 53 minutes (Billing reflects 1 on 1 treatment time spent with patient)    Subjective     Patient reports: No significant changes in overall ankle pain, still been feeling pretty stiff but moving better.     He/She was compliant with home exercise program.  Response to previous treatment: beginning home exercise plan for bilateral ankles  Functional change: performing home exercise plan    Pain: 4/10     Location: left ankle     Objective      Objective Measures updated at progress report or POC update only unless otherwise noted.     Treatment     Gerda received the treatments listed below:          CPT Intervention Performed   Today Duration / Intensity   MT Joint Mobilizations   TC A/P's, TC distraction     Soft tissue massage    L tibialis anterior and peroneals    TE Recumbent bike  x 5 minutes     Gastroc Stretch x 3 x30s holds    NMR Ankle 4 way       Plantarflexion and dorsiflexion: black band 30x each b  Inversion and eversion: blue band, 30x each b       Toe Yoga   3 minutes x 5s holds, alternating      Weight shifts    Lateral: 3 mins x 3s holds       MOBO   X  X Taps: 1 min each b, odds and evens   Holds: 1 min each b, odds and evens      Single leg stance    3x30s b      Tandem stance kettle pass  x 5# 3x10 passes each b    TA Box squat  x 15# 3 x 10     Step Ups   3 x 10 8 inch step     Step downs  x 4 inch, 3x10 b      Leg press    X Double le# 3x10   Single leg (foot low) 45# 3x10 b      Calf raises - stairs  x 3x10     Side stepping on toes  x Red band at mid-foot, 4 laps along mirror      Seated soleus raises  x Single le# 3x12 b    PLAN Single leg stance progression              CPT Codes available for Billing:   (00) minutes of Manual therapy (MT) to improve pain and ROM.  (08) minutes of Therapeutic Exercise (TE) to develop strength, endurance, range of motion, and flexibility.  (15) minutes of Neuromuscular Re-Education (NMR)  to improve: Balance, Coordination, Kinesthetic, Sense, Proprioception, and Posture.  (30) minutes of Therapeutic Activities (TA) to improve functional performance.  Vasopneumatic Device Therapy () for management of swelling/edema. (68120)  Unattended Electrical Stimulation (ES) for muscle performance or pain modulation.  BFR: Blood flow restriction applied during exercise        Patient Education and Home Exercises       Home Exercises Provided and Patient Education Provided     Education provided: time included in treatment time.   PURPOSE: Patient educated on the impairments noted above and the effects of physical therapy intervention to improve overall condition and QOL.   EXERCISE: Patient was educated on all the above exercise prior/during/after for proper posture, positioning, and execution for safe performance with home exercise program.   STRENGTH: Patient educated on the importance of improved core and extremity strength in order to improve alignment of the spine and extremities with static positions and dynamic movement.   GAIT & BALANCE: Patient educated on the importance of strong core and lower  extremity musculature in order to improve both static and dynamic balance, improve gait mechanics, reduce fall risk and improve household and community mobility.   POSTURE: Patient educated on postural awareness to reduce stress and maintain optimal alignment of the spine with static positions and dynamic movement   TRANSFERS & TRANSITIONS: Patient educated on proper technique for bed mobility, transitions and transfers to improve body mechanics and decrease risk of injury.   BRACE: patient educated on proper fit, positioning, and technique for donning and doffing brace in order to maintain optimal alignment of the extremity and promote healing     Written Home Exercises Provided: yes.  Exercises were reviewed and Gerda was able to demonstrate them prior to the end of the session.  Gerda demonstrated good  understanding of the education provided. See EMR under Patient Instructions for exercises provided during therapy sessions.    Assessment     Patient tolerated session well today, we continued all progressions from last visit with good ankle stability and pain levels overall. Patient does reports joint crepitus with endrange movements on her right ankle that she does not get on her left ankle.      Gerda is progressing well towards her goals.   Patient prognosis is Good.     Patient will continue to benefit from skilled outpatient physical therapy to address the deficits listed in the problem list box on initial evaluation, provide pt/family education and to maximize patient's level of independence in the home and community environment.     Patient's spiritual, cultural and educational needs considered and pt agreeable to plan of care and goals.    Anticipated Barriers for therapy: none    Short Term Goals:  6 weeks Status  Date Met   PAIN: Pt will report worst pain of 3/10 in order to progress toward max functional ability and improve quality of life. [x] Progressing  [] Met  [] Not Met     FUNCTION:  Patient will demonstrate improved function as indicated by a score of greater than or equal to NT out of 100 on FOTO. [x] Progressing  [] Met  [] Not Met     MOBILITY: Patient will improve AROM to 50% of stated goals, listed in objective measures above, in order to progress towards independence with functional activities.  [x] Progressing  [] Met  [] Not Met     STRENGTH: Patient will improve strength to 50% of stated goals, listed in objective measures above, in order to progress towards independence with functional activities. [x] Progressing  [] Met  [] Not Met     GAIT: Patient will demonstrate improved gait mechanics including discontinuation of walking boot in order to improve functional mobility, improve quality of life, and decrease risk of further injury or fall.  [x] Progressing  [] Met  [] Not Met     HEP: Patient will demonstrate independence with HEP in order to progress toward functional independence. [x] Progressing  [] Met  [] Not Met        Long Term Goals:  12 weeks Status Date Met   PAIN: Pt will report worst pain of 1/10 in order to progress toward max functional ability and improve quality of life [x] Progressing  [] Met  [] Not Met     FUNCTION: Patient will demonstrate improved function as indicated by a score of greater than or equal to NT out of 100 on FOTO. [x] Progressing  [] Met  [] Not Met     MOBILITY: Patient will improve AROM to stated goals, listed in objective measures above, in order to return to maximal functional potential and improve quality of life.  [x] Progressing  [] Met  [] Not Met     STRENGTH: Patient will improve strength to stated goals, listed in objective measures above, in order to improve functional independence and quality of life.  [x] Progressing  [] Met  [] Not Met     GAIT: Patient will demonstrate normalized gait mechanics with minimal compensation in order to return to PLOF. [x] Progressing  [] Met  [] Not Met     Patient will return to normal ADL's, IADL's,  community involvement, recreational activities, and work-related activities with less than or equal to 1/10 pain and maximal function.  [x] Progressing  [] Met  [] Not Met       Plan       Plan of care Certification: 2/7/2025 to 3/29/2025.    Outpatient Physical Therapy 2 times weekly for 8 weeks to include any combination of the following interventions: virtual visits, dry needling, modalities, electrical stimulation (IFC, Pre-Mod, Attended with Functional Dry Needling), Cervical/Lumbar Traction, Gait Training, Manual Therapy, Neuromuscular Re-ed, Patient Education, Self Care, Therapeutic Exercise, and Therapeutic Activites     Silver Spicer, PT

## 2025-02-10 ENCOUNTER — CLINICAL SUPPORT (OUTPATIENT)
Dept: REHABILITATION | Facility: HOSPITAL | Age: 55
End: 2025-02-10
Payer: COMMERCIAL

## 2025-02-10 DIAGNOSIS — R26.89 POOR BALANCE: ICD-10-CM

## 2025-02-10 DIAGNOSIS — M25.671 DECREASED RANGE OF MOTION OF RIGHT ANKLE: ICD-10-CM

## 2025-02-10 DIAGNOSIS — Z74.09 DECREASED FUNCTIONAL MOBILITY AND ENDURANCE: Primary | ICD-10-CM

## 2025-02-10 DIAGNOSIS — R26.9 GAIT ABNORMALITY: ICD-10-CM

## 2025-02-10 PROCEDURE — 97110 THERAPEUTIC EXERCISES: CPT

## 2025-02-10 PROCEDURE — 97112 NEUROMUSCULAR REEDUCATION: CPT

## 2025-02-10 PROCEDURE — 97530 THERAPEUTIC ACTIVITIES: CPT

## 2025-02-10 PROCEDURE — 97140 MANUAL THERAPY 1/> REGIONS: CPT

## 2025-02-10 NOTE — PROGRESS NOTES
[facility]   Physical Therapy Treatment Note      Name: Gerda Obrien  Clinic Number: [id number]    Therapy Diagnosis:   Encounter Diagnoses   Name Primary?    Decreased functional mobility and endurance Yes    Decreased range of motion of right ankle     Gait abnormality     Poor balance        Physician: Jeff Mcdonnell MD    Visit Date: 2/10/2025    Physician Orders: PT Eval and Treat  Medical Diagnosis from Referral: Left ankle pain, unspecified chronicity [M25.572]   Evaluation Date: 11/4/2024  Authorization Period Expiration: 11/4/2025   Plan of Care Expiration: 3/29/2025  Progress Note Due: 2/29/2025  Visit # / Visits authorized: 5/16 (+6 from prior authorization)   FOTO: 1/3 (last performed on 11/4/2024)     Precautions: Standard  PTA Visit #: 0/5     Time In:  4:35 PM  Time Out: 5:29 PM  Total Billable Time: 54 minutes (Billing reflects 1 on 1 treatment time spent with patient)    Subjective     Patient reports: no pain unless she stresses the ankle.     He/She was compliant with home exercise program.  Response to previous treatment: beginning home exercise plan for bilateral ankles  Functional change: performing home exercise plan    Pain: 4/10 at worst    Location: left ankle     Objective      Objective Measures updated at progress report or POC update only unless otherwise noted.     Treatment     Gerda received the treatments listed below:          CPT Intervention Performed   Today Duration / Intensity   MT Joint Mobilizations x TC A/P's, TC distraction     Soft tissue massage  x L tibialis anterior and peroneals          TE Recumbent bike  x 5 minutes     Gastroc Stretch x 3 x30s holds          NMR Ankle 4 way         Plantarflexion and dorsiflexion: black band 30x each b  Inversion and eversion: blue band, 30x each b       Toe Yoga   3 minutes x 5s holds, alternating     Short Foot x 3 minutes with 5 second holds     Weight shifts    Lateral: 3 mins x 3s  holds      MOBO      Taps: 1 min each b, odds and evens   Holds: 1 min each b, odds and evens      Single leg stance    3x30s b      Tandem stance kettle pass  x 7.5# 3x10 passes each b          TA Box squat   15# 3 x 10     Step Ups   3 x 10 8 inch step     Step downs   4 inch, 3x10 b      Leg press x  x Double le# 3x10   Single leg (foot low) 45# 3x10 b      Calf raises - stairs  x 3x12     Side stepping on toes   Red band at mid-foot, 4 laps along mirror      Seated soleus raises  x Single le# 3x12 b    PLAN Single leg stance progression              CPT Codes available for Billing:   (15) minutes of Manual therapy (MT) to improve pain and ROM.  (10) minutes of Therapeutic Exercise (TE) to develop strength, endurance, range of motion, and flexibility.  (09) minutes of Neuromuscular Re-Education (NMR)  to improve: Balance, Coordination, Kinesthetic, Sense, Proprioception, and Posture.  (18) minutes of Therapeutic Activities (TA) to improve functional performance.  Vasopneumatic Device Therapy () for management of swelling/edema. (12280)  Unattended Electrical Stimulation (ES) for muscle performance or pain modulation.  BFR: Blood flow restriction applied during exercise    Patient Education and Home Exercises       Home Exercises Provided and Patient Education Provided     Education provided: time included in treatment time.   PURPOSE: Patient educated on the impairments noted above and the effects of physical therapy intervention to improve overall condition and QOL.   EXERCISE: Patient was educated on all the above exercise prior/during/after for proper posture, positioning, and execution for safe performance with home exercise program.   STRENGTH: Patient educated on the importance of improved core and extremity strength in order to improve alignment of the spine and extremities with static positions and dynamic movement.   GAIT & BALANCE: Patient educated on the importance of strong core and lower  extremity musculature in order to improve both static and dynamic balance, improve gait mechanics, reduce fall risk and improve household and community mobility.   POSTURE: Patient educated on postural awareness to reduce stress and maintain optimal alignment of the spine with static positions and dynamic movement   TRANSFERS & TRANSITIONS: Patient educated on proper technique for bed mobility, transitions and transfers to improve body mechanics and decrease risk of injury.   BRACE: patient educated on proper fit, positioning, and technique for donning and doffing brace in order to maintain optimal alignment of the extremity and promote healing     Written Home Exercises Provided: yes.  Exercises were reviewed and Gerda was able to demonstrate them prior to the end of the session.  Gerda demonstrated good  understanding of the education provided. See EMR under Patient Instructions for exercises provided during therapy sessions.    Assessment     Patient tolerated session well today. Increased repetitions performed with heel raises with metatarsal heads on step for increased range of motion. Talocrural A/P mobilizations performed for improved joint mobility and ankle range of motion.     Gerda is progressing well towards her goals.   Patient prognosis is Good.     Patient will continue to benefit from skilled outpatient physical therapy to address the deficits listed in the problem list box on initial evaluation, provide pt/family education and to maximize patient's level of independence in the home and community environment.     Patient's spiritual, cultural and educational needs considered and pt agreeable to plan of care and goals.    Anticipated Barriers for therapy: none    Short Term Goals:  6 weeks Status  Date Met   PAIN: Pt will report worst pain of 3/10 in order to progress toward max functional ability and improve quality of life. [x] Progressing  [] Met  [] Not Met     FUNCTION: Patient will  demonstrate improved function as indicated by a score of greater than or equal to NT out of 100 on FOTO. [x] Progressing  [] Met  [] Not Met     MOBILITY: Patient will improve AROM to 50% of stated goals, listed in objective measures above, in order to progress towards independence with functional activities.  [x] Progressing  [] Met  [] Not Met     STRENGTH: Patient will improve strength to 50% of stated goals, listed in objective measures above, in order to progress towards independence with functional activities. [x] Progressing  [] Met  [] Not Met     GAIT: Patient will demonstrate improved gait mechanics including discontinuation of walking boot in order to improve functional mobility, improve quality of life, and decrease risk of further injury or fall.  [x] Progressing  [] Met  [] Not Met     HEP: Patient will demonstrate independence with HEP in order to progress toward functional independence. [x] Progressing  [] Met  [] Not Met        Long Term Goals:  12 weeks Status Date Met   PAIN: Pt will report worst pain of 1/10 in order to progress toward max functional ability and improve quality of life [x] Progressing  [] Met  [] Not Met     FUNCTION: Patient will demonstrate improved function as indicated by a score of greater than or equal to NT out of 100 on FOTO. [x] Progressing  [] Met  [] Not Met     MOBILITY: Patient will improve AROM to stated goals, listed in objective measures above, in order to return to maximal functional potential and improve quality of life.  [x] Progressing  [] Met  [] Not Met     STRENGTH: Patient will improve strength to stated goals, listed in objective measures above, in order to improve functional independence and quality of life.  [x] Progressing  [] Met  [] Not Met     GAIT: Patient will demonstrate normalized gait mechanics with minimal compensation in order to return to PLOF. [x] Progressing  [] Met  [] Not Met     Patient will return to normal ADL's, IADL's, community  involvement, recreational activities, and work-related activities with less than or equal to 1/10 pain and maximal function.  [x] Progressing  [] Met  [] Not Met       Plan       Plan of care Certification: 01/29/2025 to 3/29/2025.    Outpatient Physical Therapy 2 times weekly for 8 weeks to include any combination of the following interventions: virtual visits, dry needling, modalities, electrical stimulation (IFC, Pre-Mod, Attended with Functional Dry Needling), Cervical/Lumbar Traction, Gait Training, Manual Therapy, Neuromuscular Re-ed, Patient Education, Self Care, Therapeutic Exercise, and Therapeutic Activites     Sherwin Yao, PT

## 2025-02-25 ENCOUNTER — TELEPHONE (OUTPATIENT)
Dept: PAIN MEDICINE | Facility: CLINIC | Age: 55
End: 2025-02-25

## 2025-02-25 ENCOUNTER — TELEPHONE (OUTPATIENT)
Dept: PAIN MEDICINE | Facility: CLINIC | Age: 55
End: 2025-02-25
Payer: COMMERCIAL

## 2025-02-25 ENCOUNTER — OFFICE VISIT (OUTPATIENT)
Dept: PAIN MEDICINE | Facility: CLINIC | Age: 55
End: 2025-02-25
Payer: COMMERCIAL

## 2025-02-25 VITALS — BODY MASS INDEX: 34.75 KG/M2 | HEIGHT: 62 IN

## 2025-02-25 DIAGNOSIS — M70.62 GREATER TROCHANTERIC BURSITIS OF BOTH HIPS: ICD-10-CM

## 2025-02-25 DIAGNOSIS — R20.2 NUMBNESS AND TINGLING OF FOOT: ICD-10-CM

## 2025-02-25 DIAGNOSIS — R20.0 NUMBNESS AND TINGLING OF FOOT: ICD-10-CM

## 2025-02-25 DIAGNOSIS — M47.816 LUMBAR SPONDYLOSIS: Primary | ICD-10-CM

## 2025-02-25 DIAGNOSIS — M54.59 LUMBAR FACET JOINT PAIN: ICD-10-CM

## 2025-02-25 DIAGNOSIS — M70.61 GREATER TROCHANTERIC BURSITIS OF BOTH HIPS: ICD-10-CM

## 2025-02-25 DIAGNOSIS — M62.838 MUSCLE SPASM OF BOTH LOWER LEGS: ICD-10-CM

## 2025-02-25 PROCEDURE — G2211 COMPLEX E/M VISIT ADD ON: HCPCS | Mod: 95,,, | Performed by: PHYSICIAN ASSISTANT

## 2025-02-25 PROCEDURE — 1160F RVW MEDS BY RX/DR IN RCRD: CPT | Mod: CPTII,95,, | Performed by: PHYSICIAN ASSISTANT

## 2025-02-25 PROCEDURE — 1159F MED LIST DOCD IN RCRD: CPT | Mod: CPTII,95,, | Performed by: PHYSICIAN ASSISTANT

## 2025-02-25 PROCEDURE — 98006 SYNCH AUDIO-VIDEO EST MOD 30: CPT | Mod: 95,,, | Performed by: PHYSICIAN ASSISTANT

## 2025-02-25 RX ORDER — GABAPENTIN 300 MG/1
CAPSULE ORAL
Qty: 115 CAPSULE | Refills: 0 | Status: SHIPPED | OUTPATIENT
Start: 2025-02-25 | End: 2025-04-26

## 2025-02-25 NOTE — TELEPHONE ENCOUNTER
----- Message from Jessi sent at 2/25/2025  2:15 PM CST -----  Contact: Gerda  Type:  Sooner Apoointment RequestCaller is requesting a sooner appointment.  Caller declined first available appointment listed below.  Caller will not accept being placed on the waitlist and is requesting a message be sent to doctor.Name of Caller:Ct is the first available appointment? Scheduled for 03/10/2025Symptoms: ProcedureWould the patient rather a call back or a response via [facility]? Call [facility] Call Back Number: [phone] Information: Would prefer to have the appointment on Friday 03/07/2025

## 2025-02-25 NOTE — PROGRESS NOTES
Established Patient - TeleHealth Visit    The patient location is: [place]  The chief complaint leading to consultation is: chronic pain     Visit type: Audiovisual telemedicine visit     30 minutes of total time spent on the encounter, which includes face to face time and non-face to face time preparing to see the patient (eg, review of tests), Obtaining and/or reviewing separately obtained history, Documenting clinical information in the electronic or other health record, Independently interpreting results (not separately reported) and communicating results to the patient/family/caregiver, or Care coordination (not separately reported).     Each patient to whom he or she provides medical services by telemedicine is:  (1) informed of the relationship between the physician and patient and the respective role of any other health care provider with respect to management of the patient; and (2) notified that he or she may decline to receive medical services by telemedicine and may withdraw from such care at any time.      Chronic Pain -- Established Patient (Follow-up visit)  Referring Physician: Marie Zee MD    PCP: Meli Gutierrez MD        Chief complaint:  Follow-up     Lumbar Back Pain - facet-mediated, axial in nature   SIJ and GT bursa pain  Bilateral foot numbness    Interval History (2/25/2025):  Gerda Obrien presents today for follow-up visit.  Patient was last seen about a year ago.  She presents today with returning low back pain.  She had a lumbar RFA in December of 2023 with great pain relief that has returned, although gradual return.  She feels the low back pain started to come back about 3 months ago, so she had about 1 year of pain relief from previous lumbar RFA.  [facility] including for [facility] floor therapy.  She sees Orthopedics for an ankle injury and has Mobic to take as needed.  She reports lumbar pain, also  inferior to that right above the buttocks.  She also reports new bilateral foot numbness and tingling, which she has not had in the past; she believes this started about 2 months ago.  She is a prediabetic.    Interval History (03/06/2024):  Patient returns to clinic after procedure.  Patient reports 70% relief and lower back pain after bilateral L4-5 and L5-S1 radiofrequency ablation on 12/21/2023.  Patient reports that stabbing lower back pain has significantly improved.  Primary pain today is bilateral hip pain.  Pain described as a throbbing aching pain over the lateral aspects of her bilateral hips, right-greater-than-left.  Patient denies any significant radiation of this pain.  Pain is worse with lying on the affected side and with prolonged walking, better with elevation and anti-inflammatories.  Pain is currently rated a 3 out 10. Denies any fevers, chills, changes in gait, saddle anesthesia, or bowel and bladder incontinence    Interval History (8/14/2023):  Gerda Obrien presents today for follow-up visit.  Patient was last seen on 7/5/2023. At that visit, the plan was to start Robaxin, but she reports that it causes drowsiness.  Her main complaint is leg cramping, worse at night.  Pain is better with movement.  She also complains of leg cramps described as Charley horses during sexual intercourse.  Overall, she does not feel that it is a sciatic nerve pain, but more of muscle cramps in her legs.  She takes a multivitamin daily along with magnesium.  Patient reports pain as 2/10 today.  She has taken gabapentin in the past but does not remember why she quit taking.    Initial HPI (7/5/2023):  Gerda Obrien is a 52 y.o. female who presents to the clinic for the evaluation of lower back pain. who presents to the clinic for the evaluation of lower back pain.   Patient reports 5 month history of lower back pain.  Patient denies any inciting traumas to his lower back pain.  Patient  denies any previous surgery to the lumbar spine.    Pain is described as a throbbing aching pain in a band across her lower back.  Patient reports associated radiation to her bilateral lower extremities primarily affecting the bilateral calves and feet in a numbness tingling pain and with associated muscle cramps.  Pain is typically worse at night and when getting up in the morning.  Patient reports morning stiffness with his pain.  Pain is typically worse with stretching and exercise as well as heat.  Pain is currently rated a 3/10, but can increase to an 8/10 with exacerbating activities. Denies any fevers, chills, changes in gait, saddle anesthesia, or bowel and bladder incontinence      Non-Pharmacologic Treatments:  Physical Therapy/Home Exercise: yes  Ice/Heat:yes  TENS: no  Acupuncture: no  Massage: yes  Chiropractic: no        Previous Pain Medications:  Tylenol, NSAIDs, topicals      Pain Procedures:   -12/21/2023:  Bilateral L4-5 and L5-S1 radiofrequency ablation, 80% relief        Imaging/ Diagnostic Studies/ Labs (Reviewed on 2/25/2025):    03/21/2024 XR HIPS BILATERAL 2 VIEW INCL AP PELVIS  COMPARISON: 02/15/2022  FINDINGS:  No acute fracture.  Hip joint spaces unchanged with similar minimal to mild acetabular spurring.  No SULEMA bilateral enthesophyte changes similar.  Soft tissues unremarkable.  Impression:  Stable pelvis/hips.                                          7/5/23 MRI Lumbar Spine Without Contrast  COMPARISON: X-ray dated 02/15/2022    FINDINGS:  There are 5 non-rib-bearing lumbar vertebrae.  There is trace grade 1 degenerative anterolisthesis of L4 on L5.  Remaining alignment is unremarkable.  No acute fracture or compression deformity.  No aggressive focal signal abnormality.    Conus medullaris terminates at the L1-L2 level.  Conus medullaris is normal in size and signal.    T12-L1: No significant disc pathology.  No significant spinal canal or neural foraminal stenosis.    L1-L2: No  significant disc pathology.  No significant spinal canal or neural foraminal stenosis.    L2-L3: Trace annular disc bulge.  Mild bilateral facet arthropathy.  No significant spinal canal or neural foraminal stenosis.    L3-L4: Small circumferential disc bulge.  Mild bilateral facet arthropathy.  No significant spinal canal or neural foraminal stenosis.    L4-L5: Small circumferential disc bulge.  Mild bilateral facet arthropathy.  No significant spinal canal or neural foraminal stenosis.    L5-S1: Trace annular disc bulge.  Minimal bilateral facet arthropathy.  No significant spinal canal or neural foraminal stenosis.    Paraspinal soft tissues are unremarkable.  Visualized intra-abdominopelvic contents are also unremarkable.  Impression:   Mild mid to lower lumbar level predominant degenerative changes as detailed above including grade 1 anterolisthesis of L4 on L5.  No significant spinal canal or neural foraminal stenosis.        2/15/22 X-Ray Lumbar Complete Including Flex And Ext  COMPARISON: None  FINDINGS:  AP, lateral (flexion and extension), bilateral oblique and coned down radiographs of the lumbar spine demonstrate no evidence for acute fracture or dislocation.  Posterior facet hypertrophic changes are identified at L5/S1, to a lesser extent at L4/5.  Anterior osteophytes are identified at several lumbar levels.  Mild intervertebral disc space narrowing is also identified at L4/5 and L5/S1.  Remaining intervertebral disk spaces and vertebral body heights are well-preserved.  Visualized SI joints are intact.  Impression  Mild degenerative disease as described above        Review of Systems:   Constitutional:  Negative for chills, diaphoresis, fatigue and fever.   HENT:  Negative for ear discharge, ear pain, rhinorrhea, trouble swallowing and voice change.    Eyes:  Negative for pain and redness.   Respiratory:  Negative for chest tightness, shortness of breath, wheezing and stridor.    Cardiovascular:   "Negative for chest pain and leg swelling.   Gastrointestinal:  Negative for blood in stool, diarrhea, nausea and vomiting.   Endocrine: Negative for cold intolerance and heat intolerance.   Genitourinary:  Negative for dysuria, hematuria and urgency.   Musculoskeletal:  Positive for arthralgias, back pain and myalgias. Negative for gait problem, joint swelling, neck pain and neck stiffness.   Skin:  Negative for rash.   Neurological:  Positive for weakness and numbness. Negative for tremors, seizures, speech difficulty, light-headedness and headaches.   Hematological:  Does not bruise/bleed easily.   Psychiatric/Behavioral:  Negative for agitation, confusion and suicidal ideas.           OBJECTIVE:    Telemedicine Physical Exam:   Vitals:    02/25/25 1128   Height: 5' 2" (1.575 m)   Body mass index is 34.75 kg/m².   (reviewed on 2/25/2025)     (Physical exam performed virtually with patient guided on specific actions and diagnostic maneuvers)  GENERAL: Well appearing, in no acute distress, alert and oriented x3.  Cooperative.  PSYCH:  Mood and affect appropriate.  SKIN: Skin color & texture with no obvious abnormalities.    HEAD/FACE:  Normocephalic, atraumatic.    PULM:  No difficulty breathing. No nasal flaring. No obvious wheezing.  EXTREMITIES: No obvious deformities. Moving all extremities well, appears to have symmetric strength throughout.  MUSCULOSKELETAL: No obvious atrophy abnormalities are noted.   NEURO: No obvious neurologic deficit.   GAIT: sitting.     Physical Exam: last in clinic visit:  Constitutional:       General: She is not in acute distress.     Appearance: Normal appearance. She is not ill-appearing.   HENT:      Head: Normocephalic and atraumatic.      Nose: No congestion or rhinorrhea.   Eyes:      Extraocular Movements: Extraocular movements intact.      Pupils: Pupils are equal, round, and reactive to light.   Cardiovascular:      Pulses: Normal pulses.   Pulmonary:      Effort: Pulmonary " effort is normal.      Breath sounds: Normal breath sounds.   Abdominal:      General: Abdomen is flat. Bowel sounds are normal.      Palpations: Abdomen is soft.   Skin:     General: Skin is warm and dry.      Capillary Refill: Capillary refill takes less than 2 seconds.   Neurological:      General: No focal deficit present.      Mental Status: She is alert and oriented to person, place, and time.      Sensory: No sensory deficit.      Motor: Weakness present. No abnormal muscle tone.      Gait: Gait normal.      Deep Tendon Reflexes:      Reflex Scores:       Patellar reflexes are 2+ on the right side and 2+ on the left side.       Achilles reflexes are 2+ on the right side and 2+ on the left side.     Comments: Four out of five strength in bilateral dorsiflexion   Psychiatric:         Mood and Affect: Mood normal.         Behavior: Behavior normal.         Thought Content: Thought content normal.     Musculoskeletal:  Lumbar Exam  Incision: no  Pain with Flexion: no  Pain with Extension: yes  ROM: Decreased  Paraspinous TTP:  Positive bilaterally  Facet TTP:  L5-S1  Facet Loading:  Positive bilaterally  SLR:  Positive on the right at 75°  SIJ TTP:  Positive bilaterally  LISSA:  Positive on the right          ASSESSMENT:     54 y.o. year old female with lower back pain, consistent with     1. Lumbar spondylosis  Case Request-RAD/Other Procedure Area: bilateral L4/5 & L5/S1 RFA      2. Lumbar facet joint pain        3. Muscle spasm of both lower legs        4. Greater trochanteric bursitis of both hips        5. Numbness and tingling of foot  gabapentin (NEURONTIN) 300 MG capsule          Lumbar spondylosis  -     Case Request-RAD/Other Procedure Area: bilateral L4/5 & L5/S1 RFA    Lumbar facet joint pain    Muscle spasm of both lower legs    Greater trochanteric bursitis of both hips    Numbness and tingling of foot  -     gabapentin (NEURONTIN) 300 MG capsule; Take 1 capsule (300 mg total) by mouth every evening  for 5 days, THEN 2 capsules (600 mg total) every evening.  Dispense: 115 capsule; Refill: 0           PLAN:   - Interventions:   - Schedule repeat Bilateral L4-5 & L5-S1 RFA. Patient is not taking prescription blood thinners or ASA.    - Consider bilateral SIJ + bilateral GT bursa injection - for continued pain.     -12/21/2023:  Bilateral L4-5 & L5-S1 radiofrequency ablation, 80% relief    - Anticoagulation use:   No no anticoagulation    - Medications:  - Will start gabapentin 600mg QHS (with titration instructions, take 1 tablet QHS x 1 week, then increase to 2 capsules at night thereafter, increasing as tolerated) to potentially help with foot numbness. We will further increase if does not provide adequate relief. Potential side effects of this medication include daytime somnolence, weight gain and peripheral edema.   - Continue Mobic 15mg QD PRN from Orthopedics; can alternate with OTC Tylenol (acetaminophen) 500mg up to 3000mg per day PRN.     - [place]  reviewed and appropriate.        - Therapy:    Patient has completed some physical therapy in the past.  [facility]  Will provide SI joint and GT bursa specific exercises on visit summary.    - Imaging/Diagnostic:  - Hip x-ray reviewed.  - Lumbar MRI (7/5/23) reviewed previously.  Consider ordering BLE EMG/NCS to further evaluate foot numbness (not explained on MRI, is prediabetic).  - X-rays of lumbar spine reviewed and findings discussed with patient previously: Significant for facet arthropathy at L4-5 and L5-S1 were noted loss of disc height at L5-S1.    - Consults:   - Continue follow-up with Dr. Bartlett (orthopedics) for ankle pain; last seen in January of 2025.      - Follow up visit: 4 weeks post-procedure -- IN CLINIC (for evaluation/physical exam of SI joint in GTB pain)      Future Appointments   Date Time Provider Department Center   2/26/2025  4:30 PM Meli Gutierrez MD [facility]   3/26/2025  4:20 PM  Meli Gutierrez MD [facility]   4/8/2025  1:40 PM Estrella Thompson PA-C [facility] [place]   10/2/2025  9:00 AM Susie James MD [facility] [place] [facility]   10/2/2025 10:10 AM [facility]  MAMMO1 SCREEN [facility]MO [place] [facility]         Visit today included increased complexity associated with the care of the episodic problem of chronic pain which was addressed and continue to manage the longitudinal care of the patient due to the serious and/or complex managed problem(s) listed above.    - Patient Questions: Answered all of the patient's questions regarding diagnosis, therapy, and treatment.    - This condition does not require this patient to take time off of work, and the primary goal of our Pain Management services is to improve the patient's functional capacity.   - I discussed the risks, benefits, and alternatives to potential treatment options. All questions and concerns were fully addressed today in clinic.         Estrella Thompson PA-C  Interventional Pain Management - [facility]    Disclaimer:  This note was prepared using voice recognition system and is likely to have sound alike errors that may have been overlooked even after proof reading.  Please call me with any questions.

## 2025-02-28 ENCOUNTER — PATIENT MESSAGE (OUTPATIENT)
Dept: REHABILITATION | Facility: HOSPITAL | Age: 55
End: 2025-02-28
Payer: COMMERCIAL

## 2025-03-05 ENCOUNTER — PATIENT MESSAGE (OUTPATIENT)
Dept: PAIN MEDICINE | Facility: HOSPITAL | Age: 55
End: 2025-03-05
Payer: COMMERCIAL

## 2025-03-20 ENCOUNTER — PATIENT MESSAGE (OUTPATIENT)
Dept: PAIN MEDICINE | Facility: HOSPITAL | Age: 55
End: 2025-03-20
Payer: COMMERCIAL

## 2025-03-20 NOTE — PRE-PROCEDURE INSTRUCTIONS
Unable to reach patient regarding procedure scheduled on 3.13. PAT instructions and arrival time sent via [employer]     Arrival time 1130     Has patient been sick with fever or on antibiotics within the last 7 days? No     Does the patient have any open wounds, sores or rashes? No     Does the patient have any recent fractures? no     Has patient received a vaccination within the last 7 days? No     Received the COVID vaccination? yes     Has the patient stopped all medications as directed? na     Does patient have a pacemaker, defibrillator, or implantable stimulator? No     Does the patient have a ride to and from procedure and someone reliable to remain with patient?       Is the patient diabetic? yes      Does the patient have sleep apnea? Or use O2 at home? alesia     Is the patient receiving sedation? Yes      Is the patient instructed to remain NPO beginning at midnight the night before their procedure? yes     Procedure location confirmed with patient? Yes     Covid- Denies signs/symptoms. Instructed to notify PAT/MD if any changes.     
Unable to reach patient regarding procedure scheduled on 3.27. PAT instructions and arrival time sent via [employer]     Arrival time 1115     Has patient been sick with fever or on antibiotics within the last 7 days? No     Does the patient have any open wounds, sores or rashes? No     Does the patient have any recent fractures? no     Has patient received a vaccination within the last 7 days? No     Received the COVID vaccination? yes     Has the patient stopped all medications as directed? na     Does patient have a pacemaker, defibrillator, or implantable stimulator? No     Does the patient have a ride to and from procedure and someone reliable to remain with patient?       Is the patient diabetic? yes      Does the patient have sleep apnea? Or use O2 at home? alesia     Is the patient receiving sedation? Yes      Is the patient instructed to remain NPO beginning at midnight the night before their procedure? yes     Procedure location confirmed with patient? Yes     Covid- Denies signs/symptoms. Instructed to notify PAT/MD if any changes.       
increased urinary frequency/nocturia

## 2025-03-27 ENCOUNTER — HOSPITAL ENCOUNTER (OUTPATIENT)
Facility: HOSPITAL | Age: 55
Discharge: HOME | End: 2025-03-27
Attending: ANESTHESIOLOGY | Admitting: ANESTHESIOLOGY
Payer: COMMERCIAL

## 2025-03-27 VITALS
WEIGHT: 189.63 LBS | RESPIRATION RATE: 16 BRPM | TEMPERATURE: 98 F | HEIGHT: 62 IN | DIASTOLIC BLOOD PRESSURE: 59 MMHG | BODY MASS INDEX: 34.89 KG/M2 | SYSTOLIC BLOOD PRESSURE: 115 MMHG | OXYGEN SATURATION: 100 % | HEART RATE: 76 BPM

## 2025-03-27 DIAGNOSIS — M47.816 LUMBAR SPONDYLOSIS: Primary | ICD-10-CM

## 2025-03-27 LAB — POCT GLUCOSE: 81 MG/DL (ref 70–110)

## 2025-03-27 PROCEDURE — 64635 DESTROY LUMB/SAC FACET JNT: CPT | Mod: 50 | Performed by: ANESTHESIOLOGY

## 2025-03-27 PROCEDURE — 64636 DESTROY L/S FACET JNT ADDL: CPT | Mod: 50 | Performed by: ANESTHESIOLOGY

## 2025-03-27 PROCEDURE — 82962 GLUCOSE BLOOD TEST: CPT | Performed by: ANESTHESIOLOGY

## 2025-03-27 PROCEDURE — 64636 DESTROY L/S FACET JNT ADDL: CPT | Mod: 50,,, | Performed by: ANESTHESIOLOGY

## 2025-03-27 PROCEDURE — 99152 MOD SED SAME PHYS/QHP 5/>YRS: CPT | Performed by: ANESTHESIOLOGY

## 2025-03-27 PROCEDURE — 63600175 PHARM REV CODE 636 W HCPCS: Performed by: ANESTHESIOLOGY

## 2025-03-27 PROCEDURE — 64635 DESTROY LUMB/SAC FACET JNT: CPT | Mod: 50,,, | Performed by: ANESTHESIOLOGY

## 2025-03-27 RX ORDER — MIDAZOLAM HYDROCHLORIDE 1 MG/ML
INJECTION, SOLUTION INTRAMUSCULAR; INTRAVENOUS
Status: DISCONTINUED | OUTPATIENT
Start: 2025-03-27 | End: 2025-03-27 | Stop reason: HOSPADM

## 2025-03-27 RX ORDER — METHYLPREDNISOLONE ACETATE 40 MG/ML
INJECTION, SUSPENSION INTRA-ARTICULAR; INTRALESIONAL; INTRAMUSCULAR; SOFT TISSUE
Status: DISCONTINUED | OUTPATIENT
Start: 2025-03-27 | End: 2025-03-27 | Stop reason: HOSPADM

## 2025-03-27 RX ORDER — FENTANYL CITRATE 50 UG/ML
INJECTION, SOLUTION INTRAMUSCULAR; INTRAVENOUS
Status: DISCONTINUED | OUTPATIENT
Start: 2025-03-27 | End: 2025-03-27 | Stop reason: HOSPADM

## 2025-03-27 RX ORDER — BUPIVACAINE HYDROCHLORIDE 2.5 MG/ML
INJECTION, SOLUTION EPIDURAL; INFILTRATION; INTRACAUDAL; PERINEURAL
Status: DISCONTINUED | OUTPATIENT
Start: 2025-03-27 | End: 2025-03-27 | Stop reason: HOSPADM

## 2025-03-27 RX ORDER — LIDOCAINE HYDROCHLORIDE 10 MG/ML
INJECTION, SOLUTION EPIDURAL; INFILTRATION; INTRACAUDAL; PERINEURAL
Status: DISCONTINUED | OUTPATIENT
Start: 2025-03-27 | End: 2025-03-27 | Stop reason: HOSPADM

## 2025-03-27 RX ORDER — ONDANSETRON HYDROCHLORIDE 2 MG/ML
4 INJECTION, SOLUTION INTRAVENOUS ONCE AS NEEDED
Status: DISCONTINUED | OUTPATIENT
Start: 2025-03-27 | End: 2025-03-27 | Stop reason: HOSPADM

## 2025-03-27 NOTE — OP NOTE
Lumbar Medial nerve branch block radiofrequency ablation Under Fluoroscopy  Bilateral L4/5 and L5/S1    INFORMED CONSENT: The procedure, risks, benefits and options were discussed with patient. There are no contraindications to the procedure. The patient expressed understanding and agreed to proceed. The personnel performing the procedure was discussed.    Date of procedure 03/27/2025    Time-out taken to identify patient and procedure side prior to starting the procedure.                     PROCEDURE: Bilateral radiofrequency ablation of the the medial branch nerves at the   transverse process of  L4, L5, and the sacral ala    Pre Procedure diagnosis:    Lumbar spondylosis [M47.816]  1. Lumbar spondylosis        Post-Procedure diagnosis:   same      PHYSICIAN: Beka Johnson MD    ASSISTANTS: None     LOCAL ANESTHETIC USED: 1ml of Lidocaine 1%, at each level    Sedation: Conscious sedation provided by M.D    SEDATION MEDICATIONS: local/IV sedation: Versed 2 mg and fentanyl 150 mcg IV.  Conscious sedation ordered by MD.  Patient reevaluated and sedation administered by MD and monitored by RN.  Total sedation time was less than 25 min.    Total sedation time was >20 min but <30min    ESTIMATED BLOOD LOSS: None.     COMPLICATIONS: None.       TECHNIQUE: Laying in a prone position, the patient was prepped and draped in the usual sterile fashion using ChloraPrep and fenestrated drape. The level was determined under fluoroscopic guidance. Local anesthetic was given by going down to the hub of the 27-gauge 1.25in needle and raising a wheel. A 18-gauge 10mm curved active tip needle was introduced to the anatomic local of the medial branch at each of the above levels using fluoroscopy. Then sensory and motor testing was performed to confirm that the needle tips were in the correct location. Then after negative aspiration, 1ml of lidocaine PF 1% was injected at each level. This was followed by thermal lesioning at 80  degrees celsius for 90 seconds. After lesioning, 0.5ml of bupivacaine PF 0.25% and 5mg of DepoMedrol was injected at each level.    The patient tolerated the procedure well. Did not have any procedure related motor deficit at the conclusion of the procedure      The patient was monitored for approximately 30 minutes after the procedure.  Patient was given post procedure and discharge instructions to follow at home.  The patient was discharged in a stable condition

## 2025-03-27 NOTE — H&P
HPI  Patient presenting for Procedure(s) (LRB):  bilateral L4/5 & L5/S1 RFA (Bilateral)     Patient on Anti-coagulation No    No health changes since previous encounter    Past Medical History:   Diagnosis Date    Allergy     Anxiety     Biotin deficiency disease     Chlamydia     Diabetes mellitus     Fibroids     Herpes simplex     Knee pain, bilateral     Metabolic syndrome     Sleep apnea     Vitamin D deficiency      Past Surgical History:   Procedure Laterality Date    ARTHROSCOPIC REPAIR OF ROTATOR CUFF OF SHOULDER Left 03/07/2022    Procedure: REPAIR, ROTATOR CUFF, ARTHROSCOPIC;  Surgeon: Denilson Jackson MD;  Location: [facility] OR;  Service: Orthopedics;  Laterality: Left;    ARTHROSCOPY OF SHOULDER WITH DECOMPRESSION OF SUBACROMIAL SPACE Left 03/07/2022    Procedure: ARTHROSCOPY, SHOULDER, WITH SUBACROMIAL SPACE DECOMPRESSION;  Surgeon: Denilson Jackson MD;  Location: [facility] OR;  Service: Orthopedics;  Laterality: Left;    BREAST SURGERY      BUNIONECTOMY      CERVICAL BIOPSY  W/ LOOP ELECTRODE EXCISION      COLONOSCOPY N/A 10/15/2021    Procedure: COLONOSCOPY;  Surgeon: Alaina Ceja MD;  Location: [facility] ENDO;  Service: Endoscopy;  Laterality: N/A;    FIXATION OF TENDON Left 03/07/2022    Procedure: FIXATION, TENDON;  Surgeon: Denilson Jackson MD;  Location: [facility] OR;  Service: Orthopedics;  Laterality: Left;    FOOT SURGERY      hammer toe Bilateral     HYSTERECTOMY  11/2021    INJECTION OF ANESTHETIC AGENT AROUND MEDIAL BRANCH NERVES INNERVATING LUMBAR FACET JOINT Bilateral 10/26/2023    Procedure: Diagnostic Bilateral L4/5 & L5/S1 MBB #1;  Surgeon: Beka Johnson MD;  Location: [facility] PAIN MGT;  Service: Pain Management;  Laterality: Bilateral;    INJECTION OF ANESTHETIC AGENT AROUND MEDIAL BRANCH NERVES INNERVATING LUMBAR FACET JOINT Bilateral 11/20/2023    Procedure: Bilateral L4/L5 and L5/S1 MBB;  Surgeon: Beka Johnson MD;  Location: [facility] PAIN MGT;  Service: Pain  "Management;  Laterality: Bilateral;    Laparoscopic assisted vaginal hysterectomy with BSO  11/22/2021    Path--Leiomyomata/Adenomyosis    LASER ABLATION OF THE CERVIX      PLANTAR FASCIA SURGERY Left     RADIOFREQUENCY THERMOCOAGULATION Bilateral 12/21/2023    Procedure: Bilateral L4/L5 and L5/S1 Lumbar RFA;  Surgeon: Beka Johnson MD;  Location: [facility];  Service: Pain Management;  Laterality: Bilateral;    TOTAL REDUCTION MAMMOPLASTY  03/2007    TUBAL LIGATION       Review of patient's allergies indicates:   Allergen Reactions    Doxycycline     Dulaglutide Other (See Comments)    Emtricitabine      Other reaction(s): Rash    Lexapro [escitalopram oxalate]      spasms    Lipitor [atorvastatin]      Decreased sex drive    Metformin hcl      Other Reaction(s): Unknown    Sulfa (sulfonamide antibiotics) Other (See Comments)    Tenofovir      Other reaction(s): Rash    Bydureon [exenatide microspheres] Rash     Skin reaction at site of injection.        Medications Ordered Prior to Encounter[1]     PMHx, PSHx, Allergies, Medications reviewed in epic    ROS negative except pain complaints in HPI    OBJECTIVE:    /83 (BP Location: Right arm, Patient Position: Sitting)   Pulse 79   Temp 97.7 °F (36.5 °C) (Temporal)   Resp 19   Ht 5' 2" (1.575 m)   Wt 86 kg (189 lb 9.5 oz)   LMP 11/12/2017 (Approximate)   SpO2 99%   Breastfeeding No   BMI 34.68 kg/m²     PHYSICAL EXAMINATION:    GENERAL: Well appearing, in no acute distress, alert and oriented x3.  PSYCH:  Mood and affect appropriate.  SKIN: Skin color, texture, turgor normal, no rashes or lesions which will impact the procedure.  CV: RRR with palpation of the radial artery.  PULM: No evidence of respiratory difficulty, symmetric chest rise. Clear to auscultation.  NEURO: Cranial nerves grossly intact.    Plan:    Proceed with procedure as planned Procedure(s) (LRB):  bilateral L4/5 & L5/S1 RFA (Bilateral)    Beka Johnson, " MD  03/27/2025                 [1]   No current facility-administered medications on file prior to encounter.     Current Outpatient Medications on File Prior to Encounter   Medication Sig Dispense Refill    biotin 10,000 mcg Cap Take 1 capsule by mouth once daily.      estradioL (LYLLANA) 0.1 mg/24 hr PTSW Place 1 patch onto the skin twice a week. 24 patch 3    ferrous sulfate (FEOSOL) 325 mg (65 mg iron) Tab tablet TAKE 1 TABLET BY MOUTH EVERY DAY WITH BREAKFAST 90 tablet 1    gabapentin (NEURONTIN) 300 MG capsule Take 1 capsule (300 mg total) by mouth every evening for 5 days, THEN 2 capsules (600 mg total) every evening. 115 capsule 0    meloxicam (MOBIC) 15 MG tablet TAKE 1 TABLET BY MOUTH EVERY DAY 30 tablet 1    multivitamin capsule Take by mouth.      omeprazole (PRILOSEC) 40 MG capsule TAKE 1 CAPSULE (40 MG TOTAL) BY MOUTH EVERY MORNING. 90 capsule 1    valACYclovir (VALTREX) 500 MG tablet Take 1 tablet (500 mg total) by mouth once daily. 90 tablet 3    blood sugar diagnostic (ACCU-CHEK GUIDE TEST STRIPS) Strp Use bid with glucometer 100 strip 2    clonazePAM (KLONOPIN) 0.5 MG tablet TAKE 1 TABLET BY MOUTH EVERY DAY AS NEEDED FOR ANXIETY 20 tablet 0    clotrimazole-betamethasone 1-0.05% (LOTRISONE) cream Apply topically 2 (two) times daily. 45 g 1    desloratadine (CLARINEX) 5 mg tablet TAKE 1 TABLET BY MOUTH EVERY DAY 90 tablet 3    diclofenac sodium (VOLTAREN) 1 % Gel USE 2 GRAMS (TOPICAL) 4 TIMES PER DAY AS NEEDED FOR PAIN      fluticasone propionate (FLONASE) 50 mcg/actuation nasal spray SPRAY 2 SPRAYS IN EACH NOSTRIL ONCE DAILY      tirzepatide 12.5 mg/0.5 mL PnIj Inject 12.5 mg into the skin every 7 days. 12 Pen 1    triamcinolone acetonide 0.025% (KENALOG) 0.025 % cream Apply topically 2 (two) times daily as needed (for dry patches on face). Mild steroid. Use sparingly for 1-2 weeks if needed then stop. 80 g 1

## 2025-03-27 NOTE — LETTER
March 27, 2025         [id number] [facility]  [place] [address]  Phone: [phone]  Fax: [phone]       Patient: Gerda Obrien   YOB: 1970  Date of Visit: 03/27/2025    To Whom It May Concern:    Mahesh Obrien  was at [facility] on 03/27/2025. She may return to work/school on 03/31 with no restrictions. If you have any questions or concerns, or if I can be of further assistance, please do not hesitate to contact me.    Sincerely,    Leia ESCUDERO

## 2025-03-27 NOTE — DISCHARGE INSTRUCTIONS

## 2025-03-27 NOTE — DISCHARGE SUMMARY
Discharge Note  Short Stay      SUMMARY     Admit Date: 3/27/2025    Attending Physician: Beka Johnson MD        Discharge Physician: Beka Johnson MD        Discharge Date: 3/27/2025 12:18 PM    Procedure(s) (LRB):  bilateral L4/5 & L5/S1 RFA (Bilateral)    Final Diagnosis: Lumbar spondylosis [M47.816]    Disposition: Home or self care    Patient Instructions:   Current Discharge Medication List        CONTINUE these medications which have NOT CHANGED    Details   biotin 10,000 mcg Cap Take 1 capsule by mouth once daily.      estradioL (LYLLANA) 0.1 mg/24 hr PTSW Place 1 patch onto the skin twice a week.  Qty: 24 patch, Refills: 3    Associated Diagnoses: Hormone replacement therapy, postmenopausal      ferrous sulfate (FEOSOL) 325 mg (65 mg iron) Tab tablet TAKE 1 TABLET BY MOUTH EVERY DAY WITH BREAKFAST  Qty: 90 tablet, Refills: 1      gabapentin (NEURONTIN) 300 MG capsule Take 1 capsule (300 mg total) by mouth every evening for 5 days, THEN 2 capsules (600 mg total) every evening.  Qty: 115 capsule, Refills: 0    Associated Diagnoses: Numbness and tingling of foot      meloxicam (MOBIC) 15 MG tablet TAKE 1 TABLET BY MOUTH EVERY DAY  Qty: 30 tablet, Refills: 1    Associated Diagnoses: Left ankle pain, unspecified chronicity; High ankle sprain of left lower extremity, initial encounter      multivitamin capsule Take by mouth.      omeprazole (PRILOSEC) 40 MG capsule TAKE 1 CAPSULE (40 MG TOTAL) BY MOUTH EVERY MORNING.  Qty: 90 capsule, Refills: 1      rosuvastatin (CRESTOR) 5 MG tablet Take 1 tablet (5 mg total) by mouth once daily.  Qty: 90 tablet, Refills: 0    Associated Diagnoses: Other hyperlipidemia; Medication refill      valACYclovir (VALTREX) 500 MG tablet Take 1 tablet (500 mg total) by mouth once daily.  Qty: 90 tablet, Refills: 3    Associated Diagnoses: HSV (herpes simplex virus) infection      blood sugar diagnostic (ACCU-CHEK GUIDE TEST STRIPS) Strp Use bid with glucometer  Qty: 100 strip,  Refills: 2      clonazePAM (KLONOPIN) 0.5 MG tablet TAKE 1 TABLET BY MOUTH EVERY DAY AS NEEDED FOR ANXIETY  Qty: 20 tablet, Refills: 0    Comments: Not to exceed 5 additional fills before 10/09/2023      clotrimazole-betamethasone 1-0.05% (LOTRISONE) cream Apply topically 2 (two) times daily.  Qty: 45 g, Refills: 1      desloratadine (CLARINEX) 5 mg tablet TAKE 1 TABLET BY MOUTH EVERY DAY  Qty: 90 tablet, Refills: 3      diclofenac sodium (VOLTAREN) 1 % Gel USE 2 GRAMS (TOPICAL) 4 TIMES PER DAY AS NEEDED FOR PAIN      fluticasone propionate (FLONASE) 50 mcg/actuation nasal spray SPRAY 2 SPRAYS IN EACH NOSTRIL ONCE DAILY      tirzepatide 12.5 mg/0.5 mL PnIj Inject 12.5 mg into the skin every 7 days.  Qty: 12 Pen, Refills: 1      triamcinolone acetonide 0.025% (KENALOG) 0.025 % cream Apply topically 2 (two) times daily as needed (for dry patches on face). Mild steroid. Use sparingly for 1-2 weeks if needed then stop.  Qty: 80 g, Refills: 1    Associated Diagnoses: Seborrheic dermatitis                 Discharge Diagnosis: Lumbar spondylosis [M47.816]  Condition on Discharge: Stable with no complications to procedure   Diet on Discharge: Same as before.  Activity: as per instruction sheet.  Discharge to: Home with a responsible adult.  Follow up: 2-4 weeks       Please call the office at [phone] if you experience any weakness or loss of sensation, fever > 101.5, pain uncontrolled with oral medications, persistent nausea/vomiting/or diarrhea, redness or drainage from the incisions, or any other worrisome concerns. If physician on call was not reached or could not communicate with our office for any reason please go to the nearest emergency department

## 2025-03-28 ENCOUNTER — PATIENT MESSAGE (OUTPATIENT)
Dept: PAIN MEDICINE | Facility: CLINIC | Age: 55
End: 2025-03-28
Payer: COMMERCIAL

## 2025-04-07 ENCOUNTER — TELEPHONE (OUTPATIENT)
Dept: PAIN MEDICINE | Facility: CLINIC | Age: 55
End: 2025-04-07
Payer: COMMERCIAL

## 2025-04-07 ENCOUNTER — PATIENT MESSAGE (OUTPATIENT)
Dept: PAIN MEDICINE | Facility: CLINIC | Age: 55
End: 2025-04-07
Payer: COMMERCIAL

## 2025-04-07 DIAGNOSIS — M54.16 LUMBAR RADICULOPATHY: Primary | ICD-10-CM

## 2025-04-07 DIAGNOSIS — G57.51 TARSAL TUNNEL SYNDROME OF RIGHT SIDE: ICD-10-CM

## 2025-04-08 ENCOUNTER — TELEPHONE (OUTPATIENT)
Dept: PHYSICAL MEDICINE AND REHAB | Facility: CLINIC | Age: 55
End: 2025-04-08
Payer: COMMERCIAL

## 2025-04-09 ENCOUNTER — OFFICE VISIT (OUTPATIENT)
Dept: PHYSICAL MEDICINE AND REHAB | Facility: CLINIC | Age: 55
End: 2025-04-09
Payer: COMMERCIAL

## 2025-04-09 VITALS — RESPIRATION RATE: 13 BRPM | BODY MASS INDEX: 34.89 KG/M2 | WEIGHT: 189.63 LBS | HEIGHT: 62 IN

## 2025-04-09 DIAGNOSIS — G57.52 TARSAL TUNNEL SYNDROME OF LEFT SIDE: ICD-10-CM

## 2025-04-09 DIAGNOSIS — M54.16 LUMBAR RADICULOPATHY: ICD-10-CM

## 2025-04-09 PROCEDURE — 99499 UNLISTED E&M SERVICE: CPT | Mod: S$GLB,,, | Performed by: PHYSICAL MEDICINE & REHABILITATION

## 2025-04-09 PROCEDURE — 95911 NRV CNDJ TEST 9-10 STUDIES: CPT | Mod: S$GLB,,, | Performed by: PHYSICAL MEDICINE & REHABILITATION

## 2025-04-09 PROCEDURE — 95885 MUSC TST DONE W/NERV TST LIM: CPT | Mod: S$GLB,,, | Performed by: PHYSICAL MEDICINE & REHABILITATION

## 2025-04-09 PROCEDURE — 99999 PR PBB SHADOW E&M-EST. PATIENT-LVL III: CPT | Mod: PBBFAC,,, | Performed by: PHYSICAL MEDICINE & REHABILITATION

## 2025-04-09 NOTE — PROGRESS NOTES
Full Name: Gerda Obrien YOB: 1970  Patient ID: [id number]      Visit Date: 4/9/2025 15:14  Age: 54 Years  Examining Physician:   Referring Physician:   Conclusion: lower extr  Chief Complaint   Patient presents with    Leg Pain     Into foot       HPI: This is a 54 y.o.  female being seen in clinic today for evaluation of chronic low back achy pain with numbness/tingling in her feet-worse at the left medial ankle and plantar surface and right plantar surface. Her symptoms can increase with standing/walking and can improve somewhat with rest.    History obtained from patient    Functional History:  Walking: Not limited  Transfers: Independent  Assistive devices: No  Power mobility: No  Falls: None   Directional preference:sitting or position change      Needs help with:  Nothing - all ADLS normal    Cooking   Cleaning  Bathing   Dressing   Toileting     Past family, medical, social, and surgical history reviewed in chart    Review of Systems:     General- denies lethargy, weight change, fever, chills  Head/neck- denies swallowing difficulties  ENT- denies hearing changes  Cardiovascular-denies chest pain  Pulmonary- denies shortness of breath  GI- denies constipation or bowel incontinence  - denies bladder incontinence  Skin- denies wounds or rashes  Musculoskeletal- denies weakness, +pain  Neurologic- denies numbness and tingling  Psychiatric- denies depressive or psychotic features, +anxiety  Lymphatic-denies swelling  Endocrine- denies hypoglycemic symptoms/+DM history  All other pertinent systems negative     Physical Examination:  General: Well developed, well nourished female, NAD  HEENT:NCAT EOMI bilaterally   Pulmonary:Normal respirations    Spinal Examination: CERVICAL  Active ROM is within normal limits.  Inspection: No deformity of spinal alignment.    Spinal Examination: LUMBAR or THORACIC  Active ROM is within normal limits  Inspection: No deformity of  spinal alignment.  No palpable olisthesis.  Palpation:   SLR Test (seated and supine):negative bilaterally    Bilateral Upper and Lower Extremities:  Pulses are 2+ at radial,  bilaterally.  Shoulder/Elbow/Wrist/Hand ROM   Hip/Knee/Ankle ROM wnl.   Bilateral Extremities show normal capillary refill.  No signs of cyanosis, rubor, edema, skin changes, or dysvascular changes of appendages.  Nails appear intact.    Neurological Exam:  Cranial Nerves:  II-XII grossly intact    Manual Muscle Testing: (Motor 5=normal)  RIGHT Lower extremity: Hip flexion 5/5, Hip Abduction 4+/5, Hip Adduction 5/5, Knee extension 5/5, Knee flexion 5/5, Ankle dorsiflexion 5/5, Extensor hallucis longus 5/5, Ankle plantarflexion 5/5  LEFT Lower extremity:  Hip flexion 5/5, Hip Abduction 4+/5,Hip Adduction 5/5, Knee extension 5/5, Knee flexion 5/5, Ankle dorsiflexion 5/5, Extensor hallucis longus 5/5, Ankle plantarflexion 5/5    No focal atrophy is noted of either lower extremity.    Bilateral Reflexes:  No clonus at knee or ankle.    Sensation: tested to light touch  - intact in legs    Gait: Narrow base and good arm swing.    Sensory NCS      Nerve / Sites Rec. Site Onset Lat Peak Lat NP Amp PP Amp Segments Distance Peak Diff Velocity     ms ms µV µV  mm ms m/s   L Sural - Ankle (Calf)      Calf Ankle 2.77 3.40 9.2 27.7 Calf - Ankle 140  51   R Sural - Ankle (Calf)      Calf Ankle 2.15 3.81 7.9  Calf - Ankle 140  65   L Superficial peroneal - Ankle      Lat leg Ankle 1.92 3.00 7.2 5.2 Lat leg - Ankle 140  73   R Superficial peroneal - Ankle      Lat leg Ankle 1.77 2.67 9.4  Lat leg - Ankle 140  79   L Medial plantar, Lateral plantar - Ankle (Medial, lateral sole)      Medial plantar Sole Ankle 3.23 3.94 10.2  Medial plantar Sole - Ankle 140  43      Lateral plantar Sole Ankle 3.33 4.04 12.9 1.5 Lateral plantar Sole - Ankle 140  42         Medial plantar Sole - Lateral plantar Sole  -0.10    R Medial plantar, Lateral plantar - Ankle (Medial,  lateral sole)      Medial plantar Sole Ankle 2.13 3.02 7.3 3.9 Medial plantar Sole - Ankle 140  66      Lateral plantar Sole Ankle 1.58 3.06 7.3 5.5 Lateral plantar Sole - Ankle 140  88         Medial plantar Sole - Lateral plantar Sole  -0.04                    Motor NCS      Nerve / Sites Muscle Latency Amplitude Amp % Duration Segments Distance Lat Diff Velocity     ms mV % ms  mm ms m/s   L Peroneal - EDB      Ankle EDB 3.77 4.6 100 5.19 Ankle - EDB 80        Fib head EDB 8.73 4.4 96 6.65 Fib head - Ankle 280 4.96 56      Pop fossa EDB 10.23 4.3 93.4 5.52 Pop fossa - Fib head 80 1.50 53   R Peroneal - EDB      Ankle EDB 3.92 3.5 100 5.56 Ankle - EDB 80        Fib head EDB 9.63 3.0 85.9 6.19 Fib head - Ankle 320 5.71 56      Pop fossa EDB 10.81 2.9 81.7 5.56 Pop fossa - Fib head 70 1.19 59   L Tibial - AH      Ankle AH 4.42 2.3 100 5.33 Ankle - AH 80        Pop fossa AH 12.44 1.7 74.5 5.65 Pop fossa - Ankle 380 8.02 47   R Tibial - AH      Ankle AH 3.90 3.7 100 4.77 Ankle - AH 80        Pop fossa AH 12.23 3.4 90.5 4.50 Pop fossa - Ankle 370 8.33 44               EMG Summary Table     Spontaneous MUAP Recruitment   Muscle Nerve Roots IA Fib PSW Fasc H.F. Amp Dur. PPP Pattern   R. Extensor digitorum brevis Tibial L5-S1 1+ 2+ None None None 1+ N 1+ Reduced   R. Rectus femoris Femoral L2-L4 N None None None None N N N N   R. Abductor hallucis Tibial S1-S2 N None None None None N N 1+ 1+   L. Extensor digitorum brevis Tibial L5-S1 N None None None None N N 1+ 1+   L. Abductor hallucis Tibial S1-S2 N None None None None N N 1+ 1+     INTERPRETATION  -Bilateral superficial peroneal sensorynerve conduction study showed normal peak latency and amplitude  -Bilateral sural sensory nerve conduction study showed normal peak latency and amplitude  -Bilateral peroneal motor nerve conduction study showed normal latency, amplitude, and normal conduction velocity  -Bilateral tibial motor nerve conduction study showed normal  latency, dec amplitude, and normal conduction velocity  -Left medial and lateral plantar sensory showed prolonged peak latency and normal amplitude  -right medial and lateral plantar sensory showed normal peak latency and amplitude  -Needle EMG examination performed to above mentioned muscles     IMPRESSION  ABNORMAL Study  There is electrodiagnostic evidence of a tarsal tunnel syndrome across the left ankle.  There is an acute on chronic radiculopathy of the right L5 nerve root and a chronic radiculopathy of the left L5 and bilateral S1 nerve roots    PLAN  Follow up with referring provider-Dr Johnson  Handouts on TTS and radic provided. Pt to fu with podiatrist in [facility]  This study is good for one year. If symptoms worsen or do not improve, please re-consult.    Marie Zee M.D.  Physical Medicine and Rehab

## 2025-04-29 NOTE — PROGRESS NOTES
Established Patient - TeleHealth Visit    The patient location is: [place]  The chief complaint leading to consultation is: chronic pain     Visit type: Audiovisual telemedicine visit     Total time spent on the encounter includes Face-to-face time and non-face to face time preparing to see the patient (eg, review of tests), Obtaining and/or reviewing separately obtained history, Documenting clinical information in the electronic or other health record, Independently interpreting results (not separately reported) and communicating results to the patient/family/caregiver, and/or Care coordination (not separately reported).     Each patient to whom he or she provides medical services by telemedicine is:  (1) informed of the relationship between the physician and patient and the respective role of any other health care provider with respect to management of the patient; and (2) notified that he or she may decline to receive medical services by telemedicine and may withdraw from such care at any time.        Chronic Pain -- Established Patient (Follow-up visit)  Referring Physician: Marie Zee MD    PCP: Meli Gutierrez MD        Chief complaint:  Follow-up     Lumbar Back Pain - facet-mediated, axial in nature   SIJ and GT bursa pain  Bilateral foot numbness      Interval History (4/30/2025): Patient was logged in 24 minutes after scheduled appointment time.    Patient presents for follow-up after a lumbar nerve burn procedure: Bilateral L4-5 & L5-S1 RFA on 3/27/25, which has been 100% effective in relieving her symptoms, but she still experiences some pain in the hip area. She reports pain in the right sacroiliac joint area, specifically right above the buttock region.     She has numbness in both feet. She was told that Left foot numbness is attributed to tarsal tunnel syndrome, while right foot numbness may be related to her back issues. A nerve conduction study showed some nerve irritation that can cause  back and leg pain, which she is also here today to review those results.    Patient has some Gabapentin but is not taking it regularly. She is on Meloxicam for arthritis pain.    She had a scheduled surgery on her left foot with Dr. Fox, which was canceled two weeks ago. The new date for the surgery is set for April 11.    Interval History (2/25/2025):  Gerda Obrien presents today for follow-up visit.  Patient was last seen about a year ago.  She presents today with returning low back pain.  She had a lumbar RFA in December of 2023 with great pain relief that has returned, although gradual return.  She feels the low back pain started to come back about 3 months ago, so she had about 1 year of pain relief from previous lumbar RFA.  She is currently enrolled in formal physical therapy at [facility], including for pelvic floor therapy.  She sees Orthopedics for an ankle injury and has Mobic to take as needed.  She reports lumbar pain, also inferior to that right above the buttocks.  She also reports new bilateral foot numbness and tingling, which she has not had in the past; she believes this started about 2 months ago.  She is a prediabetic.    Interval History (03/06/2024):  Patient returns to clinic after procedure.  Patient reports 70% relief and lower back pain after bilateral L4-5 and L5-S1 radiofrequency ablation on 12/21/2023.  Patient reports that stabbing lower back pain has significantly improved.  Primary pain today is bilateral hip pain.  Pain described as a throbbing aching pain over the lateral aspects of her bilateral hips, right-greater-than-left.  Patient denies any significant radiation of this pain.  Pain is worse with lying on the affected side and with prolonged walking, better with elevation and anti-inflammatories.  Pain is currently rated a 3 out 10. Denies any fevers, chills, changes in gait, saddle anesthesia, or bowel and bladder incontinence    Interval History  (8/14/2023):  Gerda Obrien presents today for follow-up visit.  Patient was last seen on 7/5/2023. At that visit, the plan was to start Robaxin, but she reports that it causes drowsiness.  Her main complaint is leg cramping, worse at night.  Pain is better with movement.  She also complains of leg cramps described as Charley horses during sexual intercourse.  Overall, she does not feel that it is a sciatic nerve pain, but more of muscle cramps in her legs.  She takes a multivitamin daily along with magnesium.  Patient reports pain as 2/10 today.  She has taken gabapentin in the past but does not remember why she quit taking.    Initial HPI (7/5/2023):  Gerda Obrien is a 52 y.o. female who presents to the clinic for the evaluation of lower back pain. who presents to the clinic for the evaluation of lower back pain.   Patient reports 5 month history of lower back pain.  Patient denies any inciting traumas to his lower back pain.  Patient denies any previous surgery to the lumbar spine.    Pain is described as a throbbing aching pain in a band across her lower back.  Patient reports associated radiation to her bilateral lower extremities primarily affecting the bilateral calves and feet in a numbness tingling pain and with associated muscle cramps.  Pain is typically worse at night and when getting up in the morning.  Patient reports morning stiffness with his pain.  Pain is typically worse with stretching and exercise as well as heat.  Pain is currently rated a 3/10, but can increase to an 8/10 with exacerbating activities. Denies any fevers, chills, changes in gait, saddle anesthesia, or bowel and bladder incontinence      Non-Pharmacologic Treatments:  Physical Therapy/Home Exercise: yes  Ice/Heat:yes  TENS: no  Acupuncture: no  Massage: yes  Chiropractic: no        Previous Pain Medications:  Tylenol, NSAIDs, topicals      Pain Procedures:   -12/21/2023:  Bilateral L4-5 and L5-S1  radiofrequency ablation, 80% relief  -03/27/2025:  Bilateral L4-5 & L5-S1 RFA, 100% pain relief           Imaging/ Diagnostic Studies/ Labs (Reviewed on 4/30/2025):    4/9/2025 BLE EMG/NCS   ABNORMAL Study  There is electrodiagnostic evidence of a tarsal tunnel syndrome across the left ankle.  There is an acute on chronic radiculopathy of the right L5 nerve root and a chronic radiculopathy of the left L5 and bilateral S1 nerve roots      03/21/2024 XR HIPS BILATERAL 2 VIEW INCL AP PELVIS  COMPARISON: 02/15/2022  FINDINGS:  No acute fracture.  Hip joint spaces unchanged with similar minimal to mild acetabular spurring.  No SULEMA bilateral enthesophyte changes similar.  Soft tissues unremarkable.  Impression:  Stable pelvis/hips.                                          7/5/23 MRI Lumbar Spine Without Contrast  COMPARISON: X-ray dated 02/15/2022    FINDINGS:  There are 5 non-rib-bearing lumbar vertebrae.  There is trace grade 1 degenerative anterolisthesis of L4 on L5.  Remaining alignment is unremarkable.  No acute fracture or compression deformity.  No aggressive focal signal abnormality.    Conus medullaris terminates at the L1-L2 level.  Conus medullaris is normal in size and signal.    T12-L1: No significant disc pathology.  No significant spinal canal or neural foraminal stenosis.    L1-L2: No significant disc pathology.  No significant spinal canal or neural foraminal stenosis.    L2-L3: Trace annular disc bulge.  Mild bilateral facet arthropathy.  No significant spinal canal or neural foraminal stenosis.    L3-L4: Small circumferential disc bulge.  Mild bilateral facet arthropathy.  No significant spinal canal or neural foraminal stenosis.    L4-L5: Small circumferential disc bulge.  Mild bilateral facet arthropathy.  No significant spinal canal or neural foraminal stenosis.    L5-S1: Trace annular disc bulge.  Minimal bilateral facet arthropathy.  No significant spinal canal or neural foraminal  stenosis.    Paraspinal soft tissues are unremarkable.  Visualized intra-abdominopelvic contents are also unremarkable.  Impression:   Mild mid to lower lumbar level predominant degenerative changes as detailed above including grade 1 anterolisthesis of L4 on L5.  No significant spinal canal or neural foraminal stenosis.        2/15/22 X-Ray Lumbar Complete Including Flex And Ext  COMPARISON: None  FINDINGS:  AP, lateral (flexion and extension), bilateral oblique and coned down radiographs of the lumbar spine demonstrate no evidence for acute fracture or dislocation.  Posterior facet hypertrophic changes are identified at L5/S1, to a lesser extent at L4/5.  Anterior osteophytes are identified at several lumbar levels.  Mild intervertebral disc space narrowing is also identified at L4/5 and L5/S1.  Remaining intervertebral disk spaces and vertebral body heights are well-preserved.  Visualized SI joints are intact.  Impression  Mild degenerative disease as described above        Review of Systems:   Constitutional:  Negative for chills, diaphoresis, fatigue and fever.   HENT:  Negative for ear discharge, ear pain, rhinorrhea, trouble swallowing and voice change.    Eyes:  Negative for pain and redness.   Respiratory:  Negative for chest tightness, shortness of breath, wheezing and stridor.    Cardiovascular:  Negative for chest pain and leg swelling.   Gastrointestinal:  Negative for blood in stool, diarrhea, nausea and vomiting.   Endocrine: Negative for cold intolerance and heat intolerance.   Genitourinary:  Negative for dysuria, hematuria and urgency.   Musculoskeletal:  Positive for arthralgias, back pain and myalgias. Negative for gait problem, joint swelling, neck pain and neck stiffness.   Skin:  Negative for rash.   Neurological:  Positive for weakness and numbness. Negative for tremors, seizures, speech difficulty, light-headedness and headaches.   Hematological:  Does not bruise/bleed easily.  "  Psychiatric/Behavioral:  Negative for agitation, confusion and suicidal ideas.           OBJECTIVE:    Telemedicine Physical Exam:   Vitals:    04/30/25 0803   Resp: 17   Height: 5' 2" (1.575 m)   Body mass index is 34.68 kg/m².   (reviewed on 4/30/2025)     (Physical exam performed virtually with patient guided on specific actions and diagnostic maneuvers)  GENERAL: Well appearing, in no acute distress, alert and oriented x3.  Cooperative.  PSYCH:  Mood and affect appropriate.  SKIN: Skin color & texture with no obvious abnormalities.    HEAD/FACE:  Normocephalic, atraumatic.    PULM:  No difficulty breathing. No nasal flaring. No obvious wheezing.  Patient performed SIJ testing:  - TTP over SI joint: Present, also over GTB   - Juan Alberto's/ Giovanni's: Positive    - Sacroiliac Distraction Test (anterior pressure): Positive  - Sacroiliac Compression Test (lateral pressure): Positive    EXTREMITIES: No obvious deformities. Moving all extremities well, appears to have symmetric strength throughout.  MUSCULOSKELETAL: No obvious atrophy abnormalities are noted.   GAIT: sitting.     Physical Exam: last in clinic visit:  Constitutional:       General: She is not in acute distress.     Appearance: Normal appearance. She is not ill-appearing.   HENT:      Head: Normocephalic and atraumatic.      Nose: No congestion or rhinorrhea.   Eyes:      Extraocular Movements: Extraocular movements intact.      Pupils: Pupils are equal, round, and reactive to light.   Cardiovascular:      Pulses: Normal pulses.   Pulmonary:      Effort: Pulmonary effort is normal.      Breath sounds: Normal breath sounds.   Abdominal:      General: Abdomen is flat. Bowel sounds are normal.      Palpations: Abdomen is soft.   Skin:     General: Skin is warm and dry.      Capillary Refill: Capillary refill takes less than 2 seconds.   Neurological:      General: No focal deficit present.      Mental Status: She is alert and oriented to person, place, and " time.      Sensory: No sensory deficit.      Motor: Weakness present. No abnormal muscle tone.      Gait: Gait normal.      Deep Tendon Reflexes:      Reflex Scores:       Patellar reflexes are 2+ on the right side and 2+ on the left side.       Achilles reflexes are 2+ on the right side and 2+ on the left side.     Comments: Four out of five strength in bilateral dorsiflexion   Psychiatric:         Mood and Affect: Mood normal.         Behavior: Behavior normal.         Thought Content: Thought content normal.     Musculoskeletal:  Lumbar Exam  Incision: no  Pain with Flexion: no  Pain with Extension: yes  ROM: Decreased  Paraspinous TTP:  Positive bilaterally  Facet TTP:  L5-S1  Facet Loading:  Positive bilaterally  SLR:  Positive on the right at 75°  SIJ TTP:  Positive bilaterally  LISSA:  Positive on the right          ASSESSMENT:     54 y.o. year old female with lower back pain, consistent with     1. Lumbar spondylosis        2. Bilateral sacroiliitis  Case Request-RAD/Other Procedure Area: INJECTION,SACROILIAC JOINT      3. Greater trochanteric bursitis of both hips  Case Request-RAD/Other Procedure Area: INJECTION,SACROILIAC JOINT      4. Tarsal tunnel syndrome of left side        5. Numbness and tingling of foot          Lumbar spondylosis    Bilateral sacroiliitis  -     Case Request-RAD/Other Procedure Area: INJECTION,SACROILIAC JOINT    Greater trochanteric bursitis of both hips  -     Case Request-RAD/Other Procedure Area: INJECTION,SACROILIAC JOINT    Tarsal tunnel syndrome of left side    Numbness and tingling of foot         PLAN:   - Interventions:   - S/p Bilateral L4-5 & L5-S1 RFA on 3/27/25 with 100% pain relief.    - Schedule bilateral SIJ + bilateral GT bursa injection - for continued pain. Patient is not taking prescription blood thinners or ASA.      -12/21/2023: Bilateral L4-5 & L5-S1 radiofrequency ablation, 80% relief    - Anticoagulation use:   No no anticoagulation    - Medications:  -  Refill gabapentin 600mg QHS to potentially help with foot numbness. We will further increase if does not provide adequate relief. Potential side effects of this medication include daytime somnolence, weight gain and peripheral edema.  Consider switching to Lyrica for less sedating SE.    - Continue Mobic 15mg QD PRN - from Orthopedics; can alternate with OTC Tylenol (acetaminophen) 500mg up to 3000mg per day PRN.     - [place]  reviewed and appropriate.      - Therapy:   Patient has completed some physical therapy in the past.  She is also currently enrolled at [facility]. Will provide SI joint and GT bursa specific exercises on visit summary.    - Diagnostic/ Imaging:   - BLE EMG/NCS reviewed with patient.  - Previous imaging reviewed: Lumbar MRI (7/5/23).    - Consults:   - [email or web address]; planning for surgery. Previously seen by Dr. Bartlett.       - Follow up visit: 4 weeks post-procedure -- virtual visit       Future Appointments   Date Time Provider Department Center   5/27/2025  8:45 AM Meli Gutierrez MD [facility]   7/28/2025  2:00 PM Esther Chandra MD [facility] [place]   10/2/2025  9:00 AM Susie James MD [facility]BGYN [place] [facility]   10/2/2025 10:10 AM [facility]  MAMMO1 SCREEN [facility] MAMMO [place] [facility]         Visit today included increased complexity associated with the care of the episodic problem of chronic pain which was addressed and continue to manage the longitudinal care of the patient due to the serious and/or complex managed problem(s) listed above.    - Patient Questions: Answered all of the patient's questions regarding diagnosis, therapy, and treatment.    - This condition does not require this patient to take time off of work, and the primary goal of our Pain Management services is to improve the patient's functional capacity.   - I discussed the risks, benefits, and alternatives to potential treatment options. All questions and concerns  were fully addressed today in clinic.         Estrella Thompson PA-C  Interventional Pain Management - [facility]    Disclaimer:  This note was prepared using voice recognition system and is likely to have sound alike errors that may have been overlooked even after proof reading.  Please call me with any questions.     This note was generated with the assistance of ambient listening technology. Verbal consent was obtained by the patient and accompanying visitor(s) for the recording of patient appointment to facilitate this note. I attest to having reviewed and edited the generated note for accuracy, though some syntax or spelling errors may persist. Please contact the author of this note for any clarification.

## 2025-04-30 ENCOUNTER — OFFICE VISIT (OUTPATIENT)
Dept: PAIN MEDICINE | Facility: CLINIC | Age: 55
End: 2025-04-30
Payer: COMMERCIAL

## 2025-04-30 ENCOUNTER — PATIENT MESSAGE (OUTPATIENT)
Dept: PAIN MEDICINE | Facility: CLINIC | Age: 55
End: 2025-04-30

## 2025-04-30 VITALS — HEIGHT: 62 IN | RESPIRATION RATE: 17 BRPM | BODY MASS INDEX: 34.68 KG/M2

## 2025-04-30 DIAGNOSIS — M46.1 BILATERAL SACROILIITIS: ICD-10-CM

## 2025-04-30 DIAGNOSIS — M70.61 GREATER TROCHANTERIC BURSITIS OF BOTH HIPS: ICD-10-CM

## 2025-04-30 DIAGNOSIS — M47.816 LUMBAR SPONDYLOSIS: Primary | ICD-10-CM

## 2025-04-30 DIAGNOSIS — R20.2 NUMBNESS AND TINGLING OF FOOT: ICD-10-CM

## 2025-04-30 DIAGNOSIS — G57.52 TARSAL TUNNEL SYNDROME OF LEFT SIDE: ICD-10-CM

## 2025-04-30 DIAGNOSIS — R20.0 NUMBNESS AND TINGLING OF FOOT: ICD-10-CM

## 2025-04-30 DIAGNOSIS — M70.62 GREATER TROCHANTERIC BURSITIS OF BOTH HIPS: ICD-10-CM

## 2025-04-30 PROCEDURE — 3066F NEPHROPATHY DOC TX: CPT | Mod: CPTII,95,, | Performed by: PHYSICIAN ASSISTANT

## 2025-04-30 PROCEDURE — 3044F HG A1C LEVEL LT 7.0%: CPT | Mod: CPTII,95,, | Performed by: PHYSICIAN ASSISTANT

## 2025-04-30 PROCEDURE — 1160F RVW MEDS BY RX/DR IN RCRD: CPT | Mod: CPTII,95,, | Performed by: PHYSICIAN ASSISTANT

## 2025-04-30 PROCEDURE — G2211 COMPLEX E/M VISIT ADD ON: HCPCS | Mod: 95,,, | Performed by: PHYSICIAN ASSISTANT

## 2025-04-30 PROCEDURE — 3061F NEG MICROALBUMINURIA REV: CPT | Mod: CPTII,95,, | Performed by: PHYSICIAN ASSISTANT

## 2025-04-30 PROCEDURE — 98006 SYNCH AUDIO-VIDEO EST MOD 30: CPT | Mod: 95,,, | Performed by: PHYSICIAN ASSISTANT

## 2025-04-30 PROCEDURE — 1159F MED LIST DOCD IN RCRD: CPT | Mod: CPTII,95,, | Performed by: PHYSICIAN ASSISTANT

## 2025-04-30 NOTE — PATIENT INSTRUCTIONS
INTERPRETATION  -Bilateral superficial peroneal sensorynerve conduction study showed normal peak latency and amplitude  -Bilateral sural sensory nerve conduction study showed normal peak latency and amplitude  -Bilateral peroneal motor nerve conduction study showed normal latency, amplitude, and normal conduction velocity  -Bilateral tibial motor nerve conduction study showed normal latency, dec amplitude, and normal conduction velocity  -Left medial and lateral plantar sensory showed prolonged peak latency and normal amplitude  -right medial and lateral plantar sensory showed normal peak latency and amplitude  -Needle EMG examination performed to above mentioned muscles      IMPRESSION  ABNORMAL Study  There is electrodiagnostic evidence of a tarsal tunnel syndrome across the left ankle.  There is an acute on chronic radiculopathy of the right L5 nerve root and a chronic radiculopathy of the left L5 and bilateral S1 nerve roots     PLAN  Follow up with referring provider-Dr Johnson  Handouts on TTS and radic provided. Pt to fu with podiatrist in [facility]  This study is good for one year. If symptoms worsen or do not improve, please re-consult.     Marie Zee M.D.  Physical Medicine and Rehab              Electronically signed by Marie Zee MD at 4/9/2025  3:25 PM

## 2025-05-15 ENCOUNTER — PATIENT MESSAGE (OUTPATIENT)
Dept: PAIN MEDICINE | Facility: CLINIC | Age: 55
End: 2025-05-15
Payer: COMMERCIAL

## 2025-05-26 NOTE — PRE-PROCEDURE INSTRUCTIONS
Spoke with patient regarding procedure scheduled on 6.5     Arrival time 1030     Has patient been sick with fever or on antibiotics within the last 7 days? No     Does the patient have any open wounds, sores or rashes? No     Does the patient have any recent fractures? no     Has patient received a vaccination within the last 7 days? No     Received the COVID vaccination?      Has the patient stopped all medications as directed? na     Does patient have a pacemaker, defibrillator, or implantable stimulator? No     Does the patient have a ride to and from procedure and someone reliable to remain with patient?  unsure educated     Is the patient diabetic? no     Does the patient have sleep apnea? Or use O2 at home? NEERAJ     Is the patient receiving sedation?      Is the patient instructed to remain NPO beginning at midnight the night before their procedure? yes     Procedure location confirmed with patient? Yes     Covid- Denies signs/symptoms. Instructed to notify PAT/MD if any changes.

## 2025-05-27 PROBLEM — F51.01 PRIMARY INSOMNIA: Status: ACTIVE | Noted: 2024-04-17

## 2025-05-27 PROBLEM — N39.3 STRESS INCONTINENCE: Status: ACTIVE | Noted: 2025-02-04

## 2025-05-30 ENCOUNTER — HOSPITAL ENCOUNTER (OUTPATIENT)
Dept: PREADMISSION TESTING | Facility: HOSPITAL | Age: 55
Discharge: HOME | End: 2025-05-30
Attending: COLON & RECTAL SURGERY
Payer: COMMERCIAL

## 2025-05-30 DIAGNOSIS — Z12.11 COLON CANCER SCREENING: Primary | ICD-10-CM

## 2025-05-30 RX ORDER — SODIUM, POTASSIUM,MAG SULFATES 17.5-3.13G
1 SOLUTION, RECONSTITUTED, ORAL ORAL DAILY
Qty: 1 KIT | Refills: 0 | Status: SHIPPED | OUTPATIENT
Start: 2025-05-30 | End: 2025-06-01

## 2025-06-02 DIAGNOSIS — Z12.11 COLON CANCER SCREENING: Primary | ICD-10-CM

## 2025-06-02 RX ORDER — SOD SULF/POT CHLORIDE/MAG SULF 1.479 G
12 TABLET ORAL DAILY
Qty: 24 TABLET | Refills: 0 | Status: SHIPPED | OUTPATIENT
Start: 2025-06-02

## 2025-06-05 ENCOUNTER — HOSPITAL ENCOUNTER (OUTPATIENT)
Facility: HOSPITAL | Age: 55
Discharge: HOME | End: 2025-06-05
Attending: ANESTHESIOLOGY | Admitting: ANESTHESIOLOGY
Payer: COMMERCIAL

## 2025-06-05 VITALS
SYSTOLIC BLOOD PRESSURE: 110 MMHG | TEMPERATURE: 98 F | BODY MASS INDEX: 33.43 KG/M2 | OXYGEN SATURATION: 100 % | WEIGHT: 181.69 LBS | HEART RATE: 65 BPM | DIASTOLIC BLOOD PRESSURE: 60 MMHG | RESPIRATION RATE: 16 BRPM | HEIGHT: 62 IN

## 2025-06-05 DIAGNOSIS — M46.1 SACROILIITIS: Primary | ICD-10-CM

## 2025-06-05 LAB — POCT GLUCOSE: 81 MG/DL (ref 70–110)

## 2025-06-05 PROCEDURE — 63600175 PHARM REV CODE 636 W HCPCS: Mod: JZ,TB | Performed by: ANESTHESIOLOGY

## 2025-06-05 PROCEDURE — 27096 INJECT SACROILIAC JOINT: CPT | Mod: 50 | Performed by: ANESTHESIOLOGY

## 2025-06-05 PROCEDURE — 27096 INJECT SACROILIAC JOINT: CPT | Mod: 50,,, | Performed by: ANESTHESIOLOGY

## 2025-06-05 PROCEDURE — 25500020 PHARM REV CODE 255: Performed by: ANESTHESIOLOGY

## 2025-06-05 RX ORDER — ONDANSETRON HYDROCHLORIDE 2 MG/ML
4 INJECTION, SOLUTION INTRAVENOUS ONCE AS NEEDED
Status: DISCONTINUED | OUTPATIENT
Start: 2025-06-05 | End: 2025-06-05 | Stop reason: HOSPADM

## 2025-06-05 RX ORDER — MIDAZOLAM HYDROCHLORIDE 1 MG/ML
INJECTION INTRAMUSCULAR; INTRAVENOUS
Status: DISCONTINUED | OUTPATIENT
Start: 2025-06-05 | End: 2025-06-05 | Stop reason: HOSPADM

## 2025-06-05 RX ORDER — FENTANYL CITRATE 50 UG/ML
INJECTION, SOLUTION INTRAMUSCULAR; INTRAVENOUS
Status: DISCONTINUED | OUTPATIENT
Start: 2025-06-05 | End: 2025-06-05 | Stop reason: HOSPADM

## 2025-06-05 RX ORDER — LIDOCAINE HYDROCHLORIDE 10 MG/ML
INJECTION, SOLUTION EPIDURAL; INFILTRATION; INTRACAUDAL; PERINEURAL
Status: DISCONTINUED | OUTPATIENT
Start: 2025-06-05 | End: 2025-06-05 | Stop reason: HOSPADM

## 2025-06-05 RX ORDER — METHYLPREDNISOLONE ACETATE 40 MG/ML
INJECTION, SUSPENSION INTRA-ARTICULAR; INTRALESIONAL; INTRAMUSCULAR; SOFT TISSUE
Status: DISCONTINUED | OUTPATIENT
Start: 2025-06-05 | End: 2025-06-05 | Stop reason: HOSPADM

## 2025-06-05 NOTE — H&P
HPI  Patient presenting for Procedure(s) (LRB):  INJECTION,SACROILIAC JOINT (Bilateral)     Patient on Anti-coagulation No    No health changes since previous encounter    Past Medical History:   Diagnosis Date    Allergy     Anxiety     Biotin deficiency disease     Chlamydia     Diabetes mellitus     Fibroids     Herpes simplex     Knee pain, bilateral     Metabolic syndrome     Sleep apnea     Vitamin D deficiency      Past Surgical History:   Procedure Laterality Date    ARTHROSCOPIC REPAIR OF ROTATOR CUFF OF SHOULDER Left 03/07/2022    Procedure: REPAIR, ROTATOR CUFF, ARTHROSCOPIC;  Surgeon: Denilson Jackson MD;  Location: [facility] OR;  Service: Orthopedics;  Laterality: Left;    ARTHROSCOPY OF SHOULDER WITH DECOMPRESSION OF SUBACROMIAL SPACE Left 03/07/2022    Procedure: ARTHROSCOPY, SHOULDER, WITH SUBACROMIAL SPACE DECOMPRESSION;  Surgeon: Denilson Jackson MD;  Location: [facility] OR;  Service: Orthopedics;  Laterality: Left;    BREAST SURGERY      BUNIONECTOMY      CERVICAL BIOPSY  W/ LOOP ELECTRODE EXCISION      COLONOSCOPY N/A 10/15/2021    Procedure: COLONOSCOPY;  Surgeon: Alaina Ceja MD;  Location: [facility] ENDO;  Service: Endoscopy;  Laterality: N/A;    FIXATION OF TENDON Left 03/07/2022    Procedure: FIXATION, TENDON;  Surgeon: Denilson Jackson MD;  Location: [facility] OR;  Service: Orthopedics;  Laterality: Left;    FOOT SURGERY      hammer toe Bilateral     HYSTERECTOMY  11/2021    INJECTION OF ANESTHETIC AGENT AROUND MEDIAL BRANCH NERVES INNERVATING LUMBAR FACET JOINT Bilateral 10/26/2023    Procedure: Diagnostic Bilateral L4/5 & L5/S1 MBB #1;  Surgeon: Beka Johnson MD;  Location: [facility] PAIN MGT;  Service: Pain Management;  Laterality: Bilateral;    INJECTION OF ANESTHETIC AGENT AROUND MEDIAL BRANCH NERVES INNERVATING LUMBAR FACET JOINT Bilateral 11/20/2023    Procedure: Bilateral L4/L5 and L5/S1 MBB;  Surgeon: Beka Johnson MD;  Location: [facility] PAIN MGT;  Service: Pain  "Management;  Laterality: Bilateral;    Laparoscopic assisted vaginal hysterectomy with BSO  11/22/2021    Path--Leiomyomata/Adenomyosis    LASER ABLATION OF THE CERVIX      PLANTAR FASCIA SURGERY Left     RADIOFREQUENCY THERMOCOAGULATION Bilateral 12/21/2023    Procedure: Bilateral L4/L5 and L5/S1 Lumbar RFA;  Surgeon: Beka Johnson MD;  Location: [facility] PAIN MGT;  Service: Pain Management;  Laterality: Bilateral;    RADIOFREQUENCY THERMOCOAGULATION Bilateral 3/27/2025    Procedure: bilateral L4/5 & L5/S1 RFA;  Surgeon: Beka Johnson MD;  Location: [facility] PAIN MGT;  Service: Pain Management;  Laterality: Bilateral;    TOTAL REDUCTION MAMMOPLASTY  03/2007    TUBAL LIGATION       Review of patient's allergies indicates:   Allergen Reactions    Doxycycline     Dulaglutide Other (See Comments)    Emtricitabine      Other reaction(s): Rash    Lexapro [escitalopram oxalate]      spasms    Lipitor [atorvastatin]      Decreased sex drive    Metformin hcl      Other Reaction(s): Unknown    Sulfa (sulfonamide antibiotics) Other (See Comments)    Tenofovir      Other reaction(s): Rash    Bydureon [exenatide microspheres] Rash     Skin reaction at site of injection.        Medications Ordered Prior to Encounter[1]     PMHx, PSHx, Allergies, Medications reviewed in epic    ROS negative except pain complaints in HPI    OBJECTIVE:    BP (!) 109/59 (BP Location: Right arm, Patient Position: Sitting)   Pulse 70   Temp 97.1 °F (36.2 °C) (Temporal)   Resp 18   Ht 5' 2" (1.575 m)   Wt 82.4 kg (181 lb 10.5 oz)   LMP 11/12/2017 (Approximate)   SpO2 99%   Breastfeeding No   BMI 33.23 kg/m²     PHYSICAL EXAMINATION:    GENERAL: Well appearing, in no acute distress, alert and oriented x3.  PSYCH:  Mood and affect appropriate.  SKIN: Skin color, texture, turgor normal, no rashes or lesions which will impact the procedure.  CV: RRR with palpation of the radial artery.  PULM: No evidence of respiratory difficulty, symmetric " chest rise. Clear to auscultation.  NEURO: Cranial nerves grossly intact.    Plan:    Proceed with procedure as planned Procedure(s) (LRB):  INJECTION,SACROILIAC JOINT (Bilateral)    Beka Johnson MD  06/05/2025                 [1]   No current facility-administered medications on file prior to encounter.     Current Outpatient Medications on File Prior to Encounter   Medication Sig Dispense Refill    biotin 10,000 mcg Cap Take 1 capsule by mouth once daily.      blood sugar diagnostic (ACCU-CHEK GUIDE TEST STRIPS) Strp Use bid with glucometer 100 strip 2    clonazePAM (KLONOPIN) 0.5 MG tablet TAKE 1 TABLET BY MOUTH EVERY DAY AS NEEDED FOR ANXIETY 20 tablet 0    clotrimazole-betamethasone 1-0.05% (LOTRISONE) cream Apply topically 2 (two) times daily. 45 g 1    desloratadine (CLARINEX) 5 mg tablet TAKE 1 TABLET BY MOUTH EVERY DAY 90 tablet 3    diclofenac sodium (VOLTAREN) 1 % Gel USE 2 GRAMS (TOPICAL) 4 TIMES PER DAY AS NEEDED FOR PAIN      estradioL (LYLLANA) 0.1 mg/24 hr PTSW Place 1 patch onto the skin twice a week. 24 patch 3    ferrous sulfate (FEOSOL) 325 mg (65 mg iron) Tab tablet TAKE 1 TABLET BY MOUTH EVERY DAY WITH BREAKFAST 90 tablet 1    fluticasone propionate (FLONASE) 50 mcg/actuation nasal spray SPRAY 2 SPRAYS IN EACH NOSTRIL ONCE DAILY      meloxicam (MOBIC) 15 MG tablet TAKE 1 TABLET BY MOUTH EVERY DAY 30 tablet 1    multivitamin capsule Take by mouth. (Patient not taking: Reported on 5/27/2025)      omeprazole (PRILOSEC) 40 MG capsule TAKE 1 CAPSULE (40 MG TOTAL) BY MOUTH EVERY MORNING. 90 capsule 1    triamcinolone acetonide 0.025% (KENALOG) 0.025 % cream Apply topically 2 (two) times daily as needed (for dry patches on face). Mild steroid. Use sparingly for 1-2 weeks if needed then stop. 80 g 1    valACYclovir (VALTREX) 500 MG tablet Take 1 tablet (500 mg total) by mouth once daily. 90 tablet 3

## 2025-06-05 NOTE — DISCHARGE INSTRUCTIONS

## 2025-06-05 NOTE — OP NOTE
Sacroiliac Joint Injection under Fluoroscopy      INFORMED CONSENT: The procedure, risks, benefits and options were discussed with patient. There are no contraindications to the procedure. The patient expressed understanding and agreed to proceed. The personnel performing the procedure was discussed.    Date of procedure 06/05/2025    Time-out taken to identify patient and procedure side prior to starting the procedure.                     PROCEDURE:  Bilateral sacroiliac joint injection under fluoroscopy.    1) Right  sacroiliac joint injection under fluoroscopy.    2) Left  sacroiliac joint injection under fluoroscopy.    Pre Procedure diagnosis:    Bilateral sacroiliitis [M46.1]  Greater trochanteric bursitis of both hips [M70.61, M70.62]  1. Sacroiliitis        Post-Procedure diagnosis:   same    PHYSICIAN: Beka Johnson MD  ASSISTANTS: None    MEDICATIONS INJECTED: 1ml  Depo-Medrol 40mg and 3 mL Lidocaine PF 1% into each joint    LOCAL ANESTHETIC USED: 3ml Lidocaine 1%    ESTIMATED BLOOD LOSS:  None.    COMPLICATIONS:  None.    Sedation: Conscious sedation provided by M.D    SEDATION MEDICATIONS: local/IV sedation: Versed 2 mg and fentanyl 75 mcg IV.  Conscious sedation ordered by MD.  Patient reevaluated and sedation administered by MD and monitored by RN.  Total sedation time was less than 10 min.       Total sedation time was <10 min      TECHNIQUE:   Laying in the prone position, the patient was prepped and draped in the usual sterile fashion using ChloraPrep and fenestrated drape.  The area was determined under fluoroscopy.  Local Xylocaine was injected by raising a wheel and going down to the periosteum using a 27-gauge hypodermic needle.  The 3.5 inch 22-gauge spinal needle was introduce into the bilateral sacroiliac joints.  Negative pressure applied to confirm no intravascular placement.  Omnipaque was injected to confirm placement and to confirm that there was no vascular runoff.  The medication  was then injected slowly.  The patient tolerated the procedure well.        The patient was monitored for approximately 30 minutes after the procedure.  Patient was given post procedure and discharge instructions to follow at home.  The patient was discharged in a stable condition

## 2025-06-05 NOTE — PLAN OF CARE
Pt and spouse verbalized understanding of discharge instructions. Bandaids x 2 to lower back c/d/i. Patient voiced no complaints, with no further questions at this time. Patient stood at side of bed, walked steps with no new motor deficits. VSS on RA. Recovery care complete.

## 2025-06-05 NOTE — DISCHARGE SUMMARY
Discharge Note  Short Stay      SUMMARY     Admit Date: 6/5/2025    Attending Physician: Beka Johnson MD        Discharge Physician: Beka Johnson MD        Discharge Date: 6/5/2025 12:01 PM    Procedure(s) (LRB):  INJECTION,SACROILIAC JOINT (Bilateral)    Final Diagnosis: Bilateral sacroiliitis [M46.1]  Greater trochanteric bursitis of both hips [M70.61, M70.62]    Disposition: Home or self care    Patient Instructions:   Current Discharge Medication List        CONTINUE these medications which have NOT CHANGED    Details   biotin 10,000 mcg Cap Take 1 capsule by mouth once daily.      blood sugar diagnostic (ACCU-CHEK GUIDE TEST STRIPS) Strp Use bid with glucometer  Qty: 100 strip, Refills: 2      blood-glucose sensor (FREESTYLE BARAK 3 SENSOR) Tanja Change every 14 days  Qty: 5 each, Refills: 2      cholecalciferol, vitamin D3, (VITAMIN D3) 250 mcg (10,000 unit) Cap capsule Take 40,000 Units by mouth once daily.      clonazePAM (KLONOPIN) 0.5 MG tablet TAKE 1 TABLET BY MOUTH EVERY DAY AS NEEDED FOR ANXIETY  Qty: 20 tablet, Refills: 0    Comments: Not to exceed 5 additional fills before 10/09/2023      clotrimazole-betamethasone 1-0.05% (LOTRISONE) cream Apply topically 2 (two) times daily.  Qty: 45 g, Refills: 1      desloratadine (CLARINEX) 5 mg tablet TAKE 1 TABLET BY MOUTH EVERY DAY  Qty: 90 tablet, Refills: 3      diclofenac sodium (VOLTAREN) 1 % Gel USE 2 GRAMS (TOPICAL) 4 TIMES PER DAY AS NEEDED FOR PAIN      estradioL (LYLLANA) 0.1 mg/24 hr PTSW Place 1 patch onto the skin twice a week.  Qty: 24 patch, Refills: 3    Associated Diagnoses: Hormone replacement therapy, postmenopausal      ferrous sulfate (FEOSOL) 325 mg (65 mg iron) Tab tablet TAKE 1 TABLET BY MOUTH EVERY DAY WITH BREAKFAST  Qty: 90 tablet, Refills: 1      flash glucose scanning reader (LastlineSTYLE BARAK 14 DAY READER) Misc Check bid  Qty: 1 each, Refills: 1      fluticasone propionate (FLONASE) 50 mcg/actuation nasal spray SPRAY 2  SPRAYS IN EACH NOSTRIL ONCE DAILY      gabapentin (NEURONTIN) 300 MG capsule Take 1 capsule (300 mg total) by mouth every evening for 5 days, THEN 2 capsules (600 mg total) every evening.  Qty: 115 capsule, Refills: 0    Associated Diagnoses: Numbness and tingling of foot      meloxicam (MOBIC) 15 MG tablet TAKE 1 TABLET BY MOUTH EVERY DAY  Qty: 30 tablet, Refills: 1    Associated Diagnoses: Left ankle pain, unspecified chronicity; High ankle sprain of left lower extremity, initial encounter      multivitamin capsule Take by mouth.      omeprazole (PRILOSEC) 40 MG capsule TAKE 1 CAPSULE (40 MG TOTAL) BY MOUTH EVERY MORNING.  Qty: 90 capsule, Refills: 1      rosuvastatin (CRESTOR) 5 MG tablet Take 1 tablet (5 mg total) by mouth once daily.  Qty: 90 tablet, Refills: 1    Associated Diagnoses: Other hyperlipidemia; Medication refill      sod sulf-pot chloride-mag sulf (SUTAB) 1.479-0.188- 0.225 gram tablet Take 12 tablets by mouth once daily. Take according to package instructions with indicated amount of water.  Qty: 24 tablet, Refills: 0    Associated Diagnoses: Colon cancer screening      tirzepatide 12.5 mg/0.25 mL Syrg Inject 12.5 mg into the skin once a week. Inject  12.5 mg a week  Qty: 3 mL, Refills: 1    Associated Diagnoses: Type 2 diabetes mellitus with diabetic polyneuropathy, without long-term current use of insulin      triamcinolone acetonide 0.025% (KENALOG) 0.025 % cream Apply topically 2 (two) times daily as needed (for dry patches on face). Mild steroid. Use sparingly for 1-2 weeks if needed then stop.  Qty: 80 g, Refills: 1    Associated Diagnoses: Seborrheic dermatitis      valACYclovir (VALTREX) 500 MG tablet Take 1 tablet (500 mg total) by mouth once daily.  Qty: 90 tablet, Refills: 3    Associated Diagnoses: HSV (herpes simplex virus) infection      VITAMIN B COMPLEX ORAL Take 1 capsule by mouth once daily.                 Discharge Diagnosis: Bilateral sacroiliitis [M46.1]  Greater trochanteric  bursitis of both hips [M70.61, M70.62]  Condition on Discharge: Stable with no complications to procedure   Diet on Discharge: Same as before.  Activity: as per instruction sheet.  Discharge to: Home with a responsible adult.  Follow up: 2-4 weeks       Please call the office at [phone] if you experience any weakness or loss of sensation, fever > 101.5, pain uncontrolled with oral medications, persistent nausea/vomiting/or diarrhea, redness or drainage from the incisions, or any other worrisome concerns. If physician on call was not reached or could not communicate with our office for any reason please go to the nearest emergency department

## 2025-06-09 ENCOUNTER — PATIENT MESSAGE (OUTPATIENT)
Dept: PAIN MEDICINE | Facility: CLINIC | Age: 55
End: 2025-06-09
Payer: COMMERCIAL

## 2025-06-16 PROBLEM — D50.9 IRON DEFICIENCY ANEMIA: Status: ACTIVE | Noted: 2025-06-16

## 2025-06-16 PROBLEM — E66.811 CLASS 1 OBESITY WITH BODY MASS INDEX (BMI) OF 33.0 TO 33.9 IN ADULT: Status: ACTIVE | Noted: 2023-07-17

## 2025-06-17 NOTE — PROGRESS NOTES
SPORTS MEDICINE / PM&R New Patient Visit :    Referring Physician: Stacia Gutierrez MD    Chief Complaint   Patient presents with    Left Shoulder - Pain       HPI: This is a 51 y.o.  female being seen in clinic today for evaluation of Pain of the Left Shoulder      The problem began 10 months ago.  She feels sharp, dull, throbbing, stabbing, numbness, tingling, constant, pain at rest and pain with movement pain in her left shoulder . The symptoms are worsening. She has tried DRY NEEDLING, NAPROXEN AND FLEXERIL, NSAIDS and chiropractic without improvement. She has tried therapy. She states that she went to [facility] for 2 x / week for approximately 2 months.  She states that it help her trapezius region but not her shoulder area.    History obtained from patient.  Patient states that she has had progressive left shoulder pain since the spring of 2021.  She doesn't recall a specific RODNEY.  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  She states that the majority of pain is at night and when she tries to lift something in a scaption position.  She currently rates her pain at a 1/10 with it increasing to a 10/10 with certain movements and at night.    Past family, medical, social, surgical history, and vital signs reviewed in chart.    General    Nursing note and vitals reviewed.  Constitutional: She is oriented to person, place, and time. She appears well-developed and well-nourished.   HENT:   Head: Normocephalic and atraumatic.   Eyes: Conjunctivae and EOM are normal. Pupils are equal, round, and reactive to light.   Neck: Neck supple.   Cardiovascular: Intact distal pulses.    Pulmonary/Chest: Effort normal. No respiratory distress.   Abdominal: She exhibits no distension.   Neurological: She is alert and oriented to person, place, and time. She has normal reflexes.   Psychiatric: She has a normal mood and affect.         Right Shoulder Exam   Right  shoulder exam is normal.    Inspection/Observation   Scars: absent  Scapular Winging: absent  Scapular Dyskinesia: negative    Range of Motion   Active abduction: normal   Passive abduction: normal   Extension: normal   Forward Flexion: normal   Adduction: normal  External Rotation 0 degrees: normal   Internal rotation 0 degrees: normal     Muscle Strength   The patient has normal right shoulder strength.    Tests & Signs   Apprehension: negative  Greenberg test: negative  Impingement: negative  Sulcus: absent  Speed's Test: negative  Jerk Test: negative    Other   Sensation: normal    Left Shoulder Exam     Inspection/Observation   Swelling: absent  Scars: absent  Deformity: absent  Scapular Winging: absent  Scapular Dyskinesia: negative    Tenderness   The patient is tender to palpation of the greater tuberosity.    Range of Motion   Active abduction:  90 abnormal   Passive abduction: normal   Extension: normal   Forward Flexion:  150 abnormal   Adduction: normal  External Rotation 0 degrees:  70 abnormal   Internal rotation 0 degrees: abnormal     Tests & Signs   Apprehension: negative  Cross arm: negative  Greenberg test: positive  Impingement: negative  Sulcus: absent  Rotator Cuff Painful Arc/Range: mild  Belly Press: negative  Active Compression test (Bradley's Sign): negative  Speed's Test: positive  Jerk Test: negative    Other   Sensation: normal       Muscle Strength   Left Upper Extremity  Shoulder Abduction: 4/5   Shoulder Internal Rotation: 5/5   Shoulder External Rotation: 5/5   Supraspinatus: 4/5   Subscapularis: 5/5   Biceps: 5/5     Reflexes     Left Side  Biceps:  2+  Brachioradialis:  2+  Left Hernández's Sign:  Absent    Right Side   Biceps:  2+  Brachioradialis:  2+  Right Hernández's Sign:  absent    Vascular Exam     Right Pulses      Radial:                    2+      Left Pulses      Radial:                    2+      Capillary Refill  Right Hand: normal capillary refill  Left Hand: normal  capillary refill      X-Ray Shoulder 2 or More Views Left  Narrative: EXAMINATION:  XR SHOULDER COMPLETE 2 OR MORE VIEWS LEFT    CLINICAL HISTORY:  Pain in left shoulder    TECHNIQUE:  Two or three views of the left shoulder were performed.    COMPARISON:  None    FINDINGS:  There is no radiographic evidence of acute osseous, articular, or soft tissue abnormality.  There is mild AC joint arthrosis.  Glenohumeral joint space appears preserved.  Impression: As above.  No acute findings.    Electronically signed by: Cullen Meade MD  Date:    01/31/2022  Time:    15:11         Patient Instructions     Assessment:  Gerda Obrien is a 51 y.o. female   Chief Complaint   Patient presents with    Left Shoulder - Pain       Nontraumatic incomplete tear of left rotator cuff    Chronic left shoulder pain  -     Ambulatory referral/consult to Orthopedics  -     MRI Shoulder Without Contrast Left; Future; Expected date: 01/31/2022        Plan:   We personally reviewed her imaging today in clinic and agree with the radiologist's report.   At this point she has had pain for months, and has had no improvement after two months of continuous physical therapy as well as some chiropractic treatment.  She is most interested in pursuing advanced imaging to find the exact cause of this ongoing pain.   Given her inability to abduct greater than 90° I think she may have partial rotator cuff tendon tear.  MRI be the most appropriate next step to determine extent of injury and whether this is a surgical lesion or if it could be treated conservatively.   We will follow up after MRI and may send her to our shoulder surgeon versus trial of corticosteroid injection depending on results of the test.   She will take anti inflammatory in the meantime as needed and recommend she continue her home exercise program.      Follow-up:  After MRI or sooner if there are any problems between now and then.    Thank you for choosing  [facility] and Dr. Abbie Smith for your orthopedic & sports medicine care. It is our goal to provide you with exceptional care that will help keep you healthy, active, and get you back in the game.    Please do not hesitate to reach out to us via email, phone, or MyChart with any questions, concerns, or feedback.    If you felt that you received exemplary care today, please consider leaving us feedback on Healthgrades at:  [email or web address]     If you are experiencing pain/discomfort ,or have questions after 5pm and would like to be connected to the [facility] on-call team, please call this number and specify which Sports Medicine provider is treating you: [phone]         Disclaimer: This note was prepared using a voice recognition system and is likely to have sound alike errors within the text.     Abbie Smith M.D.  Sports Medicine       Detail Level: Zone Hide Include Location In Plan Question?: No Detail Level: Detailed

## 2025-06-26 ENCOUNTER — ANESTHESIA EVENT (OUTPATIENT)
Dept: ENDOSCOPY | Facility: HOSPITAL | Age: 55
End: 2025-06-26
Payer: COMMERCIAL

## 2025-06-26 NOTE — ANESTHESIA PREPROCEDURE EVALUATION
06/26/2025  Gerda Obrien is a 54 y.o., female.  Past Medical History:   Diagnosis Date    Allergy     Anxiety     Biotin deficiency disease     Chlamydia     Diabetes mellitus     Fibroids     Herpes simplex     Knee pain, bilateral     Metabolic syndrome     Sleep apnea     Vitamin D deficiency      Past Surgical History:   Procedure Laterality Date    ARTHROSCOPIC REPAIR OF ROTATOR CUFF OF SHOULDER Left 03/07/2022    Procedure: REPAIR, ROTATOR CUFF, ARTHROSCOPIC;  Surgeon: Denilson Jackson MD;  Location: [facility] OR;  Service: Orthopedics;  Laterality: Left;    ARTHROSCOPY OF SHOULDER WITH DECOMPRESSION OF SUBACROMIAL SPACE Left 03/07/2022    Procedure: ARTHROSCOPY, SHOULDER, WITH SUBACROMIAL SPACE DECOMPRESSION;  Surgeon: Denilson Jackson MD;  Location: [facility] OR;  Service: Orthopedics;  Laterality: Left;    BREAST SURGERY      BUNIONECTOMY      CERVICAL BIOPSY  W/ LOOP ELECTRODE EXCISION      COLONOSCOPY N/A 10/15/2021    Procedure: COLONOSCOPY;  Surgeon: Alaina Ceja MD;  Location: [facility] ENDO;  Service: Endoscopy;  Laterality: N/A;    FIXATION OF TENDON Left 03/07/2022    Procedure: FIXATION, TENDON;  Surgeon: Denilson Jackson MD;  Location: [facility] OR;  Service: Orthopedics;  Laterality: Left;    FOOT SURGERY      hammer toe Bilateral     HYSTERECTOMY  11/2021    INJECTION OF ANESTHETIC AGENT AROUND MEDIAL BRANCH NERVES INNERVATING LUMBAR FACET JOINT Bilateral 10/26/2023    Procedure: Diagnostic Bilateral L4/5 & L5/S1 MBB #1;  Surgeon: Beka Johnson MD;  Location: [facility] PAIN MGT;  Service: Pain Management;  Laterality: Bilateral;    INJECTION OF ANESTHETIC AGENT AROUND MEDIAL BRANCH NERVES INNERVATING LUMBAR FACET JOINT Bilateral 11/20/2023    Procedure: Bilateral L4/L5 and L5/S1 MBB;  Surgeon: Beka Johnson MD;  Location: [facility] PAIN MGT;  Service: Pain  Management;  Laterality: Bilateral;    INJECTION, SACROILIAC JOINT Bilateral 6/5/2025    Procedure: INJECTION,SACROILIAC JOINT;  Surgeon: Beka Johnson MD;  Location: [facility] PAIN MGT;  Service: Pain Management;  Laterality: Bilateral;    Laparoscopic assisted vaginal hysterectomy with BSO  11/22/2021    Path--Leiomyomata/Adenomyosis    LASER ABLATION OF THE CERVIX      PLANTAR FASCIA SURGERY Left     RADIOFREQUENCY THERMOCOAGULATION Bilateral 12/21/2023    Procedure: Bilateral L4/L5 and L5/S1 Lumbar RFA;  Surgeon: Beka Johnson MD;  Location: [facility] PAIN MGT;  Service: Pain Management;  Laterality: Bilateral;    RADIOFREQUENCY THERMOCOAGULATION Bilateral 3/27/2025    Procedure: bilateral L4/5 & L5/S1 RFA;  Surgeon: Beka Johnson MD;  Location: [facility] PAIN MGT;  Service: Pain Management;  Laterality: Bilateral;    TOTAL REDUCTION MAMMOPLASTY  03/2007    TUBAL LIGATION       Patient Active Problem List   Diagnosis    Dysmetabolic syndrome X    Other hyperlipidemia    Unspecified hypothyroidism    Biotin deficiency disease    Metabolic syndrome    Sleep apnea    Vitamin D deficiency    Allergic rhinitis    Deviated nasal septum    Essential hypertension, benign    Lymphocytopenia    Anxiety    GERD (gastroesophageal reflux disease)    Genital herpes simplex type 2    Lumbar facet arthropathy    Decreased functional mobility and endurance    Decreased ROM of left shoulder    Weakness of hip    Low back pain    Type 2 diabetes mellitus with diabetic polyneuropathy, without long-term current use of insulin    Class 1 obesity with body mass index (BMI) of 33.0 to 33.9 in adult    Decreased range of motion of right ankle    Gait abnormality    Poor balance    Tarsal tunnel syndrome of left side    Lumbar radiculopathy    Primary insomnia    Stress incontinence    Iron deficiency anemia         Pre-op Assessment    I have reviewed the Patient Summary Reports.     I have reviewed the Nursing Notes. I have reviewed  the NPO Status.   I have reviewed the Medications.     Review of Systems  Anesthesia Hx:  No problems with previous Anesthesia             Denies Family Hx of Anesthesia complications.    Denies Personal Hx of Anesthesia complications.                    Social:  Non-Smoker, Social Alcohol Use       Cardiovascular:     Hypertension           hyperlipidemia                         Hypertension         Pulmonary:        Sleep Apnea     Obstructive Sleep Apnea (NEERAJ).           Hepatic/GI:  Bowel Prep.   GERD    Taking GLP-1 Agonists     Gerd          Neurological:    Neuromuscular Disease,                                 Neuromuscular Disease   Endocrine:  Diabetes, type 2 Hypothyroidism   Diabetes                   Hypothyroidism        Obesity / BMI > 30  Psych:   anxiety                 Physical Exam  General: Cooperative, Alert and Oriented    Airway:  Mallampati: II   Mouth Opening: Normal  TM Distance: Normal  Tongue: Normal  Neck ROM: Normal ROM    Dental:  Intact        Anesthesia Plan  Type of Anesthesia, risks & benefits discussed:    Anesthesia Type: Gen Natural Airway  Intra-op Monitoring Plan: Standard ASA Monitors  Post Op Pain Control Plan: multimodal analgesia  Induction:  IV  Informed Consent: Informed consent signed with the Patient and all parties understand the risks and agree with anesthesia plan.  All questions answered. Patient consented to blood products? No  ASA Score: 3  Day of Surgery Review of History & Physical: H&P Update referred to the surgeon/provider.    Ready For Surgery From Anesthesia Perspective.     .

## 2025-06-27 ENCOUNTER — ANESTHESIA (OUTPATIENT)
Dept: ENDOSCOPY | Facility: HOSPITAL | Age: 55
End: 2025-06-27
Payer: COMMERCIAL

## 2025-06-27 ENCOUNTER — HOSPITAL ENCOUNTER (OUTPATIENT)
Dept: ENDOSCOPY | Facility: HOSPITAL | Age: 55
Discharge: HOME | End: 2025-06-27
Attending: INTERNAL MEDICINE | Admitting: INTERNAL MEDICINE
Payer: COMMERCIAL

## 2025-06-27 ENCOUNTER — NURSE TRIAGE (OUTPATIENT)
Dept: ADMINISTRATIVE | Facility: CLINIC | Age: 55
End: 2025-06-27
Payer: COMMERCIAL

## 2025-06-27 VITALS
WEIGHT: 179.44 LBS | OXYGEN SATURATION: 98 % | HEART RATE: 59 BPM | TEMPERATURE: 98 F | BODY MASS INDEX: 33.02 KG/M2 | SYSTOLIC BLOOD PRESSURE: 104 MMHG | RESPIRATION RATE: 22 BRPM | DIASTOLIC BLOOD PRESSURE: 53 MMHG | HEIGHT: 62 IN

## 2025-06-27 DIAGNOSIS — Z86.0100 PERSONAL HISTORY OF COLON POLYPS, UNSPECIFIED: Primary | ICD-10-CM

## 2025-06-27 DIAGNOSIS — Z12.11 COLON CANCER SCREENING: ICD-10-CM

## 2025-06-27 LAB — POCT GLUCOSE: 83 MG/DL (ref 70–110)

## 2025-06-27 PROCEDURE — 27201012 HC FORCEPS, HOT/COLD, DISP: Performed by: INTERNAL MEDICINE

## 2025-06-27 PROCEDURE — 25000003 PHARM REV CODE 250: Performed by: INTERNAL MEDICINE

## 2025-06-27 PROCEDURE — 37000008 HC ANESTHESIA 1ST 15 MINUTES

## 2025-06-27 PROCEDURE — 82962 GLUCOSE BLOOD TEST: CPT

## 2025-06-27 PROCEDURE — 63600175 PHARM REV CODE 636 W HCPCS: Performed by: NURSE ANESTHETIST, CERTIFIED REGISTERED

## 2025-06-27 PROCEDURE — 88305 TISSUE EXAM BY PATHOLOGIST: CPT | Mod: TC | Performed by: INTERNAL MEDICINE

## 2025-06-27 PROCEDURE — 37000009 HC ANESTHESIA EA ADD 15 MINS

## 2025-06-27 RX ORDER — LIDOCAINE HYDROCHLORIDE 20 MG/ML
INJECTION INTRAVENOUS
Status: DISCONTINUED | OUTPATIENT
Start: 2025-06-27 | End: 2025-06-27

## 2025-06-27 RX ORDER — DEXTROMETHORPHAN/PSEUDOEPHED 2.5-7.5/.8
DROPS ORAL
Status: COMPLETED | OUTPATIENT
Start: 2025-06-27 | End: 2025-06-27

## 2025-06-27 RX ORDER — PROPOFOL 10 MG/ML
VIAL (ML) INTRAVENOUS
Status: DISCONTINUED | OUTPATIENT
Start: 2025-06-27 | End: 2025-06-27

## 2025-06-27 RX ADMIN — PROPOFOL 75 MG: 10 INJECTION, EMULSION INTRAVENOUS at 09:06

## 2025-06-27 RX ADMIN — PROPOFOL 20 MG: 10 INJECTION, EMULSION INTRAVENOUS at 09:06

## 2025-06-27 RX ADMIN — LIDOCAINE HYDROCHLORIDE 50 MG: 20 INJECTION INTRAVENOUS at 09:06

## 2025-06-27 RX ADMIN — SIMETHICONE 200 MG: 20 SUSPENSION/ DROPS ORAL at 09:06

## 2025-06-27 NOTE — TRANSFER OF CARE
"Anesthesia Transfer of Care Note    Patient: Gerda Obrien    Procedure(s) Performed: * No procedures listed *    Patient location: PACU    Anesthesia Type: general    Transport from OR: Transported from OR on room air with adequate spontaneous ventilation    Post pain: adequate analgesia    Post assessment: no apparent anesthetic complications    Post vital signs: stable    Level of consciousness: awake    Nausea/Vomiting: no nausea/vomiting    Complications: none    Transfer of care protocol was followed    Last vitals: Visit Vitals  BP (!) 101/57   Pulse 70   Temp 36.6 °C (97.8 °F)   Resp 15   Ht 5' 2" (1.575 m)   Wt 81.4 kg (179 lb 7.3 oz)   LMP 11/12/2017 (Approximate)   SpO2 99%   Breastfeeding No   BMI 32.82 kg/m²     "

## 2025-06-27 NOTE — TELEPHONE ENCOUNTER
Patient reports irritation to her rectum from her colonoscopy prep. She denies any bleeding or fever. Disposition provided - home care. Instructed to call back with additional questions or worsening of symptoms. Patient verbalized understanding.       Reason for Disposition   Mild rectal itching    Additional Information   Negative: Sounds like a life-threatening emergency to the triager   Negative: Injury to rectum   Negative: Patient sounds very sick or weak to the triager   Negative: Severe rectal pain   Negative: Rectal pain or redness and fever > 100.4 F (38.0 C)   Negative: Acute onset rectal pain and constipation (straining with rectal pressure or fullness), which is not relieved by Sitz bath or suppository   Negative: MODERATE-SEVERE rectal pain (i.e., interferes with school, work, or sleep)   Negative: MODERATE-SEVERE rectal itching (i.e., interferes with school, work, or sleep)   Negative: Last bowel movement (BM) > 4 days ago   Negative: Rectal area looks infected (e.g., draining sore, spreading redness)   Negative: Rash of rectal area (e.g., open sore, painful tiny water blisters, unexplained bumps)   Negative: Patient is worried they have a sexually transmitted infection (STI)   Negative: Home treatment > 3 days for rectal pain and not improved   Negative: Home treatment for > 3 days for rectal itching and not improved   Negative: Patient wants to be seen   Negative: Recurrent episodes of unexplained rectal pain, but NO rectal symptoms now   Negative: Painless lump in rectal area   Negative: Mild rectal pain    Protocols used: Rectal Symptoms-A-OH

## 2025-06-27 NOTE — DISCHARGE SUMMARY
[facility] - Endoscopy 1st Fl  Discharge Note  Short Stay    Colonoscopy      OUTCOME: Patient tolerated treatment/procedure well without complication and is now ready for discharge.    DISPOSITION: Home or Self Care    FINAL DIAGNOSIS:  Personal history of colon polyps, unspecified    FOLLOWUP: With primary care provider    DISCHARGE INSTRUCTIONS:  No discharge procedures on file.

## 2025-06-27 NOTE — H&P
Short Stay Endoscopy History and Physical    PCP - Meli Gutierrez MD    Procedure - Colonoscopy  ASA - per anesthesia  Mallampati - per anesthesia  History of Anesthesia problems - no  Family history Anesthesia problems -  no     HPI:  This is a 54 y.o. female here for evaluation of :   Active Hospital Problems    Diagnosis  POA    *Personal history of colon polyps, unspecified [Z86.0100]  Not Applicable      Resolved Hospital Problems   No resolved problems to display.         Health Maintenance         Date Due Completion Date    Colorectal Cancer Screening 10/15/2024 10/15/2021    TETANUS VACCINE 05/27/2026 (Originally 2/21/2025) 2/21/2015    Override on 2/16/2015: Done    COVID-19 Vaccine (4 - 2024-25 season) 05/27/2026 (Originally 9/1/2024) 12/6/2021    Pneumococcal Vaccines (Age 50+) (1 of 2 - PCV) 05/27/2026 (Originally 9/8/1989) ---    Mammogram 09/30/2025 9/30/2024    Override on 8/18/2021: Done    Override on 8/17/2020: Done    Override on 10/1/2018: Done    Override on 10/1/2017: Done    Override on 10/1/2016: Done    Hemoglobin A1c 11/27/2025 5/27/2025    Diabetic Eye Exam 05/12/2026 5/12/2025    Override on 6/1/2023: Done    Diabetes Urine Screening 05/27/2026 5/27/2025    Foot Exam 05/27/2026 5/27/2025    Lipid Panel 05/27/2026 5/27/2025    Override on 6/5/2018: Done    DEXA Scan 05/28/2027 5/28/2025    RSV Vaccine (Age 60+ and Pregnant patients) (1 - 1-dose 75+ series) 09/08/2045 ---              ROS:  CONSTITUTIONAL: Denies weight change,  fatigue, fevers, chills, night sweats.  CARDIOVASCULAR: Denies chest pain, shortness of breath, orthopnea and edema.  RESPIRATORY: Denies cough, hemoptysis, dyspnea, and wheezing.  GI: See HPI.    Medical History:   Past Medical History:   Diagnosis Date    Allergy     Anxiety     Biotin deficiency disease     Chlamydia     Diabetes mellitus     Fibroids     Herpes simplex     Knee pain, bilateral     Metabolic syndrome     Sleep apnea     Vitamin D  deficiency        Surgical History:   Past Surgical History:   Procedure Laterality Date    ARTHROSCOPIC REPAIR OF ROTATOR CUFF OF SHOULDER Left 03/07/2022    Procedure: REPAIR, ROTATOR CUFF, ARTHROSCOPIC;  Surgeon: Denilson Jackson MD;  Location: [facility] OR;  Service: Orthopedics;  Laterality: Left;    ARTHROSCOPY OF SHOULDER WITH DECOMPRESSION OF SUBACROMIAL SPACE Left 03/07/2022    Procedure: ARTHROSCOPY, SHOULDER, WITH SUBACROMIAL SPACE DECOMPRESSION;  Surgeon: Denilson Jcakson MD;  Location: [facility] OR;  Service: Orthopedics;  Laterality: Left;    BREAST SURGERY      BUNIONECTOMY      CERVICAL BIOPSY  W/ LOOP ELECTRODE EXCISION      COLONOSCOPY N/A 10/15/2021    Procedure: COLONOSCOPY;  Surgeon: Alaina Ceja MD;  Location: [facility] ENDO;  Service: Endoscopy;  Laterality: N/A;    FIXATION OF TENDON Left 03/07/2022    Procedure: FIXATION, TENDON;  Surgeon: Denilson Jackson MD;  Location: [facility] OR;  Service: Orthopedics;  Laterality: Left;    FOOT SURGERY      hammer toe Bilateral     HYSTERECTOMY  11/2021    INJECTION OF ANESTHETIC AGENT AROUND MEDIAL BRANCH NERVES INNERVATING LUMBAR FACET JOINT Bilateral 10/26/2023    Procedure: Diagnostic Bilateral L4/5 & L5/S1 MBB #1;  Surgeon: Beka Johnson MD;  Location: [facility] PAIN MGT;  Service: Pain Management;  Laterality: Bilateral;    INJECTION OF ANESTHETIC AGENT AROUND MEDIAL BRANCH NERVES INNERVATING LUMBAR FACET JOINT Bilateral 11/20/2023    Procedure: Bilateral L4/L5 and L5/S1 MBB;  Surgeon: Beka Johnson MD;  Location: [facility] PAIN MGT;  Service: Pain Management;  Laterality: Bilateral;    INJECTION, SACROILIAC JOINT Bilateral 6/5/2025    Procedure: INJECTION,SACROILIAC JOINT;  Surgeon: Beka Johnson MD;  Location: [facility] PAIN MGT;  Service: Pain Management;  Laterality: Bilateral;    Laparoscopic assisted vaginal hysterectomy with BSO  11/22/2021    Path--Leiomyomata/Adenomyosis    LASER ABLATION OF THE CERVIX      PLANTAR FASCIA  SURGERY Left     RADIOFREQUENCY THERMOCOAGULATION Bilateral 12/21/2023    Procedure: Bilateral L4/L5 and L5/S1 Lumbar RFA;  Surgeon: Beka Johnson MD;  [facility];  Service: Pain Management;  Laterality: Bilateral;    RADIOFREQUENCY THERMOCOAGULATION Bilateral 3/27/2025    Procedure: bilateral L4/5 & L5/S1 RFA;  Surgeon: Beka Johnson MD;  [facility];  Service: Pain Management;  Laterality: Bilateral;    TOTAL REDUCTION MAMMOPLASTY  03/2007    TUBAL LIGATION         Family History:   Family History   Problem Relation Name Age of Onset    Diabetes Paternal Grandfather Cezar Wilson     Epilepsy Maternal Grandmother Elissa Cuello     Seizures Maternal Grandmother Elissa Cuello     Cancer Father Gordon Wilson         Prostrate    Diabetes Father Gordon Wilson     Breast cancer Cousin Paternal     Hypertension Mother Ping Wilson Brown     Heart disease Neg Hx         Social History:   Social History[1]    Allergies:   Review of patient's allergies indicates:   Allergen Reactions    Doxycycline     Dulaglutide Other (See Comments)    Emtricitabine      Other reaction(s): Rash    Lexapro [escitalopram oxalate]      spasms    Lipitor [atorvastatin]      Decreased sex drive    Metformin hcl      Other Reaction(s): Unknown    Sulfa (sulfonamide antibiotics) Other (See Comments)    Tenofovir      Other reaction(s): Rash    Bydureon [exenatide microspheres] Rash     Skin reaction at site of injection.       Medications:   Medications Ordered Prior to Encounter[2]    Physical Exam:  Vital Signs:   Vitals:    06/27/25 0841   BP: 122/60   Pulse: 74   Resp: 18   Temp: 97.6 °F (36.4 °C)     General Appearance: Well appearing in no acute distress  ENT: OP clear  Chest: CTA B  CV: RRR, no m/r/g  Abd: s/nt/nd/nabs  Ext: no edema    Labs:Reviewed    IMP:  Active Hospital Problems    Diagnosis  POA    *Personal history of colon polyps, unspecified [Z86.0100]  Not Applicable      Resolved  Hospital Problems   No resolved problems to display.         Plan:   I have explained the risks and benefits of colonoscopy to the patient including but not limited to bleeding, perforation, infection, and death. The patient wishes to proceed.         [1]   Social History  Tobacco Use    Smoking status: Never    Smokeless tobacco: Never   Substance Use Topics    Alcohol use: Yes     Alcohol/week: 1.0 standard drink of alcohol     Types: 1 Glasses of wine per week     Comment: ocassional     Drug use: No   [2]   Current Outpatient Medications on File Prior to Encounter   Medication Sig Dispense Refill    biotin 10,000 mcg Cap Take 1 capsule by mouth once daily.      cholecalciferol, vitamin D3, (VITAMIN D3) 250 mcg (10,000 unit) Cap capsule Take 40,000 Units by mouth once daily.      estradioL (LYLLANA) 0.1 mg/24 hr PTSW Place 1 patch onto the skin twice a week. 24 patch 3    rosuvastatin (CRESTOR) 5 MG tablet Take 1 tablet (5 mg total) by mouth once daily. 90 tablet 1    VITAMIN B COMPLEX ORAL Take 1 capsule by mouth once daily.      blood sugar diagnostic (ACCU-CHEK GUIDE TEST STRIPS) Strp Use bid with glucometer 100 strip 2    blood-glucose sensor (DEXCOM G7 SENSOR) Tanja Change sensor every 10 days 5 each 2    clonazePAM (KLONOPIN) 0.5 MG tablet TAKE 1 TABLET BY MOUTH EVERY DAY AS NEEDED FOR ANXIETY 20 tablet 0    clotrimazole-betamethasone 1-0.05% (LOTRISONE) cream Apply topically 2 (two) times daily. 45 g 1    desloratadine (CLARINEX) 5 mg tablet TAKE 1 TABLET BY MOUTH EVERY DAY 90 tablet 3    diclofenac sodium (VOLTAREN) 1 % Gel USE 2 GRAMS (TOPICAL) 4 TIMES PER DAY AS NEEDED FOR PAIN      ferrous sulfate (FEOSOL) 325 mg (65 mg iron) Tab tablet TAKE 1 TABLET BY MOUTH EVERY DAY WITH BREAKFAST 90 tablet 1    fluticasone propionate (FLONASE) 50 mcg/actuation nasal spray SPRAY 2 SPRAYS IN EACH NOSTRIL ONCE DAILY      gabapentin (NEURONTIN) 300 MG capsule Take 1 capsule (300 mg total) by mouth every evening for 5  days, THEN 2 capsules (600 mg total) every evening. 115 capsule 0    meloxicam (MOBIC) 15 MG tablet TAKE 1 TABLET BY MOUTH EVERY DAY 30 tablet 1    multivitamin capsule Take by mouth.      omeprazole (PRILOSEC) 40 MG capsule TAKE 1 CAPSULE (40 MG TOTAL) BY MOUTH EVERY MORNING. 90 capsule 1    tirzepatide 12.5 mg/0.25 mL Syrg Inject 12.5 mg into the skin once a week. Inject  12.5 mg a week 3 mL 1    valACYclovir (VALTREX) 500 MG tablet Take 1 tablet (500 mg total) by mouth once daily. 90 tablet 3     No current facility-administered medications on file prior to encounter.

## 2025-06-27 NOTE — ANESTHESIA POSTPROCEDURE EVALUATION
Anesthesia Post Evaluation    Patient: Gerda Obrien    Procedure(s) Performed: * No procedures listed *    Final Anesthesia Type: general      Patient location during evaluation: PACU  Patient participation: Yes- Able to Participate  Level of consciousness: awake  Post-procedure vital signs: reviewed and stable  Pain management: adequate  Airway patency: patent    PONV status at discharge: No PONV  Anesthetic complications: no      Cardiovascular status: stable  Respiratory status: unassisted  Hydration status: euvolemic  Follow-up not needed.              Vitals Value Taken Time   /59 06/27/25 10:11   Temp 36.6 °C (97.8 °F) 06/27/25 09:59   Pulse 63 06/27/25 10:13   Resp 26 06/27/25 10:13   SpO2 100 % 06/27/25 10:13   Vitals shown include unfiled device data.      No case tracking events are documented in the log.      Pain/Ramandeep Score: Ramandeep Score: 9 (6/27/2025 10:01 AM)

## 2025-06-30 VITALS
SYSTOLIC BLOOD PRESSURE: 104 MMHG | RESPIRATION RATE: 22 BRPM | OXYGEN SATURATION: 98 % | HEART RATE: 59 BPM | WEIGHT: 179.44 LBS | TEMPERATURE: 98 F | HEIGHT: 62 IN | DIASTOLIC BLOOD PRESSURE: 53 MMHG | BODY MASS INDEX: 33.02 KG/M2

## 2025-07-01 LAB
ESTROGEN SERPL-MCNC: NORMAL PG/ML
INSULIN SERPL-ACNC: NORMAL U[IU]/ML
LAB AP CLINICAL INFORMATION: NORMAL
LAB AP GROSS DESCRIPTION: NORMAL
LAB AP PERFORMING LOCATION(S): NORMAL
LAB AP REPORT FOOTNOTES: NORMAL

## 2025-07-02 ENCOUNTER — RESULTS FOLLOW-UP (OUTPATIENT)
Dept: GASTROENTEROLOGY | Facility: CLINIC | Age: 55
End: 2025-07-02

## 2025-07-23 ENCOUNTER — PATIENT MESSAGE (OUTPATIENT)
Dept: OPHTHALMOLOGY | Facility: CLINIC | Age: 55
End: 2025-07-23
Payer: COMMERCIAL

## 2025-07-23 DIAGNOSIS — E11.9 TYPE 2 DIABETES MELLITUS WITHOUT COMPLICATIONS: ICD-10-CM

## 2025-07-23 DIAGNOSIS — H52.03 HYPERMETROPIA, BILATERAL: ICD-10-CM

## 2025-07-23 DIAGNOSIS — H40.003 PREGLAUCOMA, UNSPECIFIED, BILATERAL: Primary | ICD-10-CM

## 2025-07-28 ENCOUNTER — TELEPHONE (OUTPATIENT)
Dept: DERMATOLOGY | Facility: CLINIC | Age: 55
End: 2025-07-28
Payer: COMMERCIAL

## 2025-07-28 NOTE — TELEPHONE ENCOUNTER
Called pt in regards to r/s today's appt that was cancelled. Pt r/s to 2pm on 8/12. Pt accepted and appt scheduled.

## 2025-08-12 ENCOUNTER — OFFICE VISIT (OUTPATIENT)
Dept: DERMATOLOGY | Facility: CLINIC | Age: 55
End: 2025-08-12
Payer: COMMERCIAL

## 2025-08-12 DIAGNOSIS — L70.0 ACNE VULGARIS: Primary | ICD-10-CM

## 2025-08-12 DIAGNOSIS — L57.4 SKIN LAXITY: ICD-10-CM

## 2025-08-12 PROCEDURE — G2211 COMPLEX E/M VISIT ADD ON: HCPCS | Mod: S$GLB,,, | Performed by: DERMATOLOGY

## 2025-08-12 PROCEDURE — 3061F NEG MICROALBUMINURIA REV: CPT | Mod: CPTII,S$GLB,, | Performed by: DERMATOLOGY

## 2025-08-12 PROCEDURE — 1159F MED LIST DOCD IN RCRD: CPT | Mod: CPTII,S$GLB,, | Performed by: DERMATOLOGY

## 2025-08-12 PROCEDURE — 99999 PR PBB SHADOW E&M-EST. PATIENT-LVL IV: CPT | Mod: PBBFAC,,, | Performed by: DERMATOLOGY

## 2025-08-12 PROCEDURE — 99213 OFFICE O/P EST LOW 20 MIN: CPT | Mod: S$GLB,,, | Performed by: DERMATOLOGY

## 2025-08-12 PROCEDURE — 1160F RVW MEDS BY RX/DR IN RCRD: CPT | Mod: CPTII,S$GLB,, | Performed by: DERMATOLOGY

## 2025-08-12 PROCEDURE — 3066F NEPHROPATHY DOC TX: CPT | Mod: CPTII,S$GLB,, | Performed by: DERMATOLOGY

## 2025-08-12 PROCEDURE — 3044F HG A1C LEVEL LT 7.0%: CPT | Mod: CPTII,S$GLB,, | Performed by: DERMATOLOGY

## 2025-08-12 RX ORDER — TRETINOIN 0.5 MG/G
CREAM TOPICAL
Qty: 20 G | Refills: 6 | Status: SHIPPED | OUTPATIENT
Start: 2025-08-12 | End: 2026-08-12

## 2025-08-14 ENCOUNTER — PATIENT MESSAGE (OUTPATIENT)
Dept: DERMATOLOGY | Facility: CLINIC | Age: 55
End: 2025-08-14
Payer: COMMERCIAL

## 2025-08-27 DIAGNOSIS — L70.0 ACNE VULGARIS: ICD-10-CM

## 2025-08-27 DIAGNOSIS — L57.4 SKIN LAXITY: ICD-10-CM

## 2025-08-28 RX ORDER — TRETINOIN 0.5 MG/G
CREAM TOPICAL
Qty: 20 G | Refills: 6 | Status: SHIPPED | OUTPATIENT
Start: 2025-08-28 | End: 2026-08-27

## (undated) DEVICE — NDL SPINAL 18GX3.5 SPINOCAN

## (undated) DEVICE — NDL ARTHSCP MF SCORPION

## (undated) DEVICE — DRAPE INCISE IOBAN 2 23X17IN

## (undated) DEVICE — GOWN SMARTGOWN LVL4 X-LONG XL

## (undated) DEVICE — MANIFOLD 4 PORT

## (undated) DEVICE — ELECTRODE REM PLYHSV RETURN 9

## (undated) DEVICE — TOWEL OR DISP STRL BLUE 4/PK

## (undated) DEVICE — STOCKINETTE TUBULAR 2PL 6 X 4

## (undated) DEVICE — TUBING PUMP ARTHROSCOPY STRL

## (undated) DEVICE — PAD ABD 8X10 STERILE

## (undated) DEVICE — APPLICATOR CHLORAPREP ORN 26ML

## (undated) DEVICE — SEE MEDLINE ITEM 157117

## (undated) DEVICE — SUT ETHILON 3/0 18IN PS-1

## (undated) DEVICE — KIT TRIMANO

## (undated) DEVICE — SEE MEDLINE ITEM 157027

## (undated) DEVICE — BLADE COOLCUT EXCALIBER 4X13

## (undated) DEVICE — UNDERGLOVES BIOGEL PI SIZE 8

## (undated) DEVICE — COVER SURG LIGHT HANDLE

## (undated) DEVICE — DRAPE STERI U-SHAPED 47X51IN

## (undated) DEVICE — KIT FIBERTAK DISP ANCHOR 2.6

## (undated) DEVICE — TAPE SURGICAL MICROFOAM 4IN

## (undated) DEVICE — COVER LIGHT HANDLE 80/CA

## (undated) DEVICE — COVER CAMERA OPERATING ROOM

## (undated) DEVICE — DRAPE PLASTIC U 60X72

## (undated) DEVICE — SUPPORT ULNA NERVE PROTECTOR

## (undated) DEVICE — GAUZE SPONGE 4X4 12PLY

## (undated) DEVICE — DRAPE HIP TIBURON 87X115X134

## (undated) DEVICE — TUBING SUCTION STRAIGHT .25X20

## (undated) DEVICE — PROBE MULTI PORT RF 90 DEGREE

## (undated) DEVICE — SEE MEDLINE ITEM 146292

## (undated) DEVICE — SEE MEDLINE ITEM 157131

## (undated) DEVICE — DRESSING XEROFORM FOIL PK 1X8

## (undated) DEVICE — GLOVE BIOGEL ORTHOPEDIC 7.5

## (undated) DEVICE — BNDG COFLEX FOAM LF2 ST 4X5YD

## (undated) DEVICE — BLADE SCALP OPHTL RND TIP

## (undated) DEVICE — SET CASSETTE TUBE DW OUTFLOW

## (undated) DEVICE — CANNULA PASSPORT 8 MM X 4CM.